# Patient Record
Sex: MALE | Race: WHITE | NOT HISPANIC OR LATINO | Employment: OTHER | URBAN - METROPOLITAN AREA
[De-identification: names, ages, dates, MRNs, and addresses within clinical notes are randomized per-mention and may not be internally consistent; named-entity substitution may affect disease eponyms.]

---

## 2023-03-09 LAB
THYROTROPIN (MIU/L) IN SER/PLAS BY DETECTION LIMIT <= 0.05 MIU/L: 2 MIU/L (ref 0.44–3.98)
THYROXINE (T4) FREE (NG/DL) IN SER/PLAS: 1.21 NG/DL (ref 0.78–1.48)

## 2023-06-26 LAB
ALANINE AMINOTRANSFERASE (SGPT) (U/L) IN SER/PLAS: 17 U/L (ref 10–52)
ALBUMIN (G/DL) IN SER/PLAS: 4.3 G/DL (ref 3.4–5)
ALKALINE PHOSPHATASE (U/L) IN SER/PLAS: 27 U/L (ref 33–136)
ANION GAP IN SER/PLAS: 15 MMOL/L (ref 10–20)
ASPARTATE AMINOTRANSFERASE (SGOT) (U/L) IN SER/PLAS: 13 U/L (ref 9–39)
BILIRUBIN TOTAL (MG/DL) IN SER/PLAS: 0.7 MG/DL (ref 0–1.2)
CALCIUM (MG/DL) IN SER/PLAS: 9.8 MG/DL (ref 8.6–10.6)
CARBON DIOXIDE, TOTAL (MMOL/L) IN SER/PLAS: 27 MMOL/L (ref 21–32)
CHLORIDE (MMOL/L) IN SER/PLAS: 103 MMOL/L (ref 98–107)
CHOLESTEROL (MG/DL) IN SER/PLAS: 133 MG/DL (ref 0–199)
CHOLESTEROL IN HDL (MG/DL) IN SER/PLAS: 39.5 MG/DL
CHOLESTEROL/HDL RATIO: 3.4
CREATININE (MG/DL) IN SER/PLAS: 1.11 MG/DL (ref 0.5–1.3)
ESTIMATED AVERAGE GLUCOSE FOR HBA1C: 140 MG/DL
GFR MALE: 71 ML/MIN/1.73M2
GLUCOSE (MG/DL) IN SER/PLAS: 166 MG/DL (ref 74–99)
HEMOGLOBIN A1C/HEMOGLOBIN TOTAL IN BLOOD: 6.5 %
LDL: 63 MG/DL (ref 0–99)
POTASSIUM (MMOL/L) IN SER/PLAS: 4.6 MMOL/L (ref 3.5–5.3)
PROTEIN TOTAL: 6.9 G/DL (ref 6.4–8.2)
SODIUM (MMOL/L) IN SER/PLAS: 140 MMOL/L (ref 136–145)
TRIGLYCERIDE (MG/DL) IN SER/PLAS: 153 MG/DL (ref 0–149)
UREA NITROGEN (MG/DL) IN SER/PLAS: 21 MG/DL (ref 6–23)
VLDL: 31 MG/DL (ref 0–40)

## 2023-09-14 PROBLEM — S30.0XXA CONTUSION OF LOWER BACK: Status: ACTIVE | Noted: 2023-09-14

## 2023-09-14 PROBLEM — E55.9 VITAMIN D DEFICIENCY: Status: ACTIVE | Noted: 2023-09-14

## 2023-09-14 PROBLEM — I10 BENIGN ESSENTIAL HYPERTENSION: Status: ACTIVE | Noted: 2023-09-14

## 2023-09-14 PROBLEM — H35.89: Status: ACTIVE | Noted: 2023-09-14

## 2023-09-14 PROBLEM — R07.9 CHEST PAIN OF UNCERTAIN ETIOLOGY: Status: ACTIVE | Noted: 2023-09-14

## 2023-09-14 PROBLEM — N40.0 ENLARGED PROSTATE WITHOUT LOWER URINARY TRACT SYMPTOMS (LUTS): Status: ACTIVE | Noted: 2023-09-14

## 2023-09-14 PROBLEM — K52.9: Status: ACTIVE | Noted: 2023-09-14

## 2023-09-14 PROBLEM — K27.9 PUD (PEPTIC ULCER DISEASE): Status: ACTIVE | Noted: 2023-09-14

## 2023-09-14 PROBLEM — F22 PARANOIA (PSYCHOSIS) (MULTI): Status: ACTIVE | Noted: 2023-09-14

## 2023-09-14 PROBLEM — F33.1 MODERATE EPISODE OF RECURRENT MAJOR DEPRESSIVE DISORDER (MULTI): Status: ACTIVE | Noted: 2023-09-14

## 2023-09-14 PROBLEM — R35.0 URINARY FREQUENCY: Status: ACTIVE | Noted: 2023-09-14

## 2023-09-14 PROBLEM — I10 HTN (HYPERTENSION): Status: ACTIVE | Noted: 2023-09-14

## 2023-09-14 PROBLEM — F44.89 REACTIVE CONFUSION: Status: ACTIVE | Noted: 2023-09-14

## 2023-09-14 PROBLEM — R63.0 ANOREXIA: Status: ACTIVE | Noted: 2023-09-14

## 2023-09-14 PROBLEM — T50.905A ADVERSE EFFECT OF DRUG: Status: ACTIVE | Noted: 2023-09-14

## 2023-09-14 PROBLEM — E78.00 ELEVATED LDL CHOLESTEROL LEVEL: Status: ACTIVE | Noted: 2023-09-14

## 2023-09-14 PROBLEM — E03.9 PRIMARY HYPOTHYROIDISM: Status: ACTIVE | Noted: 2023-09-14

## 2023-09-14 PROBLEM — M79.609 TRIGGER POINT OF EXTREMITY: Status: ACTIVE | Noted: 2023-09-14

## 2023-09-14 PROBLEM — F25.9 SCHIZOAFFECTIVE DISORDER (MULTI): Status: ACTIVE | Noted: 2023-09-14

## 2023-09-14 PROBLEM — E11.9 DIABETES MELLITUS (MULTI): Status: ACTIVE | Noted: 2023-09-14

## 2023-09-14 PROBLEM — J44.9 COPD (CHRONIC OBSTRUCTIVE PULMONARY DISEASE) (MULTI): Status: ACTIVE | Noted: 2023-09-14

## 2023-09-14 PROBLEM — M17.12 PRIMARY OSTEOARTHRITIS OF LEFT KNEE: Status: ACTIVE | Noted: 2023-09-14

## 2023-09-14 PROBLEM — F41.8 MIXED ANXIETY DEPRESSIVE DISORDER: Status: ACTIVE | Noted: 2023-09-14

## 2023-09-14 PROBLEM — G62.9 PERIPHERAL NEUROPATHY: Status: ACTIVE | Noted: 2023-09-14

## 2023-09-14 PROBLEM — G21.11 NEUROLEPTIC-INDUCED PARKINSONISM (MULTI): Status: ACTIVE | Noted: 2023-09-14

## 2023-09-14 PROBLEM — R45.81 CHRONIC LOW SELF ESTEEM: Status: ACTIVE | Noted: 2023-09-14

## 2023-09-14 PROBLEM — G45.9 TIA (TRANSIENT ISCHEMIC ATTACK): Status: ACTIVE | Noted: 2023-09-14

## 2023-09-14 PROBLEM — F22 PARANOID IDEATION (MULTI): Status: ACTIVE | Noted: 2023-09-14

## 2023-09-14 PROBLEM — R42 VERTIGO: Status: ACTIVE | Noted: 2023-09-14

## 2023-09-14 PROBLEM — L70.0 ACNE VULGARIS: Status: ACTIVE | Noted: 2020-02-18

## 2023-09-14 PROBLEM — N50.819 PERSISTENT TESTICULAR PAIN: Status: ACTIVE | Noted: 2023-09-14

## 2023-09-14 PROBLEM — G89.29 PENILE PAIN, CHRONIC: Status: ACTIVE | Noted: 2023-09-14

## 2023-09-14 PROBLEM — H60.62 CHRONIC OTITIS EXTERNA OF LEFT EAR: Status: ACTIVE | Noted: 2023-09-14

## 2023-09-14 PROBLEM — Z04.9 CONDITION NOT FOUND: Status: ACTIVE | Noted: 2023-09-14

## 2023-09-14 PROBLEM — R53.83 FATIGUE: Status: ACTIVE | Noted: 2023-09-14

## 2023-09-14 PROBLEM — K21.00 REFLUX ESOPHAGITIS: Status: ACTIVE | Noted: 2023-09-14

## 2023-09-14 PROBLEM — N52.9 ORGANIC IMPOTENCE: Status: ACTIVE | Noted: 2023-09-14

## 2023-09-14 PROBLEM — F43.10 POST TRAUMATIC STRESS DISORDER (PTSD): Status: ACTIVE | Noted: 2023-09-14

## 2023-09-14 PROBLEM — R29.898 LEG WEAKNESS: Status: ACTIVE | Noted: 2023-09-14

## 2023-09-14 PROBLEM — H40.9 GLAUCOMA: Status: ACTIVE | Noted: 2023-09-14

## 2023-09-14 PROBLEM — R53.1 GENERALIZED WEAKNESS: Status: ACTIVE | Noted: 2023-09-14

## 2023-09-14 PROBLEM — E11.42 DIABETIC PERIPHERAL NEUROPATHY (MULTI): Status: ACTIVE | Noted: 2023-09-14

## 2023-09-14 PROBLEM — H54.7 IMPAIRED VISION: Status: ACTIVE | Noted: 2023-09-14

## 2023-09-14 PROBLEM — I95.2 HYPOTENSION DUE TO DRUGS: Status: ACTIVE | Noted: 2023-09-14

## 2023-09-14 PROBLEM — R06.02 SOB (SHORTNESS OF BREATH): Status: ACTIVE | Noted: 2023-09-14

## 2023-09-14 PROBLEM — M25.562 ACUTE PAIN OF BOTH KNEES: Status: ACTIVE | Noted: 2023-09-14

## 2023-09-14 PROBLEM — M25.569 ARTHRALGIA OF KNEE: Status: ACTIVE | Noted: 2023-09-14

## 2023-09-14 PROBLEM — E78.1 HIGH TRIGLYCERIDES: Status: ACTIVE | Noted: 2023-09-14

## 2023-09-14 PROBLEM — E29.1 HYPOGONADISM MALE: Status: ACTIVE | Noted: 2023-09-14

## 2023-09-14 PROBLEM — M50.30 DEGENERATION OF INTERVERTEBRAL DISC OF CERVICAL REGION: Status: ACTIVE | Noted: 2023-09-14

## 2023-09-14 PROBLEM — G47.00 INSOMNIA: Status: ACTIVE | Noted: 2023-09-14

## 2023-09-14 PROBLEM — E07.9 THYROID DISORDER: Status: ACTIVE | Noted: 2023-09-14

## 2023-09-14 PROBLEM — F68.8: Status: ACTIVE | Noted: 2023-09-14

## 2023-09-14 PROBLEM — T43.505A NEUROLEPTIC-INDUCED PARKINSONISM (MULTI): Status: ACTIVE | Noted: 2023-09-14

## 2023-09-14 PROBLEM — E29.1 TESTICULAR HYPOFUNCTION: Status: ACTIVE | Noted: 2023-09-14

## 2023-09-14 PROBLEM — J45.909 ASTHMATIC BRONCHITIS (HHS-HCC): Status: ACTIVE | Noted: 2023-09-14

## 2023-09-14 PROBLEM — M51.26 LUMBAR DISC HERNIATION: Status: ACTIVE | Noted: 2023-09-14

## 2023-09-14 PROBLEM — M25.561 ACUTE PAIN OF BOTH KNEES: Status: ACTIVE | Noted: 2023-09-14

## 2023-09-14 PROBLEM — K40.90 RIGHT INGUINAL HERNIA: Status: ACTIVE | Noted: 2023-09-14

## 2023-09-14 PROBLEM — H93.13 SUBJECTIVE TINNITUS OF BOTH EARS: Status: ACTIVE | Noted: 2023-09-14

## 2023-09-14 PROBLEM — G50.0 TRIGEMINAL NEURALGIA: Status: ACTIVE | Noted: 2023-09-14

## 2023-09-14 PROBLEM — R55 VASOVAGAL REACTION: Status: ACTIVE | Noted: 2023-09-14

## 2023-09-14 PROBLEM — H90.8 MIXED HEARING LOSS: Status: ACTIVE | Noted: 2023-09-14

## 2023-09-14 PROBLEM — E11.69: Status: ACTIVE | Noted: 2023-09-14

## 2023-09-14 PROBLEM — M77.11 LATERAL EPICONDYLITIS OF RIGHT ELBOW: Status: ACTIVE | Noted: 2023-09-14

## 2023-09-14 PROBLEM — M17.11 PRIMARY OSTEOARTHRITIS OF RIGHT KNEE: Status: ACTIVE | Noted: 2023-09-14

## 2023-09-14 PROBLEM — H66.90 CHRONIC OTITIS MEDIA: Status: ACTIVE | Noted: 2023-09-14

## 2023-09-14 PROBLEM — R73.9 HYPERGLYCEMIA: Status: ACTIVE | Noted: 2023-09-14

## 2023-09-14 PROBLEM — E11.610: Status: ACTIVE | Noted: 2023-09-14

## 2023-09-14 PROBLEM — N48.89 PENILE PAIN, CHRONIC: Status: ACTIVE | Noted: 2023-09-14

## 2023-09-14 PROBLEM — J98.11 PULMONARY ATELECTASIS: Status: ACTIVE | Noted: 2023-09-14

## 2023-09-14 PROBLEM — N45.1 EPIDIDYMITIS: Status: ACTIVE | Noted: 2023-09-14

## 2023-09-14 PROBLEM — G40.909 SEIZURE DISORDER (MULTI): Status: ACTIVE | Noted: 2023-09-14

## 2023-09-14 RX ORDER — DAPAGLIFLOZIN 5 MG/1
1 TABLET, FILM COATED ORAL DAILY
COMMUNITY
Start: 2019-05-14 | End: 2023-10-27 | Stop reason: SDUPTHER

## 2023-09-14 RX ORDER — TIMOLOL MALEATE 5 MG/ML
SOLUTION/ DROPS OPHTHALMIC
COMMUNITY
Start: 2012-07-12 | End: 2023-11-29 | Stop reason: SDUPTHER

## 2023-09-14 RX ORDER — POLYETHYLENE GLYCOL 3350, SODIUM CHLORIDE, SODIUM BICARBONATE, POTASSIUM CHLORIDE 420; 11.2; 5.72; 1.48 G/4L; G/4L; G/4L; G/4L
POWDER, FOR SOLUTION ORAL
COMMUNITY
Start: 2022-02-18

## 2023-09-14 RX ORDER — TESTOSTERONE 20.25 MG/1.25G
2 GEL TOPICAL DAILY
COMMUNITY
Start: 2021-02-26 | End: 2023-12-04 | Stop reason: SDUPTHER

## 2023-09-14 RX ORDER — RIBOFLAVIN (VITAMIN B2) 400 MG
400 TABLET ORAL
COMMUNITY
End: 2024-04-19 | Stop reason: ALTCHOICE

## 2023-09-14 RX ORDER — FLUOXETINE HYDROCHLORIDE 20 MG/1
1 CAPSULE ORAL DAILY
COMMUNITY
Start: 2020-03-17 | End: 2023-11-28 | Stop reason: WASHOUT

## 2023-09-14 RX ORDER — CLINDAMYCIN PHOSPHATE 11.9 MG/ML
1 SOLUTION TOPICAL
COMMUNITY
Start: 2016-10-26 | End: 2024-04-19 | Stop reason: ALTCHOICE

## 2023-09-14 RX ORDER — LACTULOSE 10 G/15ML
30 SOLUTION ORAL; RECTAL DAILY
COMMUNITY
Start: 2020-01-22 | End: 2024-04-19 | Stop reason: ALTCHOICE

## 2023-09-14 RX ORDER — DIAZEPAM 5 MG/1
1 TABLET ORAL NIGHTLY PRN
COMMUNITY
Start: 2013-03-12 | End: 2024-04-01

## 2023-09-14 RX ORDER — METFORMIN HYDROCHLORIDE 500 MG/1
2 TABLET ORAL 2 TIMES DAILY
COMMUNITY
Start: 2012-11-20 | End: 2024-02-08

## 2023-09-14 RX ORDER — LANOLIN ALCOHOL/MO/W.PET/CERES
1 CREAM (GRAM) TOPICAL DAILY
COMMUNITY
Start: 2016-09-29 | End: 2024-04-19 | Stop reason: ALTCHOICE

## 2023-09-14 RX ORDER — CHOLECALCIFEROL (VITAMIN D3) 50 MCG
2 TABLET ORAL
COMMUNITY
Start: 2016-12-13

## 2023-09-14 RX ORDER — ATORVASTATIN CALCIUM 20 MG/1
1 TABLET, FILM COATED ORAL NIGHTLY
COMMUNITY
Start: 2016-09-29 | End: 2023-11-07 | Stop reason: SDUPTHER

## 2023-09-14 RX ORDER — LISINOPRIL 10 MG/1
1 TABLET ORAL DAILY
COMMUNITY
Start: 2016-08-22 | End: 2023-10-03

## 2023-09-14 RX ORDER — RISPERIDONE 1 MG/1
1 TABLET ORAL 2 TIMES DAILY
COMMUNITY
Start: 2022-09-06 | End: 2023-11-15

## 2023-09-14 RX ORDER — BUPROPION HYDROCHLORIDE 150 MG/1
150 TABLET ORAL 2 TIMES DAILY
COMMUNITY
Start: 2019-02-11

## 2023-09-14 RX ORDER — LEVOTHYROXINE SODIUM 88 UG/1
1 TABLET ORAL DAILY
COMMUNITY
Start: 2014-08-28 | End: 2023-10-30

## 2023-09-14 RX ORDER — PANTOPRAZOLE SODIUM 40 MG/1
1 TABLET, DELAYED RELEASE ORAL DAILY
COMMUNITY
Start: 2018-12-12 | End: 2023-10-19

## 2023-09-14 RX ORDER — LATANOPROST 50 UG/ML
SOLUTION/ DROPS OPHTHALMIC
COMMUNITY
Start: 2016-09-14

## 2023-09-14 RX ORDER — GLIPIZIDE 5 MG/1
2 TABLET ORAL
COMMUNITY
Start: 2015-03-03 | End: 2023-10-26 | Stop reason: SDUPTHER

## 2023-09-14 RX ORDER — OMEGA-3-ACID ETHYL ESTERS 1 G/1
4 CAPSULE, LIQUID FILLED ORAL
COMMUNITY
Start: 2015-05-11 | End: 2023-11-27 | Stop reason: SDUPTHER

## 2023-09-14 RX ORDER — PHENYLPROPANOLAMINE/CLEMASTINE 75-1.34MG
200 TABLET, EXTENDED RELEASE ORAL
COMMUNITY

## 2023-09-14 RX ORDER — TRIAMCINOLONE ACETONIDE 1 MG/G
CREAM TOPICAL
COMMUNITY
Start: 2016-10-26

## 2023-09-14 RX ORDER — LEVETIRACETAM 250 MG/1
1 TABLET ORAL DAILY
COMMUNITY
Start: 2022-06-06

## 2023-09-14 RX ORDER — BLOOD-GLUCOSE METER
KIT MISCELLANEOUS 2 TIMES DAILY
COMMUNITY
Start: 2014-09-03

## 2023-10-02 ENCOUNTER — TELEPHONE (OUTPATIENT)
Dept: PRIMARY CARE | Facility: CLINIC | Age: 73
End: 2023-10-02
Payer: COMMERCIAL

## 2023-10-03 ENCOUNTER — TELEMEDICINE (OUTPATIENT)
Dept: BEHAVIORAL HEALTH | Facility: CLINIC | Age: 73
End: 2023-10-03
Payer: COMMERCIAL

## 2023-10-03 DIAGNOSIS — F41.8 MIXED ANXIETY DEPRESSIVE DISORDER: ICD-10-CM

## 2023-10-03 DIAGNOSIS — F25.1 SCHIZOAFFECTIVE DISORDER, DEPRESSIVE TYPE (MULTI): ICD-10-CM

## 2023-10-03 DIAGNOSIS — I10 ESSENTIAL (PRIMARY) HYPERTENSION: ICD-10-CM

## 2023-10-03 PROCEDURE — 99215 OFFICE O/P EST HI 40 MIN: CPT | Performed by: PSYCHIATRY & NEUROLOGY

## 2023-10-03 RX ORDER — LISINOPRIL 10 MG/1
10 TABLET ORAL DAILY
Qty: 90 TABLET | Refills: 2 | Status: SHIPPED | OUTPATIENT
Start: 2023-10-03

## 2023-10-03 ASSESSMENT — ACTIVITIES OF DAILY LIVING (ADL)
DRESSING: INDEPENDENT
BATHING: INDEPENDENT
JUDGMENT_ADEQUATE_SAFELY_COMPLETE_DAILY_ACTIVITIES: YES
FEEDING: INDEPENDENT
MANAGING FINANCES: INDEPENDENT
GROCERY SHOPPING: INDEPENDENT
USING TRANSPORTATION: INDEPENDENT
TOILETING: INDEPENDENT
EATING: INDEPENDENT
STIL DRIVING: YES
PREPARING MEALS: INDEPENDENT
TAKING MEDICATION: INDEPENDENT
ADEQUATE_TO_COMPLETE_ADL: YES
USING TELEPHONE: INDEPENDENT
DOING HOUSEWORK: INDEPENDENT
NEEDS ASSISTANCE WITH FOOD: INDEPENDENT

## 2023-10-03 ASSESSMENT — ENCOUNTER SYMPTOMS
LOSS OF SENSATION IN FEET: 0
DEPRESSION: 0
DYSPHORIC MOOD: 1
NEUROLOGICAL NEGATIVE: 1
OCCASIONAL FEELINGS OF UNSTEADINESS: 0

## 2023-10-03 NOTE — PROGRESS NOTES
Subjective   Patient ID: Truong Armijo is a 73 y.o. male who presents for No chief complaint on file. I have COVID and I have am ill.     The patient reported that he now has COVID and is not feeling well. His symptoms started 4 days ago. Kirby is still concerned about his blood sugars.     Review of Systems   Neurological: Negative.    Psychiatric/Behavioral:  Positive for dysphoric mood.    All other systems reviewed and are negative.    Objective   Physical Exam  Constitutional:       Appearance: Normal appearance.   Neurological:      General: No focal deficit present.      Mental Status: He is alert and oriented to person, place, and time. Mental status is at baseline.   Psychiatric:      Comments: Dysphoric mood and now has COVID with symptoms starting four days ago. He has been concerned about his blood sugars.      Lab Review:       Assessment/Plan   The plan is to continue your current regimen and follow up next month with continuing monthly follow up.

## 2023-10-03 NOTE — TELEPHONE ENCOUNTER
LM for pt that we need a negative covid test within 24 hours due to his symptoms to come in the office

## 2023-10-04 ENCOUNTER — APPOINTMENT (OUTPATIENT)
Dept: PRIMARY CARE | Facility: CLINIC | Age: 73
End: 2023-10-04
Payer: COMMERCIAL

## 2023-10-17 ENCOUNTER — APPOINTMENT (OUTPATIENT)
Dept: BEHAVIORAL HEALTH | Facility: CLINIC | Age: 73
End: 2023-10-17
Payer: COMMERCIAL

## 2023-10-18 ENCOUNTER — OFFICE VISIT (OUTPATIENT)
Dept: PRIMARY CARE | Facility: CLINIC | Age: 73
End: 2023-10-18
Payer: COMMERCIAL

## 2023-10-18 VITALS
HEART RATE: 72 BPM | RESPIRATION RATE: 16 BRPM | SYSTOLIC BLOOD PRESSURE: 110 MMHG | DIASTOLIC BLOOD PRESSURE: 70 MMHG | BODY MASS INDEX: 27.44 KG/M2 | WEIGHT: 213.85 LBS | HEIGHT: 74 IN

## 2023-10-18 DIAGNOSIS — Z23 FLU VACCINE NEED: ICD-10-CM

## 2023-10-18 DIAGNOSIS — G93.39 OTHER POST INFECTION AND RELATED FATIGUE SYNDROMES: ICD-10-CM

## 2023-10-18 DIAGNOSIS — U09.9 POST-COVID-19 SYNDROME MANIFESTING AS CHRONIC FATIGUE: ICD-10-CM

## 2023-10-18 DIAGNOSIS — J43.1 PANLOBULAR EMPHYSEMA (MULTI): Primary | ICD-10-CM

## 2023-10-18 DIAGNOSIS — G93.32 POST-COVID-19 SYNDROME MANIFESTING AS CHRONIC FATIGUE: ICD-10-CM

## 2023-10-18 DIAGNOSIS — M51.26 LUMBAR DISC HERNIATION: ICD-10-CM

## 2023-10-18 DIAGNOSIS — I10 BENIGN ESSENTIAL HYPERTENSION: ICD-10-CM

## 2023-10-18 DIAGNOSIS — E03.9 PRIMARY HYPOTHYROIDISM: ICD-10-CM

## 2023-10-18 DIAGNOSIS — E78.00 ELEVATED LDL CHOLESTEROL LEVEL: ICD-10-CM

## 2023-10-18 DIAGNOSIS — K21.00 GASTROESOPHAGEAL REFLUX DISEASE WITH ESOPHAGITIS WITHOUT HEMORRHAGE: ICD-10-CM

## 2023-10-18 DIAGNOSIS — E11.9 TYPE 2 DIABETES MELLITUS WITHOUT COMPLICATION, WITHOUT LONG-TERM CURRENT USE OF INSULIN (MULTI): ICD-10-CM

## 2023-10-18 PROBLEM — H25.13 NUCLEAR SCLEROTIC CATARACT OF BOTH EYES: Status: RESOLVED | Noted: 2019-09-26 | Resolved: 2023-10-18

## 2023-10-18 PROBLEM — D23.10 BENIGN NEOPLASM OF EYELID INCLUDING CANTHUS: Status: RESOLVED | Noted: 2017-12-28 | Resolved: 2023-10-18

## 2023-10-18 PROBLEM — J45.909 ASTHMA (HHS-HCC): Status: RESOLVED | Noted: 2019-09-26 | Resolved: 2023-10-18

## 2023-10-18 PROCEDURE — 3044F HG A1C LEVEL LT 7.0%: CPT | Performed by: INTERNAL MEDICINE

## 2023-10-18 PROCEDURE — 4010F ACE/ARB THERAPY RXD/TAKEN: CPT | Performed by: INTERNAL MEDICINE

## 2023-10-18 PROCEDURE — 1126F AMNT PAIN NOTED NONE PRSNT: CPT | Performed by: INTERNAL MEDICINE

## 2023-10-18 PROCEDURE — 90471 IMMUNIZATION ADMIN: CPT | Performed by: INTERNAL MEDICINE

## 2023-10-18 PROCEDURE — 3078F DIAST BP <80 MM HG: CPT | Performed by: INTERNAL MEDICINE

## 2023-10-18 PROCEDURE — 1036F TOBACCO NON-USER: CPT | Performed by: INTERNAL MEDICINE

## 2023-10-18 PROCEDURE — 3074F SYST BP LT 130 MM HG: CPT | Performed by: INTERNAL MEDICINE

## 2023-10-18 PROCEDURE — 99214 OFFICE O/P EST MOD 30 MIN: CPT | Performed by: INTERNAL MEDICINE

## 2023-10-18 PROCEDURE — 90662 IIV NO PRSV INCREASED AG IM: CPT | Performed by: INTERNAL MEDICINE

## 2023-10-18 PROCEDURE — 1159F MED LIST DOCD IN RCRD: CPT | Performed by: INTERNAL MEDICINE

## 2023-10-18 ASSESSMENT — ENCOUNTER SYMPTOMS
EYES NEGATIVE: 1
NEUROLOGICAL NEGATIVE: 1
HEMATOLOGIC/LYMPHATIC NEGATIVE: 1
ALLERGIC/IMMUNOLOGIC NEGATIVE: 1
ENDOCRINE NEGATIVE: 1
GASTROINTESTINAL NEGATIVE: 1
RESPIRATORY NEGATIVE: 1
CARDIOVASCULAR NEGATIVE: 1
CONSTITUTIONAL NEGATIVE: 1
MUSCULOSKELETAL NEGATIVE: 1
PSYCHIATRIC NEGATIVE: 1

## 2023-10-18 NOTE — ASSESSMENT & PLAN NOTE
DM - NIDDM  . Reviewed with patient / Will check  HbA1c and fasting blood sugars. Educate home self monitoring and diary keeping(reviewed with patient home blood sugar levels /diary). Educate extensively low calorie diet and weight loss with exercise. Reviewed BS- diary and Rx. Regimen. [place] renal protection with ARB/ACEI.               Educate compliance with diet and Rx. And educate risks and autcomes.

## 2023-10-18 NOTE — ASSESSMENT & PLAN NOTE
POST COVID Fatigue - Fatigue - check CMP(metabolic panel and elctrolytes) , CBC(complete blood cell count), TSH(thyroid function). Insomnia may play a role and sleep studies(rule out sleep apnea) are recommended . Educate sleep hygiene. Consider anxiety disorder vs. depression. Consider Stress test, and 2DECHO.

## 2023-10-18 NOTE — ASSESSMENT & PLAN NOTE
HTN - hypertension well/controlled .Target BP < 130/80  achieved. Educate low salt diet and exercise with weight loss. Educate home self monitoring and diary keeping. Educate risks of elevate blood pressure and benefits of prompt treatment.  Refill Lisinopril

## 2023-10-18 NOTE — ASSESSMENT & PLAN NOTE
DDD - Degenerative disc disease of the Lumbo-Sacral (LS)/Cervical (C)  spine. Educate exercises and referred patient to Physical Therapy (PT). Ordered X-Ray's of the LS/C spine. Consider MRI. Radiculopathy in the distribution of L4-L5-S1/C3-C4-C5 nerve roots, needs NSAIDS (Naprosin 500mg BID vs. Arthrotec 75mg BID or Prednisone taper (Medrol dose pack), Flexeril 10 mg qHS, heat application, and pain control with Tylenol 325 mg TID.

## 2023-10-18 NOTE — ASSESSMENT & PLAN NOTE
Hypothyroidism - Symptoms well/controlled (weight gain, fatigue, constipation, cold intolerance). Check TSH continues dose of Synthroid/Levothyroxine  of  88 bmcg/qD.

## 2023-10-18 NOTE — PROGRESS NOTES
"Subjective   Patient ID: Truong Armijo is a 73 y.o. male who presents for Follow-up (Follow up/Pt tested positive for covid two weeks ago-still feeling fatigue ).    HPI Covid infection 15 days ago was treated with Paxlovid and had bad symptoms and still feels very fatigued       Review of Systems   Constitutional: Negative.    HENT: Negative.     Eyes: Negative.    Respiratory: Negative.     Cardiovascular: Negative.    Gastrointestinal: Negative.    Endocrine: Negative.    Musculoskeletal: Negative.    Skin: Negative.    Allergic/Immunologic: Negative.    Neurological: Negative.    Hematological: Negative.    Psychiatric/Behavioral: Negative.     All other systems reviewed and are negative.      Objective   Ht 1.88 m (6' 2\")   Wt 97 kg (213 lb 13.5 oz)   BMI 27.46 kg/m²   Blood pressure 110/70, pulse 72, resp. rate 16, height 1.88 m (6' 2\"), weight 97 kg (213 lb 13.5 oz).   Physical Exam  Vitals and nursing note reviewed.   Constitutional:       Appearance: Normal appearance.   HENT:      Head: Normocephalic and atraumatic.      Right Ear: Tympanic membrane, ear canal and external ear normal.      Left Ear: Tympanic membrane, ear canal and external ear normal. There is no impacted cerumen.      Nose: Nose normal.      Mouth/Throat:      Mouth: Mucous membranes are moist.      Pharynx: Oropharynx is clear.   Eyes:      Extraocular Movements: Extraocular movements intact.      Conjunctiva/sclera: Conjunctivae normal.      Pupils: Pupils are equal, round, and reactive to light.   Cardiovascular:      Rate and Rhythm: Normal rate and regular rhythm.      Pulses: Normal pulses.      Heart sounds: Normal heart sounds. No murmur heard.  Pulmonary:      Effort: Pulmonary effort is normal. No respiratory distress.      Breath sounds: Normal breath sounds. No stridor. No wheezing, rhonchi or rales.   Chest:      Chest wall: No tenderness.   Abdominal:      General: Abdomen is flat. Bowel sounds are normal. There is no " distension.      Palpations: Abdomen is soft. There is no mass.      Tenderness: There is no abdominal tenderness. There is no right CVA tenderness, left CVA tenderness, guarding or rebound.      Hernia: No hernia is present.   Musculoskeletal:         General: Normal range of motion.      Cervical back: Normal range of motion and neck supple.   Skin:     General: Skin is warm.      Capillary Refill: Capillary refill takes less than 2 seconds.   Neurological:      General: No focal deficit present.      Mental Status: He is alert.      Cranial Nerves: No cranial nerve deficit.      Sensory: No sensory deficit.      Motor: No weakness.      Coordination: Coordination normal.      Gait: Gait normal.      Deep Tendon Reflexes: Reflexes normal.   Psychiatric:         Mood and Affect: Mood normal.         Behavior: Behavior normal. Behavior is cooperative.         Thought Content: Thought content normal.         Judgment: Judgment normal.         Assessment/Plan   Problem List Items Addressed This Visit             ICD-10-CM    Benign essential hypertension I10     HTN - hypertension well/controlled .Target BP < 130/80  achieved. Educate low salt diet and exercise with weight loss. Educate home self monitoring and diary keeping. Educate risks of elevate blood pressure and benefits of prompt treatment.  Refill Lisinopril            COPD (chronic obstructive pulmonary disease) (CMS/[facility]) - Primary J44.9    Elevated LDL cholesterol level E78.00     Hypercholesterolemia - Monitor lipid profile and educate patient upon risks of high cholesterol and targets. Educate diet and change in lifestyle and increase in exercises - Refill: Atorvastatin  and educate compliance with medication and diet.           Relevant Orders    Lipid Panel    Fatigue R53.83     POST COVID Fatigue - Fatigue - check CMP(metabolic panel and elctrolytes) , CBC(complete blood cell count), TSH(thyroid function). Insomnia may play a role and sleep  studies(rule out sleep apnea) are recommended . Educate sleep hygiene. Consider anxiety disorder vs. depression. Consider Stress test, and 2DECHO.           Relevant Orders    CBC and Auto Differential    Diabetes mellitus (CMS/[facility]) E11.9     DM - NIDDM  . Reviewed with patient / Will check  HbA1c and fasting blood sugars. Educate home self monitoring and diary keeping(reviewed with patient home blood sugar levels /diary). Educate extensively low calorie diet and weight loss with exercise. Reviewed BS- diary and Rx. Regimen. [place] renal protection with ARB/ACEI.               Educate compliance with diet and Rx. And educate risks and autcomes.                                                  Relevant Orders    Comprehensive Metabolic Panel    Hemoglobin A1C    Lumbar disc herniation M51.26     DDD - Degenerative disc disease of the Lumbo-Sacral (LS)/Cervical (C)  spine. Educate exercises and referred patient to Physical Therapy (PT). Ordered X-Ray's of the LS/C spine. Consider MRI. Radiculopathy in the distribution of L4-L5-S1/C3-C4-C5 nerve roots, needs NSAIDS (Naprosin 500mg BID vs. Arthrotec 75mg BID or Prednisone taper (Medrol dose pack), Flexeril 10 mg qHS, heat application, and pain control with Tylenol 325 mg TID.           Primary hypothyroidism E03.9     Hypothyroidism - Symptoms well/controlled (weight gain, fatigue, constipation, cold intolerance). Check TSH continues dose of Synthroid/Levothyroxine  of  88 bmcg/qD.           Relevant Orders    TSH with reflex to Free T4 if abnormal    Reflux esophagitis K21.00     GERD - Acid reflux disease. Rx. PPI (Prilosec/Prevacid/Protonix/Nexium) and educate diet and life style changes. Referred patient to an endoscopy (EGD) and check H. Pylori titers.           Post-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 chronic fatigue G93.32, U09.9     Other Visit Diagnoses         Codes    Flu vaccine need     Z23    Relevant Orders    Flu vaccine, quadrivalent, high-dose,  preservative free, age 65y+ (FLUZONE)

## 2023-10-18 NOTE — ASSESSMENT & PLAN NOTE
Hypercholesterolemia - Monitor lipid profile and educate patient upon risks of high cholesterol and targets. Educate diet and change in lifestyle and increase in exercises - Refill: Atorvastatin  and educate compliance with medication and diet.

## 2023-10-19 DIAGNOSIS — K27.9 PEPTIC ULCER, SITE UNSPECIFIED, UNSPECIFIED AS ACUTE OR CHRONIC, WITHOUT HEMORRHAGE OR PERFORATION: ICD-10-CM

## 2023-10-19 RX ORDER — PANTOPRAZOLE SODIUM 40 MG/1
40 TABLET, DELAYED RELEASE ORAL DAILY
Qty: 90 TABLET | Refills: 2 | Status: SHIPPED | OUTPATIENT
Start: 2023-10-19 | End: 2024-03-08 | Stop reason: SDUPTHER

## 2023-10-24 ENCOUNTER — TELEPHONE (OUTPATIENT)
Dept: NEUROLOGY | Facility: CLINIC | Age: 73
End: 2023-10-24
Payer: COMMERCIAL

## 2023-10-24 ENCOUNTER — APPOINTMENT (OUTPATIENT)
Dept: BEHAVIORAL HEALTH | Facility: CLINIC | Age: 73
End: 2023-10-24
Payer: COMMERCIAL

## 2023-10-24 DIAGNOSIS — G43.109 MIGRAINE EQUIVALENT: Primary | ICD-10-CM

## 2023-10-24 RX ORDER — GLUCOSAMINE HCL 500 MG
200 TABLET ORAL DAILY
Qty: 60 TABLET | Refills: 5 | Status: SHIPPED | OUTPATIENT
Start: 2023-10-24 | End: 2023-12-11

## 2023-10-24 NOTE — TELEPHONE ENCOUNTER
Toparimate was not helping... stated it made him feel worse...patient stopped taking it (not sure when he stopped, possibly a month ago) ... Would like to know what else he can do.

## 2023-10-26 DIAGNOSIS — E11.9 TYPE 2 DIABETES MELLITUS WITHOUT COMPLICATION, WITHOUT LONG-TERM CURRENT USE OF INSULIN (MULTI): ICD-10-CM

## 2023-10-26 RX ORDER — GLIPIZIDE 5 MG/1
10 TABLET ORAL
Qty: 120 TABLET | Refills: 2 | Status: SHIPPED | OUTPATIENT
Start: 2023-10-26 | End: 2024-01-26 | Stop reason: SDUPTHER

## 2023-10-27 DIAGNOSIS — E11.9 TYPE 2 DIABETES MELLITUS WITHOUT COMPLICATION, WITHOUT LONG-TERM CURRENT USE OF INSULIN (MULTI): Primary | ICD-10-CM

## 2023-10-27 RX ORDER — DAPAGLIFLOZIN 5 MG/1
5 TABLET, FILM COATED ORAL DAILY
Qty: 90 TABLET | Refills: 0 | Status: SHIPPED | OUTPATIENT
Start: 2023-10-27 | End: 2024-02-08

## 2023-10-29 DIAGNOSIS — E03.9 HYPOTHYROIDISM, UNSPECIFIED: ICD-10-CM

## 2023-10-30 RX ORDER — LEVOTHYROXINE SODIUM 88 UG/1
88 TABLET ORAL DAILY
Qty: 90 TABLET | Refills: 0 | Status: SHIPPED | OUTPATIENT
Start: 2023-10-30 | End: 2023-12-27 | Stop reason: SDUPTHER

## 2023-11-07 ENCOUNTER — OFFICE VISIT (OUTPATIENT)
Dept: OPHTHALMOLOGY | Facility: CLINIC | Age: 73
End: 2023-11-07
Payer: COMMERCIAL

## 2023-11-07 DIAGNOSIS — H52.4 PRESBYOPIA OF BOTH EYES: ICD-10-CM

## 2023-11-07 DIAGNOSIS — H43.813 PVD (POSTERIOR VITREOUS DETACHMENT), BOTH EYES: ICD-10-CM

## 2023-11-07 DIAGNOSIS — E78.1 HIGH TRIGLYCERIDES: ICD-10-CM

## 2023-11-07 DIAGNOSIS — H53.19 PHOTOPSIA: ICD-10-CM

## 2023-11-07 DIAGNOSIS — Z96.1 PSEUDOPHAKIA OF BOTH EYES: ICD-10-CM

## 2023-11-07 DIAGNOSIS — H52.13 MYOPIA, BILATERAL: ICD-10-CM

## 2023-11-07 DIAGNOSIS — H26.493 POSTERIOR CAPSULAR OPACIFICATION OF BOTH EYES, NOT OBSCURING VISION: ICD-10-CM

## 2023-11-07 DIAGNOSIS — G43.109 ACEPHALGIC MIGRAINE: Primary | ICD-10-CM

## 2023-11-07 PROCEDURE — 92015 DETERMINE REFRACTIVE STATE: CPT | Performed by: OPTOMETRIST

## 2023-11-07 PROCEDURE — 92014 COMPRE OPH EXAM EST PT 1/>: CPT | Performed by: OPTOMETRIST

## 2023-11-07 RX ORDER — ATORVASTATIN CALCIUM 20 MG/1
20 TABLET, FILM COATED ORAL NIGHTLY
Qty: 90 TABLET | Refills: 0 | Status: SHIPPED | OUTPATIENT
Start: 2023-11-07 | End: 2024-02-06

## 2023-11-07 ASSESSMENT — REFRACTION_MANIFEST
OS_AXIS: 015
OD_ADD: +3.00
OS_ADD: +3.00
OS_CYLINDER: -0.75
OD_SPHERE: -2.75
OS_SPHERE: -2.50

## 2023-11-07 ASSESSMENT — REFRACTION_WEARINGRX
OD_SPHERE: -3.00
OD_ADD: +3.00
OS_CYLINDER: -0.75
OS_ADD: +3.00
OS_AXIS: 015
OS_SPHERE: -2.75

## 2023-11-07 ASSESSMENT — TONOMETRY
OD_IOP_MMHG: 13
OS_IOP_MMHG: 14
IOP_METHOD: GOLDMANN APPLANATION

## 2023-11-07 ASSESSMENT — CUP TO DISC RATIO
OD_RATIO: 0.7
OS_RATIO: 0.7

## 2023-11-07 ASSESSMENT — VISUAL ACUITY
OS_CC: 20/30
CORRECTION_TYPE: GLASSES
OD_CC: 20/25
METHOD: SNELLEN - LINEAR

## 2023-11-07 ASSESSMENT — SLIT LAMP EXAM - LIDS
COMMENTS: NORMAL
COMMENTS: NORMAL

## 2023-11-07 ASSESSMENT — EXTERNAL EXAM - LEFT EYE: OS_EXAM: NORMAL

## 2023-11-07 ASSESSMENT — EXTERNAL EXAM - RIGHT EYE: OD_EXAM: NORMAL

## 2023-11-07 NOTE — PROGRESS NOTES
Hx. 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  A1C 6.5 (April 2023). Being seen by Dr. Garnica. Taking latanoprost, brimonidine and timolol for glaucoma and managed with Dr. Adamson. Recent history of acephalgic migraine. Ruled out retinal tear hole retinal detachment (RD) today as well. 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  The patient was asked to return to our clinic or seek out eye care ASAP if new flashes of light or floaters are noted.    PCO right eye (OD)>left eye (OS). No glare complaint and will monitor.   A spectacle prescription was dispensed to be used as needed.   RTC PRN with me.

## 2023-11-13 ENCOUNTER — LAB (OUTPATIENT)
Dept: LAB | Facility: LAB | Age: 73
End: 2023-11-13
Payer: COMMERCIAL

## 2023-11-13 DIAGNOSIS — E78.00 ELEVATED LDL CHOLESTEROL LEVEL: ICD-10-CM

## 2023-11-13 DIAGNOSIS — G93.39 OTHER POST INFECTION AND RELATED FATIGUE SYNDROMES: ICD-10-CM

## 2023-11-13 DIAGNOSIS — E03.9 PRIMARY HYPOTHYROIDISM: ICD-10-CM

## 2023-11-13 DIAGNOSIS — E11.9 TYPE 2 DIABETES MELLITUS WITHOUT COMPLICATION, WITHOUT LONG-TERM CURRENT USE OF INSULIN (MULTI): ICD-10-CM

## 2023-11-13 LAB
ALBUMIN SERPL BCP-MCNC: 4.7 G/DL (ref 3.4–5)
ALP SERPL-CCNC: 29 U/L (ref 33–136)
ALT SERPL W P-5'-P-CCNC: 16 U/L (ref 10–52)
ANION GAP SERPL CALC-SCNC: 15 MMOL/L (ref 10–20)
AST SERPL W P-5'-P-CCNC: 12 U/L (ref 9–39)
BASOPHILS # BLD AUTO: 0.07 X10*3/UL (ref 0–0.1)
BASOPHILS NFR BLD AUTO: 0.9 %
BILIRUB SERPL-MCNC: 0.8 MG/DL (ref 0–1.2)
BUN SERPL-MCNC: 21 MG/DL (ref 6–23)
CALCIUM SERPL-MCNC: 9.7 MG/DL (ref 8.6–10.6)
CHLORIDE SERPL-SCNC: 101 MMOL/L (ref 98–107)
CHOLEST SERPL-MCNC: 182 MG/DL (ref 0–199)
CHOLESTEROL/HDL RATIO: 4.2
CO2 SERPL-SCNC: 27 MMOL/L (ref 21–32)
CREAT SERPL-MCNC: 1.03 MG/DL (ref 0.5–1.3)
EOSINOPHIL # BLD AUTO: 0.25 X10*3/UL (ref 0–0.4)
EOSINOPHIL NFR BLD AUTO: 3.3 %
ERYTHROCYTE [DISTWIDTH] IN BLOOD BY AUTOMATED COUNT: 14 % (ref 11.5–14.5)
EST. AVERAGE GLUCOSE BLD GHB EST-MCNC: 128 MG/DL
GFR SERPL CREATININE-BSD FRML MDRD: 78 ML/MIN/1.73M*2
GLUCOSE SERPL-MCNC: 186 MG/DL (ref 74–99)
HBA1C MFR BLD: 6.1 %
HCT VFR BLD AUTO: 44.5 % (ref 41–52)
HDLC SERPL-MCNC: 43.2 MG/DL
HGB BLD-MCNC: 14.6 G/DL (ref 13.5–17.5)
IMM GRANULOCYTES # BLD AUTO: 0.04 X10*3/UL (ref 0–0.5)
IMM GRANULOCYTES NFR BLD AUTO: 0.5 % (ref 0–0.9)
LDLC SERPL CALC-MCNC: 109 MG/DL
LYMPHOCYTES # BLD AUTO: 1.97 X10*3/UL (ref 0.8–3)
LYMPHOCYTES NFR BLD AUTO: 25.8 %
MCH RBC QN AUTO: 30.7 PG (ref 26–34)
MCHC RBC AUTO-ENTMCNC: 32.8 G/DL (ref 32–36)
MCV RBC AUTO: 94 FL (ref 80–100)
MONOCYTES # BLD AUTO: 0.77 X10*3/UL (ref 0.05–0.8)
MONOCYTES NFR BLD AUTO: 10.1 %
NEUTROPHILS # BLD AUTO: 4.53 X10*3/UL (ref 1.6–5.5)
NEUTROPHILS NFR BLD AUTO: 59.4 %
NON HDL CHOLESTEROL: 139 MG/DL (ref 0–149)
NRBC BLD-RTO: 0 /100 WBCS (ref 0–0)
PLATELET # BLD AUTO: 248 X10*3/UL (ref 150–450)
POTASSIUM SERPL-SCNC: 4.1 MMOL/L (ref 3.5–5.3)
PROT SERPL-MCNC: 7.2 G/DL (ref 6.4–8.2)
RBC # BLD AUTO: 4.76 X10*6/UL (ref 4.5–5.9)
SODIUM SERPL-SCNC: 139 MMOL/L (ref 136–145)
TRIGL SERPL-MCNC: 147 MG/DL (ref 0–149)
TSH SERPL-ACNC: 2.68 MIU/L (ref 0.44–3.98)
VLDL: 29 MG/DL (ref 0–40)
WBC # BLD AUTO: 7.6 X10*3/UL (ref 4.4–11.3)

## 2023-11-13 PROCEDURE — 80061 LIPID PANEL: CPT

## 2023-11-13 PROCEDURE — 84443 ASSAY THYROID STIM HORMONE: CPT

## 2023-11-13 PROCEDURE — 83036 HEMOGLOBIN GLYCOSYLATED A1C: CPT

## 2023-11-13 PROCEDURE — 80053 COMPREHEN METABOLIC PANEL: CPT

## 2023-11-13 PROCEDURE — 85025 COMPLETE CBC W/AUTO DIFF WBC: CPT

## 2023-11-13 PROCEDURE — 36415 COLL VENOUS BLD VENIPUNCTURE: CPT

## 2023-11-15 DIAGNOSIS — F22 PARANOIA (PSYCHOSIS) (MULTI): ICD-10-CM

## 2023-11-15 RX ORDER — RISPERIDONE 1 MG/1
1 TABLET ORAL NIGHTLY
Qty: 30 TABLET | Refills: 2 | Status: SHIPPED | OUTPATIENT
Start: 2023-11-15 | End: 2024-03-19 | Stop reason: SDUPTHER

## 2023-11-18 DIAGNOSIS — E11.9 TYPE 2 DIABETES MELLITUS WITHOUT COMPLICATIONS (MULTI): ICD-10-CM

## 2023-11-20 RX ORDER — SITAGLIPTIN 100 MG/1
100 TABLET, FILM COATED ORAL DAILY
Qty: 90 TABLET | Refills: 0 | Status: SHIPPED | OUTPATIENT
Start: 2023-11-20 | End: 2024-02-16

## 2023-11-21 ENCOUNTER — TELEPHONE (OUTPATIENT)
Dept: PRIMARY CARE | Facility: CLINIC | Age: 73
End: 2023-11-21
Payer: COMMERCIAL

## 2023-11-21 ENCOUNTER — TELEPHONE (OUTPATIENT)
Dept: OTHER | Age: 73
End: 2023-11-21
Payer: COMMERCIAL

## 2023-11-21 NOTE — TELEPHONE ENCOUNTER
from [facility] Gissell Blackburn is calling to coordinate care for your mutual patient. She can be reached at [phone].

## 2023-11-22 ENCOUNTER — APPOINTMENT (OUTPATIENT)
Dept: PRIMARY CARE | Facility: CLINIC | Age: 73
End: 2023-11-22
Payer: COMMERCIAL

## 2023-11-26 PROBLEM — I10 ESSENTIAL HYPERTENSION: Status: ACTIVE | Noted: 2019-09-26

## 2023-11-26 RX ORDER — ARIPIPRAZOLE 15 MG/1
15 TABLET ORAL
COMMUNITY
Start: 2019-09-26 | End: 2023-11-28 | Stop reason: WASHOUT

## 2023-11-26 RX ORDER — NYSTATIN 100000 U/G
CREAM TOPICAL
COMMUNITY
Start: 2023-10-20

## 2023-11-26 RX ORDER — TOPIRAMATE 25 MG/1
TABLET ORAL
COMMUNITY
Start: 2023-09-15

## 2023-11-26 RX ORDER — SILDENAFIL CITRATE 20 MG/1
20 TABLET ORAL
COMMUNITY
Start: 2019-01-26 | End: 2024-04-19 | Stop reason: ALTCHOICE

## 2023-11-26 RX ORDER — CARBAMAZEPINE 100 MG/1
100 TABLET, EXTENDED RELEASE ORAL DAILY
COMMUNITY
Start: 2022-12-27 | End: 2023-11-28 | Stop reason: WASHOUT

## 2023-11-26 RX ORDER — BRIMONIDINE TARTRATE 1.5 MG/ML
SOLUTION/ DROPS OPHTHALMIC
COMMUNITY
Start: 2023-10-01 | End: 2024-01-12 | Stop reason: SDUPTHER

## 2023-11-26 RX ORDER — CHLORHEXIDINE GLUCONATE ORAL RINSE 1.2 MG/ML
SOLUTION DENTAL
COMMUNITY
Start: 2023-07-25 | End: 2024-04-19 | Stop reason: ALTCHOICE

## 2023-11-26 RX ORDER — BRIMONIDINE TARTRATE 1 MG/ML
SOLUTION/ DROPS OPHTHALMIC
COMMUNITY
Start: 2023-03-02 | End: 2024-01-15

## 2023-11-26 RX ORDER — NIACIN (INOSITOL NIACINATE) 400(500MG)
CAPSULE ORAL
COMMUNITY
Start: 2023-10-24 | End: 2024-01-26 | Stop reason: SDUPTHER

## 2023-11-26 RX ORDER — FLUOXETINE HYDROCHLORIDE 40 MG/1
40 CAPSULE ORAL DAILY
COMMUNITY
Start: 2023-10-27 | End: 2024-04-04

## 2023-11-26 RX ORDER — NIRMATRELVIR AND RITONAVIR 300-100 MG
KIT ORAL
COMMUNITY
Start: 2023-10-03 | End: 2023-11-28 | Stop reason: WASHOUT

## 2023-11-27 DIAGNOSIS — I10 BENIGN ESSENTIAL HYPERTENSION: ICD-10-CM

## 2023-11-27 RX ORDER — OMEGA-3-ACID ETHYL ESTERS 1 G/1
4 CAPSULE, LIQUID FILLED ORAL
Qty: 360 CAPSULE | Refills: 0 | Status: SHIPPED | OUTPATIENT
Start: 2023-11-27 | End: 2024-02-26

## 2023-11-28 ENCOUNTER — TELEMEDICINE (OUTPATIENT)
Dept: BEHAVIORAL HEALTH | Facility: CLINIC | Age: 73
End: 2023-11-28
Payer: COMMERCIAL

## 2023-11-28 DIAGNOSIS — F25.1 SCHIZOAFFECTIVE DISORDER, DEPRESSIVE TYPE (MULTI): ICD-10-CM

## 2023-11-28 PROCEDURE — 99215 OFFICE O/P EST HI 40 MIN: CPT | Performed by: PSYCHIATRY & NEUROLOGY

## 2023-11-28 SDOH — HEALTH STABILITY: PHYSICAL HEALTH: ON AVERAGE, HOW MANY DAYS PER WEEK DO YOU ENGAGE IN MODERATE TO STRENUOUS EXERCISE (LIKE A BRISK WALK)?: 0 DAYS

## 2023-11-28 SDOH — ECONOMIC STABILITY: INCOME INSECURITY: IN THE LAST 12 MONTHS, WAS THERE A TIME WHEN YOU WERE NOT ABLE TO PAY THE MORTGAGE OR RENT ON TIME?: NO

## 2023-11-28 SDOH — ECONOMIC STABILITY: FOOD INSECURITY: WITHIN THE PAST 12 MONTHS, THE FOOD YOU BOUGHT JUST DIDN'T LAST AND YOU DIDN'T HAVE MONEY TO GET MORE.: NEVER TRUE

## 2023-11-28 SDOH — ECONOMIC STABILITY: GENERAL
WHICH OF THE FOLLOWING DO YOU KNOW HOW TO USE AND HAVE ACCESS TO EVERY DAY? (CHOOSE ALL THAT APPLY): DESKTOP COMPUTER, LAPTOP COMPUTER, OR TABLET WITH BROADBAND INTERNET CONNECTION

## 2023-11-28 SDOH — ECONOMIC STABILITY: FOOD INSECURITY: WITHIN THE PAST 12 MONTHS, YOU WORRIED THAT YOUR FOOD WOULD RUN OUT BEFORE YOU GOT MONEY TO BUY MORE.: NEVER TRUE

## 2023-11-28 SDOH — HEALTH STABILITY: PHYSICAL HEALTH: ON AVERAGE, HOW MANY MINUTES DO YOU ENGAGE IN EXERCISE AT THIS LEVEL?: 0 MIN

## 2023-11-28 SDOH — ECONOMIC STABILITY: TRANSPORTATION INSECURITY
IN THE PAST 12 MONTHS, HAS THE LACK OF TRANSPORTATION KEPT YOU FROM MEDICAL APPOINTMENTS OR FROM GETTING MEDICATIONS?: NO

## 2023-11-28 SDOH — ECONOMIC STABILITY: HOUSING INSECURITY: IN THE LAST 12 MONTHS, HOW MANY PLACES HAVE YOU LIVED?: 1

## 2023-11-28 SDOH — ECONOMIC STABILITY: HOUSING INSECURITY
IN THE LAST 12 MONTHS, WAS THERE A TIME WHEN YOU DID NOT HAVE A STEADY PLACE TO SLEEP OR SLEPT IN A SHELTER (INCLUDING NOW)?: NO

## 2023-11-28 SDOH — ECONOMIC STABILITY: TRANSPORTATION INSECURITY
IN THE PAST 12 MONTHS, HAS LACK OF TRANSPORTATION KEPT YOU FROM MEETINGS, WORK, OR FROM GETTING THINGS NEEDED FOR DAILY LIVING?: NO

## 2023-11-28 SDOH — ECONOMIC STABILITY: GENERAL
WHICH OF THE FOLLOWING WOULD YOU LIKE TO GET CONNECTED TO IN ORDER TO RECEIVE A DISCOUNT OR FOR FREE? (CHOOSE ALL THAT APPLY): COMPUTER

## 2023-11-28 ASSESSMENT — PATIENT HEALTH QUESTIONNAIRE - PHQ9
8. MOVING OR SPEAKING SO SLOWLY THAT OTHER PEOPLE COULD HAVE NOTICED. OR THE OPPOSITE, BEING SO FIGETY OR RESTLESS THAT YOU HAVE BEEN MOVING AROUND A LOT MORE THAN USUAL: SEVERAL DAYS
SUM OF ALL RESPONSES TO PHQ9 QUESTIONS 1 & 2: 6
5. POOR APPETITE OR OVEREATING: SEVERAL DAYS
2. FEELING DOWN, DEPRESSED OR HOPELESS: MORE THAN HALF THE DAYS
1. LITTLE INTEREST OR PLEASURE IN DOING THINGS: NEARLY EVERY DAY
9. THOUGHTS THAT YOU WOULD BE BETTER OFF DEAD, OR OF HURTING YOURSELF: NOT AT ALL
10. IF YOU CHECKED OFF ANY PROBLEMS, HOW DIFFICULT HAVE THESE PROBLEMS MADE IT FOR YOU TO DO YOUR WORK, TAKE CARE OF THINGS AT HOME, OR GET ALONG WITH OTHER PEOPLE: SOMEWHAT DIFFICULT
7. TROUBLE CONCENTRATING ON THINGS, SUCH AS READING THE NEWSPAPER OR WATCHING TELEVISION: SEVERAL DAYS
1. LITTLE INTEREST OR PLEASURE IN DOING THINGS: MORE THAN HALF THE DAYS
3. TROUBLE FALLING OR STAYING ASLEEP: SEVERAL DAYS
2. FEELING DOWN, DEPRESSED OR HOPELESS: NEARLY EVERY DAY
4. FEELING TIRED OR HAVING LITTLE ENERGY: SEVERAL DAYS
SUM OF ALL RESPONSES TO PHQ9 QUESTIONS 1 & 2: 4
7. TROUBLE CONCENTRATING ON THINGS, SUCH AS READING THE NEWSPAPER OR WATCHING TELEVISION: SEVERAL DAYS
10. IF YOU CHECKED OFF ANY PROBLEMS, HOW DIFFICULT HAVE THESE PROBLEMS MADE IT FOR YOU TO DO YOUR WORK, TAKE CARE OF THINGS AT HOME, OR GET ALONG WITH OTHER PEOPLE: SOMEWHAT DIFFICULT
5. POOR APPETITE OR OVEREATING: SEVERAL DAYS
6. FEELING BAD ABOUT YOURSELF - OR THAT YOU ARE A FAILURE OR HAVE LET YOURSELF OR YOUR FAMILY DOWN: MORE THAN HALF THE DAYS
6. FEELING BAD ABOUT YOURSELF - OR THAT YOU ARE A FAILURE OR HAVE LET YOURSELF OR YOUR FAMILY DOWN: SEVERAL DAYS
8. MOVING OR SPEAKING SO SLOWLY THAT OTHER PEOPLE COULD HAVE NOTICED. OR THE OPPOSITE, BEING SO FIGETY OR RESTLESS THAT YOU HAVE BEEN MOVING AROUND A LOT MORE THAN USUAL: NOT AT ALL
SUM OF ALL RESPONSES TO PHQ QUESTIONS 1-9: 12
4. FEELING TIRED OR HAVING LITTLE ENERGY: MORE THAN HALF THE DAYS
3. TROUBLE FALLING OR STAYING ASLEEP: SEVERAL DAYS
SUM OF ALL RESPONSES TO PHQ QUESTIONS 1-9: 11
9. THOUGHTS THAT YOU WOULD BE BETTER OFF DEAD, OR OF HURTING YOURSELF: NOT AT ALL

## 2023-11-28 ASSESSMENT — ENCOUNTER SYMPTOMS
NEUROLOGICAL NEGATIVE: 1
PSYCHIATRIC NEGATIVE: 1
CONSTITUTIONAL NEGATIVE: 1

## 2023-11-28 ASSESSMENT — LIFESTYLE VARIABLES
HOW MANY STANDARD DRINKS CONTAINING ALCOHOL DO YOU HAVE ON A TYPICAL DAY: PATIENT DOES NOT DRINK
SKIP TO QUESTIONS 9-10: 1
HOW OFTEN DO YOU HAVE A DRINK CONTAINING ALCOHOL: NEVER
HOW OFTEN DO YOU HAVE SIX OR MORE DRINKS ON ONE OCCASION: NEVER
AUDIT-C TOTAL SCORE: 0

## 2023-11-28 ASSESSMENT — SOCIAL DETERMINANTS OF HEALTH (SDOH)
ARE YOU MARRIED, WIDOWED, DIVORCED, SEPARATED, NEVER MARRIED, OR LIVING WITH A PARTNER?: NEVER MARRIED
WITHIN THE LAST YEAR, HAVE YOU BEEN AFRAID OF YOUR PARTNER OR EX-PARTNER?: NO
HOW OFTEN DO YOU GET TOGETHER WITH FRIENDS OR RELATIVES?: MORE THAN THREE TIMES A WEEK
IN THE PAST 12 MONTHS, HAS THE ELECTRIC, GAS, OIL, OR WATER COMPANY THREATENED TO SHUT OFF SERVICE IN YOUR HOME?: NO
WITHIN THE LAST YEAR, HAVE YOU BEEN KICKED, HIT, SLAPPED, OR OTHERWISE PHYSICALLY HURT BY YOUR PARTNER OR EX-PARTNER?: NO
HOW OFTEN DO YOU ATTENT MEETINGS OF THE CLUB OR ORGANIZATION YOU BELONG TO?: MORE THAN 4 TIMES PER YEAR
HOW OFTEN DO YOU ATTEND CHURCH OR RELIGIOUS SERVICES?: NEVER
WITHIN THE LAST YEAR, HAVE TO BEEN RAPED OR FORCED TO HAVE ANY KIND OF SEXUAL ACTIVITY BY YOUR PARTNER OR EX-PARTNER?: NO
WITHIN THE LAST YEAR, HAVE YOU BEEN HUMILIATED OR EMOTIONALLY ABUSED IN OTHER WAYS BY YOUR PARTNER OR EX-PARTNER?: NO
DO YOU BELONG TO ANY CLUBS OR ORGANIZATIONS SUCH AS CHURCH GROUPS UNIONS, FRATERNAL OR ATHLETIC GROUPS, OR SCHOOL GROUPS?: YES
IN A TYPICAL WEEK, HOW MANY TIMES DO YOU TALK ON THE PHONE WITH FAMILY, FRIENDS, OR NEIGHBORS?: MORE THAN THREE TIMES A WEEK
HOW HARD IS IT FOR YOU TO PAY FOR THE VERY BASICS LIKE FOOD, HOUSING, MEDICAL CARE, AND HEATING?: SOMEWHAT HARD

## 2023-11-28 NOTE — PROGRESS NOTES
Subjective   Patient ID: Truong Armijo is a 73 y.o. male who presents for No chief complaint on file. My cousins sent more than I thought they would.    The patient is alert, fully oriented, language is intact, and recent and remote memory intact. The patient denies any suicidal or homicidal ideation or plans. The patient presents with chronic depressive features without manic or psychotic symptoms. Thought is clear. Judgment and  insight are limited.   The patient reports that he did have some testing for cognition, including a MoCA score of 30/30. He states that he did 22 words on the MoCA exceeding the 11 threshold by his report.   The patient is glad that he received a third more in funds from his cousins beyond what he sought but he still feels financially strained.       Review of Systems   Constitutional: Negative.    Neurological: Negative.         The patient is alert, fully oriented, language is intact, and recent and remote memory intact. The patient denies any suicidal or homicidal ideation or plans. The patient presents with chronic depressive features without manic or psychotic symptoms. Thought is clear. Judgment and  insight are limited.   The patient reports that he did have some testing for cognition, including a MoCA score of 30/30. He states that he did 22 words on the MoCA exceeding the 11 threshold by his report.   The patient is glad that he received a third more in funds from his cousins beyond what he sought but he still feels financially strained.    Psychiatric/Behavioral: Negative.          The patient is alert, fully oriented, language is intact, and recent and remote memory intact. The patient denies any suicidal or homicidal ideation or plans. The patient presents with chronic depressive features without manic or psychotic symptoms. Thought is clear. Judgment and  insight are limited.   The patient reports that he did have some testing for cognition, including a MoCA score of 30/30.  He states that he did 22 words on the MoCA exceeding the 11 threshold by his report.   The patient is glad that he received a third more in funds from his cousins beyond what he sought but he still feels financially strained.    All other systems reviewed and are negative.      Objective   Physical Exam  Neurological:      General: No focal deficit present.      Mental Status: He is alert and oriented to person, place, and time. Mental status is at baseline.      Comments: The patient is alert, fully oriented, language is intact, and recent and remote memory intact. The patient denies any suicidal or homicidal ideation or plans. The patient presents with chronic depressive features without manic or psychotic symptoms. Thought is clear. Judgment and  insight are limited.   The patient reports that he did have some testing for cognition, including a MoCA score of 30/30. He states that he did 22 words on the MoCA exceeding the 11 threshold by his report.   The patient is glad that he received a third more in funds from his cousins beyond what he sought but he still feels financially strained.    Psychiatric:      Comments: The patient is alert, fully oriented, language is intact, and recent and remote memory intact. The patient denies any suicidal or homicidal ideation or plans. The patient presents with chronic depressive features without manic or psychotic symptoms. Thought is clear. Judgment and  insight are limited.   The patient reports that he did have some testing for cognition, including a MoCA score of 30/30. He states that he did 22 words on the MoCA exceeding the 11 threshold by his report.   The patient is glad that he received a third more in funds from his cousins beyond what he sought but he still feels financially strained.           Lab Review:       Assessment/Plan   The risks, benefits & alternatives to the medications prescribed were explained to you today. You were able to understand & repeat these  risks, benefits & alternatives to this prescribed medication. You have agreed to proceed with treatment with the medications discussed based on the conclusion that the benefit outweighs the risks of this treatment regimen: continue fluoxetine 40 mg daily, bupropion  mg twice daily, risperidone 1 mg daily only as needed and diazepam 5 mg daily at bedtime.   Continue monthly appointments or sooner if needed.

## 2023-11-29 DIAGNOSIS — Z96.1 PSEUDOPHAKIA: Primary | ICD-10-CM

## 2023-11-30 DIAGNOSIS — Z96.1 PSEUDOPHAKIA: ICD-10-CM

## 2023-11-30 RX ORDER — TIMOLOL MALEATE 5 MG/ML
1 SOLUTION/ DROPS OPHTHALMIC 2 TIMES DAILY
Qty: 10 ML | Refills: 3 | Status: SHIPPED | OUTPATIENT
Start: 2023-11-30 | End: 2024-01-12 | Stop reason: SDUPTHER

## 2023-11-30 NOTE — PROGRESS NOTES
"Reason for Nutrition Visit: T2DM      Anthropometrics    Height 6' 2\"  Weight 213# 13.5oz  (10/18/2023);   226# (7/5/2023)  Wt loss 12.2# ~3+ mos; wt loss 5.4%)  BMI  27.46  Waist Circumference    Weight change:    Significant Weight Change: No    Past Medical History  Hyperlipidemia  Hypertension  T2DM  Hypothyroid  Vitamin D deficiency  Peptic Ulcer disease  Reflux esophagitis  Schizophrenia    Nutritional Labs    Lab Results   Component Value Date    HGBA1C 6.1 (H) 11/13/2023    CHOL 182 11/13/2023    LDLF 63 06/26/2023    TRIG 147 11/13/2023    HDL 43.2 11/13/2023         Food and Nutrition Hx:  Very elevated BG started about 3 months ago.    Followed Ketogenic Diet.  Was anorexic at age 30.  After that developed impaired cognition.   Now after meals gets that feeling again.  Sensitive to tobacco especially after meals.  Spells include aphasia and numb feeling in head.  Doctors have not been able to diagnose or treat with medication.  Has neuropathy, frequent urination, advanced glaucoma.  Last A1c was 6.1% and morning glucose usually close to 200 mg/dL.  Has also had very low glucose levels in evening.  Has food aversions to sour foods--vinegar, yogurts, condiments, tomatoes.  If he does not eat in time, gets a dazed feeling in his head. Has checked BG in the past and found no correlation to Would like a diet that will not harm health by being too high in meats.  His budget is very limited. He is committed to continuing with the supplements he uses--has researched them--despite their cost. First diagnosed with T2DM at age of 48 and started treating in his 50's.  Has gradually lost weight over the years and recently due to austere ketogenic diet.  Neurologists recommended kim doses of riboflavin,  Spending a large percentage of budget on supplements.  Has small, cramped kitchen.  Currently does not have appetite until 3 hours or so after rising.  Has 1/2 mug of half n half heated with artificial sweetener and " coffee.  He likes blueberries and crisp apples.  Avoids sugar and fructose do eliminated dried fruits and apricots.  24 Hour Diet Recall:  Meal 1: 1/2 mug of half n half heated (now using whole milk) with coffee (1-2 cups per day)                3 small egg omelet + small amount corned beef hash                May have bran cereal with milk and berries 3 x week                May also have cheese                4 strips of schwab  Meal 2: Sub sandwich at [employer] [place]               [employer] and [employer] roast beef  1 is too small, 2 is too much   Goes to [employer] [place]   [employer] menu                  If staying home may have chicken and chicken wings (from [employer])  Roasts chicken and gets 3-4 meals                                 Ground beef, or pork chops  may have rice with spaghetti sauce, or vegetables such as celery or [place] sprouts                                  Likes dry mix soups and Ramen but does not seem to taste as good any more                                   Drinks water, and drinks orange juice sparingly         Gets cans of salmon or sardines sometimes  Meal 3:  Similar to above.  Last night had shrimp and broccoli with white rice  Snacks:  does not snack   Beverages:  coffee, water     Allergies: None   Sensitivities:  sensitive to wheat, and large amount of potatoes or corn    Intolerance: None See above       constipation    Types of Activities: Mostly Sedentary          Supplements: Vitamin D ; Fish oil (states that normalized lipids) : Takes multiple supplements including Vitamin C, Vitamin B complex, Multivitamin 2/d, alpha-lipoic acid,, tumeric, choline Helps with sleep)       Energy Levels: Low      Estimated Energy Needs:    Weight Maintanence: 2161 kcal/day  Weight Loss Needs: 1661  kcal/day  Weight Gain Needs: n/a kcal/day  Protein Needs:    Nutrition Diagnosis:    Diagnosis Statement 1:  Diagnosis Status: New  Diagnosis : Food and  nutrition related knowledge deficit related to lack of or limited prior nutrition-related education as evidenced by verbalizes inaccurate or incomplete knowledge    Nutrition Interventions:  Low saturated fat diet    Nutrition Goals:  Nutrition Goals : Blood glucose control; maintain stable weight    Nutrition Recommendations:   1.  Focus on lean proteins to prevent increase in serum lipids--white skinless poultry, fish, 90-94% lean beef, pork tenderloin, eggs, reduced fat cheeses, lower fat cottage cheese, reduced fat milk    2.  Add small amounts of healthy carbohydrates to protein rich meals such as slice of whole wheat bread, whole grain crackers, brown rice, quinoa,, fruit, corn, peas, potatoes, etc    3.  If using low calorie sweeteners to avoid blood sugar spikes, best choices are stevia of monk fruit for natural sweeteners or the artificial sweetener sucralose (Splenda-yellow packet) Splenda      4.  Set timer on your phone for every 4 hours to remind you to eat something that is high in protein, a small amount of carbohydrate and healthy fat for example low fat cheese + Triscuit,  Peanuts + apple, slice of meat + brown rice cracker    5.  Add vegetables to breakfast omelet to increase fiber and vitamin/mineral intake          Educational Handouts: Snack lists

## 2023-12-01 ENCOUNTER — APPOINTMENT (OUTPATIENT)
Dept: NUTRITION | Facility: CLINIC | Age: 73
End: 2023-12-01
Payer: COMMERCIAL

## 2023-12-01 ENCOUNTER — APPOINTMENT (OUTPATIENT)
Dept: PRIMARY CARE | Facility: CLINIC | Age: 73
End: 2023-12-01
Payer: COMMERCIAL

## 2023-12-01 ENCOUNTER — TELEMEDICINE CLINICAL SUPPORT (OUTPATIENT)
Dept: NUTRITION | Facility: CLINIC | Age: 73
End: 2023-12-01
Payer: COMMERCIAL

## 2023-12-01 DIAGNOSIS — E11.9 DIABETES MELLITUS WITHOUT COMPLICATION (MULTI): Primary | ICD-10-CM

## 2023-12-01 PROCEDURE — 97802 MEDICAL NUTRITION INDIV IN: CPT | Performed by: DIETITIAN, REGISTERED

## 2023-12-01 PROCEDURE — 97802 MEDICAL NUTRITION INDIV IN: CPT | Mod: 95 | Performed by: DIETITIAN, REGISTERED

## 2023-12-04 ENCOUNTER — OFFICE VISIT (OUTPATIENT)
Dept: PRIMARY CARE | Facility: CLINIC | Age: 73
End: 2023-12-04
Payer: COMMERCIAL

## 2023-12-04 VITALS
RESPIRATION RATE: 16 BRPM | HEIGHT: 74 IN | BODY MASS INDEX: 27.73 KG/M2 | DIASTOLIC BLOOD PRESSURE: 78 MMHG | WEIGHT: 216.05 LBS | HEART RATE: 78 BPM | SYSTOLIC BLOOD PRESSURE: 128 MMHG

## 2023-12-04 DIAGNOSIS — E03.9 PRIMARY HYPOTHYROIDISM: ICD-10-CM

## 2023-12-04 DIAGNOSIS — M51.26 LUMBAR DISC HERNIATION: ICD-10-CM

## 2023-12-04 DIAGNOSIS — K21.00 GASTROESOPHAGEAL REFLUX DISEASE WITH ESOPHAGITIS WITHOUT HEMORRHAGE: ICD-10-CM

## 2023-12-04 DIAGNOSIS — E29.1 HYPOGONADISM MALE: ICD-10-CM

## 2023-12-04 DIAGNOSIS — I10 BENIGN ESSENTIAL HYPERTENSION: ICD-10-CM

## 2023-12-04 DIAGNOSIS — E78.00 ELEVATED LDL CHOLESTEROL LEVEL: ICD-10-CM

## 2023-12-04 DIAGNOSIS — M25.562 ACUTE PAIN OF LEFT KNEE: ICD-10-CM

## 2023-12-04 DIAGNOSIS — G93.39 OTHER POST INFECTION AND RELATED FATIGUE SYNDROMES: ICD-10-CM

## 2023-12-04 DIAGNOSIS — E11.9 TYPE 2 DIABETES MELLITUS WITHOUT COMPLICATION, WITHOUT LONG-TERM CURRENT USE OF INSULIN (MULTI): Primary | ICD-10-CM

## 2023-12-04 PROCEDURE — 4010F ACE/ARB THERAPY RXD/TAKEN: CPT | Performed by: INTERNAL MEDICINE

## 2023-12-04 PROCEDURE — 3044F HG A1C LEVEL LT 7.0%: CPT | Performed by: INTERNAL MEDICINE

## 2023-12-04 PROCEDURE — 1036F TOBACCO NON-USER: CPT | Performed by: INTERNAL MEDICINE

## 2023-12-04 PROCEDURE — 1160F RVW MEDS BY RX/DR IN RCRD: CPT | Performed by: INTERNAL MEDICINE

## 2023-12-04 PROCEDURE — 3078F DIAST BP <80 MM HG: CPT | Performed by: INTERNAL MEDICINE

## 2023-12-04 PROCEDURE — 3074F SYST BP LT 130 MM HG: CPT | Performed by: INTERNAL MEDICINE

## 2023-12-04 PROCEDURE — 1126F AMNT PAIN NOTED NONE PRSNT: CPT | Performed by: INTERNAL MEDICINE

## 2023-12-04 PROCEDURE — 3049F LDL-C 100-129 MG/DL: CPT | Performed by: INTERNAL MEDICINE

## 2023-12-04 PROCEDURE — 1159F MED LIST DOCD IN RCRD: CPT | Performed by: INTERNAL MEDICINE

## 2023-12-04 PROCEDURE — 99214 OFFICE O/P EST MOD 30 MIN: CPT | Performed by: INTERNAL MEDICINE

## 2023-12-04 RX ORDER — TESTOSTERONE 20.25 MG/1.25G
2 GEL TOPICAL DAILY
Qty: 75 G | Refills: 0 | Status: SHIPPED | OUTPATIENT
Start: 2023-12-04 | End: 2024-03-08 | Stop reason: SDUPTHER

## 2023-12-04 RX ORDER — TESTOSTERONE 20.25 MG/1.25G
2 GEL TOPICAL DAILY
Qty: 75 G | Refills: 0 | Status: SHIPPED | OUTPATIENT
Start: 2023-12-04 | End: 2023-12-04 | Stop reason: SDUPTHER

## 2023-12-04 ASSESSMENT — ENCOUNTER SYMPTOMS
EYES NEGATIVE: 1
CARDIOVASCULAR NEGATIVE: 1
ENDOCRINE NEGATIVE: 1
CONSTITUTIONAL NEGATIVE: 1
NEUROLOGICAL NEGATIVE: 1
ALLERGIC/IMMUNOLOGIC NEGATIVE: 1
MUSCULOSKELETAL NEGATIVE: 1
HEMATOLOGIC/LYMPHATIC NEGATIVE: 1
PSYCHIATRIC NEGATIVE: 1
GASTROINTESTINAL NEGATIVE: 1
RESPIRATORY NEGATIVE: 1

## 2023-12-04 NOTE — ASSESSMENT & PLAN NOTE
DDD - Degenerative disc disease of the Lumbo-Sacral (LS)/ spine. Educate exercises and referred patient to Physical Therapy (PT). Ordered X-Ray's of the LS spine. Consider MRI. Radiculopathy in the distribution of L4-L5-S1/nerve roots, needs NSAIDS (Naprosin 500mg BID vs. Arthrotec 75mg BID or Prednisone taper (Medrol dose pack), Flexeril 10 mg qHS, heat application, and pain control with Tylenol 325 mg TID.

## 2023-12-04 NOTE — PROGRESS NOTES
"Subjective   Patient ID: Truong Armijo is a 73 y.o. male who presents for Follow-up (Follow up).    HPI     Review of Systems   Constitutional: Negative.    HENT: Negative.     Eyes: Negative.    Respiratory: Negative.     Cardiovascular: Negative.    Gastrointestinal: Negative.    Endocrine: Negative.    Musculoskeletal: Negative.    Skin: Negative.    Allergic/Immunologic: Negative.    Neurological: Negative.    Hematological: Negative.    Psychiatric/Behavioral: Negative.     All other systems reviewed and are negative.      Objective   Ht 1.88 m (6' 2\")   Wt 98 kg (216 lb 0.8 oz)   BMI 27.74 kg/m²   Blood pressure 128/78, pulse 78, resp. rate 16, height 1.88 m (6' 2\"), weight 98 kg (216 lb 0.8 oz).   Physical Exam  Vitals and nursing note reviewed.   Constitutional:       Appearance: Normal appearance.   HENT:      Head: Normocephalic and atraumatic.      Right Ear: Tympanic membrane, ear canal and external ear normal.      Left Ear: Tympanic membrane, ear canal and external ear normal. There is no impacted cerumen.      Nose: Nose normal.      Mouth/Throat:      Mouth: Mucous membranes are moist.      Pharynx: Oropharynx is clear.   Eyes:      Extraocular Movements: Extraocular movements intact.      Conjunctiva/sclera: Conjunctivae normal.      Pupils: Pupils are equal, round, and reactive to light.   Cardiovascular:      Rate and Rhythm: Normal rate and regular rhythm.      Pulses: Normal pulses.      Heart sounds: Normal heart sounds. No murmur heard.  Pulmonary:      Effort: Pulmonary effort is normal. No respiratory distress.      Breath sounds: Normal breath sounds. No stridor. No wheezing, rhonchi or rales.   Chest:      Chest wall: No tenderness.   Abdominal:      General: Abdomen is flat. Bowel sounds are normal. There is no distension.      Palpations: Abdomen is soft. There is no mass.      Tenderness: There is no abdominal tenderness. There is no right CVA tenderness, left CVA tenderness, " guarding or rebound.      Hernia: No hernia is present.   Musculoskeletal:         General: Normal range of motion.      Cervical back: Normal range of motion and neck supple.   Skin:     General: Skin is warm.      Capillary Refill: Capillary refill takes less than 2 seconds.   Neurological:      General: No focal deficit present.      Mental Status: He is alert.      Cranial Nerves: No cranial nerve deficit.      Sensory: No sensory deficit.      Motor: No weakness.      Coordination: Coordination normal.      Gait: Gait normal.      Deep Tendon Reflexes: Reflexes normal.   Psychiatric:         Mood and Affect: Mood normal.         Behavior: Behavior normal. Behavior is cooperative.         Thought Content: Thought content normal.         Judgment: Judgment normal.         Assessment/Plan   Problem List Items Addressed This Visit             ICD-10-CM    Acute pain of left knee M25.562    Relevant Orders    XR knee left 1-2 views    Referral to Physical Therapy    Benign essential hypertension - Primary I10     HTN - hypertension well/controlled .Target BP < 130/80  achieved. Educate low salt diet and exercise with weight loss. Educate home self monitoring and diary keeping. Educate risks of elevate blood pressure and benefits of prompt treatment.  Refill Lisinopril           Elevated LDL cholesterol level E78.00     Hypercholesterolemia - Monitor lipid profile and educate patient upon risks of high cholesterol and targets. Educate diet and change in lifestyle and increase in exercises - Refill: Atorvastatin  and educate compliance with medication and diet.            Fatigue R53.83     POST COVID Fatigue - Fatigue - check CMP(metabolic panel and elctrolytes) , CBC(complete blood cell count), TSH(thyroid function). Insomnia may play a role and sleep studies(rule out sleep apnea) are recommended . Educate sleep hygiene. Consider anxiety disorder vs. depression. Consider Stress test, and 2DECHO.            Diabetes  mellitus (CMS/[facility]) E11.9     DM - NIDDM  . Reviewed with patient / Will check  HbA1c and fasting blood sugars. Educate home self monitoring and diary keeping(reviewed with patient home blood sugar levels /diary). Educate extensively low calorie diet and weight loss with exercise. Reviewed BS- diary and Rx. Regimen. [place] renal protection with ARB/ACEI.               Educate compliance with diet and Rx. And educate risks and autcomes.                                                 Lumbar disc herniation M51.26     DDD - Degenerative disc disease of the Lumbo-Sacral (LS)/ spine. Educate exercises and referred patient to Physical Therapy (PT). Ordered X-Ray's of the LS spine. Consider MRI. Radiculopathy in the distribution of L4-L5-S1/nerve roots, needs NSAIDS (Naprosin 500mg BID vs. Arthrotec 75mg BID or Prednisone taper (Medrol dose pack), Flexeril 10 mg qHS, heat application, and pain control with Tylenol 325 mg TID.           Primary hypothyroidism E03.9     Hypothyroidism - Symptoms well/controlled (weight gain, fatigue, constipation, cold intolerance). Check TSH continues dose of Synthroid/Levothyroxine  of  88 bmcg/qD.          Reflux esophagitis K21.00     GERD - Acid reflux disease. Rx. PPI (Prilosec/Prevacid/Protonix/Nexium) and educate diet and life style changes. Referred patient to an endoscopy (EGD) and check H. Pylori titers.          Hypogonadism male E29.1     Check levels and refill supplement of Testosterone          Relevant Medications    testosterone 20.25 mg/1.25 gram (1.62 %) gel in metered-dose pump          Benign essential hypertension I10        HTN - hypertension well/controlled .Target BP < 130/80  achieved. Educate low salt diet and exercise with weight loss. Educate home self monitoring and diary keeping. Educate risks of elevate blood pressure and benefits of prompt treatment.  Refill Lisinopril               COPD (chronic obstructive pulmonary disease) (CMS/[facility]) - Primary J44.9      Elevated LDL cholesterol level E78.00       Hypercholesterolemia - Monitor lipid profile and educate patient upon risks of high cholesterol and targets. Educate diet and change in lifestyle and increase in exercises - Refill: Atorvastatin  and educate compliance with medication and diet.             Relevant Orders     Lipid Panel     Fatigue R53.83       POST COVID Fatigue - Fatigue - check CMP(metabolic panel and elctrolytes) , CBC(complete blood cell count), TSH(thyroid function). Insomnia may play a role and sleep studies(rule out sleep apnea) are recommended . Educate sleep hygiene. Consider anxiety disorder vs. depression. Consider Stress test, and 2DECHO.             Relevant Orders     CBC and Auto Differential     Diabetes mellitus (CMS/HCC) E11.9       DM - NIDDM  . Reviewed with patient / Will check  HbA1c and fasting blood sugars. Educate home self monitoring and diary keeping(reviewed with patient home blood sugar levels /diary). Educate extensively low calorie diet and weight loss with exercise. Reviewed BS- diary and Rx. Regimen. [place] renal protection with ARB/ACEI.               Educate compliance with diet and Rx. And educate risks and autcomes.                                                     Relevant Orders     Comprehensive Metabolic Panel     Hemoglobin A1C     Lumbar disc herniation M51.26       DDD - Degenerative disc disease of the Lumbo-Sacral (LS)/Cervical (C)  spine. Educate exercises and referred patient to Physical Therapy (PT). Ordered X-Ray's of the LS/C spine. Consider MRI. Radiculopathy in the distribution of L4-L5-S1/C3-C4-C5 nerve roots, needs NSAIDS (Naprosin 500mg BID vs. Arthrotec 75mg BID or Prednisone taper (Medrol dose pack), Flexeril 10 mg qHS, heat application, and pain control with Tylenol 325 mg TID.             Primary hypothyroidism E03.9       Hypothyroidism - Symptoms well/controlled (weight gain, fatigue, constipation, cold intolerance). Check TSH continues  dose of Synthroid/Levothyroxine  of  88 bmcg/qD.              Relevant Orders     TSH with reflex to Free T4 if abnormal     Reflux esophagitis K21.00       GERD - Acid reflux disease. Rx. PPI (Prilosec/Prevacid/Protonix/Nexium) and educate diet and life style changes. Referred patient to an endoscopy (EGD) and check H. Pylori titers.              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

## 2023-12-04 NOTE — ASSESSMENT & PLAN NOTE
DM - NIDDM  . Reviewed with patient / Will check  HbA1c and fasting blood sugars. Educate home self monitoring and diary keeping(reviewed with patient home blood sugar levels /diary). Educate extensively low calorie diet and weight loss with exercise. Reviewed BS- diary and Rx. Regimen. [place] renal protection with ARB/ACEI.               Educate compliance with diet and Rx. And educate risks and autcomes.

## 2023-12-06 ENCOUNTER — OFFICE VISIT (OUTPATIENT)
Dept: BEHAVIORAL HEALTH | Facility: CLINIC | Age: 73
End: 2023-12-06
Payer: COMMERCIAL

## 2023-12-06 VITALS
TEMPERATURE: 96.5 F | BODY MASS INDEX: 27.91 KG/M2 | DIASTOLIC BLOOD PRESSURE: 77 MMHG | HEART RATE: 75 BPM | HEIGHT: 74 IN | SYSTOLIC BLOOD PRESSURE: 114 MMHG | WEIGHT: 217.5 LBS

## 2023-12-06 DIAGNOSIS — F25.1 SCHIZOAFFECTIVE DISORDER, DEPRESSIVE TYPE (MULTI): ICD-10-CM

## 2023-12-06 PROCEDURE — 1160F RVW MEDS BY RX/DR IN RCRD: CPT | Performed by: PSYCHIATRY & NEUROLOGY

## 2023-12-06 PROCEDURE — 99215 OFFICE O/P EST HI 40 MIN: CPT | Performed by: PSYCHIATRY & NEUROLOGY

## 2023-12-06 PROCEDURE — 1159F MED LIST DOCD IN RCRD: CPT | Performed by: PSYCHIATRY & NEUROLOGY

## 2023-12-06 PROCEDURE — 4010F ACE/ARB THERAPY RXD/TAKEN: CPT | Performed by: PSYCHIATRY & NEUROLOGY

## 2023-12-06 PROCEDURE — 3049F LDL-C 100-129 MG/DL: CPT | Performed by: PSYCHIATRY & NEUROLOGY

## 2023-12-06 PROCEDURE — 1036F TOBACCO NON-USER: CPT | Performed by: PSYCHIATRY & NEUROLOGY

## 2023-12-06 PROCEDURE — 3078F DIAST BP <80 MM HG: CPT | Performed by: PSYCHIATRY & NEUROLOGY

## 2023-12-06 PROCEDURE — 1126F AMNT PAIN NOTED NONE PRSNT: CPT | Performed by: PSYCHIATRY & NEUROLOGY

## 2023-12-06 PROCEDURE — 3044F HG A1C LEVEL LT 7.0%: CPT | Performed by: PSYCHIATRY & NEUROLOGY

## 2023-12-06 PROCEDURE — 3074F SYST BP LT 130 MM HG: CPT | Performed by: PSYCHIATRY & NEUROLOGY

## 2023-12-06 ASSESSMENT — ENCOUNTER SYMPTOMS
DYSPHORIC MOOD: 1
NEUROLOGICAL NEGATIVE: 1

## 2023-12-06 NOTE — PROGRESS NOTES
Subjective   Patient ID: Truong Armijo is a 73 y.o. male who presents for No chief complaint on file. I have been having mood changes.    HPI: The patient is alert, fully oriented, language is intact, and recent and remote memory intact. The patient denies any suicidal or homicidal ideation or plans. The patient presents with no depressive, manic or psychotic symptoms. Thought is logical and clear. Judgment and insight are limited. No behavioral disturbances are present on examination.  The patient reported that his father had a deleterious effect upon him. The patient wants to try to maintain weight and not fall into anorexia, which he is doing successfully. He is working to find appropriate exercises that he can do given he can't walk very much because of his malformed feet according to his report. The patient report that she was close to his mother until the patient turned 42 years of age at which point he thinks she turned against him. He did have a reconciliation with his father.     FH: The patient's mother had dementia and his father was reclusive.         Review of Systems   Neurological: Negative.         The patient is alert, fully oriented, language is intact, and recent and remote memory intact. The patient denies any suicidal or homicidal ideation or plans. The patient presents with no depressive, manic or psychotic symptoms. Thought is logical and clear. Judgment and insight are limited. No behavioral disturbances are present on examination.   Psychiatric/Behavioral:  Positive for dysphoric mood.         The patient is alert, fully oriented, language is intact, and recent and remote memory intact. The patient denies any suicidal or homicidal ideation or plans. The patient presents with no depressive, manic or psychotic symptoms. Thought is logical and clear. Judgment and insight are limited. No behavioral disturbances are present on examination.   All other systems reviewed and are  negative.      Objective   Physical Exam  Vitals reviewed.   Neurological:      General: No focal deficit present.      Mental Status: He is alert and oriented to person, place, and time. Mental status is at baseline.      Comments: The patient is alert, fully oriented, language is intact, and recent and remote memory intact. The patient denies any suicidal or homicidal ideation or plans. The patient presents with no depressive, manic or psychotic symptoms. Thought is logical and clear. Judgment and insight are limited. No behavioral disturbances are present on examination.   Psychiatric:      Comments: The patient is alert, fully oriented, language is intact, and recent and remote memory intact. The patient denies any suicidal or homicidal ideation or plans. The patient presents with no depressive, manic or psychotic symptoms. Thought is logical and clear. Judgment and insight are limited. No behavioral disturbances are present on examination.         Lab Review:       Assessment/Plan   The risks, benefits & alternatives to the medications prescribed were explained to you today. You were able to understand & repeat these risks, benefits & alternatives to this prescribed medication. You have agreed to proceed with treatment with the medications discussed based on the conclusion that the benefit outweighs the risks of this treatment regimen: continue to see your therapist, Sissy Alfred, continue bupropion  mg daily, diazepam 5 mg nightly prin, and fluoxetine 40 mg daily. Follow up monthly as you have been doing.

## 2023-12-09 DIAGNOSIS — G43.109 MIGRAINE EQUIVALENT: ICD-10-CM

## 2023-12-11 ENCOUNTER — APPOINTMENT (OUTPATIENT)
Dept: OPHTHALMOLOGY | Facility: CLINIC | Age: 73
End: 2023-12-11
Payer: COMMERCIAL

## 2023-12-11 RX ORDER — NIACIN (INOSITOL NIACINATE) 400(500MG)
CAPSULE ORAL
Qty: 180 CAPSULE | Refills: 2 | Status: SHIPPED | OUTPATIENT
Start: 2023-12-11

## 2023-12-12 ENCOUNTER — ANCILLARY PROCEDURE (OUTPATIENT)
Dept: RADIOLOGY | Facility: CLINIC | Age: 73
End: 2023-12-12
Payer: COMMERCIAL

## 2023-12-12 DIAGNOSIS — M25.562 ACUTE PAIN OF LEFT KNEE: ICD-10-CM

## 2023-12-12 PROCEDURE — 73560 X-RAY EXAM OF KNEE 1 OR 2: CPT | Mod: LT

## 2023-12-12 PROCEDURE — 73560 X-RAY EXAM OF KNEE 1 OR 2: CPT | Mod: LEFT SIDE | Performed by: RADIOLOGY

## 2023-12-22 DIAGNOSIS — E03.9 HYPOTHYROIDISM, UNSPECIFIED: ICD-10-CM

## 2023-12-27 RX ORDER — LEVOTHYROXINE SODIUM 88 UG/1
88 TABLET ORAL DAILY
Qty: 90 TABLET | Refills: 0 | Status: SHIPPED | OUTPATIENT
Start: 2023-12-27 | End: 2024-04-19 | Stop reason: SDUPTHER

## 2024-01-12 DIAGNOSIS — Z96.1 PSEUDOPHAKIA: ICD-10-CM

## 2024-01-15 RX ORDER — BRIMONIDINE TARTRATE 1.5 MG/ML
1 SOLUTION/ DROPS OPHTHALMIC DAILY
Qty: 5 ML | Refills: 6 | Status: SHIPPED | OUTPATIENT
Start: 2024-01-15

## 2024-01-15 RX ORDER — TIMOLOL MALEATE 5 MG/ML
1 SOLUTION/ DROPS OPHTHALMIC 2 TIMES DAILY
Qty: 10 ML | Refills: 6 | Status: SHIPPED | OUTPATIENT
Start: 2024-01-15 | End: 2024-05-20

## 2024-01-18 ENCOUNTER — EVALUATION (OUTPATIENT)
Dept: PHYSICAL THERAPY | Facility: CLINIC | Age: 74
End: 2024-01-18
Payer: COMMERCIAL

## 2024-01-18 DIAGNOSIS — M25.562 ACUTE PAIN OF LEFT KNEE: ICD-10-CM

## 2024-01-18 DIAGNOSIS — M54.16 RADICULOPATHY, LUMBAR REGION: Primary | ICD-10-CM

## 2024-01-18 PROCEDURE — 97161 PT EVAL LOW COMPLEX 20 MIN: CPT | Mod: GP | Performed by: PHYSICAL THERAPIST

## 2024-01-18 PROCEDURE — 97110 THERAPEUTIC EXERCISES: CPT | Mod: GP | Performed by: PHYSICAL THERAPIST

## 2024-01-18 ASSESSMENT — ENCOUNTER SYMPTOMS
LOSS OF SENSATION IN FEET: 1
DEPRESSION: 1
OCCASIONAL FEELINGS OF UNSTEADINESS: 1

## 2024-01-18 NOTE — PROGRESS NOTES
Physical Therapy  Physical Therapy Orthopedic Evaluation    Patient Name: Truong Armijo  MRN: [id number]  Today's Date: 1/18/2024  Time Calculation  Start Time: 1145  Stop Time: 1230  Time Calculation (min): 45 min  Reason for visit: acute pain left knee   Referring MD: Ki Ahuja   Insurance: [facility] , auth needed  DX: acute left knee pain , lumbar radic   POC: 1/week 6-8 weeks         Current Problem  1. Radiculopathy, lumbar region  Follow Up In Physical Therapy      2. Acute pain of left knee  Referral to Physical Therapy    Follow Up In Physical Therapy          General:  General  Reason for Referral: Left knee, lumbar radicullopathy  Referred By: Ki Ahuja  Past Medical History Relevant to Rehab: HTN, anxiety/depression, neurogenic bladder, daibetes, diabetic neuropathy, Charcot foot, hypogonadism, seizure disorder, GERD, HLD, dizziness/vertigo, renal insufficiency, insomnia,. Back and neck pain,  Precautions:  Precautions  STEADI Fall Risk Score (The score of 4 or more indicates an increased risk of falling): 6  Pain:       Subjective:   Subjective   Chief Complaint: after our last therapy sessions [facility].  I would get residual pain in my lower body especially my left knee.  I had some soreness in my left thigh as well that has started to go away as I have not been going to the gym.  It took about 3 months to settle down.    I want to be able to get back into working out my lower body without hurting.    Still seeing podiatrist and has changed inserts which has made walking easier.    Onset: 1/18/23  MIKE: none     Current Condition: improving    PAIN  Intensity (0-10): 1/10, up to 4/10  Location: front of left knee and thigh   Description: sharp pain     Aggravating Factors:  working out, walking even from garage to condo.    Relieving Factors:  rest     Relevant Information (PMH & Previous Tests/Imaging):     PMH: HTN, anxiety/depression, neurogenic  bladder, daibetes, diabetic neuropathy, Charcot foot, hypogonadism, seizure disorder, GERD, HLD, dizziness/vertigo, renal insufficiency, insomnia,. Back and neck pain,     Xray knee 12/12/23- unremarkable, well maintained joint spaces    Previous Interventions/Treatments: prior therapy and was [facility] but not since pain worsened.  Has been working on weight loss     Prior Level of Function (PLOF)  Exercise/Physical Activity: not currently   Work/School:Retired     Living situation/Environment: lives alone , no problems reported currently getting around except balance    Treatment Goals: Be able to get back to working my lower body     Red Flags: Do you have any of the following? No   Fever/chills, unexplained weight changes, dizziness/fainting, unexplained change in bowel or bladder functions, unexplained malaise or muscle weakness, night pain/sweats, numbness or tingling    Objective:  Objective       Gait: w/o device, symmetric, inc toe out TIARRA      Transfers:  sit <> stand: independent    Balance  SL Balance: 2-3 sec tiarra   Tandem: poor   5xsts 17.28 sec     Knee ROM WNL TIARRA            Knee Strength        Extension: L= 4+/5 [] R= 4+/5  Flexion: L= 5-/5 [] R= 5-/5    Hip Strength MMT  Flex: L= 4-/5 [] R= 4/5   Abd L= 4/5 [] R= 4/5  Ext L= 4-/5 [] R= 4/5    Abdominals = fair   Bridge = partial range     Flexibility:  Hamstrings: MOD  Quads:MALCOLM left mod right   Hip Flexor:limited      Repeated movement testing   RFIS - NE   SIVA - prod thigh pain, worse     LS ROM   FLEX = mod dec       Outcome Measures:  Other Measures  Lower Extremity Funtional Score (LEFS): 31/80     EDUCATION: home exercise program, plan of care, activity modifications, pain management, and injury pathology       Goals:  Active       PT Problem       PT Goal 1       Start:  01/18/24    Expected End:  02/15/24       Patient will demonstrate good understanding and compliance with HEP          PT Goal 2       Start:  01/18/24     Expected End:  03/14/24       Knee strength 5/5 to allow return to normal workouts w/o pain   Hip strength 4+/5 to improve gait and mobility   Abdominal strength good to improve stability in core   Increase LEFS score by 9 points   Tolerate weight machine workouts w/o increased pain to allow return to gym workouts   Decrease pain to  1/10                Treatments:   Access Code: [email or web address]  URL: [email or web address]  Date: 01/18/2024  Prepared by: Artemio Roy    Exercises  - Supine Double Knee to Chest  - 2-6 x daily - 1-2 sets - 10 reps - 5 hold  - Small Range Straight Leg Raise  - 1-2 x daily - 2-3 sets - 10 reps  - Supine Bridge  - 1-2 x daily - 2-3 sets - 10 reps - 3-5 hold  - Standing Hip Abduction with Counter Support  - 1-2 x daily - 2-3 sets - 10 reps  - Seated Hamstring Stretch  - 2-3 x daily - 3-4 reps - 30 hold  - Quadricep Stretch with Chair and Counter Support  - 2-3 x daily - 3-4 reps - 30 hold    Assessment: Patient presents therapy with left > right knee pain, and limitations to exercise and normal work outs, also has possible radicular symptoms with flexion bias likely contributing complaints.  Problems resulting in limited participation in pain-free ADLs and inability to perform at their prior level of function. Pt would benefit from physical therapy to address the impairments found & listed previously in the objective section in order to return to safe and pain-free ADLs and prior level of function.     Pain, Impaired balance, Impaired gait, Decreased flexibility, Decreased strength, Limitations to normal ADL's, Fall risk , and Decreased knowledge of HEP     Plan:   Certification Period Start Date: 01/18/24  Certification Period End Date: 04/17/24  Rehab Potential: Good  Planned Interventions include: therapeutic exercise, self-care home management, manual therapy, therapeutic activities, gait training, neuromuscular coordination, aquatic therapy, modalities  PRN  Frequency: 1 /week   Duration: 6-8 weeks    Artemio Roy, PT

## 2024-01-22 ENCOUNTER — APPOINTMENT (OUTPATIENT)
Dept: PHYSICAL THERAPY | Facility: CLINIC | Age: 74
End: 2024-01-22
Payer: COMMERCIAL

## 2024-01-22 ENCOUNTER — DOCUMENTATION (OUTPATIENT)
Dept: PHYSICAL THERAPY | Facility: CLINIC | Age: 74
End: 2024-01-22
Payer: COMMERCIAL

## 2024-01-22 NOTE — PROGRESS NOTES
Therapy Communication Note    Patient Name: Truong Armijo  MRN: [id number]  Today's Date: 1/22/2024     Discipline: Physical Therapy        Comment: Had to cancel today's appointment as insurance approval is still pending.

## 2024-01-23 ENCOUNTER — TELEMEDICINE (OUTPATIENT)
Dept: BEHAVIORAL HEALTH | Facility: CLINIC | Age: 74
End: 2024-01-23
Payer: COMMERCIAL

## 2024-01-23 DIAGNOSIS — F41.8 MIXED ANXIETY DEPRESSIVE DISORDER: ICD-10-CM

## 2024-01-23 DIAGNOSIS — F68.8 CHANGE IN PERSONALITY: ICD-10-CM

## 2024-01-23 DIAGNOSIS — Z79.899 MEDICATION MANAGEMENT: ICD-10-CM

## 2024-01-23 DIAGNOSIS — R45.81 CHRONIC LOW SELF ESTEEM: ICD-10-CM

## 2024-01-23 DIAGNOSIS — F25.1 SCHIZOAFFECTIVE DISORDER, DEPRESSIVE TYPE (MULTI): ICD-10-CM

## 2024-01-23 PROCEDURE — 80346 BENZODIAZEPINES1-12: CPT | Performed by: PSYCHIATRY & NEUROLOGY

## 2024-01-23 PROCEDURE — 99215 OFFICE O/P EST HI 40 MIN: CPT | Performed by: PSYCHIATRY & NEUROLOGY

## 2024-01-23 SDOH — ECONOMIC STABILITY: GENERAL
WHICH OF THE FOLLOWING WOULD YOU LIKE TO GET CONNECTED TO IN ORDER TO RECEIVE A DISCOUNT OR FOR FREE? (CHOOSE ALL THAT APPLY): NONE OF THESE

## 2024-01-23 SDOH — ECONOMIC STABILITY: GENERAL
WHICH OF THE FOLLOWING DO YOU KNOW HOW TO USE AND HAVE ACCESS TO EVERY DAY? (CHOOSE ALL THAT APPLY): DESKTOP COMPUTER, LAPTOP COMPUTER, OR TABLET WITH BROADBAND INTERNET CONNECTION;SMARTPHONE WITH CELLULAR DATA PLAN

## 2024-01-23 ASSESSMENT — ENCOUNTER SYMPTOMS: DYSPHORIC MOOD: 1

## 2024-01-23 NOTE — PROGRESS NOTES
Subjective   Patient ID: Truong Armijo is a 73 y.o. male who presents for No chief complaint on file. Things are not very well.     The patient is alert, fully oriented, language is intact, and recent and remote memory intact. The patient denies any suicidal or homicidal ideation or plans. The patient presents with chronic dysphoria without manic or psychotic symptoms. Thought is clear. Judgment and insight are limited. The patient continues to have disappointments with his social interaction.      Review of Systems   Neurological:         The patient is alert, fully oriented, language is intact, and recent and remote memory intact. The patient denies any suicidal or homicidal ideation or plans. The patient presents with chronic dysphoria without manic or psychotic symptoms. Thought is clear. Judgment and insight are limited. The patient continues to have disappointments with his social interaction.     Psychiatric/Behavioral:  Positive for dysphoric mood.         The patient is alert, fully oriented, language is intact, and recent and remote memory intact. The patient denies any suicidal or homicidal ideation or plans. The patient presents with chronic dysphoria without manic or psychotic symptoms. Thought is clear. Judgment and insight are limited. The patient continues to have disappointments with his social interaction.     All other systems reviewed and are negative.      Objective   Physical Exam  Neurological:      General: No focal deficit present.      Mental Status: He is alert and oriented to person, place, and time. Mental status is at baseline.      Comments: The patient is alert, fully oriented, language is intact, and recent and remote memory intact. The patient denies any suicidal or homicidal ideation or plans. The patient presents with chronic dysphoria without manic or psychotic symptoms. Thought is clear. Judgment and insight are limited. The patient continues to have disappointments with his  social interaction.     Psychiatric:      Comments: The patient is alert, fully oriented, language is intact, and recent and remote memory intact. The patient denies any suicidal or homicidal ideation or plans. The patient presents with chronic dysphoria without manic or psychotic symptoms. Thought is clear. Judgment and insight are limited. The patient continues to have disappointments with his social interaction.           Lab Review:       Assessment/Plan   The FDA risks, benefits & alternatives to the medications prescribed were explained to you today. You were able to understand & repeat these risks, benefits & alternatives to these prescribed medications. You have agreed to proceed with treatment with the medications discussed based on the conclusion that the benefit outweighs the risks of this treatment regimen: bupropion  mg every 12 hours, diazepam 5 mg nightly as needed, fluoxetine 40 mg daily and risperidone 1 mg daily. Follow up monthly. You are due for a urine toxicology screen (last done in November of 2022) for which I have put in an order. You are also due to sign your annual controlled substance agreement(last signed in December of 2023). Follow up monthly as you have been doing and please come in to have your substance agreement signed at [facility].

## 2024-01-26 ENCOUNTER — OFFICE VISIT (OUTPATIENT)
Dept: PRIMARY CARE | Facility: CLINIC | Age: 74
End: 2024-01-26
Payer: COMMERCIAL

## 2024-01-26 VITALS
HEIGHT: 74 IN | WEIGHT: 215 LBS | BODY MASS INDEX: 27.59 KG/M2 | SYSTOLIC BLOOD PRESSURE: 95 MMHG | DIASTOLIC BLOOD PRESSURE: 65 MMHG | RESPIRATION RATE: 16 BRPM | HEART RATE: 78 BPM

## 2024-01-26 DIAGNOSIS — N40.0 ENLARGED PROSTATE WITHOUT LOWER URINARY TRACT SYMPTOMS (LUTS): ICD-10-CM

## 2024-01-26 DIAGNOSIS — I95.2 HYPOTENSION DUE TO DRUGS: ICD-10-CM

## 2024-01-26 DIAGNOSIS — E11.42 DIABETIC PERIPHERAL NEUROPATHY (MULTI): ICD-10-CM

## 2024-01-26 DIAGNOSIS — G21.11 NEUROLEPTIC-INDUCED PARKINSONISM (MULTI): ICD-10-CM

## 2024-01-26 DIAGNOSIS — G93.39 OTHER POST INFECTION AND RELATED FATIGUE SYNDROMES: ICD-10-CM

## 2024-01-26 DIAGNOSIS — E78.00 ELEVATED LDL CHOLESTEROL LEVEL: ICD-10-CM

## 2024-01-26 DIAGNOSIS — J43.1 PANLOBULAR EMPHYSEMA (MULTI): ICD-10-CM

## 2024-01-26 DIAGNOSIS — K21.00 GASTROESOPHAGEAL REFLUX DISEASE WITH ESOPHAGITIS WITHOUT HEMORRHAGE: ICD-10-CM

## 2024-01-26 DIAGNOSIS — I10 BENIGN ESSENTIAL HYPERTENSION: ICD-10-CM

## 2024-01-26 DIAGNOSIS — E11.9 TYPE 2 DIABETES MELLITUS WITHOUT COMPLICATION, WITHOUT LONG-TERM CURRENT USE OF INSULIN (MULTI): Primary | ICD-10-CM

## 2024-01-26 DIAGNOSIS — E29.1 HYPOGONADISM MALE: ICD-10-CM

## 2024-01-26 DIAGNOSIS — F25.1 SCHIZOAFFECTIVE DISORDER, DEPRESSIVE TYPE (MULTI): ICD-10-CM

## 2024-01-26 DIAGNOSIS — E03.9 PRIMARY HYPOTHYROIDISM: ICD-10-CM

## 2024-01-26 DIAGNOSIS — T43.505A NEUROLEPTIC-INDUCED PARKINSONISM (MULTI): ICD-10-CM

## 2024-01-26 PROBLEM — R26.89 IMPAIRMENT OF BALANCE: Status: RESOLVED | Noted: 2024-01-26 | Resolved: 2024-01-26

## 2024-01-26 PROBLEM — H40.1190 PRIMARY OPEN ANGLE GLAUCOMA (POAG): Status: RESOLVED | Noted: 2023-09-14 | Resolved: 2024-01-26

## 2024-01-26 PROBLEM — M25.529 ELBOW PAIN: Status: RESOLVED | Noted: 2024-01-26 | Resolved: 2024-01-26

## 2024-01-26 PROBLEM — S53.409A ELBOW SPRAIN: Status: RESOLVED | Noted: 2024-01-26 | Resolved: 2024-01-26

## 2024-01-26 PROBLEM — N39.0 URINARY TRACT INFECTION: Status: RESOLVED | Noted: 2024-01-26 | Resolved: 2024-01-26

## 2024-01-26 PROBLEM — K59.00 ACUTE CONSTIPATION: Status: RESOLVED | Noted: 2024-01-26 | Resolved: 2024-01-26

## 2024-01-26 PROBLEM — N31.9 NEUROGENIC BLADDER: Status: RESOLVED | Noted: 2024-01-26 | Resolved: 2024-01-26

## 2024-01-26 PROBLEM — R13.10 DYSPHAGIA: Status: RESOLVED | Noted: 2024-01-26 | Resolved: 2024-01-26

## 2024-01-26 PROBLEM — H47.029: Status: RESOLVED | Noted: 2024-01-26 | Resolved: 2024-01-26

## 2024-01-26 PROBLEM — R11.0 NAUSEA: Status: RESOLVED | Noted: 2024-01-26 | Resolved: 2024-01-26

## 2024-01-26 PROBLEM — H93.19 SUBJECTIVE TINNITUS: Status: RESOLVED | Noted: 2024-01-26 | Resolved: 2024-01-26

## 2024-01-26 PROBLEM — G40.909 SEIZURE DISORDER (MULTI): Status: RESOLVED | Noted: 2023-09-14 | Resolved: 2024-01-26

## 2024-01-26 PROCEDURE — 3078F DIAST BP <80 MM HG: CPT | Performed by: INTERNAL MEDICINE

## 2024-01-26 PROCEDURE — 4010F ACE/ARB THERAPY RXD/TAKEN: CPT | Performed by: INTERNAL MEDICINE

## 2024-01-26 PROCEDURE — 99215 OFFICE O/P EST HI 40 MIN: CPT | Performed by: INTERNAL MEDICINE

## 2024-01-26 PROCEDURE — 1126F AMNT PAIN NOTED NONE PRSNT: CPT | Performed by: INTERNAL MEDICINE

## 2024-01-26 PROCEDURE — 1036F TOBACCO NON-USER: CPT | Performed by: INTERNAL MEDICINE

## 2024-01-26 PROCEDURE — 1159F MED LIST DOCD IN RCRD: CPT | Performed by: INTERNAL MEDICINE

## 2024-01-26 PROCEDURE — 3074F SYST BP LT 130 MM HG: CPT | Performed by: INTERNAL MEDICINE

## 2024-01-26 RX ORDER — GLIPIZIDE 5 MG/1
10 TABLET ORAL
Qty: 120 TABLET | Refills: 2 | Status: SHIPPED | OUTPATIENT
Start: 2024-01-26 | End: 2024-04-19 | Stop reason: SDUPTHER

## 2024-01-26 ASSESSMENT — ENCOUNTER SYMPTOMS
RESPIRATORY NEGATIVE: 1
HEMATOLOGIC/LYMPHATIC NEGATIVE: 1
ALLERGIC/IMMUNOLOGIC NEGATIVE: 1
GASTROINTESTINAL NEGATIVE: 1
NEUROLOGICAL NEGATIVE: 1
CONSTITUTIONAL NEGATIVE: 1
CARDIOVASCULAR NEGATIVE: 1
ENDOCRINE NEGATIVE: 1
PSYCHIATRIC NEGATIVE: 1
MUSCULOSKELETAL NEGATIVE: 1
EYES NEGATIVE: 1

## 2024-01-26 NOTE — ASSESSMENT & PLAN NOTE
DM - NIDDM  . Reviewed with patient / Will check  HbA1c and fasting blood sugars. Educate home self monitoring and diary keeping(reviewed with patient home blood sugar levels /diary). Educate extensively low calorie diet and weight loss with exercise. Reviewed BS- diary and Rx. Regimen. [place] renal protection with ARB/ACEI.               Educate compliance with diet and Rx. And educate risks and autcomes.

## 2024-01-26 NOTE — PROGRESS NOTES
"Subjective   Patient ID: Truong Armijo is a 73 y.o. male who presents for Follow-up (Follow up).    HPI     Review of Systems   Constitutional: Negative.    HENT: Negative.     Eyes: Negative.    Respiratory: Negative.     Cardiovascular: Negative.    Gastrointestinal: Negative.    Endocrine: Negative.    Musculoskeletal: Negative.    Skin: Negative.    Allergic/Immunologic: Negative.    Neurological: Negative.    Hematological: Negative.    Psychiatric/Behavioral: Negative.     All other systems reviewed and are negative.      Objective   Ht 1.88 m (6' 2\")   Wt 97.5 kg (215 lb)   BMI 27.60 kg/m²   Blood pressure 95/65, pulse 78, resp. rate 16, height 1.88 m (6' 2\"), weight 97.5 kg (215 lb).   Physical Exam  Vitals and nursing note reviewed.   Constitutional:       Appearance: Normal appearance.   HENT:      Head: Normocephalic and atraumatic.      Right Ear: Tympanic membrane, ear canal and external ear normal.      Left Ear: Tympanic membrane, ear canal and external ear normal. There is no impacted cerumen.      Nose: Nose normal.      Mouth/Throat:      Mouth: Mucous membranes are moist.      Pharynx: Oropharynx is clear.   Eyes:      Extraocular Movements: Extraocular movements intact.      Conjunctiva/sclera: Conjunctivae normal.      Pupils: Pupils are equal, round, and reactive to light.   Cardiovascular:      Rate and Rhythm: Normal rate and regular rhythm.      Pulses: Normal pulses.      Heart sounds: Normal heart sounds. No murmur heard.  Pulmonary:      Effort: Pulmonary effort is normal. No respiratory distress.      Breath sounds: Normal breath sounds. No stridor. No wheezing, rhonchi or rales.   Chest:      Chest wall: No tenderness.   Abdominal:      General: Abdomen is flat. Bowel sounds are normal. There is no distension.      Palpations: Abdomen is soft. There is no mass.      Tenderness: There is no abdominal tenderness. There is no right CVA tenderness, left CVA tenderness, guarding or " rebound.      Hernia: No hernia is present.   Musculoskeletal:         General: Normal range of motion.      Cervical back: Normal range of motion and neck supple.   Skin:     General: Skin is warm.      Capillary Refill: Capillary refill takes less than 2 seconds.   Neurological:      General: No focal deficit present.      Mental Status: He is alert.      Cranial Nerves: No cranial nerve deficit.      Sensory: No sensory deficit.      Motor: No weakness.      Coordination: Coordination normal.      Gait: Gait normal.      Deep Tendon Reflexes: Reflexes normal.   Psychiatric:         Mood and Affect: Mood normal.         Behavior: Behavior normal. Behavior is cooperative.         Thought Content: Thought content normal.         Judgment: Judgment normal.       Assessment/Plan   Problem List Items Addressed This Visit             ICD-10-CM    Benign essential hypertension I10     HTN - hypertension well/controlled .Target BP < 130/80  achieved. Educate low salt diet and exercise with weight loss. Educate home self monitoring and diary keeping. Educate risks of elevate blood pressure and benefits of prompt treatment.  Refill Lisinopril            Diabetic peripheral neuropathy (CMS/HCC) E11.42     Neuropathy/ - positive numbness, tingling, discomfort (needles pricking, cold feeling) in distal lower extremities(toes, feet, legs), secondary to DM/Radiculopathy/idiopathic. Recommend: Gabapentin(Neurontin) 300 daily(at bed time) upward taper up to  2 gm/day or Lyrica 75 mg daily if failure of treatment. Also recommend: treatment of Diabetes(control levels of blood sugars), lumbar/ cervical  disc disease (Physical Therapy), and check electrolytes and Thyroid function. Also address regenrative measures: B12 intrmusculary (Monthly) and Folic acid supplementation. Recommend  EMG. Start Amitriptyline 25 mg daily / . Start - Tonic water - and Tramadol 50 mg TID.           Elevated LDL cholesterol level E78.00      Hypercholesterolemia - Monitor lipid profile and educate patient upon risks of high cholesterol and targets. Educate diet and change in lifestyle and increase in exercises - Refill: Atorvastatin  and educate compliance with medication and diet.             Enlarged prostate without lower urinary tract symptoms (luts) N40.0     BPH - Benign PROSTATIC Hypertrophy, + Dysuria/Nocturia, check PSA, prescribe Flomax 0.4mg qD and Avodart 0.5 mg qD vs. Finasteride 5 mg qD.  Educate exercises and Change in life style, prescribe vitamin E 400 IU qD. Consider referral to urology.          Fatigue R53.83     Fatigue - check CMP(metabolic panel and elctrolytes) , CBC(complete blood cell count), TSH(thyroid function). Insomnia may play a role and sleep studies(rule out sleep apnea) are recommended . Educate sleep hygiene. Consider anxiety disorder vs. depression. Consider Stress test, and 2DECHO.           Diabetes mellitus (CMS/[facility]) - Primary E11.9     DM - NIDDM  . Reviewed with patient / Will check  HbA1c and fasting blood sugars. Educate home self monitoring and diary keeping(reviewed with patient home blood sugar levels /diary). Educate extensively low calorie diet and weight loss with exercise. Reviewed BS- diary and Rx. Regimen. [place] renal protection with ARB/ACEI.               Educate compliance with diet and Rx. And educate risks and autcomes.                                                 Relevant Medications    glipiZIDE (Glucotrol) 5 mg tablet    Primary hypothyroidism E03.9     Hypothyroidism - Symptoms well/controlled (weight gain, fatigue, constipation, cold intolerance). Check TSH continues dose of Synthroid/Levothyroxine  of  88 bmcg/qD.           Reflux esophagitis K21.00     GERD - Acid reflux disease. Rx. PPI (Prilosec/Prevacid/Protonix/Nexium) and educate diet and life style changes. Referred patient to an endoscopy (EGD) and check H. Pylori titers.          Schizoaffective disorder (CMS/[facility]) F25.9      Refer to and follows with psych          Hypogonadism male E29.1          Benign essential hypertension - Primary I10        HTN - hypertension well/controlled .Target BP < 130/80  achieved. Educate low salt diet and exercise with weight loss. Educate home self monitoring and diary keeping. Educate risks of elevate blood pressure and benefits of prompt treatment.  Refill Lisinopril             Elevated LDL cholesterol level E78.00       Hypercholesterolemia - Monitor lipid profile and educate patient upon risks of high cholesterol and targets. Educate diet and change in lifestyle and increase in exercises - Refill: Atorvastatin  and educate compliance with medication and diet.              Fatigue R53.83       POST COVID Fatigue - Fatigue - check CMP(metabolic panel and elctrolytes) , CBC(complete blood cell count), TSH(thyroid function). Insomnia may play a role and sleep studies(rule out sleep apnea) are recommended . Educate sleep hygiene. Consider anxiety disorder vs. depression. Consider Stress test, and 2DECHO.              Diabetes mellitus (CMS/[facility]) E11.9       DM - NIDDM  . Reviewed with patient / Will check  HbA1c and fasting blood sugars. Educate home self monitoring and diary keeping(reviewed with patient home blood sugar levels /diary). Educate extensively low calorie diet and weight loss with exercise. Reviewed BS- diary and Rx. Regimen. [place] renal protection with ARB/ACEI.               Educate compliance with diet and Rx. And educate risks and autcomes.                                                   Lumbar disc herniation M51.26       DDD - Degenerative disc disease of the Lumbo-Sacral (LS)/ spine. Educate exercises and referred patient to Physical Therapy (PT). Ordered X-Ray's of the LS spine. Consider MRI. Radiculopathy in the distribution of L4-L5-S1/nerve roots, needs NSAIDS (Naprosin 500mg BID vs. Arthrotec 75mg BID or Prednisone taper (Medrol dose pack), Flexeril 10 mg qHS, heat  application, and pain control with Tylenol 325 mg TID.             Primary hypothyroidism E03.9       Hypothyroidism - Symptoms well/controlled (weight gain, fatigue, constipation, cold intolerance). Check TSH continues dose of Synthroid/Levothyroxine  of  88 bmcg/qD.            Reflux esophagitis K21.00       GERD - Acid reflux disease. Rx. PPI (Prilosec/Prevacid/Protonix/Nexium) and educate diet and life style changes. Referred patient to an endoscopy (EGD) and check H. Pylori titers.            Hypogonadism male E29.1       Check levels and refill supplement of Testosterone            Relevant Medications     testosterone 20.25 mg/1.25 gram (1.62 %) gel in metered-dose pump                Benign essential hypertension I10          HTN - hypertension well/controlled .Target BP < 130/80  achieved. Educate low salt diet and exercise with weight loss. Educate home self monitoring and diary keeping. Educate risks of elevate blood pressure and benefits of prompt treatment.  Refill Lisinopril               COPD (chronic obstructive pulmonary disease) (CMS/[facility]) - Primary J44.9      Elevated LDL cholesterol level E78.00        Hypercholesterolemia - Monitor lipid profile and educate patient upon risks of high cholesterol and targets. Educate diet and change in lifestyle and increase in exercises - Refill: Atorvastatin  and educate compliance with medication and diet.             Relevant Orders      Lipid Panel      Fatigue R53.83        POST COVID Fatigue - Fatigue - check CMP(metabolic panel and elctrolytes) , CBC(complete blood cell count), TSH(thyroid function). Insomnia may play a role and sleep studies(rule out sleep apnea) are recommended . Educate sleep hygiene. Consider anxiety disorder vs. depression. Consider Stress test, and 2DECHO.             Relevant Orders      CBC and Auto Differential      Diabetes mellitus (CMS/[facility]) E11.9        DM - NIDDM  . Reviewed with patient / Will check  HbA1c and fasting blood  sugars. Educate home self monitoring and diary keeping(reviewed with patient home blood sugar levels /diary). Educate extensively low calorie diet and weight loss with exercise. Reviewed BS- diary and Rx. Regimen. [place] renal protection with ARB/ACEI.               Educate compliance with diet and Rx. And educate risks and autcomes.                                                     Relevant Orders      Comprehensive Metabolic Panel      Hemoglobin A1C      Lumbar disc herniation M51.26        DDD - Degenerative disc disease of the Lumbo-Sacral (LS)/Cervical (C)  spine. Educate exercises and referred patient to Physical Therapy (PT). Ordered X-Ray's of the LS/C spine. Consider MRI. Radiculopathy in the distribution of L4-L5-S1/C3-C4-C5 nerve roots, needs NSAIDS (Naprosin 500mg BID vs. Arthrotec 75mg BID or Prednisone taper (Medrol dose pack), Flexeril 10 mg qHS, heat application, and pain control with Tylenol 325 mg TID.             Primary hypothyroidism E03.9        Hypothyroidism - Symptoms well/controlled (weight gain, fatigue, constipation, cold intolerance). Check TSH continues dose of Synthroid/Levothyroxine  of  88 bmcg/qD.              Relevant Orders      TSH with reflex to Free T4 if abnormal      Reflux esophagitis K21.00        GERD - Acid reflux disease. Rx. PPI (Prilosec/Prevacid/Protonix/Nexium) and educate diet and life style changes. Referred patient to an endoscopy (EGD) and check H. Pylori titers.         Total face to face time was 45 minutes , with more than 50% spent counseling the patient regarding diagnosis of Hypertension with aggressive treatment of hypercholesterolemia. Educate extensively diet and lifestyle changes and increase in physical activity and weight loss. Also educate the patient the risks of  untreated hypertension or untreated hypercholesterolemia  - educate risks of developing coronary artery disease , stroke or renal failure -

## 2024-01-26 NOTE — ASSESSMENT & PLAN NOTE
Neuropathy/ - positive numbness, tingling, discomfort (needles pricking, cold feeling) in distal lower extremities(toes, feet, legs), secondary to DM/Radiculopathy/idiopathic. Recommend: Gabapentin(Neurontin) 300 daily(at bed time) upward taper up to  2 gm/day or Lyrica 75 mg daily if failure of treatment. Also recommend: treatment of Diabetes(control levels of blood sugars), lumbar/ cervical  disc disease (Physical Therapy), and check electrolytes and Thyroid function. Also address regenrative measures: B12 intrmusculary (Monthly) and Folic acid supplementation. Recommend  EMG. Start Amitriptyline 25 mg daily / . Start - Tonic water - and Tramadol 50 mg TID.

## 2024-01-27 LAB
1OH-MIDAZOLAM UR CFM-MCNC: <25 NG/ML
7AMINOCLONAZEPAM UR CFM-MCNC: <25 NG/ML
A-OH ALPRAZ UR CFM-MCNC: <25 NG/ML
ALPRAZ UR CFM-MCNC: <25 NG/ML
CHLORDIAZEP UR CFM-MCNC: <25 NG/ML
CLONAZEPAM UR CFM-MCNC: <25 NG/ML
DIAZEPAM UR CFM-MCNC: <25 NG/ML
LORAZEPAM UR CFM-MCNC: <25 NG/ML
MIDAZOLAM UR CFM-MCNC: <25 NG/ML
NORDIAZEPAM UR CFM-MCNC: 283 NG/ML
OXAZEPAM UR CFM-MCNC: 598 NG/ML
TEMAZEPAM UR CFM-MCNC: 671 NG/ML

## 2024-01-29 ENCOUNTER — TREATMENT (OUTPATIENT)
Dept: PHYSICAL THERAPY | Facility: CLINIC | Age: 74
End: 2024-01-29
Payer: COMMERCIAL

## 2024-01-29 DIAGNOSIS — M25.562 ACUTE PAIN OF LEFT KNEE: Primary | ICD-10-CM

## 2024-01-29 DIAGNOSIS — M54.16 RADICULOPATHY, LUMBAR REGION: ICD-10-CM

## 2024-01-29 PROCEDURE — 97110 THERAPEUTIC EXERCISES: CPT | Mod: GP | Performed by: PHYSICAL THERAPIST

## 2024-01-29 NOTE — PROGRESS NOTES
"Physical Therapy  Physical Therapy Treatment    Patient Name: Truong Armijo  MRN: [id number]  Today's Date: 1/29/2024     Visit 2   Reason for visit: acute pain left knee   Referring MD: Ki Ahuja   Insurance: [facility] 8 visits from 1/18/24 through 4/17/23  DX: acute left knee pain , lumbar radic   POC: 1/week 6-8 weeks       Current Problem  No diagnosis found.    General     Precautions     Pain       Subjective:   Subjective   Patient reports 0.5/10 pain current.  There is a little bit of pain in my left knee.  I have not felt the pain in my thigh/tendon really anymore.    HEP compliance= yes     Objective:   Objective    Patellar mobility - WNL TIARRA   + lateral tracking TIARRA L>R     Treatments:  Nustep L3 x5'   DKTC 5\" x10    SLR 2x12 tiarra   Bridge 2x12   Standing hip abd 2x12 tiarra   TKE blue TB x20 tiarra (Added to HEP)    Sit to stand hands on thighs x10 (Added to HEP)    Leg press 70# 2x12   Cybex walk out 3 step 5# x5 ea   Ham stretch seated /chair 1' tiarra   Quad stretch chair 1' tiarra     Access Code: [email or web address]   Assessment:     Able to complete bridge but in partial range.  Verbal cues for hold times.  Did report some calf \"sensation\" after bridging exercise but not pain.  Calf sensation mostly abolished after DKTC.      Plan:      Continue per POC to increase ROM, flexibility, core and LE strength to improve function, decrease pain and allow return to prior level of function and exercise routine.        Artemio Roy, PT  "

## 2024-02-06 DIAGNOSIS — E78.1 HIGH TRIGLYCERIDES: ICD-10-CM

## 2024-02-06 RX ORDER — ATORVASTATIN CALCIUM 20 MG/1
20 TABLET, FILM COATED ORAL NIGHTLY
Qty: 90 TABLET | Refills: 0 | Status: SHIPPED | OUTPATIENT
Start: 2024-02-06 | End: 2024-02-15 | Stop reason: SDUPTHER

## 2024-02-08 DIAGNOSIS — E11.9 TYPE 2 DIABETES MELLITUS WITHOUT COMPLICATION, WITHOUT LONG-TERM CURRENT USE OF INSULIN (MULTI): ICD-10-CM

## 2024-02-08 DIAGNOSIS — E11.9 TYPE 2 DIABETES MELLITUS WITHOUT COMPLICATIONS (MULTI): ICD-10-CM

## 2024-02-08 RX ORDER — DAPAGLIFLOZIN 5 MG/1
5 TABLET, FILM COATED ORAL DAILY
Qty: 90 TABLET | Refills: 0 | Status: SHIPPED | OUTPATIENT
Start: 2024-02-08

## 2024-02-08 RX ORDER — METFORMIN HYDROCHLORIDE 500 MG/1
1000 TABLET ORAL 2 TIMES DAILY
Qty: 360 TABLET | Refills: 0 | Status: SHIPPED | OUTPATIENT
Start: 2024-02-08 | End: 2024-05-10

## 2024-02-15 DIAGNOSIS — E78.1 HIGH TRIGLYCERIDES: ICD-10-CM

## 2024-02-15 RX ORDER — ATORVASTATIN CALCIUM 20 MG/1
20 TABLET, FILM COATED ORAL NIGHTLY
Qty: 90 TABLET | Refills: 0 | Status: SHIPPED | OUTPATIENT
Start: 2024-02-15 | End: 2024-04-19 | Stop reason: SDUPTHER

## 2024-02-16 DIAGNOSIS — E11.9 TYPE 2 DIABETES MELLITUS WITHOUT COMPLICATIONS (MULTI): ICD-10-CM

## 2024-02-16 RX ORDER — SITAGLIPTIN 100 MG/1
100 TABLET, FILM COATED ORAL DAILY
Qty: 90 TABLET | Refills: 0 | Status: SHIPPED | OUTPATIENT
Start: 2024-02-16 | End: 2024-05-10

## 2024-02-19 ENCOUNTER — OFFICE VISIT (OUTPATIENT)
Dept: OPHTHALMOLOGY | Facility: CLINIC | Age: 74
End: 2024-02-19
Payer: COMMERCIAL

## 2024-02-19 DIAGNOSIS — H35.372 EPIRETINAL MEMBRANE (ERM) OF LEFT EYE: Primary | ICD-10-CM

## 2024-02-19 DIAGNOSIS — D31.32 NEVUS OF CHOROID OF LEFT EYE: ICD-10-CM

## 2024-02-19 PROCEDURE — 92134 CPTRZ OPH DX IMG PST SGM RTA: CPT

## 2024-02-19 PROCEDURE — 99213 OFFICE O/P EST LOW 20 MIN: CPT

## 2024-02-19 PROCEDURE — 92250 FUNDUS PHOTOGRAPHY W/I&R: CPT

## 2024-02-19 ASSESSMENT — SLIT LAMP EXAM - LIDS
COMMENTS: NORMAL
COMMENTS: NORMAL

## 2024-02-19 ASSESSMENT — CUP TO DISC RATIO
OD_RATIO: 0.7
OS_RATIO: 0.7

## 2024-02-19 ASSESSMENT — VISUAL ACUITY
OS_CC: 20/30
OD_CC: 20/25
OS_PH_CC: 20/25
METHOD: SNELLEN - LINEAR

## 2024-02-19 ASSESSMENT — PACHYMETRY
OD_CT(UM): 623
OS_CT(UM): 590

## 2024-02-19 ASSESSMENT — TONOMETRY
IOP_METHOD: GOLDMANN APPLANATION
OD_IOP_MMHG: 15
OS_IOP_MMHG: 16

## 2024-02-19 ASSESSMENT — EXTERNAL EXAM - LEFT EYE: OS_EXAM: NORMAL

## 2024-02-19 ASSESSMENT — EXTERNAL EXAM - RIGHT EYE: OD_EXAM: NORMAL

## 2024-02-19 ASSESSMENT — ENCOUNTER SYMPTOMS: EYES NEGATIVE: 1

## 2024-02-19 NOTE — PROGRESS NOTES
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 No IRF or SRF    - Left eye (OS): ERM stage 3. 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 Adamson

## 2024-02-20 ENCOUNTER — TELEMEDICINE (OUTPATIENT)
Dept: BEHAVIORAL HEALTH | Facility: CLINIC | Age: 74
End: 2024-02-20
Payer: COMMERCIAL

## 2024-02-20 DIAGNOSIS — F25.1 SCHIZOAFFECTIVE DISORDER, DEPRESSIVE TYPE (MULTI): ICD-10-CM

## 2024-02-20 PROCEDURE — 1126F AMNT PAIN NOTED NONE PRSNT: CPT | Performed by: PSYCHIATRY & NEUROLOGY

## 2024-02-20 PROCEDURE — 1160F RVW MEDS BY RX/DR IN RCRD: CPT | Performed by: PSYCHIATRY & NEUROLOGY

## 2024-02-20 PROCEDURE — 4010F ACE/ARB THERAPY RXD/TAKEN: CPT | Performed by: PSYCHIATRY & NEUROLOGY

## 2024-02-20 PROCEDURE — 1036F TOBACCO NON-USER: CPT | Performed by: PSYCHIATRY & NEUROLOGY

## 2024-02-20 PROCEDURE — 99214 OFFICE O/P EST MOD 30 MIN: CPT | Performed by: PSYCHIATRY & NEUROLOGY

## 2024-02-20 PROCEDURE — 1159F MED LIST DOCD IN RCRD: CPT | Performed by: PSYCHIATRY & NEUROLOGY

## 2024-02-20 ASSESSMENT — ENCOUNTER SYMPTOMS: DYSPHORIC MOOD: 1

## 2024-02-20 NOTE — PROGRESS NOTES
Subjective   Patient ID: Truong Armijo is a 73 y.o. male who presents for No chief complaint on file. Things are not very well.     The patient engaged in a telehealth session via Epic audio visual or phone with this provider practicing within the [facility]. The identity of the patient was verified by their date of birth and last four digits of their social security number. The provider demonstrated that confidentially was preserved at their location. The patient was informed that they were responsible for ensuring confidentially was secured at their location. The patient's location was documented for emergency purposes. The patient was informed of the necessary steps that would occur if an emergency was to occur or technology failed during session.     The patient is alert, fully oriented, language is intact, and recent and remote memory intact. The patient denies any suicidal or homicidal ideation or plans. The patient presents with chronic dysphoria without manic or psychotic symptoms. Thought is clear. Judgment and insight are limited. The patient continues to have disappointments with his social interactions.      Review of Systems   Neurological:         The patient is alert, fully oriented, language is intact, and recent and remote memory intact. The patient denies any suicidal or homicidal ideation or plans. The patient presents with chronic dysphoria without manic or psychotic symptoms. Thought is clear. Judgment and insight are limited. The patient continues to have disappointments with his social interaction.     Psychiatric/Behavioral:  Positive for dysphoric mood.         The patient is alert, fully oriented, language is intact, and recent and remote memory intact. The patient denies any suicidal or homicidal ideation or plans. The patient presents with chronic dysphoria without manic or psychotic symptoms. Thought is clear. Judgment and insight are limited. The patient continues to have  disappointments with his social interaction.     All other systems reviewed and are negative.      Objective   Physical Exam  Neurological:      General: No focal deficit present.      Mental Status: He is alert and oriented to person, place, and time. Mental status is at baseline.      Comments: The patient is alert, fully oriented, language is intact, and recent and remote memory intact. The patient denies any suicidal or homicidal ideation or plans. The patient presents with chronic dysphoria without manic or psychotic symptoms. Thought is clear. Judgment and insight are limited. The patient continues to have disappointments with his social interaction.     Psychiatric:      Comments: The patient is alert, fully oriented, language is intact, and recent and remote memory intact. The patient denies any suicidal or homicidal ideation or plans. The patient presents with chronic dysphoria without manic or psychotic symptoms. Thought is clear. Judgment and insight are limited. The patient continues to have disappointments with his social interaction.         Lab Review:     Assessment/Plan   The FDA risks, benefits & alternatives to the medications prescribed were explained to you today. You were able to understand & repeat these risks, benefits & alternatives to these prescribed medications. You have agreed to proceed with treatment with the medications discussed based on the conclusion that the benefit outweighs the risks of this treatment regimen: bupropion  mg every 12 hours, diazepam 5 mg nightly as needed, fluoxetine 40 mg daily and risperidone 1 mg daily.    Follow up monthly. You are due for a urine toxicology screen (last done on January 23 of 2024) and your annual controlled substance agreement(last signed in January 23 of 2024) in January of 2025.     Follow up monthly as you have been doing.

## 2024-02-24 DIAGNOSIS — I10 BENIGN ESSENTIAL HYPERTENSION: ICD-10-CM

## 2024-02-26 ENCOUNTER — APPOINTMENT (OUTPATIENT)
Dept: OPHTHALMOLOGY | Facility: CLINIC | Age: 74
End: 2024-02-26
Payer: COMMERCIAL

## 2024-02-26 RX ORDER — OMEGA-3-ACID ETHYL ESTERS 1 G/1
4 CAPSULE, LIQUID FILLED ORAL
Qty: 360 CAPSULE | Refills: 0 | Status: SHIPPED | OUTPATIENT
Start: 2024-02-26 | End: 2024-05-20

## 2024-02-27 ENCOUNTER — APPOINTMENT (OUTPATIENT)
Dept: OPHTHALMOLOGY | Facility: CLINIC | Age: 74
End: 2024-02-27
Payer: COMMERCIAL

## 2024-03-04 ENCOUNTER — APPOINTMENT (OUTPATIENT)
Dept: PHYSICAL THERAPY | Facility: CLINIC | Age: 74
End: 2024-03-04
Payer: COMMERCIAL

## 2024-03-08 ENCOUNTER — OFFICE VISIT (OUTPATIENT)
Dept: PRIMARY CARE | Facility: CLINIC | Age: 74
End: 2024-03-08
Payer: COMMERCIAL

## 2024-03-08 ENCOUNTER — APPOINTMENT (OUTPATIENT)
Dept: PRIMARY CARE | Facility: CLINIC | Age: 74
End: 2024-03-08
Payer: COMMERCIAL

## 2024-03-08 VITALS
BODY MASS INDEX: 27.34 KG/M2 | HEART RATE: 72 BPM | DIASTOLIC BLOOD PRESSURE: 70 MMHG | SYSTOLIC BLOOD PRESSURE: 110 MMHG | RESPIRATION RATE: 16 BRPM | WEIGHT: 213 LBS | HEIGHT: 74 IN

## 2024-03-08 DIAGNOSIS — E29.1 HYPOGONADISM MALE: ICD-10-CM

## 2024-03-08 DIAGNOSIS — M50.30 DEGENERATION OF INTERVERTEBRAL DISC OF CERVICAL REGION: ICD-10-CM

## 2024-03-08 DIAGNOSIS — E11.9 TYPE 2 DIABETES MELLITUS WITHOUT COMPLICATION, WITHOUT LONG-TERM CURRENT USE OF INSULIN (MULTI): ICD-10-CM

## 2024-03-08 DIAGNOSIS — E11.610 DIABETIC CHARCOT'S FOOT (MULTI): ICD-10-CM

## 2024-03-08 DIAGNOSIS — J43.1 PANLOBULAR EMPHYSEMA (MULTI): ICD-10-CM

## 2024-03-08 DIAGNOSIS — R53.82 CHRONIC FATIGUE: ICD-10-CM

## 2024-03-08 DIAGNOSIS — E78.00 ELEVATED LDL CHOLESTEROL LEVEL: ICD-10-CM

## 2024-03-08 DIAGNOSIS — E11.42 DIABETIC PERIPHERAL NEUROPATHY (MULTI): ICD-10-CM

## 2024-03-08 DIAGNOSIS — R29.898 WEAKNESS OF BOTH LOWER EXTREMITIES: ICD-10-CM

## 2024-03-08 DIAGNOSIS — K27.9 PEPTIC ULCER, SITE UNSPECIFIED, UNSPECIFIED AS ACUTE OR CHRONIC, WITHOUT HEMORRHAGE OR PERFORATION: ICD-10-CM

## 2024-03-08 DIAGNOSIS — I10 PRIMARY HYPERTENSION: ICD-10-CM

## 2024-03-08 DIAGNOSIS — Z00.00 ANNUAL PHYSICAL EXAM: ICD-10-CM

## 2024-03-08 DIAGNOSIS — I10 BENIGN ESSENTIAL HYPERTENSION: Primary | ICD-10-CM

## 2024-03-08 DIAGNOSIS — F33.1 MODERATE EPISODE OF RECURRENT MAJOR DEPRESSIVE DISORDER (MULTI): ICD-10-CM

## 2024-03-08 DIAGNOSIS — R73.9 HYPERGLYCEMIA: ICD-10-CM

## 2024-03-08 DIAGNOSIS — F68.8 CHANGE IN PERSONALITY: ICD-10-CM

## 2024-03-08 DIAGNOSIS — F25.1 SCHIZOAFFECTIVE DISORDER, DEPRESSIVE TYPE (MULTI): ICD-10-CM

## 2024-03-08 DIAGNOSIS — K21.00 GASTROESOPHAGEAL REFLUX DISEASE WITH ESOPHAGITIS WITHOUT HEMORRHAGE: ICD-10-CM

## 2024-03-08 PROCEDURE — 99214 OFFICE O/P EST MOD 30 MIN: CPT | Performed by: INTERNAL MEDICINE

## 2024-03-08 PROCEDURE — 1126F AMNT PAIN NOTED NONE PRSNT: CPT | Performed by: INTERNAL MEDICINE

## 2024-03-08 PROCEDURE — 4010F ACE/ARB THERAPY RXD/TAKEN: CPT | Performed by: INTERNAL MEDICINE

## 2024-03-08 PROCEDURE — 1160F RVW MEDS BY RX/DR IN RCRD: CPT | Performed by: INTERNAL MEDICINE

## 2024-03-08 PROCEDURE — 1159F MED LIST DOCD IN RCRD: CPT | Performed by: INTERNAL MEDICINE

## 2024-03-08 PROCEDURE — 3078F DIAST BP <80 MM HG: CPT | Performed by: INTERNAL MEDICINE

## 2024-03-08 PROCEDURE — 1036F TOBACCO NON-USER: CPT | Performed by: INTERNAL MEDICINE

## 2024-03-08 PROCEDURE — 99397 PER PM REEVAL EST PAT 65+ YR: CPT | Performed by: INTERNAL MEDICINE

## 2024-03-08 PROCEDURE — 3074F SYST BP LT 130 MM HG: CPT | Performed by: INTERNAL MEDICINE

## 2024-03-08 RX ORDER — TESTOSTERONE 20.25 MG/1.25G
2 GEL TOPICAL DAILY
Qty: 75 G | Refills: 0 | Status: SHIPPED | OUTPATIENT
Start: 2024-03-08

## 2024-03-08 RX ORDER — PANTOPRAZOLE SODIUM 40 MG/1
40 TABLET, DELAYED RELEASE ORAL DAILY
Qty: 90 TABLET | Refills: 2 | Status: SHIPPED | OUTPATIENT
Start: 2024-03-08

## 2024-03-08 RX ORDER — DAPAGLIFLOZIN 10 MG/1
10 TABLET, FILM COATED ORAL DAILY
Qty: 100 TABLET | Refills: 0 | Status: SHIPPED | OUTPATIENT
Start: 2024-03-08 | End: 2024-06-03

## 2024-03-08 ASSESSMENT — ENCOUNTER SYMPTOMS
CONSTITUTIONAL NEGATIVE: 1
PSYCHIATRIC NEGATIVE: 1
CARDIOVASCULAR NEGATIVE: 1
RESPIRATORY NEGATIVE: 1
ALLERGIC/IMMUNOLOGIC NEGATIVE: 1
MUSCULOSKELETAL NEGATIVE: 1
EYES NEGATIVE: 1
GASTROINTESTINAL NEGATIVE: 1
NEUROLOGICAL NEGATIVE: 1
HEMATOLOGIC/LYMPHATIC NEGATIVE: 1
ENDOCRINE NEGATIVE: 1

## 2024-03-08 NOTE — PROGRESS NOTES
"Subjective   Patient ID: Truong Armijo is a 73 y.o. male who presents for Annual Exam (cpe).    HPI     Review of Systems   Constitutional: Negative.    HENT: Negative.     Eyes: Negative.    Respiratory: Negative.     Cardiovascular: Negative.    Gastrointestinal: Negative.    Endocrine: Negative.    Musculoskeletal: Negative.    Skin: Negative.    Allergic/Immunologic: Negative.    Neurological: Negative.    Hematological: Negative.    Psychiatric/Behavioral: Negative.     All other systems reviewed and are negative.      Objective   Ht 1.88 m (6' 2\")   Wt 96.6 kg (213 lb)   BMI 27.35 kg/m²   Blood pressure 110/70, pulse 72, resp. rate 16, height 1.88 m (6' 2\"), weight 96.6 kg (213 lb).   Physical Exam  Vitals and nursing note reviewed.   Constitutional:       Appearance: Normal appearance.   HENT:      Head: Normocephalic and atraumatic.      Right Ear: Tympanic membrane, ear canal and external ear normal.      Left Ear: Tympanic membrane, ear canal and external ear normal. There is no impacted cerumen.      Nose: Nose normal.      Mouth/Throat:      Mouth: Mucous membranes are moist.      Pharynx: Oropharynx is clear.   Eyes:      Extraocular Movements: Extraocular movements intact.      Conjunctiva/sclera: Conjunctivae normal.      Pupils: Pupils are equal, round, and reactive to light.   Cardiovascular:      Rate and Rhythm: Normal rate and regular rhythm.      Pulses: Normal pulses.      Heart sounds: Normal heart sounds. No murmur heard.  Pulmonary:      Effort: Pulmonary effort is normal. No respiratory distress.      Breath sounds: Normal breath sounds. No stridor. No wheezing, rhonchi or rales.   Chest:      Chest wall: No tenderness.   Abdominal:      General: Abdomen is flat. Bowel sounds are normal. There is no distension.      Palpations: Abdomen is soft. There is no mass.      Tenderness: There is no abdominal tenderness. There is no right CVA tenderness, left CVA tenderness, guarding or rebound. "      Hernia: No hernia is present.   Musculoskeletal:         General: Normal range of motion.      Cervical back: Normal range of motion and neck supple.   Skin:     General: Skin is warm.      Capillary Refill: Capillary refill takes less than 2 seconds.   Neurological:      General: No focal deficit present.      Mental Status: He is alert.      Cranial Nerves: No cranial nerve deficit.      Sensory: No sensory deficit.      Motor: No weakness.      Coordination: Coordination normal.      Gait: Gait normal.      Deep Tendon Reflexes: Reflexes normal.   Psychiatric:         Mood and Affect: Mood normal.         Behavior: Behavior normal. Behavior is cooperative.         Thought Content: Thought content normal.         Judgment: Judgment normal.         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    COPD (chronic obstructive pulmonary disease) (CMS/[facility]) J44.9    Degeneration of intervertebral disc of cervical region M50.30    Diabetic Charcot's foot (CMS/[facility]) E11.610    Diabetic peripheral neuropathy (CMS/[facility]) E11.42    Elevated LDL cholesterol level E78.00    Fatigue R53.83    Relevant Orders    CBC and Auto Differential    HTN (hypertension) I10    Diabetes mellitus (CMS/[facility]) E11.9    Relevant Orders    Hemoglobin A1C    Hyperglycemia R73.9    Leg weakness R29.898    Moderate episode of recurrent major depressive disorder (CMS/[facility]) F33.1    Reflux esophagitis K21.00    Schizoaffective disorder (CMS/[facility]) F25.9    Hypogonadism male E29.1    Relevant Medications    testosterone 20.25 mg/1.25 gram (1.62 %) gel in metered-dose pump    Annual physical exam Z00.00     No recent hospitalizations.    All medications reviewed and reconciled by me the provider..  No use of controlled substances or opiates.    Family history, social history reviewed, no  changes.    Patient does not smoke.    Patient does not drink.    Patient hydrates adequately daily.  Eats a well-balanced healthy diet.     Exercises adequately including walking and doing weightbearing exercises.    Patient denies any difficulty with memory or cognition.     Denies any difficulty with hearing.  Patient does not wear hearing aids.    No fall risk.  No recent falls.  Denies any difficulty walking.    Patient with no history of depression anxiety, denies any loss of interest, no feeling of sadness, no lack of motivation.    Patient is independent in all ADLs and IADLs.  Independent bathing, dressing, walking.  Takes care of own finances, shopping and cooking.     End-of-life decision-making power of  reviewed with patient.     Risk Factors Identified During Visit: None.   Influenza: influenza vaccine was previously given.   Pneumovax 23: Pneumovax 23 vaccine was previously given.   Prevnar 13: Prevnar 13 vaccine was previously given.   Shingles Vaccine: Shingles vaccine was previously given.   Prostate cancer screening: Screening is current.   Colorectal Cancer Screening: screening is current.   Abdominal Aortic Aneurysm screening: screening is current.   HIV screening: screening not indicated.       Preventive measures - Recommend ASAP :  Specialist. Colonoscopy (educate patient risks of colon cancer) refer patient to GI specialist. Ophthalmology and retina exam recommend yearly exams refer patient to an Ophthalmologist. BPH - (Benign Prostatic Hypertrophy) refer patient to an Urologist for rectal exam and PSA check.          Relevant Orders    CBC and Auto Differential    Comprehensive Metabolic Panel    Lipid Panel    TSH with reflex to Free T4 if abnormal    Prostate Specific Antigen    Hemoglobin A1C          Benign essential hypertension I10        HTN - hypertension well/controlled .Target BP < 130/80  achieved. Educate low salt diet and exercise with weight loss. Educate home self  monitoring and diary keeping. Educate risks of elevate blood pressure and benefits of prompt treatment.  Refill Lisinopril              Diabetic peripheral neuropathy (CMS/[facility]) E11.42       Neuropathy/ - positive numbness, tingling, discomfort (needles pricking, cold feeling) in distal lower extremities(toes, feet, legs), secondary to DM/Radiculopathy/idiopathic. Recommend: Gabapentin(Neurontin) 300 daily(at bed time) upward taper up to  2 gm/day or Lyrica 75 mg daily if failure of treatment. Also recommend: treatment of Diabetes(control levels of blood sugars), lumbar/ cervical  disc disease (Physical Therapy), and check electrolytes and Thyroid function. Also address regenrative measures: B12 intrmusculary (Monthly) and Folic acid supplementation. Recommend  EMG. Start Amitriptyline 25 mg daily / . Start - Tonic water - and Tramadol 50 mg TID.             Elevated LDL cholesterol level E78.00       Hypercholesterolemia - Monitor lipid profile and educate patient upon risks of high cholesterol and targets. Educate diet and change in lifestyle and increase in exercises - Refill: Atorvastatin  and educate compliance with medication and diet.               Enlarged prostate without lower urinary tract symptoms (luts) N40.0       BPH - Benign PROSTATIC Hypertrophy, + Dysuria/Nocturia, check PSA, prescribe Flomax 0.4mg qD and Avodart 0.5 mg qD vs. Finasteride 5 mg qD.  Educate exercises and Change in life style, prescribe vitamin E 400 IU qD. Consider referral to urology.            Fatigue R53.83       Fatigue - check CMP(metabolic panel and elctrolytes) , CBC(complete blood cell count), TSH(thyroid function). Insomnia may play a role and sleep studies(rule out sleep apnea) are recommended . Educate sleep hygiene. Consider anxiety disorder vs. depression. Consider Stress test, and 2DECHO.             Diabetes mellitus (CMS/[facility]) - Primary E11.9       DM - NIDDM  . Reviewed with patient / Will check  HbA1c and fasting  blood sugars. Educate home self monitoring and diary keeping(reviewed with patient home blood sugar levels /diary). Educate extensively low calorie diet and weight loss with exercise. Reviewed BS- diary and Rx. Regimen. [place] renal protection with ARB/ACEI.               Educate compliance with diet and Rx. And educate risks and autcomes.                                                   Relevant Medications     glipiZIDE (Glucotrol) 5 mg tablet     Primary hypothyroidism E03.9       Hypothyroidism - Symptoms well/controlled (weight gain, fatigue, constipation, cold intolerance). Check TSH continues dose of Synthroid/Levothyroxine  of  88 bmcg/qD.             Reflux esophagitis K21.00       GERD - Acid reflux disease. Rx. PPI (Prilosec/Prevacid/Protonix/Nexium) and educate diet and life style changes. Referred patient to an endoscopy (EGD) and check H. Pylori titers.            Schizoaffective disorder (CMS/HCC) F25.9       Refer to and follows with psych            Hypogonadism male E29.1                Benign essential hypertension - Primary I10          HTN - hypertension well/controlled .Target BP < 130/80  achieved. Educate low salt diet and exercise with weight loss. Educate home self monitoring and diary keeping. Educate risks of elevate blood pressure and benefits of prompt treatment.  Refill Lisinopril             Elevated LDL cholesterol level E78.00        Hypercholesterolemia - Monitor lipid profile and educate patient upon risks of high cholesterol and targets. Educate diet and change in lifestyle and increase in exercises - Refill: Atorvastatin  and educate compliance with medication and diet.              Fatigue R53.83        POST COVID Fatigue - Fatigue - check CMP(metabolic panel and elctrolytes) , CBC(complete blood cell count), TSH(thyroid function). Insomnia may play a role and sleep studies(rule out sleep apnea) are recommended . Educate sleep hygiene. Consider anxiety disorder vs.  depression. Consider Stress test, and 2DECHO.              Diabetes mellitus (CMS/[facility]) E11.9        DM - NIDDM  . Reviewed with patient / Will check  HbA1c and fasting blood sugars. Educate home self monitoring and diary keeping(reviewed with patient home blood sugar levels /diary). Educate extensively low calorie diet and weight loss with exercise. Reviewed BS- diary and Rx. Regimen. [place] renal protection with ARB/ACEI.               Educate compliance with diet and Rx. And educate risks and autcomes.                                                   Lumbar disc herniation M51.26        DDD - Degenerative disc disease of the Lumbo-Sacral (LS)/ spine. Educate exercises and referred patient to Physical Therapy (PT). Ordered X-Ray's of the LS spine. Consider MRI. Radiculopathy in the distribution of L4-L5-S1/nerve roots, needs NSAIDS (Naprosin 500mg BID vs. Arthrotec 75mg BID or Prednisone taper (Medrol dose pack), Flexeril 10 mg qHS, heat application, and pain control with Tylenol 325 mg TID.             Primary hypothyroidism E03.9        Hypothyroidism - Symptoms well/controlled (weight gain, fatigue, constipation, cold intolerance). Check TSH continues dose of Synthroid/Levothyroxine  of  88 bmcg/qD.            Reflux esophagitis K21.00        GERD - Acid reflux disease. Rx. PPI (Prilosec/Prevacid/Protonix/Nexium) and educate diet and life style changes. Referred patient to an endoscopy (EGD) and check H. Pylori titers.            Hypogonadism male E29.1        Check levels and refill supplement of Testosterone            Relevant Medications      testosterone 20.25 mg/1.25 gram (1.62 %) gel in metered-dose pump                      Benign essential hypertension I10           HTN - hypertension well/controlled .Target BP < 130/80  achieved. Educate low salt diet and exercise with weight loss. Educate home self monitoring and diary keeping. Educate risks of elevate blood pressure and benefits of prompt  treatment.  Refill Lisinopril               COPD (chronic obstructive pulmonary disease) (CMS/[facility]) - Primary J44.9       Elevated LDL cholesterol level E78.00         Hypercholesterolemia - Monitor lipid profile and educate patient upon risks of high cholesterol and targets. Educate diet and change in lifestyle and increase in exercises - Refill: Atorvastatin  and educate compliance with medication and diet.             Relevant Orders       Lipid Panel       Fatigue R53.83         POST COVID Fatigue - Fatigue - check CMP(metabolic panel and elctrolytes) , CBC(complete blood cell count), TSH(thyroid function). Insomnia may play a role and sleep studies(rule out sleep apnea) are recommended . Educate sleep hygiene. Consider anxiety disorder vs. depression. Consider Stress test, and 2DECHO.             Relevant Orders       CBC and Auto Differential       Diabetes mellitus (CMS/[facility]) E11.9         DM - NIDDM  . Reviewed with patient / Will check  HbA1c and fasting blood sugars. Educate home self monitoring and diary keeping(reviewed with patient home blood sugar levels /diary). Educate extensively low calorie diet and weight loss with exercise. Reviewed BS- diary and Rx. Regimen. [place] renal protection with ARB/ACEI.               Educate compliance with diet and Rx. And educate risks and autcomes.                                                     Relevant Orders       Comprehensive Metabolic Panel       Hemoglobin A1C       Lumbar disc herniation M51.26         DDD - Degenerative disc disease of the Lumbo-Sacral (LS)/Cervical (C)  spine. Educate exercises and referred patient to Physical Therapy (PT). Ordered X-Ray's of the LS/C spine. Consider MRI. Radiculopathy in the distribution of L4-L5-S1/C3-C4-C5 nerve roots, needs NSAIDS (Naprosin 500mg BID vs. Arthrotec 75mg BID or Prednisone taper (Medrol dose pack), Flexeril 10 mg qHS, heat application, and pain control with Tylenol 325 mg TID.             Primary  hypothyroidism E03.9         Hypothyroidism - Symptoms well/controlled (weight gain, fatigue, constipation, cold intolerance). Check TSH continues dose of Synthroid/Levothyroxine  of  88 bmcg/qD.              Relevant Orders       TSH with reflex to Free T4 if abnormal       Reflux esophagitis K21.00         GERD - Acid reflux disease. Rx. PPI (Prilosec/Prevacid/Protonix/Nexium) and educate diet and life style changes. Referred patient to an endoscopy (EGD) and check H. Pylori titers.         Total face to face time was 45 minutes , with more than 50% spent counseling the patient regarding diagnosis of Hypertension with aggressive treatment of hypercholesterolemia. Educate extensively diet and lifestyle changes and increase in physical activity and weight loss. Also educate the patient the risks of  untreated hypertension or untreated hypercholesterolemia  - educate risks of developing coronary artery disease , stroke or renal failure -               Immunizations/Injections      very important  Immunizations from outside sources need reconciliation.      Flu vaccine, quadrivalent, high-dose, preservative free, age 65y+ (FLUZONE)10/18/2023, 10/17/2022, 10/8/2020  Influenza, High Dose Seasonal, Preservative Free10/14/2019, 10/17/2018, 10/4/2017  Influenza, Seasonal, Quadrivalent, Pqdjzphisl82/27/2021  Influenza, seasonal, vwfinbokxm44/1/2021, 9/23/2011, 10/8/2009,  ... (1 more)  Influenza, seasonal, injectable, preservative free10/5/2015, 10/1/2015  Moderna SARS-CoV-2 Vaccination3/27/2021, 2/28/2021  Pfizer COVID-19 vaccine, Fall 2023, 12 years and older, (30mcg/0.3mL)1/23/2024  Pfizer COVID-19 vaccine, bivalent, age 12 years and older (30 mcg/0.3 mL)9/15/2022  Pfizer Purple Cap SARS-CoV-211/3/2021  Pneumococcal conjugate vaccine, 13-valent (PREVNAR 13)9/15/2014  Pneumococcal conjugate vaccine, 20-valent (PREVNAR 20)5/5/2023  Pneumococcal polysaccharide vaccine, 23-valent, age 2 years and older (PNEUMOVAX  23)1/1/2014  Tdap vaccine, age 7 year and older (BOOSTRIX, ADACEL)6/5/2023, 11/26/2017  Zoster vaccine, recombinant, adult (SHINGRIX)8/8/2023, 6/5/2023, 12/13/2020,  ... (1 more)  Zoster, live10/25/2017, 5/22/2014

## 2024-03-08 NOTE — ASSESSMENT & PLAN NOTE
No recent hospitalizations.    All medications reviewed and reconciled by me the provider..  No use of controlled substances or opiates.    Family history, social history reviewed, no changes.    Patient does not smoke.    Patient does not drink.    Patient hydrates adequately daily.  Eats a well-balanced healthy diet.     Exercises adequately including walking and doing weightbearing exercises.    Patient denies any difficulty with memory or cognition.     Denies any difficulty with hearing.  Patient does not wear hearing aids.    No fall risk.  No recent falls.  Denies any difficulty walking.    Patient with no history of depression anxiety, denies any loss of interest, no feeling of sadness, no lack of motivation.    Patient is independent in all ADLs and IADLs.  Independent bathing, dressing, walking.  Takes care of own finances, shopping and cooking.     End-of-life decision-making power of  reviewed with patient.     Risk Factors Identified During Visit: None.   Influenza: influenza vaccine was previously given.   Pneumovax 23: Pneumovax 23 vaccine was previously given.   Prevnar 13: Prevnar 13 vaccine was previously given.   Shingles Vaccine: Shingles vaccine was previously given.   Prostate cancer screening: Screening is current.   Colorectal Cancer Screening: screening is current.   Abdominal Aortic Aneurysm screening: screening is current.   HIV screening: screening not indicated.       Preventive measures - Recommend ASAP :  Specialist. Colonoscopy (educate patient risks of colon cancer) refer patient to GI specialist. Ophthalmology and retina exam recommend yearly exams refer patient to an Ophthalmologist. BPH - (Benign Prostatic Hypertrophy) refer patient to an Urologist for rectal exam and PSA check.

## 2024-03-11 ENCOUNTER — APPOINTMENT (OUTPATIENT)
Dept: PHYSICAL THERAPY | Facility: CLINIC | Age: 74
End: 2024-03-11
Payer: COMMERCIAL

## 2024-03-11 ENCOUNTER — DOCUMENTATION (OUTPATIENT)
Dept: PHYSICAL THERAPY | Facility: CLINIC | Age: 74
End: 2024-03-11
Payer: COMMERCIAL

## 2024-03-11 NOTE — PROGRESS NOTES
Discharge Summary    Name: Truong Armijo  MRN: [id number]  : 1950  Date: 24    Discharge Summary: PT    Discharge Information: Date of discharge 3/11/24, Date of last visit 24, Date of evaluation 24, Number of attended visits 2, Referred by Ki Ahuja, and Referred for Left knee pain     Therapy Summary: Cancelled remaining visits via my chart and stated that he was having a difficult time and unable to follow up at this time.      Discharge Status: unable to reassess.  Attended 2 sessions but then had to cancel remaining.      Rehab Discharge Reason: Failed to schedule and/or keep follow-up appointment(s)

## 2024-03-18 ENCOUNTER — APPOINTMENT (OUTPATIENT)
Dept: PHYSICAL THERAPY | Facility: CLINIC | Age: 74
End: 2024-03-18
Payer: COMMERCIAL

## 2024-03-19 ENCOUNTER — TELEMEDICINE (OUTPATIENT)
Dept: BEHAVIORAL HEALTH | Facility: CLINIC | Age: 74
End: 2024-03-19
Payer: COMMERCIAL

## 2024-03-19 DIAGNOSIS — F22 PARANOIA (PSYCHOSIS) (MULTI): ICD-10-CM

## 2024-03-19 PROCEDURE — 4010F ACE/ARB THERAPY RXD/TAKEN: CPT | Performed by: PSYCHIATRY & NEUROLOGY

## 2024-03-19 PROCEDURE — 1160F RVW MEDS BY RX/DR IN RCRD: CPT | Performed by: PSYCHIATRY & NEUROLOGY

## 2024-03-19 PROCEDURE — 1036F TOBACCO NON-USER: CPT | Performed by: PSYCHIATRY & NEUROLOGY

## 2024-03-19 PROCEDURE — 99214 OFFICE O/P EST MOD 30 MIN: CPT | Performed by: PSYCHIATRY & NEUROLOGY

## 2024-03-19 PROCEDURE — 1159F MED LIST DOCD IN RCRD: CPT | Performed by: PSYCHIATRY & NEUROLOGY

## 2024-03-19 RX ORDER — RISPERIDONE 1 MG/1
1 TABLET ORAL 2 TIMES DAILY
Qty: 180 TABLET | Refills: 0 | Status: SHIPPED | OUTPATIENT
Start: 2024-03-19 | End: 2024-06-17

## 2024-03-19 ASSESSMENT — ENCOUNTER SYMPTOMS: DYSPHORIC MOOD: 1

## 2024-03-19 NOTE — PROGRESS NOTES
Subjective   Patient ID: Truong Armijo is a 73 y.o. male who presents for No chief complaint on file. I have had catatonic episodes and not doing well.    The patient engaged in a telehealth session via Epic audio visual or phone with this provider practicing within the [facility]. The identity of the patient was verified by their date of birth and last four digits of their social security number. The provider demonstrated that confidentially was preserved at their location. The patient was informed that they were responsible for ensuring confidentially was secured at their location. The patient's location was documented for emergency purposes. The patient was informed of the necessary steps that would occur if an emergency was to occur or technology failed during session.     MSE: The patient is alert, fully oriented, language is intact, and recent and remote memory intact. The patient denies any suicidal or homicidal ideation or plans. The patient presents with chronic dysphoria with increased depressive spells, catatonic episodes without manic symptoms. Thought is impaired with thought blocking. Judgment and insight are limited. The patient continues to have disappointments with his social interactions. The patient reports inability function.       Review of Systems   Neurological:         MSE: The patient is alert, fully oriented, language is intact, and recent and remote memory intact. The patient denies any suicidal or homicidal ideation or plans. The patient presents with chronic dysphoria with increased depressive spells, catatonic episodes without manic symptoms. Thought is impaired with thought blocking. Judgment and insight are limited. The patient continues to have disappointments with his social interactions.       Psychiatric/Behavioral:  Positive for dysphoric mood.         MSE: The patient is alert, fully oriented, language is intact, and recent and remote memory intact. The patient denies any  suicidal or homicidal ideation or plans. The patient presents with chronic dysphoria with increased depressive spells, catatonic episodes without manic symptoms. Thought is impaired with thought blocking. Judgment and insight are limited. The patient continues to have disappointments with his social interactions. The patient reports inability function.        All other systems reviewed and are negative.      Objective   Physical Exam  Neurological:      General: No focal deficit present.      Mental Status: He is alert and oriented to person, place, and time. Mental status is at baseline.      Comments: MSE: The patient is alert, fully oriented, language is intact, and recent and remote memory intact. The patient denies any suicidal or homicidal ideation or plans. The patient presents with chronic dysphoria with increased depressive spells, catatonic episodes without manic symptoms. Thought is impaired with thought blocking. Judgment and insight are limited. The patient continues to have disappointments with his social interactions. The patient reports inability function.        Psychiatric:      Comments: MSE: The patient is alert, fully oriented, language is intact, and recent and remote memory intact. The patient denies any suicidal or homicidal ideation or plans. The patient presents with chronic dysphoria with increased depressive spells, catatonic episodes without manic symptoms. Thought is impaired with thought blocking. Judgment and insight are limited. The patient continues to have disappointments with his social interactions. The patient reports inability function.            Lab Review:     Assessment/Plan   The FDA risks, benefits & alternatives to the medications prescribed were explained to you today. You were able to understand & repeat these risks, benefits & alternatives to these prescribed medications. You have agreed to proceed with treatment with the medications discussed based on the conclusion  that the benefit outweighs the risks of this treatment regimen: bupropion  mg every 12 hours, diazepam 5 mg nightly as needed, fluoxetine 40 mg daily and risperidone increase 2 mg daily.    Follow up monthly. You are due for a urine toxicology screen (last done on January 23 of 2024) and your annual controlled substance agreement(last signed in January 23 of 2024) in January of 2025.     Follow up monthly as you have been doing.

## 2024-03-25 ENCOUNTER — APPOINTMENT (OUTPATIENT)
Dept: PHYSICAL THERAPY | Facility: CLINIC | Age: 74
End: 2024-03-25
Payer: COMMERCIAL

## 2024-03-25 ENCOUNTER — OFFICE VISIT (OUTPATIENT)
Dept: OPHTHALMOLOGY | Facility: CLINIC | Age: 74
End: 2024-03-25
Payer: COMMERCIAL

## 2024-03-25 DIAGNOSIS — H40.1132 PRIMARY OPEN ANGLE GLAUCOMA (POAG) OF BOTH EYES, MODERATE STAGE: Primary | ICD-10-CM

## 2024-03-25 PROCEDURE — 1160F RVW MEDS BY RX/DR IN RCRD: CPT | Performed by: OPHTHALMOLOGY

## 2024-03-25 PROCEDURE — 1159F MED LIST DOCD IN RCRD: CPT | Performed by: OPHTHALMOLOGY

## 2024-03-25 PROCEDURE — 1036F TOBACCO NON-USER: CPT | Performed by: OPHTHALMOLOGY

## 2024-03-25 PROCEDURE — 4010F ACE/ARB THERAPY RXD/TAKEN: CPT | Performed by: OPHTHALMOLOGY

## 2024-03-25 PROCEDURE — 99212 OFFICE O/P EST SF 10 MIN: CPT | Performed by: OPHTHALMOLOGY

## 2024-03-25 ASSESSMENT — ENCOUNTER SYMPTOMS: EYES NEGATIVE: 1

## 2024-03-25 ASSESSMENT — VISUAL ACUITY
OD_CC: 20/25
OS_CC: 20/30
METHOD: SNELLEN - LINEAR

## 2024-03-25 ASSESSMENT — SLIT LAMP EXAM - LIDS
COMMENTS: NORMAL
COMMENTS: NORMAL

## 2024-03-25 ASSESSMENT — GONIOSCOPY
OD_SUPERIOR: 3/360
OS_SUPERIOR: 3/360

## 2024-03-25 ASSESSMENT — PACHYMETRY
OD_CT(UM): 623
OS_CT(UM): 590

## 2024-03-25 ASSESSMENT — CUP TO DISC RATIO
OD_RATIO: 0.7
OS_RATIO: 0.7

## 2024-03-25 ASSESSMENT — TONOMETRY
OD_IOP_MMHG: 18
OS_IOP_MMHG: 18
IOP_METHOD: GOLDMANN APPLANATION

## 2024-03-25 ASSESSMENT — EXTERNAL EXAM - LEFT EYE: OS_EXAM: NORMAL

## 2024-03-25 ASSESSMENT — EXTERNAL EXAM - RIGHT EYE: OD_EXAM: NORMAL

## 2024-03-30 DIAGNOSIS — F41.9 ANXIETY DISORDER, UNSPECIFIED: ICD-10-CM

## 2024-04-01 ENCOUNTER — APPOINTMENT (OUTPATIENT)
Dept: PHYSICAL THERAPY | Facility: CLINIC | Age: 74
End: 2024-04-01
Payer: COMMERCIAL

## 2024-04-01 RX ORDER — DIAZEPAM 5 MG/1
TABLET ORAL
Qty: 90 TABLET | Refills: 0 | Status: SHIPPED | OUTPATIENT
Start: 2024-04-01

## 2024-04-04 DIAGNOSIS — F43.10 POST-TRAUMATIC STRESS DISORDER, UNSPECIFIED: ICD-10-CM

## 2024-04-04 RX ORDER — FLUOXETINE HYDROCHLORIDE 40 MG/1
40 CAPSULE ORAL DAILY
Qty: 90 CAPSULE | Refills: 1 | Status: SHIPPED | OUTPATIENT
Start: 2024-04-04

## 2024-04-08 ENCOUNTER — APPOINTMENT (OUTPATIENT)
Dept: PHYSICAL THERAPY | Facility: CLINIC | Age: 74
End: 2024-04-08
Payer: COMMERCIAL

## 2024-04-09 ENCOUNTER — LAB (OUTPATIENT)
Dept: LAB | Facility: LAB | Age: 74
End: 2024-04-09
Payer: COMMERCIAL

## 2024-04-09 ENCOUNTER — APPOINTMENT (OUTPATIENT)
Dept: PHYSICAL THERAPY | Facility: CLINIC | Age: 74
End: 2024-04-09
Payer: COMMERCIAL

## 2024-04-09 DIAGNOSIS — E11.9 TYPE 2 DIABETES MELLITUS WITHOUT COMPLICATION, WITHOUT LONG-TERM CURRENT USE OF INSULIN (MULTI): ICD-10-CM

## 2024-04-09 DIAGNOSIS — R53.82 CHRONIC FATIGUE: ICD-10-CM

## 2024-04-09 DIAGNOSIS — I10 BENIGN ESSENTIAL HYPERTENSION: ICD-10-CM

## 2024-04-09 DIAGNOSIS — Z00.00 ANNUAL PHYSICAL EXAM: ICD-10-CM

## 2024-04-09 LAB
ALBUMIN SERPL BCP-MCNC: 4.6 G/DL (ref 3.4–5)
ALP SERPL-CCNC: 31 U/L (ref 33–136)
ALT SERPL W P-5'-P-CCNC: 15 U/L (ref 10–52)
ANION GAP SERPL CALC-SCNC: 16 MMOL/L (ref 10–20)
AST SERPL W P-5'-P-CCNC: 11 U/L (ref 9–39)
BASOPHILS # BLD AUTO: 0.06 X10*3/UL (ref 0–0.1)
BASOPHILS NFR BLD AUTO: 0.7 %
BILIRUB SERPL-MCNC: 0.7 MG/DL (ref 0–1.2)
BUN SERPL-MCNC: 24 MG/DL (ref 6–23)
CALCIUM SERPL-MCNC: 10.1 MG/DL (ref 8.6–10.6)
CHLORIDE SERPL-SCNC: 99 MMOL/L (ref 98–107)
CHOLEST SERPL-MCNC: 240 MG/DL (ref 0–199)
CHOLESTEROL/HDL RATIO: 5.2
CO2 SERPL-SCNC: 27 MMOL/L (ref 21–32)
CREAT SERPL-MCNC: 1.07 MG/DL (ref 0.5–1.3)
EGFRCR SERPLBLD CKD-EPI 2021: 74 ML/MIN/1.73M*2
EOSINOPHIL # BLD AUTO: 0.29 X10*3/UL (ref 0–0.4)
EOSINOPHIL NFR BLD AUTO: 3.2 %
ERYTHROCYTE [DISTWIDTH] IN BLOOD BY AUTOMATED COUNT: 13.3 % (ref 11.5–14.5)
EST. AVERAGE GLUCOSE BLD GHB EST-MCNC: 123 MG/DL
GLUCOSE SERPL-MCNC: 107 MG/DL (ref 74–99)
HBA1C MFR BLD: 5.9 %
HCT VFR BLD AUTO: 46.6 % (ref 41–52)
HDLC SERPL-MCNC: 46 MG/DL
HGB BLD-MCNC: 15.5 G/DL (ref 13.5–17.5)
IMM GRANULOCYTES # BLD AUTO: 0.05 X10*3/UL (ref 0–0.5)
IMM GRANULOCYTES NFR BLD AUTO: 0.6 % (ref 0–0.9)
LDLC SERPL CALC-MCNC: 156 MG/DL
LYMPHOCYTES # BLD AUTO: 2.22 X10*3/UL (ref 0.8–3)
LYMPHOCYTES NFR BLD AUTO: 24.5 %
MCH RBC QN AUTO: 31.2 PG (ref 26–34)
MCHC RBC AUTO-ENTMCNC: 33.3 G/DL (ref 32–36)
MCV RBC AUTO: 94 FL (ref 80–100)
MONOCYTES # BLD AUTO: 0.99 X10*3/UL (ref 0.05–0.8)
MONOCYTES NFR BLD AUTO: 10.9 %
NEUTROPHILS # BLD AUTO: 5.46 X10*3/UL (ref 1.6–5.5)
NEUTROPHILS NFR BLD AUTO: 60.1 %
NON HDL CHOLESTEROL: 194 MG/DL (ref 0–149)
NRBC BLD-RTO: 0 /100 WBCS (ref 0–0)
PLATELET # BLD AUTO: 274 X10*3/UL (ref 150–450)
POTASSIUM SERPL-SCNC: 4.3 MMOL/L (ref 3.5–5.3)
PROT SERPL-MCNC: 7.4 G/DL (ref 6.4–8.2)
PSA SERPL-MCNC: 3.21 NG/ML
RBC # BLD AUTO: 4.97 X10*6/UL (ref 4.5–5.9)
SODIUM SERPL-SCNC: 138 MMOL/L (ref 136–145)
T4 FREE SERPL-MCNC: 1.51 NG/DL (ref 0.78–1.48)
TRIGL SERPL-MCNC: 190 MG/DL (ref 0–149)
TSH SERPL-ACNC: 4.3 MIU/L (ref 0.44–3.98)
VLDL: 38 MG/DL (ref 0–40)
WBC # BLD AUTO: 9.1 X10*3/UL (ref 4.4–11.3)

## 2024-04-09 PROCEDURE — 83036 HEMOGLOBIN GLYCOSYLATED A1C: CPT

## 2024-04-09 PROCEDURE — 80053 COMPREHEN METABOLIC PANEL: CPT

## 2024-04-09 PROCEDURE — 85025 COMPLETE CBC W/AUTO DIFF WBC: CPT

## 2024-04-09 PROCEDURE — 80061 LIPID PANEL: CPT

## 2024-04-09 PROCEDURE — 36415 COLL VENOUS BLD VENIPUNCTURE: CPT

## 2024-04-09 PROCEDURE — 84439 ASSAY OF FREE THYROXINE: CPT

## 2024-04-09 PROCEDURE — 84153 ASSAY OF PSA TOTAL: CPT

## 2024-04-09 PROCEDURE — 84443 ASSAY THYROID STIM HORMONE: CPT

## 2024-04-19 ENCOUNTER — OFFICE VISIT (OUTPATIENT)
Dept: PRIMARY CARE | Facility: CLINIC | Age: 74
End: 2024-04-19
Payer: COMMERCIAL

## 2024-04-19 VITALS
BODY MASS INDEX: 26.88 KG/M2 | SYSTOLIC BLOOD PRESSURE: 110 MMHG | HEART RATE: 83 BPM | OXYGEN SATURATION: 96 % | RESPIRATION RATE: 16 BRPM | HEIGHT: 74 IN | WEIGHT: 209.44 LBS | DIASTOLIC BLOOD PRESSURE: 70 MMHG

## 2024-04-19 DIAGNOSIS — E03.9 HYPOTHYROIDISM, UNSPECIFIED: Primary | ICD-10-CM

## 2024-04-19 DIAGNOSIS — E78.1 HIGH TRIGLYCERIDES: ICD-10-CM

## 2024-04-19 DIAGNOSIS — E78.00 ELEVATED LDL CHOLESTEROL LEVEL: ICD-10-CM

## 2024-04-19 DIAGNOSIS — I10 BENIGN ESSENTIAL HYPERTENSION: ICD-10-CM

## 2024-04-19 DIAGNOSIS — E11.9 TYPE 2 DIABETES MELLITUS WITHOUT COMPLICATION, WITHOUT LONG-TERM CURRENT USE OF INSULIN (MULTI): ICD-10-CM

## 2024-04-19 DIAGNOSIS — E29.1 HYPOGONADISM MALE: ICD-10-CM

## 2024-04-19 DIAGNOSIS — G93.39 OTHER POST INFECTION AND RELATED FATIGUE SYNDROMES: ICD-10-CM

## 2024-04-19 PROBLEM — G82.20 PARAPARESIS (MULTI): Status: ACTIVE | Noted: 2024-04-19

## 2024-04-19 PROCEDURE — 99215 OFFICE O/P EST HI 40 MIN: CPT | Performed by: INTERNAL MEDICINE

## 2024-04-19 PROCEDURE — 1036F TOBACCO NON-USER: CPT | Performed by: INTERNAL MEDICINE

## 2024-04-19 PROCEDURE — 3074F SYST BP LT 130 MM HG: CPT | Performed by: INTERNAL MEDICINE

## 2024-04-19 PROCEDURE — 3078F DIAST BP <80 MM HG: CPT | Performed by: INTERNAL MEDICINE

## 2024-04-19 PROCEDURE — 3044F HG A1C LEVEL LT 7.0%: CPT | Performed by: INTERNAL MEDICINE

## 2024-04-19 PROCEDURE — 4010F ACE/ARB THERAPY RXD/TAKEN: CPT | Performed by: INTERNAL MEDICINE

## 2024-04-19 PROCEDURE — 1159F MED LIST DOCD IN RCRD: CPT | Performed by: INTERNAL MEDICINE

## 2024-04-19 PROCEDURE — 3050F LDL-C >= 130 MG/DL: CPT | Performed by: INTERNAL MEDICINE

## 2024-04-19 PROCEDURE — 1160F RVW MEDS BY RX/DR IN RCRD: CPT | Performed by: INTERNAL MEDICINE

## 2024-04-19 RX ORDER — ATORVASTATIN CALCIUM 40 MG/1
40 TABLET, FILM COATED ORAL NIGHTLY
Qty: 90 TABLET | Refills: 1 | Status: SHIPPED | OUTPATIENT
Start: 2024-04-19

## 2024-04-19 RX ORDER — LEVOTHYROXINE SODIUM 100 UG/1
100 TABLET ORAL DAILY
Qty: 90 TABLET | Refills: 1 | Status: SHIPPED | OUTPATIENT
Start: 2024-04-19

## 2024-04-19 RX ORDER — GLIPIZIDE 5 MG/1
10 TABLET ORAL
Qty: 120 TABLET | Refills: 2 | Status: SHIPPED | OUTPATIENT
Start: 2024-04-19

## 2024-04-19 ASSESSMENT — ENCOUNTER SYMPTOMS
NEUROLOGICAL NEGATIVE: 1
PSYCHIATRIC NEGATIVE: 1
LOSS OF SENSATION IN FEET: 0
ENDOCRINE NEGATIVE: 1
ALLERGIC/IMMUNOLOGIC NEGATIVE: 1
GASTROINTESTINAL NEGATIVE: 1
MUSCULOSKELETAL NEGATIVE: 1
OCCASIONAL FEELINGS OF UNSTEADINESS: 0
EYES NEGATIVE: 1
DEPRESSION: 0
CONSTITUTIONAL NEGATIVE: 1
RESPIRATORY NEGATIVE: 1
HEMATOLOGIC/LYMPHATIC NEGATIVE: 1
CARDIOVASCULAR NEGATIVE: 1

## 2024-04-19 ASSESSMENT — PATIENT HEALTH QUESTIONNAIRE - PHQ9
1. LITTLE INTEREST OR PLEASURE IN DOING THINGS: NOT AT ALL
2. FEELING DOWN, DEPRESSED OR HOPELESS: NOT AT ALL
SUM OF ALL RESPONSES TO PHQ9 QUESTIONS 1 AND 2: 0

## 2024-04-19 NOTE — PROGRESS NOTES
"Subjective   Patient ID: Truong Armijo is a 73 y.o. male who presents for Follow-up (6 week follow up).    HPI     Review of Systems   Constitutional: Negative.    HENT: Negative.     Eyes: Negative.    Respiratory: Negative.     Cardiovascular: Negative.    Gastrointestinal: Negative.    Endocrine: Negative.    Musculoskeletal: Negative.    Skin: Negative.    Allergic/Immunologic: Negative.    Neurological: Negative.    Hematological: Negative.    Psychiatric/Behavioral: Negative.     All other systems reviewed and are negative.      Objective   Pulse 83   Resp 16   Ht 1.88 m (6' 2\")   Wt 95 kg (209 lb 7 oz)   SpO2 96%   BMI 26.89 kg/m²   Blood pressure 110/70, pulse 83, resp. rate 16, height 1.88 m (6' 2\"), weight 95 kg (209 lb 7 oz), SpO2 96%.   Physical Exam  Vitals and nursing note reviewed.   Constitutional:       Appearance: Normal appearance.   HENT:      Head: Normocephalic and atraumatic.      Right Ear: Tympanic membrane, ear canal and external ear normal.      Left Ear: Tympanic membrane, ear canal and external ear normal. There is no impacted cerumen.      Nose: Nose normal.      Mouth/Throat:      Mouth: Mucous membranes are moist.      Pharynx: Oropharynx is clear.   Eyes:      Extraocular Movements: Extraocular movements intact.      Conjunctiva/sclera: Conjunctivae normal.      Pupils: Pupils are equal, round, and reactive to light.   Cardiovascular:      Rate and Rhythm: Normal rate and regular rhythm.      Pulses: Normal pulses.      Heart sounds: Normal heart sounds. No murmur heard.  Pulmonary:      Effort: Pulmonary effort is normal. No respiratory distress.      Breath sounds: Normal breath sounds. No stridor. No wheezing, rhonchi or rales.   Chest:      Chest wall: No tenderness.   Abdominal:      General: Abdomen is flat. Bowel sounds are normal. There is no distension.      Palpations: Abdomen is soft. There is no mass.      Tenderness: There is no abdominal tenderness. There is no " right CVA tenderness, left CVA tenderness, guarding or rebound.      Hernia: No hernia is present.   Musculoskeletal:         General: Normal range of motion.      Cervical back: Normal range of motion and neck supple.   Skin:     General: Skin is warm.      Capillary Refill: Capillary refill takes less than 2 seconds.   Neurological:      General: No focal deficit present.      Mental Status: He is alert.      Cranial Nerves: No cranial nerve deficit.      Sensory: No sensory deficit.      Motor: No weakness.      Coordination: Coordination normal.      Gait: Gait normal.      Deep Tendon Reflexes: Reflexes normal.   Psychiatric:         Mood and Affect: Mood normal.         Behavior: Behavior normal. Behavior is cooperative.         Thought Content: Thought content normal.         Judgment: Judgment normal.         Assessment/Plan   Problem List Items Addressed This Visit             ICD-10-CM    Benign essential hypertension I10     HTN - hypertension well/controlled .Target BP < 130/80  achieved. Educate low salt diet and exercise with weight loss. Educate home self monitoring and diary keeping. Educate risks of elevate blood pressure and benefits of prompt treatment.  Refill Lisinopril            Elevated LDL cholesterol level E78.00     Hypercholesterolemia - Monitor lipid profile and educate patient upon risks of high cholesterol and targets. Educate diet and change in lifestyle and increase in exercises - Refill: Atorvastatin  40 mg daily and educate compliance with medication and diet.          Fatigue R53.83     Fatigue - check CMP(metabolic panel and elctrolytes) , CBC(complete blood cell count), TSH(thyroid function). Insomnia may play a role and sleep studies(rule out sleep apnea) are recommended . Educate sleep hygiene. Consider anxiety disorder vs. depression. Consider Stress test, and 2DECHO.           High triglycerides E78.1    Relevant Medications    atorvastatin (Lipitor) 40 mg tablet    Diabetes  mellitus (Multi) E11.9     DM - NIDDM  . Reviewed with patient / Will check  HbA1c and fasting blood sugars. Educate home self monitoring and diary keeping(reviewed with patient home blood sugar levels /diary). Educate extensively low calorie diet and weight loss with exercise. Reviewed BS- diary and Rx. Regimen. [place] renal protection with ARB/ACEI.               Educate compliance with diet and Rx. And educate risks and autcomes.                                                 Relevant Medications    glipiZIDE (Glucotrol) 5 mg tablet    Hypothyroidism, unspecified - Primary E03.9    Relevant Medications    levothyroxine (Synthroid, Levoxyl) 100 mcg tablet    Hypogonadism male E29.1     Check levels and refill supplement of Testosterone             Benign essential hypertension I10         HTN - hypertension well/controlled .Target BP < 130/80  achieved. Educate low salt diet and exercise with weight loss. Educate home self monitoring and diary keeping. Educate risks of elevate blood pressure and benefits of prompt treatment.  Refill Lisinopril              Diabetic peripheral neuropathy (CMS/HCC) E11.42       Neuropathy/ - positive numbness, tingling, discomfort (needles pricking, cold feeling) in distal lower extremities(toes, feet, legs), secondary to DM/Radiculopathy/idiopathic. Recommend: Gabapentin(Neurontin) 300 daily(at bed time) upward taper up to  2 gm/day or Lyrica 75 mg daily if failure of treatment. Also recommend: treatment of Diabetes(control levels of blood sugars), lumbar/ cervical  disc disease (Physical Therapy), and check electrolytes and Thyroid function. Also address regenrative measures: B12 intrmusculary (Monthly) and Folic acid supplementation. Recommend  EMG. Start Amitriptyline 25 mg daily / . Start - Tonic water - and Tramadol 50 mg TID.             Elevated LDL cholesterol level E78.00       Hypercholesterolemia - Monitor lipid profile and educate patient upon risks of high  cholesterol and targets. Educate diet and change in lifestyle and increase in exercises - Refill: Atorvastatin  and educate compliance with medication and diet.               Enlarged prostate without lower urinary tract symptoms (luts) N40.0       BPH - Benign PROSTATIC Hypertrophy, + Dysuria/Nocturia, check PSA, prescribe Flomax 0.4mg qD and Avodart 0.5 mg qD vs. Finasteride 5 mg qD.  Educate exercises and Change in life style, prescribe vitamin E 400 IU qD. Consider referral to urology.            Fatigue R53.83       Fatigue - check CMP(metabolic panel and elctrolytes) , CBC(complete blood cell count), TSH(thyroid function). Insomnia may play a role and sleep studies(rule out sleep apnea) are recommended . Educate sleep hygiene. Consider anxiety disorder vs. depression. Consider Stress test, and 2DECHO.             Diabetes mellitus (CMS/[facility]) - Primary E11.9       DM - NIDDM  . Reviewed with patient / Will check  HbA1c and fasting blood sugars. Educate home self monitoring and diary keeping(reviewed with patient home blood sugar levels /diary). Educate extensively low calorie diet and weight loss with exercise. Reviewed BS- diary and Rx. Regimen. [place] renal protection with ARB/ACEI.               Educate compliance with diet and Rx. And educate risks and autcomes.                                                   Relevant Medications     glipiZIDE (Glucotrol) 5 mg tablet     Primary hypothyroidism E03.9       Hypothyroidism - Symptoms well/controlled (weight gain, fatigue, constipation, cold intolerance). Check TSH continues dose of Synthroid/Levothyroxine  of  88 bmcg/qD.             Reflux esophagitis K21.00       GERD - Acid reflux disease. Rx. PPI (Prilosec/Prevacid/Protonix/Nexium) and educate diet and life style changes. Referred patient to an endoscopy (EGD) and check H. Pylori titers.            Schizoaffective disorder (CMS/[facility]) F25.9       Refer to and follows with psych            Hypogonadism  male E29.1                     Benign essential hypertension - Primary I10           HTN - hypertension well/controlled .Target BP < 130/80  achieved. Educate low salt diet and exercise with weight loss. Educate home self monitoring and diary keeping. Educate risks of elevate blood pressure and benefits of prompt treatment.  Refill Lisinopril              Elevated LDL cholesterol level E78.00         Hypercholesterolemia - Monitor lipid profile and educate patient upon risks of high cholesterol and targets. Educate diet and change in lifestyle and increase in exercises - Refill: Atorvastatin  and educate compliance with medication and diet.               Fatigue R53.83         POST COVID Fatigue - Fatigue - check CMP(metabolic panel and elctrolytes) , CBC(complete blood cell count), TSH(thyroid function). Insomnia may play a role and sleep studies(rule out sleep apnea) are recommended . Educate sleep hygiene. Consider anxiety disorder vs. depression. Consider Stress test, and 2DECHO.               Diabetes mellitus (CMS/[facility]) E11.9         DM - NIDDM  . Reviewed with patient / Will check  HbA1c and fasting blood sugars. Educate home self monitoring and diary keeping(reviewed with patient home blood sugar levels /diary). Educate extensively low calorie diet and weight loss with exercise. Reviewed BS- diary and Rx. Regimen. [place] renal protection with ARB/ACEI.               Educate compliance with diet and Rx. And educate risks and autcomes.                                                    Lumbar disc herniation M51.26         DDD - Degenerative disc disease of the Lumbo-Sacral (LS)/ spine. Educate exercises and referred patient to Physical Therapy (PT). Ordered X-Ray's of the LS spine. Consider MRI. Radiculopathy in the distribution of L4-L5-S1/nerve roots, needs NSAIDS (Naprosin 500mg BID vs. Arthrotec 75mg BID or Prednisone taper (Medrol dose pack), Flexeril 10 mg qHS, heat application, and pain control with  Tylenol 325 mg TID.              Primary hypothyroidism E03.9         Hypothyroidism - Symptoms well/controlled (weight gain, fatigue, constipation, cold intolerance). Check TSH continues dose of Synthroid/Levothyroxine  of  88 bmcg/qD.             Reflux esophagitis K21.00         GERD - Acid reflux disease. Rx. PPI (Prilosec/Prevacid/Protonix/Nexium) and educate diet and life style changes. Referred patient to an endoscopy (EGD) and check H. Pylori titers.             Hypogonadism male E29.1         Check levels and refill supplement of Testosterone             Relevant Medications       testosterone 20.25 mg/1.25 gram (1.62 %) gel in metered-dose pump                       Benign essential hypertension I10           HTN - hypertension well/controlled .Target BP < 130/80  achieved. Educate low salt diet and exercise with weight loss. Educate home self monitoring and diary keeping. Educate risks of elevate blood pressure and benefits of prompt treatment.  Refill Lisinopril               COPD (chronic obstructive pulmonary disease) (CMS/[facility]) - Primary J44.9       Elevated LDL cholesterol level E78.00         Hypercholesterolemia - Monitor lipid profile and educate patient upon risks of high cholesterol and targets. Educate diet and change in lifestyle and increase in exercises - Refill: Atorvastatin  and educate compliance with medication and diet.             Relevant Orders       Lipid Panel       Fatigue R53.83         POST COVID Fatigue - Fatigue - check CMP(metabolic panel and elctrolytes) , CBC(complete blood cell count), TSH(thyroid function). Insomnia may play a role and sleep studies(rule out sleep apnea) are recommended . Educate sleep hygiene. Consider anxiety disorder vs. depression. Consider Stress test, and 2DECHO.             Relevant Orders       CBC and Auto Differential       Diabetes mellitus (CMS/[facility]) E11.9         DM - NIDDM  . Reviewed with patient / Will check  HbA1c and fasting blood sugars.  Educate home self monitoring and diary keeping(reviewed with patient home blood sugar levels /diary). Educate extensively low calorie diet and weight loss with exercise. Reviewed BS- diary and Rx. Regimen. [place] renal protection with ARB/ACEI.               Educate compliance with diet and Rx. And educate risks and autcomes.                                                     Relevant Orders       Comprehensive Metabolic Panel       Hemoglobin A1C       Lumbar disc herniation M51.26         DDD - Degenerative disc disease of the Lumbo-Sacral (LS)/Cervical (C)  spine. Educate exercises and referred patient to Physical Therapy (PT). Ordered X-Ray's of the LS/C spine. Consider MRI. Radiculopathy in the distribution of L4-L5-S1/C3-C4-C5 nerve roots, needs NSAIDS (Naprosin 500mg BID vs. Arthrotec 75mg BID or Prednisone taper (Medrol dose pack), Flexeril 10 mg qHS, heat application, and pain control with Tylenol 325 mg TID.             Primary hypothyroidism E03.9         Hypothyroidism - Symptoms well/controlled (weight gain, fatigue, constipation, cold intolerance). Check TSH continues dose of Synthroid/Levothyroxine  of  88 bmcg/qD.              Relevant Orders       TSH with reflex to Free T4 if abnormal       Reflux esophagitis K21.00         GERD - Acid reflux disease. Rx. PPI (Prilosec/Prevacid/Protonix/Nexium) and educate diet and life style changes. Referred patient to an endoscopy (EGD) and check H. Pylori titers.         Total face to face time was 45 minutes , with more than 50% spent counseling the patient regarding diagnosis of Hypertension with aggressive treatment of hypercholesterolemia. Educate extensively diet and lifestyle changes and increase in physical activity and weight loss. Also educate the patient the risks of  untreated hypertension or untreated hypercholesterolemia  - educate risks of developing coronary artery disease , stroke or renal failure

## 2024-04-19 NOTE — ASSESSMENT & PLAN NOTE
Hypercholesterolemia - Monitor lipid profile and educate patient upon risks of high cholesterol and targets. Educate diet and change in lifestyle and increase in exercises - Refill: Atorvastatin  40 mg daily and educate compliance with medication and diet.

## 2024-04-19 NOTE — ASSESSMENT & PLAN NOTE
DM - NIDDM  . Reviewed with patient / Will check  HbA1c and fasting blood sugars. Educate home self monitoring and diary keeping(reviewed with patient home blood sugar levels /diary). Educate extensively low calorie diet and weight loss with exercise. Reviewed BS- diary and Rx. Regimen. [place] renal protection with ARB/ACEI.               Educate compliance with diet and Rx. And educate risks and autcomes.

## 2024-04-23 ENCOUNTER — TELEMEDICINE (OUTPATIENT)
Dept: BEHAVIORAL HEALTH | Facility: CLINIC | Age: 74
End: 2024-04-23
Payer: COMMERCIAL

## 2024-04-23 DIAGNOSIS — F44.89 REACTIVE CONFUSION: ICD-10-CM

## 2024-04-23 DIAGNOSIS — R45.81 CHRONIC LOW SELF ESTEEM: ICD-10-CM

## 2024-04-23 DIAGNOSIS — F25.1 SCHIZOAFFECTIVE DISORDER, DEPRESSIVE TYPE (MULTI): ICD-10-CM

## 2024-04-23 DIAGNOSIS — F41.8 MIXED ANXIETY DEPRESSIVE DISORDER: ICD-10-CM

## 2024-04-23 PROCEDURE — 3050F LDL-C >= 130 MG/DL: CPT | Performed by: PSYCHIATRY & NEUROLOGY

## 2024-04-23 PROCEDURE — 99214 OFFICE O/P EST MOD 30 MIN: CPT | Performed by: PSYCHIATRY & NEUROLOGY

## 2024-04-23 PROCEDURE — 1160F RVW MEDS BY RX/DR IN RCRD: CPT | Performed by: PSYCHIATRY & NEUROLOGY

## 2024-04-23 PROCEDURE — 4010F ACE/ARB THERAPY RXD/TAKEN: CPT | Performed by: PSYCHIATRY & NEUROLOGY

## 2024-04-23 PROCEDURE — 3044F HG A1C LEVEL LT 7.0%: CPT | Performed by: PSYCHIATRY & NEUROLOGY

## 2024-04-23 PROCEDURE — 1159F MED LIST DOCD IN RCRD: CPT | Performed by: PSYCHIATRY & NEUROLOGY

## 2024-04-23 ASSESSMENT — ENCOUNTER SYMPTOMS
DEPRESSED MOOD: 1
DYSPHORIC MOOD: 1
NERVOUS/ANXIOUS: 1

## 2024-04-23 NOTE — PROGRESS NOTES
Subjective   Patient ID: Truong Armijo is a 73 y.o. male who presents for No chief complaint on file. I have had catatonic episodes and not doing well.    The patient engaged in a telehealth session via Epic audio visual or phone with this provider practicing within the [facility], [place]. The identity of the patient was verified by their date of birth and last four digits of their social security number. The provider demonstrated that confidentially was preserved at their location. The patient was informed that they were responsible for ensuring confidentially was secured at their location. The patient's location was documented for emergency purposes. The patient was informed of the necessary steps that would occur if an emergency was to occur or technology failed during session.     HPI: The patient reports that he is withdrawing more because of his negative interactions with others and associated stress. The patient wants to minimize exposure to people who have a negative perception of him. The patient reports having elevated lipids with his weight at 202 pounds concerns him. He reports has reduction thyroid activity as well. He has some improvement in energy with the increase of Synthroid to 100 micrograms. The patient reports intermittent episodes of having anxiety and possible catatonic or acute mental cognitive dysfunction which he describes as mental stupor.     MSE: The patient is alert, fully oriented, language is intact, and recent and remote memory intact. The patient denies any suicidal or homicidal ideation or plans. The patient presents with chronic dysphoria with increased depressive spells, catatonic episodes without manic symptoms. Thought is impaired with thought blocking. Judgment and insight are limited. The patient continues to have disappointments with his social interactions. The patient reports intermittent of episodes of inability to function.       Review of Systems   Neurological:          MSE: The patient is alert, fully oriented, language is intact, and recent and remote memory intact. The patient denies any suicidal or homicidal ideation or plans. The patient presents with chronic dysphoria with increased depressive spells, catatonic episodes without manic symptoms. Thought is impaired with thought blocking. Judgment and insight are limited. The patient continues to have disappointments with his social interactions. The patient reports intermittent of episodes of inability to function.        Psychiatric/Behavioral:  Positive for behavioral problems and dysphoric mood. The patient is nervous/anxious.         MSE: The patient is alert, fully oriented, language is intact, and recent and remote memory intact. The patient denies any suicidal or homicidal ideation or plans. The patient presents with chronic dysphoria with increased depressive spells, catatonic episodes without manic symptoms. Thought is impaired with thought blocking. Judgment and insight are limited. The patient continues to have disappointments with his social interactions. The patient reports intermittent of episodes of inability to function.          All other systems reviewed and are negative.      Objective   Physical Exam  Neurological:      General: No focal deficit present.      Mental Status: He is alert and oriented to person, place, and time. Mental status is at baseline.      Comments: MSE: The patient is alert, fully oriented, language is intact, and recent and remote memory intact. The patient denies any suicidal or homicidal ideation or plans. The patient presents with chronic dysphoria with increased depressive spells, catatonic episodes without manic symptoms. Thought is impaired with thought blocking. Judgment and insight are limited. The patient continues to have disappointments with his social interactions. The patient reports intermittent of episodes of inability to function.        Psychiatric:      Comments: MSE:  The patient is alert, fully oriented, language is intact, and recent and remote memory intact. The patient denies any suicidal or homicidal ideation or plans. The patient presents with chronic dysphoria with increased depressive spells, catatonic episodes without manic symptoms. Thought is impaired with thought blocking. Judgment and insight are limited. The patient continues to have disappointments with his social interactions. The patient reports intermittent of episodes of inability to function.              Lab Review:     Assessment/Plan   The FDA risks, benefits & alternatives to the medications prescribed were explained to you today. You were able to understand & repeat these risks, benefits & alternatives to these prescribed medications. You have agreed to proceed with treatment with the medications discussed based on the conclusion that the benefit outweighs the risks of this treatment regimen: bupropion  mg every 12 hours, diazepam 5 mg nightly as needed, fluoxetine 40 mg daily and risperidone increase 2 mg daily.    Follow up monthly. You are due for a urine toxicology screen (last done on January 23 of 2024) and your annual controlled substance agreement(last signed in January 23 of 2024) in January of 2025.     Follow up monthly as you have been doing.       Psych Review of Symptoms:    ADHD: Patient denied any symptoms.         Anxiety:   Generalized Anxiety Symptoms: Excessive worry.       Developmental and Sensory Concerns: Patient denied any symptoms.         Depressive Symptoms:   Depressed mood, withdrawal/isolation and irritable.       Disruptive and Conduct Symptoms: Patient denied any symptoms.         Eating / Feeding Concerns: Patient denied any symptoms.         Elimination Symptoms: Patient denied any symptoms.         Manic Symptoms: Patient denied any symptoms.         Obsessive-Compulsive Symptoms: Patient denied any symptoms.         Psychotic Symptoms:   Catatonia.         Trauma  Related Symptoms: Patient denied any symptoms.           Sleep Concerns: Patient denied any symptoms.

## 2024-05-10 DIAGNOSIS — E11.9 TYPE 2 DIABETES MELLITUS WITHOUT COMPLICATIONS (MULTI): ICD-10-CM

## 2024-05-10 RX ORDER — SITAGLIPTIN 100 MG/1
100 TABLET, FILM COATED ORAL DAILY
Qty: 90 TABLET | Refills: 0 | Status: SHIPPED | OUTPATIENT
Start: 2024-05-10

## 2024-05-10 RX ORDER — METFORMIN HYDROCHLORIDE 500 MG/1
1000 TABLET ORAL 2 TIMES DAILY
Qty: 360 TABLET | Refills: 0 | Status: SHIPPED | OUTPATIENT
Start: 2024-05-10

## 2024-05-19 DIAGNOSIS — I10 BENIGN ESSENTIAL HYPERTENSION: ICD-10-CM

## 2024-05-20 ENCOUNTER — LAB (OUTPATIENT)
Dept: LAB | Facility: LAB | Age: 74
End: 2024-05-20
Payer: COMMERCIAL

## 2024-05-20 ENCOUNTER — OFFICE VISIT (OUTPATIENT)
Dept: PRIMARY CARE | Facility: CLINIC | Age: 74
End: 2024-05-20
Payer: COMMERCIAL

## 2024-05-20 VITALS
SYSTOLIC BLOOD PRESSURE: 120 MMHG | HEART RATE: 72 BPM | RESPIRATION RATE: 16 BRPM | BODY MASS INDEX: 26.56 KG/M2 | HEIGHT: 74 IN | DIASTOLIC BLOOD PRESSURE: 70 MMHG | WEIGHT: 207 LBS

## 2024-05-20 DIAGNOSIS — E03.9 PRIMARY HYPOTHYROIDISM: ICD-10-CM

## 2024-05-20 DIAGNOSIS — E11.9 TYPE 2 DIABETES MELLITUS WITHOUT COMPLICATION, WITHOUT LONG-TERM CURRENT USE OF INSULIN (MULTI): Primary | ICD-10-CM

## 2024-05-20 DIAGNOSIS — G93.39 OTHER POST INFECTION AND RELATED FATIGUE SYNDROMES: ICD-10-CM

## 2024-05-20 DIAGNOSIS — M54.16 RADICULOPATHY, LUMBAR REGION: ICD-10-CM

## 2024-05-20 DIAGNOSIS — N40.0 ENLARGED PROSTATE WITHOUT LOWER URINARY TRACT SYMPTOMS (LUTS): ICD-10-CM

## 2024-05-20 DIAGNOSIS — E03.9 ACQUIRED HYPOTHYROIDISM: ICD-10-CM

## 2024-05-20 DIAGNOSIS — E78.00 HYPERCHOLESTEROLEMIA: ICD-10-CM

## 2024-05-20 DIAGNOSIS — M51.26 LUMBAR DISC HERNIATION: ICD-10-CM

## 2024-05-20 DIAGNOSIS — I10 BENIGN ESSENTIAL HYPERTENSION: ICD-10-CM

## 2024-05-20 DIAGNOSIS — F51.01 PRIMARY INSOMNIA: ICD-10-CM

## 2024-05-20 DIAGNOSIS — R53.1 GENERALIZED WEAKNESS: ICD-10-CM

## 2024-05-20 PROBLEM — G82.20 PARAPARESIS (MULTI): Status: RESOLVED | Noted: 2024-04-19 | Resolved: 2024-05-20

## 2024-05-20 PROCEDURE — 1159F MED LIST DOCD IN RCRD: CPT | Performed by: INTERNAL MEDICINE

## 2024-05-20 PROCEDURE — 3050F LDL-C >= 130 MG/DL: CPT | Performed by: INTERNAL MEDICINE

## 2024-05-20 PROCEDURE — 3078F DIAST BP <80 MM HG: CPT | Performed by: INTERNAL MEDICINE

## 2024-05-20 PROCEDURE — 3074F SYST BP LT 130 MM HG: CPT | Performed by: INTERNAL MEDICINE

## 2024-05-20 PROCEDURE — 1036F TOBACCO NON-USER: CPT | Performed by: INTERNAL MEDICINE

## 2024-05-20 PROCEDURE — 4010F ACE/ARB THERAPY RXD/TAKEN: CPT | Performed by: INTERNAL MEDICINE

## 2024-05-20 PROCEDURE — 3044F HG A1C LEVEL LT 7.0%: CPT | Performed by: INTERNAL MEDICINE

## 2024-05-20 PROCEDURE — 99215 OFFICE O/P EST HI 40 MIN: CPT | Performed by: INTERNAL MEDICINE

## 2024-05-20 PROCEDURE — 1160F RVW MEDS BY RX/DR IN RCRD: CPT | Performed by: INTERNAL MEDICINE

## 2024-05-20 RX ORDER — OMEGA-3-ACID ETHYL ESTERS 1 G/1
4 CAPSULE, LIQUID FILLED ORAL
Qty: 360 CAPSULE | Refills: 0 | Status: SHIPPED | OUTPATIENT
Start: 2024-05-20

## 2024-05-20 RX ORDER — TRAZODONE HYDROCHLORIDE 50 MG/1
50 TABLET ORAL NIGHTLY PRN
Qty: 30 TABLET | Refills: 0 | Status: SHIPPED | OUTPATIENT
Start: 2024-05-20 | End: 2025-05-20

## 2024-05-20 ASSESSMENT — ENCOUNTER SYMPTOMS
NEUROLOGICAL NEGATIVE: 1
PSYCHIATRIC NEGATIVE: 1
ENDOCRINE NEGATIVE: 1
MUSCULOSKELETAL NEGATIVE: 1
CARDIOVASCULAR NEGATIVE: 1
GASTROINTESTINAL NEGATIVE: 1
CONSTITUTIONAL NEGATIVE: 1
HEMATOLOGIC/LYMPHATIC NEGATIVE: 1
EYES NEGATIVE: 1
RESPIRATORY NEGATIVE: 1
ALLERGIC/IMMUNOLOGIC NEGATIVE: 1

## 2024-05-20 NOTE — ASSESSMENT & PLAN NOTE
Hypothyroidism - Symptoms well/not controlled (weight gain, fatigue, constipation, cold intolerance). Check TSH continue/change dose of Synthroid/Levothyroxine  of 100 mcg/qD.

## 2024-05-20 NOTE — ASSESSMENT & PLAN NOTE
DM - NIDDM  . Reviewed with patient / Will check  HbA1c and fasting blood sugars. Educate home self monitoring and diary keeping(reviewed with patient home blood sugar levels /diary). Educate extensively low calorie diet and weight loss with exercise. Reviewed BS- diary and Rx. Regimen. [place] renal protection with ARB/ACEI.               Educate compliance with diet and Rx. And educate risks and autcomes.    Continuesandrefill the Metformin 500 mg BID with meals  Januvia 100 mg daily and Farxiga 5 mg daily

## 2024-05-20 NOTE — PROGRESS NOTES
"Subjective   Patient ID: Truong Armijo is a 73 y.o. male who presents for Follow-up (Follow up/Wants to discuss getting PT for leg weakness, TSH levels, medication for sleep with his age ). Urgency of urination weak and stream and also Complains of  right lower back ands right leg hurt a lot a few days ago which got better by itself though did not follow in PT though last time  -     HPI     Review of Systems   Constitutional: Negative.    HENT: Negative.     Eyes: Negative.    Respiratory: Negative.     Cardiovascular: Negative.    Gastrointestinal: Negative.    Endocrine: Negative.    Musculoskeletal: Negative.    Skin: Negative.    Allergic/Immunologic: Negative.    Neurological: Negative.    Hematological: Negative.    Psychiatric/Behavioral: Negative.     All other systems reviewed and are negative.      Objective   Ht 1.88 m (6' 2\")   Wt 93.9 kg (207 lb)   BMI 26.58 kg/m²   Blood pressure 120/70, pulse 72, resp. rate 16, height 1.88 m (6' 2\"), weight 93.9 kg (207 lb).   Physical Exam  Vitals and nursing note reviewed.   Constitutional:       Appearance: Normal appearance.   HENT:      Head: Normocephalic and atraumatic.      Right Ear: Tympanic membrane, ear canal and external ear normal.      Left Ear: Tympanic membrane, ear canal and external ear normal. There is no impacted cerumen.      Nose: Nose normal.      Mouth/Throat:      Mouth: Mucous membranes are moist.      Pharynx: Oropharynx is clear.   Eyes:      Extraocular Movements: Extraocular movements intact.      Conjunctiva/sclera: Conjunctivae normal.      Pupils: Pupils are equal, round, and reactive to light.   Cardiovascular:      Rate and Rhythm: Normal rate and regular rhythm.      Pulses: Normal pulses.      Heart sounds: Normal heart sounds. No murmur heard.  Pulmonary:      Effort: Pulmonary effort is normal. No respiratory distress.      Breath sounds: Normal breath sounds. No stridor. No wheezing, rhonchi or rales.   Chest:      Chest " wall: No tenderness.   Abdominal:      General: Abdomen is flat. Bowel sounds are normal. There is no distension.      Palpations: Abdomen is soft. There is no mass.      Tenderness: There is no abdominal tenderness. There is no right CVA tenderness, left CVA tenderness, guarding or rebound.      Hernia: No hernia is present.   Musculoskeletal:         General: Normal range of motion.      Cervical back: Normal range of motion and neck supple.   Skin:     General: Skin is warm.      Capillary Refill: Capillary refill takes less than 2 seconds.   Neurological:      General: No focal deficit present.      Mental Status: He is alert.      Cranial Nerves: No cranial nerve deficit.      Sensory: No sensory deficit.      Motor: No weakness.      Coordination: Coordination normal.      Gait: Gait normal.      Deep Tendon Reflexes: Reflexes normal.   Psychiatric:         Mood and Affect: Mood normal.         Behavior: Behavior normal. Behavior is cooperative.         Thought Content: Thought content normal.         Judgment: Judgment normal.         Assessment/Plan   Problem List Items Addressed This Visit             ICD-10-CM    Benign essential hypertension I10     HTN - hypertension well/controlled .Target BP < 130/80  achieved. Educate low salt diet and exercise with weight loss. Educate home self monitoring and diary keeping. Educate risks of elevate blood pressure and benefits of prompt treatment.  Refill Lisinopril            Hypercholesterolemia E78.00     Hypercholesterolemia - Monitor lipid profile and educate patient upon risks of high cholesterol and targets. Educate diet and change in lifestyle and increase in exercises - Refill: Atorvastatin  40 mg daily and educate compliance with medication and diet.          Relevant Orders    Lipid Panel    Enlarged prostate without lower urinary tract symptoms (luts) N40.0     BPH - Benign PROSTATIC Hypertrophy, + Dysuria/Nocturia, check PSA, prescribe Flomax 0.4mg qD  and Avodart 0.5 mg qD vs. Finasteride 5 mg qD.  Educate exercises and Change in life style, prescribe vitamin E 400 IU qD. Consider referral to urology.          Fatigue R53.83     Fatigue - check CMP(metabolic panel and elctrolytes) , CBC(complete blood cell count), TSH(thyroid function). Insomnia may play a role and sleep studies(rule out sleep apnea) are recommended . Educate sleep hygiene. Consider anxiety disorder vs. depression. Consider Stress test, and 2DECHO.           Generalized weakness R53.1     Weaknesz  - refer to PT ans strengthening exERCIESES         Diabetes mellitus (Multi) - Primary E11.9     DM - NIDDM  . Reviewed with patient / Will check  HbA1c and fasting blood sugars. Educate home self monitoring and diary keeping(reviewed with patient home blood sugar levels /diary). Educate extensively low calorie diet and weight loss with exercise. Reviewed BS- diary and Rx. Regimen. [place] renal protection with ARB/ACEI.               Educate compliance with diet and Rx. And educate risks and autcomes.    Continuesandrefill the Metformin 500 mg BID with meals  Januvia 100 mg daily and Farxiga 5 mg daily                                            Hypothyroidism, unspecified E03.9     Hypothyroidism - Symptoms well/not controlled (weight gain, fatigue, constipation, cold intolerance). Check TSH continue/change dose of Synthroid/Levothyroxine  of 100 mcg/qD.          Relevant Orders    TSH with reflex to Free T4 if abnormal    Thyroxine, Free    Insomnia G47.00    Relevant Medications    traZODone (Desyrel) 50 mg tablet    Lumbar disc herniation M51.26     DDD - Degenerative disc disease of the Lumbo-Sacral (LS)/ spine. Educate exercises and referred patient to Physical Therapy (PT). Ordered X-Ray's of the LS spine. Consider MRI. Radiculopathy in the distribution of L4-L5-S1/nerve roots, needs NSAIDS (Naprosin 500mg BID vs. Arthrotec 75mg BID or Prednisone taper (Medrol dose pack), Flexeril 10 mg qHS,  heat application, and pain control with Tylenol 325 mg TI          Primary hypothyroidism E03.9    Radiculopathy, lumbar region M54.16     DDD - Degenerative disc disease of the Lumbo-Sacral (LS) spine. Educate exercises and referred patient to Physical Therapy (PT). Ordered X-Ray's of the LS spine. Consider MRI. Radiculopathy in the distribution of L4-L5-S1/C nerve roots, needs NSAIDS (Naprosin 500mg BID vs. Arthrotec 75mg BID or Prednisone taper (Medrol dose pack), Flexeril 10 mg qHS, heat application, and pain control with Tylenol          Total face to face time was 45 minutes , with more than 50% spent counseling the patient regarding diagnosis of Hypertension with aggressive treatment of hypercholesterolemia. Educate extensively diet and lifestyle changes and increase in physical activity and weight loss. Also educate the patient the risks of  untreated hypertension or untreated hypercholesterolemia  - educate risks of developing coronary artery disease , stroke or renal failure -

## 2024-05-20 NOTE — ASSESSMENT & PLAN NOTE
DDD - Degenerative disc disease of the Lumbo-Sacral (LS) spine. Educate exercises and referred patient to Physical Therapy (PT). Ordered X-Ray's of the LS spine. Consider MRI. Radiculopathy in the distribution of L4-L5-S1/C nerve roots, needs NSAIDS (Naprosin 500mg BID vs. Arthrotec 75mg BID or Prednisone taper (Medrol dose pack), Flexeril 10 mg qHS, heat application, and pain control with Tylenol

## 2024-05-20 NOTE — ASSESSMENT & PLAN NOTE
DDD - Degenerative disc disease of the Lumbo-Sacral (LS)/ spine. Educate exercises and referred patient to Physical Therapy (PT). Ordered X-Ray's of the LS spine. Consider MRI. Radiculopathy in the distribution of L4-L5-S1/nerve roots, needs NSAIDS (Naprosin 500mg BID vs. Arthrotec 75mg BID or Prednisone taper (Medrol dose pack), Flexeril 10 mg qHS, heat application, and pain control with Tylenol 325 mg TI

## 2024-05-21 ENCOUNTER — TELEMEDICINE (OUTPATIENT)
Dept: BEHAVIORAL HEALTH | Facility: CLINIC | Age: 74
End: 2024-05-21
Payer: COMMERCIAL

## 2024-05-21 DIAGNOSIS — G47.00 INSOMNIA, UNSPECIFIED TYPE: ICD-10-CM

## 2024-05-21 PROCEDURE — 4010F ACE/ARB THERAPY RXD/TAKEN: CPT | Performed by: PSYCHIATRY & NEUROLOGY

## 2024-05-21 PROCEDURE — 1160F RVW MEDS BY RX/DR IN RCRD: CPT | Performed by: PSYCHIATRY & NEUROLOGY

## 2024-05-21 PROCEDURE — 3050F LDL-C >= 130 MG/DL: CPT | Performed by: PSYCHIATRY & NEUROLOGY

## 2024-05-21 PROCEDURE — 99214 OFFICE O/P EST MOD 30 MIN: CPT | Performed by: PSYCHIATRY & NEUROLOGY

## 2024-05-21 PROCEDURE — 1159F MED LIST DOCD IN RCRD: CPT | Performed by: PSYCHIATRY & NEUROLOGY

## 2024-05-21 PROCEDURE — 3044F HG A1C LEVEL LT 7.0%: CPT | Performed by: PSYCHIATRY & NEUROLOGY

## 2024-05-21 ASSESSMENT — ENCOUNTER SYMPTOMS
NERVOUS/ANXIOUS: 1
DYSPHORIC MOOD: 1
DEPRESSED MOOD: 1

## 2024-05-21 NOTE — PROGRESS NOTES
Subjective   Patient ID: Truong Armijo is a 73 y.o. male who presents for No chief complaint on file. I have had anxiety and brain fog today.    The patient engaged in a telehealth session via Epic audio visual or phone with this provider practicing within the [facility]. The identity of the patient was verified by their date of birth and last four digits of their social security number. The provider demonstrated that confidentially was preserved at their location. The patient was informed that they were responsible for ensuring confidentially was secured at their location. The patient's location was documented for emergency purposes. The patient was informed of the necessary steps that would occur if an emergency was to occur or technology failed during session.     HPI: The patient reports that he has been having brain fog and anxiety. He has been dieting and did some exercise so that he has been able to lose weight. The patient is going to try physical therapy at another institution. The patient had an episode of significant back pain which has improved. The patient has been concerned about his lipid levels which has been elevated. The patient has been going through a difficult emotional time. He has withdrawn because he was emotionally hurt by others. The patient described disappointing interactions with people that were emotional hurtful for him. The patient feels that he has been misunderstood by others creating unpleasant interactions.     MSE: The patient is alert, fully oriented, language is intact, and recent and remote memory intact. The patient denies any suicidal or homicidal ideation or plans. The patient presents with chronic dysphoria with increased anxious and depressive spells, intermittent possible catatonic episodes without manic symptoms. Thought is impaired with thought blocking. Judgment and insight are limited. The patient continues to have disappointments with his social interactions.  The patient reports intermittent of episodes of inability to function or brain fog.       Review of Systems   Neurological:         MSE: The patient is alert, fully oriented, language is intact, and recent and remote memory intact. The patient denies any suicidal or homicidal ideation or plans. The patient presents with chronic dysphoria with increased anxious and depressive spells, intermittent possible catatonic episodes without manic symptoms. Thought is impaired with thought blocking. Judgment and insight are limited. The patient continues to have disappointments with his social interactions. The patient reports intermittent of episodes of inability to function or brain fog.          Psychiatric/Behavioral:  Positive for behavioral problems and dysphoric mood. The patient is nervous/anxious.         MSE: The patient is alert, fully oriented, language is intact, and recent and remote memory intact. The patient denies any suicidal or homicidal ideation or plans. The patient presents with chronic dysphoria with increased anxious and depressive spells, intermittent possible catatonic episodes without manic symptoms. Thought is impaired with thought blocking. Judgment and insight are limited. The patient continues to have disappointments with his social interactions. The patient reports intermittent of episodes of inability to function or brain fog.          All other systems reviewed and are negative.      Objective   Physical Exam  Neurological:      General: No focal deficit present.      Mental Status: He is alert and oriented to person, place, and time. Mental status is at baseline.      Comments: MSE: The patient is alert, fully oriented, language is intact, and recent and remote memory intact. The patient denies any suicidal or homicidal ideation or plans. The patient presents with chronic dysphoria with increased anxious and depressive spells, intermittent possible catatonic episodes without manic symptoms.  Thought is impaired with thought blocking. Judgment and insight are limited. The patient continues to have disappointments with his social interactions. The patient reports intermittent of episodes of inability to function or brain fog.      Psychiatric:      Comments: MSE: The patient is alert, fully oriented, language is intact, and recent and remote memory intact. The patient denies any suicidal or homicidal ideation or plans. The patient presents with chronic dysphoria with increased depressive spells, catatonic episodes without manic symptoms. Thought is impaired with thought blocking. Judgment and insight are limited. The patient continues to have disappointments with his social interactions. The patient reports intermittent of episodes of inability to function.              Lab Review:     Assessment/Plan   The FDA risks, benefits & alternatives to the medications prescribed were explained to you today. You were able to understand & repeat these risks, benefits & alternatives to these prescribed medications. You have agreed to proceed with treatment with the medications discussed based on the conclusion that the benefit outweighs the risks of this treatment regimen: bupropion  mg every 12 hours, diazepam 5 mg nightly as needed, fluoxetine 40 mg daily and risperidone 2 mg daily.    Follow up monthly. You are due for a urine toxicology screen (last done on January 23 of 2024) and your annual controlled substance agreement(last signed in January 23 of 2024) in January of 2025.     Follow up monthly as you have been doing.    We have referred you to sleep medicine as discussed for your chronic insomnia.       Psych Review of Symptoms:    ADHD: Patient denied any symptoms.         Anxiety:   Generalized Anxiety Symptoms: Excessive worry.       Developmental and Sensory Concerns: Patient denied any symptoms.         Depressive Symptoms:   Depressed mood, withdrawal/isolation and irritable.       Disruptive  and Conduct Symptoms: Patient denied any symptoms.         Eating / Feeding Concerns: Patient denied any symptoms.         Elimination Symptoms: Patient denied any symptoms.         Manic Symptoms: Patient denied any symptoms.         Obsessive-Compulsive Symptoms: Patient denied any symptoms.         Psychotic Symptoms:   Catatonia.         Trauma Related Symptoms: Patient denied any symptoms.           Sleep Concerns: Patient denied any symptoms.

## 2024-06-02 DIAGNOSIS — E11.42 DIABETIC PERIPHERAL NEUROPATHY (MULTI): ICD-10-CM

## 2024-06-02 DIAGNOSIS — E11.9 TYPE 2 DIABETES MELLITUS WITHOUT COMPLICATION, WITHOUT LONG-TERM CURRENT USE OF INSULIN (MULTI): ICD-10-CM

## 2024-06-03 ENCOUNTER — LAB (OUTPATIENT)
Dept: LAB | Facility: LAB | Age: 74
End: 2024-06-03
Payer: COMMERCIAL

## 2024-06-03 DIAGNOSIS — E78.00 HYPERCHOLESTEROLEMIA: ICD-10-CM

## 2024-06-03 LAB
CHOLEST SERPL-MCNC: 119 MG/DL (ref 0–199)
CHOLESTEROL/HDL RATIO: 2.8
HDLC SERPL-MCNC: 41.8 MG/DL
LDLC SERPL CALC-MCNC: 55 MG/DL
NON HDL CHOLESTEROL: 77 MG/DL (ref 0–149)
T4 FREE SERPL-MCNC: 1.57 NG/DL (ref 0.78–1.48)
TRIGL SERPL-MCNC: 109 MG/DL (ref 0–149)
TSH SERPL-ACNC: 2.95 MIU/L (ref 0.44–3.98)
VLDL: 22 MG/DL (ref 0–40)

## 2024-06-03 PROCEDURE — 84443 ASSAY THYROID STIM HORMONE: CPT

## 2024-06-03 PROCEDURE — 84439 ASSAY OF FREE THYROXINE: CPT

## 2024-06-03 PROCEDURE — 36415 COLL VENOUS BLD VENIPUNCTURE: CPT

## 2024-06-03 PROCEDURE — 80061 LIPID PANEL: CPT

## 2024-06-03 RX ORDER — DAPAGLIFLOZIN 10 MG/1
10 TABLET, FILM COATED ORAL DAILY
Qty: 90 TABLET | Refills: 0 | Status: SHIPPED | OUTPATIENT
Start: 2024-06-03

## 2024-06-04 ENCOUNTER — TELEPHONE (OUTPATIENT)
Dept: PRIMARY CARE | Facility: CLINIC | Age: 74
End: 2024-06-04

## 2024-06-04 ENCOUNTER — TELEMEDICINE (OUTPATIENT)
Dept: BEHAVIORAL HEALTH | Facility: CLINIC | Age: 74
End: 2024-06-04
Payer: COMMERCIAL

## 2024-06-04 DIAGNOSIS — F25.1 SCHIZOAFFECTIVE DISORDER, DEPRESSIVE TYPE (MULTI): ICD-10-CM

## 2024-06-04 PROCEDURE — 3044F HG A1C LEVEL LT 7.0%: CPT | Performed by: PSYCHIATRY & NEUROLOGY

## 2024-06-04 PROCEDURE — 99214 OFFICE O/P EST MOD 30 MIN: CPT | Performed by: PSYCHIATRY & NEUROLOGY

## 2024-06-04 PROCEDURE — 3048F LDL-C <100 MG/DL: CPT | Performed by: PSYCHIATRY & NEUROLOGY

## 2024-06-04 PROCEDURE — 4010F ACE/ARB THERAPY RXD/TAKEN: CPT | Performed by: PSYCHIATRY & NEUROLOGY

## 2024-06-04 ASSESSMENT — ENCOUNTER SYMPTOMS
DEPRESSED MOOD: 1
DYSPHORIC MOOD: 1
NERVOUS/ANXIOUS: 1

## 2024-06-04 NOTE — PROGRESS NOTES
Subjective   Patient ID: Truong Armijo is a 73 y.o. male who presents for No chief complaint on file. I have had anxiety and brain fog today.    The patient engaged in a telehealth session via Epic audio visual or phone with this provider practicing within the [facility]. The identity of the patient was verified by their date of birth and last four digits of their social security number. The provider demonstrated that confidentially was preserved at their location. The patient was informed that they were responsible for ensuring confidentially was secured at their location. The patient's location was documented for emergency purposes. The patient was informed of the necessary steps that would occur if an emergency was to occur or technology failed during session.     HPI: The patient reports that he has been having brain fog and anxiety. He has been dieting and did some exercise so that he has been able to lose weight. The patient is going to try physical therapy at another institution. The patient had an episode of significant back pain which has improved. The patient has been concerned about his lipid levels which has been elevated. The patient has been going through a difficult emotional time. He has withdrawn because he was emotionally hurt by others. The patient described disappointing interactions with people that were emotional hurtful for him. The patient feels that he has been misunderstood by others creating unpleasant interactions.   The patient reports that recent laboratories have been off which have been concerning. As a result he reports that he has become more tense. He is concerned about his elevated free thyroxine level. The patient has been very stressed. There have been social stresses including feeling targeted by a person of authority.     MSE: The patient is alert, fully oriented, language is intact, and recent and remote memory intact. The patient denies any suicidal or homicidal  ideation or plans. The patient presents with chronic dysphoria with increased anxious and depressive spells, intermittent possible catatonic episodes without manic symptoms. Thought is impaired with thought blocking. Judgment and insight are limited. The patient continues to have disappointments with his social interactions. The patient reports intermittent of episodes of inability to function or brain fog continuing.       Review of Systems   Neurological:         MSE: The patient is alert, fully oriented, language is intact, and recent and remote memory intact. The patient denies any suicidal or homicidal ideation or plans. The patient presents with chronic dysphoria with increased anxious and depressive spells, intermittent possible catatonic episodes without manic symptoms. Thought is impaired with thought blocking. Judgment and insight are limited. The patient continues to have disappointments with his social interactions. The patient reports intermittent of episodes of inability to function or brain fog.          Psychiatric/Behavioral:  Positive for behavioral problems and dysphoric mood. The patient is nervous/anxious.         MSE: The patient is alert, fully oriented, language is intact, and recent and remote memory intact. The patient denies any suicidal or homicidal ideation or plans. The patient presents with chronic dysphoria with increased anxious and depressive spells, intermittent possible catatonic episodes without manic symptoms. Thought is impaired with thought blocking. Judgment and insight are limited. The patient continues to have disappointments with his social interactions. The patient reports intermittent of episodes of inability to function or brain fog.          All other systems reviewed and are negative.      Objective   Physical Exam  Neurological:      General: No focal deficit present.      Mental Status: He is alert and oriented to person, place, and time. Mental status is at  baseline.      Comments: MSE: The patient is alert, fully oriented, language is intact, and recent and remote memory intact. The patient denies any suicidal or homicidal ideation or plans. The patient presents with chronic dysphoria with increased anxious and depressive spells, intermittent possible catatonic episodes without manic symptoms. Thought is impaired with thought blocking. Judgment and insight are limited. The patient continues to have disappointments with his social interactions. The patient reports intermittent of episodes of inability to function or brain fog.      Psychiatric:      Comments: MSE: The patient is alert, fully oriented, language is intact, and recent and remote memory intact. The patient denies any suicidal or homicidal ideation or plans. The patient presents with chronic dysphoria with increased depressive spells, catatonic episodes without manic symptoms. Thought is impaired with thought blocking. Judgment and insight are limited. The patient continues to have disappointments with his social interactions. The patient reports intermittent of episodes of inability to function.              Lab Review:     Assessment/Plan   The FDA risks, benefits & alternatives to the medications prescribed were explained to you today. You were able to understand & repeat these risks, benefits & alternatives to these prescribed medications. You have agreed to proceed with treatment with the medications discussed based on the conclusion that the benefit outweighs the risks of this treatment regimen: bupropion  mg every 12 hours, diazepam 5 mg nightly as needed, fluoxetine 40 mg daily and risperidone 2 mg daily.    Follow up monthly. You are due for a urine toxicology screen (last done on January 23 of 2024) and your annual controlled substance agreement(last signed in January 23 of 2024) in January of 2025.     Follow up monthly as you have been doing.    We have referred you to sleep medicine as  discussed for your chronic insomnia.       Psych Review of Symptoms:    ADHD: Patient denied any symptoms.         Anxiety:   Generalized Anxiety Symptoms: Excessive worry.       Developmental and Sensory Concerns: Patient denied any symptoms.         Depressive Symptoms:   Depressed mood, withdrawal/isolation and irritable.       Disruptive and Conduct Symptoms: Patient denied any symptoms.         Eating / Feeding Concerns: Patient denied any symptoms.         Elimination Symptoms: Patient denied any symptoms.         Manic Symptoms: Patient denied any symptoms.         Obsessive-Compulsive Symptoms: Patient denied any symptoms.         Psychotic Symptoms: Patient denied any symptoms.           Trauma Related Symptoms: Patient denied any symptoms.           Sleep Concerns: Patient denied any symptoms.

## 2024-06-18 DIAGNOSIS — F51.01 PRIMARY INSOMNIA: ICD-10-CM

## 2024-06-18 RX ORDER — TRAZODONE HYDROCHLORIDE 50 MG/1
50 TABLET ORAL NIGHTLY PRN
Qty: 90 TABLET | Refills: 1 | Status: SHIPPED | OUTPATIENT
Start: 2024-06-18 | End: 2025-06-18

## 2024-06-20 ENCOUNTER — TELEPHONE (OUTPATIENT)
Dept: PRIMARY CARE | Facility: CLINIC | Age: 74
End: 2024-06-20
Payer: COMMERCIAL

## 2024-06-21 ENCOUNTER — OFFICE VISIT (OUTPATIENT)
Dept: PRIMARY CARE | Facility: CLINIC | Age: 74
End: 2024-06-21
Payer: COMMERCIAL

## 2024-06-21 VITALS
HEIGHT: 74 IN | WEIGHT: 208 LBS | BODY MASS INDEX: 26.69 KG/M2 | SYSTOLIC BLOOD PRESSURE: 120 MMHG | OXYGEN SATURATION: 98 % | RESPIRATION RATE: 18 BRPM | DIASTOLIC BLOOD PRESSURE: 85 MMHG | HEART RATE: 76 BPM

## 2024-06-21 DIAGNOSIS — E03.9 PRIMARY HYPOTHYROIDISM: ICD-10-CM

## 2024-06-21 DIAGNOSIS — I10 ESSENTIAL (PRIMARY) HYPERTENSION: ICD-10-CM

## 2024-06-21 DIAGNOSIS — F25.1 SCHIZOAFFECTIVE DISORDER, DEPRESSIVE TYPE (MULTI): ICD-10-CM

## 2024-06-21 DIAGNOSIS — E29.1 HYPOGONADISM MALE: ICD-10-CM

## 2024-06-21 DIAGNOSIS — E03.8 SUBCLINICAL HYPOTHYROIDISM: ICD-10-CM

## 2024-06-21 DIAGNOSIS — H10.33 ACUTE BACTERIAL CONJUNCTIVITIS OF BOTH EYES: Primary | ICD-10-CM

## 2024-06-21 DIAGNOSIS — E11.9 TYPE 2 DIABETES MELLITUS WITHOUT COMPLICATION, WITHOUT LONG-TERM CURRENT USE OF INSULIN (MULTI): ICD-10-CM

## 2024-06-21 DIAGNOSIS — J43.1 PANLOBULAR EMPHYSEMA (MULTI): ICD-10-CM

## 2024-06-21 DIAGNOSIS — I10 BENIGN ESSENTIAL HYPERTENSION: ICD-10-CM

## 2024-06-21 DIAGNOSIS — E78.00 HYPERCHOLESTEROLEMIA: ICD-10-CM

## 2024-06-21 PROCEDURE — 3044F HG A1C LEVEL LT 7.0%: CPT | Performed by: INTERNAL MEDICINE

## 2024-06-21 PROCEDURE — 3048F LDL-C <100 MG/DL: CPT | Performed by: INTERNAL MEDICINE

## 2024-06-21 PROCEDURE — 3079F DIAST BP 80-89 MM HG: CPT | Performed by: INTERNAL MEDICINE

## 2024-06-21 PROCEDURE — 1159F MED LIST DOCD IN RCRD: CPT | Performed by: INTERNAL MEDICINE

## 2024-06-21 PROCEDURE — 4010F ACE/ARB THERAPY RXD/TAKEN: CPT | Performed by: INTERNAL MEDICINE

## 2024-06-21 PROCEDURE — 99215 OFFICE O/P EST HI 40 MIN: CPT | Performed by: INTERNAL MEDICINE

## 2024-06-21 PROCEDURE — 3074F SYST BP LT 130 MM HG: CPT | Performed by: INTERNAL MEDICINE

## 2024-06-21 PROCEDURE — 1036F TOBACCO NON-USER: CPT | Performed by: INTERNAL MEDICINE

## 2024-06-21 RX ORDER — LISINOPRIL 10 MG/1
10 TABLET ORAL DAILY
Qty: 90 TABLET | Refills: 2 | Status: SHIPPED | OUTPATIENT
Start: 2024-06-21

## 2024-06-21 RX ORDER — TOBRAMYCIN AND DEXAMETHASONE 3; 1 MG/ML; MG/ML
1 SUSPENSION/ DROPS OPHTHALMIC
Qty: 10 ML | Refills: 2 | Status: SHIPPED | OUTPATIENT
Start: 2024-06-21

## 2024-06-21 ASSESSMENT — PATIENT HEALTH QUESTIONNAIRE - PHQ9
5. POOR APPETITE OR OVEREATING: NOT AT ALL
SUM OF ALL RESPONSES TO PHQ9 QUESTIONS 1 AND 2: 3
10. IF YOU CHECKED OFF ANY PROBLEMS, HOW DIFFICULT HAVE THESE PROBLEMS MADE IT FOR YOU TO DO YOUR WORK, TAKE CARE OF THINGS AT HOME, OR GET ALONG WITH OTHER PEOPLE: SOMEWHAT DIFFICULT
7. TROUBLE CONCENTRATING ON THINGS, SUCH AS READING THE NEWSPAPER OR WATCHING TELEVISION: NOT AT ALL
SUM OF ALL RESPONSES TO PHQ QUESTIONS 1-9: 10
9. THOUGHTS THAT YOU WOULD BE BETTER OFF DEAD, OR OF HURTING YOURSELF: NOT AT ALL
4. FEELING TIRED OR HAVING LITTLE ENERGY: NOT AT ALL
2. FEELING DOWN, DEPRESSED OR HOPELESS: NEARLY EVERY DAY
3. TROUBLE FALLING OR STAYING ASLEEP OR SLEEPING TOO MUCH: NEARLY EVERY DAY
1. LITTLE INTEREST OR PLEASURE IN DOING THINGS: NOT AT ALL
8. MOVING OR SPEAKING SO SLOWLY THAT OTHER PEOPLE COULD HAVE NOTICED. OR THE OPPOSITE, BEING SO FIGETY OR RESTLESS THAT YOU HAVE BEEN MOVING AROUND A LOT MORE THAN USUAL: SEVERAL DAYS
6. FEELING BAD ABOUT YOURSELF - OR THAT YOU ARE A FAILURE OR HAVE LET YOURSELF OR YOUR FAMILY DOWN: NEARLY EVERY DAY

## 2024-06-21 ASSESSMENT — ENCOUNTER SYMPTOMS
GASTROINTESTINAL NEGATIVE: 1
LOSS OF SENSATION IN FEET: 0
MUSCULOSKELETAL NEGATIVE: 1
OCCASIONAL FEELINGS OF UNSTEADINESS: 0
CONSTITUTIONAL NEGATIVE: 1
PSYCHIATRIC NEGATIVE: 1
CARDIOVASCULAR NEGATIVE: 1
EYE REDNESS: 1
ENDOCRINE NEGATIVE: 1
HEMATOLOGIC/LYMPHATIC NEGATIVE: 1
RESPIRATORY NEGATIVE: 1
EYE ITCHING: 1
DEPRESSION: 1
NEUROLOGICAL NEGATIVE: 1
ALLERGIC/IMMUNOLOGIC NEGATIVE: 1

## 2024-06-21 NOTE — ASSESSMENT & PLAN NOTE
COPD - no wheezing, no SOB, no Crackles heard/ Exacerbation.         Get CXR.  Continues mild exercises and inhalers.  [place] preventive care and vaccinations.                                       Add advair inhalers and spiriva inhaler.

## 2024-06-21 NOTE — PROGRESS NOTES
"Subjective   Patient ID: Truong Armijo is a 73 y.o. male who presents for Follow-up (Eye problem last 5 weeks).    HPI     Review of Systems   Constitutional: Negative.    HENT: Negative.     Eyes:  Positive for redness and itching.   Respiratory: Negative.     Cardiovascular: Negative.    Gastrointestinal: Negative.    Endocrine: Negative.    Musculoskeletal: Negative.    Skin: Negative.    Allergic/Immunologic: Negative.    Neurological: Negative.    Hematological: Negative.    Psychiatric/Behavioral: Negative.     All other systems reviewed and are negative.      Objective   Pulse 76   Resp 18   Ht 1.88 m (6' 2\")   Wt 94.3 kg (208 lb)   SpO2 98%   BMI 26.71 kg/m²   Blood pressure 120/85, pulse 76, resp. rate 18, height 1.88 m (6' 2\"), weight 94.3 kg (208 lb), SpO2 98%.   Physical Exam  Vitals and nursing note reviewed.   Constitutional:       Appearance: Normal appearance.   HENT:      Head: Normocephalic and atraumatic.      Right Ear: Tympanic membrane, ear canal and external ear normal.      Left Ear: Tympanic membrane, ear canal and external ear normal. There is no impacted cerumen.      Nose: Nose normal.      Mouth/Throat:      Mouth: Mucous membranes are moist.      Pharynx: Oropharynx is clear.   Eyes:      General:         Right eye: Discharge present.         Left eye: Discharge present.     Extraocular Movements: Extraocular movements intact.      Conjunctiva/sclera: Conjunctivae normal.      Pupils: Pupils are equal, round, and reactive to light.   Cardiovascular:      Rate and Rhythm: Normal rate and regular rhythm.      Pulses: Normal pulses.      Heart sounds: Normal heart sounds. No murmur heard.  Pulmonary:      Effort: Pulmonary effort is normal. No respiratory distress.      Breath sounds: Normal breath sounds. No stridor. No wheezing, rhonchi or rales.   Chest:      Chest wall: No tenderness.   Abdominal:      General: Abdomen is flat. Bowel sounds are normal. There is no distension.    "   Palpations: Abdomen is soft. There is no mass.      Tenderness: There is no abdominal tenderness. There is no right CVA tenderness, left CVA tenderness, guarding or rebound.      Hernia: No hernia is present.   Musculoskeletal:         General: Normal range of motion.      Cervical back: Normal range of motion and neck supple.   Skin:     General: Skin is warm.      Capillary Refill: Capillary refill takes less than 2 seconds.   Neurological:      General: No focal deficit present.      Mental Status: He is alert.      Cranial Nerves: No cranial nerve deficit.      Sensory: No sensory deficit.      Motor: No weakness.      Coordination: Coordination normal.      Gait: Gait normal.      Deep Tendon Reflexes: Reflexes normal.   Psychiatric:         Mood and Affect: Mood normal.         Behavior: Behavior normal. Behavior is cooperative.         Thought Content: Thought content normal.         Judgment: Judgment normal.         Assessment/Plan   Problem List Items Addressed This Visit             ICD-10-CM    Benign essential hypertension I10     HTN - hypertension well/controlled .Target BP < 130/80  achieved. Educate low salt diet and exercise with weight loss. Educate home self monitoring and diary keeping. Educate risks of elevate blood pressure and benefits of prompt treatment.  Refill Lisinopril            COPD (chronic obstructive pulmonary disease) (Multi) J44.9     COPD - no wheezing, no SOB, no Crackles heard/ Exacerbation.         Get CXR.  Continues mild exercises and inhalers.  [place] preventive care and vaccinations.                                       Add advair inhalers and spiriva inhaler.           Hypercholesterolemia E78.00     Hypercholesterolemia - Monitor lipid profile and educate patient upon risks of high cholesterol and targets. Educate diet and change in lifestyle and increase in exercises - Refill: Atorvastatin  40 mg daily and educate compliance with medication and diet.           Diabetes mellitus (Multi) E11.9     DM - NIDDM  . Reviewed with patient / Will check  HbA1c and fasting blood sugars. Educate home self monitoring and diary keeping(reviewed with patient home blood sugar levels /diary). Educate extensively low calorie diet and weight loss with exercise. Reviewed BS- diary and Rx. Regimen. [place] renal protection with ARB/ACEI.               Educate compliance with diet and Rx. And educate risks and autcomes.    Continuesandrefill the Metformin 500 mg BID with meals  Januvia 100 mg daily and Farxiga 5 mg daily                         Primary hypothyroidism E03.9     Hypothyroidism - Symptoms well/controlled (weight gain, fatigue, constipation, cold intolerance). Check TSH continues dose of Synthroid/Levothyroxine  of  88 bmcg/qD.          Relevant Orders    Referral to Endocrinology    Schizoaffective disorder (Multi) F25.9     Refer to and follows with psych          Hypogonadism male E29.1     Check levels and refill supplement of Testosterone          Essential (primary) hypertension I10     HTN - hypertension well/controlled .Target BP < 130/80  achieved. Educate low salt diet and exercise with weight loss. Educate home self monitoring and diary keeping. Educate risks of elevate blood pressure and benefits of prompt treatment.  Refill lisinopril          Relevant Medications    lisinopril 10 mg tablet    Acute bacterial conjunctivitis of both eyes - Primary H10.33     Start ophthalmic solution Tobradex and follows with ophthalmology          Relevant Medications    tobramycin-dexamethasone (Tobradex) ophthalmic suspension     Other Visit Diagnoses         Codes    Subclinical hypothyroidism     E03.8    Relevant Orders    Referral to Endocrinology          Total face to face time was 45 minutes , with more than 50% spent counseling the patient regarding diagnosis of Hypertension with aggressive treatment of hypercholesterolemia. Educate extensively diet and lifestyle changes  and increase in physical activity and weight loss. Also educate the patient the risks of  untreated hypertension or untreated hypercholesterolemia  - educate risks of developing coronary artery disease , stroke or renal failure -   Benign essential hypertension I10        HTN - hypertension well/controlled .Target BP < 130/80  achieved. Educate low salt diet and exercise with weight loss. Educate home self monitoring and diary keeping. Educate risks of elevate blood pressure and benefits of prompt treatment.  Refill Lisinopril              Hypercholesterolemia E78.00       Hypercholesterolemia - Monitor lipid profile and educate patient upon risks of high cholesterol and targets. Educate diet and change in lifestyle and increase in exercises - Refill: Atorvastatin  40 mg daily and educate compliance with medication and diet.            Relevant Orders     Lipid Panel     Enlarged prostate without lower urinary tract symptoms (luts) N40.0       BPH - Benign PROSTATIC Hypertrophy, + Dysuria/Nocturia, check PSA, prescribe Flomax 0.4mg qD and Avodart 0.5 mg qD vs. Finasteride 5 mg qD.  Educate exercises and Change in life style, prescribe vitamin E 400 IU qD. Consider referral to urology.            Fatigue R53.83       Fatigue - check CMP(metabolic panel and elctrolytes) , CBC(complete blood cell count), TSH(thyroid function). Insomnia may play a role and sleep studies(rule out sleep apnea) are recommended . Educate sleep hygiene. Consider anxiety disorder vs. depression. Consider Stress test, and 2DECHO.             Generalized weakness R53.1       Weaknesz  - refer to PT ans strengthening exERCIESES           Diabetes mellitus (Multi) - Primary E11.9       DM - NIDDM  . Reviewed with patient / Will check  HbA1c and fasting blood sugars. Educate home self monitoring and diary keeping(reviewed with patient home blood sugar levels /diary). Educate extensively low calorie diet and weight loss with exercise. Reviewed BS-  diary and Rx. Regimen. [place] renal protection with ARB/ACEI.               Educate compliance with diet and Rx. And educate risks and autcomes.    Continuesandrefill the Metformin 500 mg BID with meals  Januvia 100 mg daily and Farxiga 5 mg daily                                              Hypothyroidism, unspecified E03.9       Hypothyroidism - Symptoms well/not controlled (weight gain, fatigue, constipation, cold intolerance). Check TSH continue/change dose of Synthroid/Levothyroxine  of 100 mcg/qD.            Relevant Orders     TSH with reflex to Free T4 if abnormal     Thyroxine, Free     Insomnia G47.00     Relevant Medications     traZODone (Desyrel) 50 mg tablet     Lumbar disc herniation M51.26       DDD - Degenerative disc disease of the Lumbo-Sacral (LS)/ spine. Educate exercises and referred patient to Physical Therapy (PT). Ordered X-Ray's of the LS spine. Consider MRI. Radiculopathy in the distribution of L4-L5-S1/nerve roots, needs NSAIDS (Naprosin 500mg BID vs. Arthrotec 75mg BID or Prednisone taper (Medrol dose pack), Flexeril 10 mg qHS, heat application, and pain control with Tylenol 325 mg TI            Primary hypothyroidism E03.9     Radiculopathy, lumbar region M54.16       DDD - Degenerative disc disease of the Lumbo-Sacral (LS) spine. Educate exercises and referred patient to Physical Therapy (PT). Ordered X-Ray's of the LS spine. Consider MRI. Radiculopathy in the distribution of L4-L5-S1/C nerve roots, needs NSAIDS (Naprosin 500mg BID vs. Arthrotec 75mg BID or Prednisone taper (Medrol dose pack), Flexeril 10 mg qHS, heat application, and pain control with Tylenol            Total face to face time was 45 minutes , with more than 50% spent counseling the patient regarding diagnosis of Hypertension with aggressive treatment of hypercholesterolemia. Educate extensively diet and lifestyle changes and increase in physical activity and weight loss. Also educate the patient the risks of   untreated hypertension or untreated hypercholesterolemia  - educate risks of developing coronary artery disease , stroke or renal failure -               Immunizations/Injections      very important  Immunizations from outside sources need reconciliation.      Flu vaccine, quadrivalent, high-dose, preservative free, age 65y+ (FLUZONE)10/18/2023, 10/17/2022, 10/8/2020  Influenza, High Dose Seasonal, Preservative Free10/14/2019, 10/17/2018, 10/4/2017  Influenza, Seasonal, Quadrivalent, Fundgamopf82/27/2021  Influenza, seasonal, czwxtzsvvs11/1/2021, 9/23/2011, 10/8/2009,  ... (1 more)  Influenza, seasonal, injectable, preservative free10/5/2015, 10/1/2015  Moderna SARS-CoV-2 Vaccination3/27/2021, 2/28/2021  Pfizer COVID-19 vaccine, Fall 2023, 12 years and older, (30mcg/0.3mL)1/23/2024  Pfizer COVID-19 vaccine, bivalent, age 12 years and older (30 mcg/0.3 mL)9/15/2022  Pfizer Purple Cap SARS-CoV-211/3/2021  Pneumococcal conjugate vaccine, 13-valent (PREVNAR 13)9/15/2014  Pneumococcal conjugate vaccine, 20-valent (PREVNAR 20)5/5/2023  Pneumococcal polysaccharide vaccine, 23-valent, age 2 years and older (PNEUMOVAX 23)1/1/2014  Tdap vaccine, age 7 year and older (BOOSTRIX, ADACEL)6/5/2023, 11/26/2017  Zoster vaccine, recombinant, adult (SHINGRIX)8/8/2023, 6/5/2023, 12/13/2020,  ... (1 more)  Zoster, live10/25/2017, 5/22/2014

## 2024-06-21 NOTE — ASSESSMENT & PLAN NOTE
DM - NIDDM  . Reviewed with patient / Will check  HbA1c and fasting blood sugars. Educate home self monitoring and diary keeping(reviewed with patient home blood sugar levels /diary). Educate extensively low calorie diet and weight loss with exercise. Reviewed BS- diary and Rx. Regimen. [place] renal protection with ARB/ACEI.               Educate compliance with diet and Rx. And educate risks and autcomes.    Continuesandrefill the Metformin 500 mg BID with meals  Januvia 100 mg daily and Farxiga 5 mg daily

## 2024-06-24 ENCOUNTER — APPOINTMENT (OUTPATIENT)
Dept: PRIMARY CARE | Facility: CLINIC | Age: 74
End: 2024-06-24
Payer: COMMERCIAL

## 2024-06-25 ENCOUNTER — APPOINTMENT (OUTPATIENT)
Dept: OPHTHALMOLOGY | Facility: CLINIC | Age: 74
End: 2024-06-25
Payer: COMMERCIAL

## 2024-06-25 DIAGNOSIS — Z96.1 PSEUDOPHAKIA: ICD-10-CM

## 2024-06-25 DIAGNOSIS — H04.123 DRY EYES, BILATERAL: Primary | ICD-10-CM

## 2024-06-25 PROCEDURE — 99214 OFFICE O/P EST MOD 30 MIN: CPT | Performed by: OPHTHALMOLOGY

## 2024-06-25 RX ORDER — CYCLOSPORINE 0.5 MG/ML
1 EMULSION OPHTHALMIC 2 TIMES DAILY
Qty: 60 ML | Refills: 11 | Status: SHIPPED | OUTPATIENT
Start: 2024-06-25

## 2024-06-25 RX ORDER — BRIMONIDINE TARTRATE 1.5 MG/ML
1 SOLUTION/ DROPS OPHTHALMIC DAILY
Qty: 5 ML | Refills: 6 | Status: SHIPPED | OUTPATIENT
Start: 2024-06-25

## 2024-06-25 RX ORDER — LATANOPROST 50 UG/ML
1 SOLUTION/ DROPS OPHTHALMIC NIGHTLY
Qty: 2.5 ML | Refills: 11 | Status: SHIPPED | OUTPATIENT
Start: 2024-06-25 | End: 2025-06-25

## 2024-06-25 RX ORDER — TIMOLOL MALEATE 5 MG/ML
1 SOLUTION/ DROPS OPHTHALMIC 2 TIMES DAILY
Qty: 10 ML | Refills: 6 | Status: SHIPPED | OUTPATIENT
Start: 2024-06-25 | End: 2024-09-23

## 2024-06-25 ASSESSMENT — VISUAL ACUITY
OD_CC: 20/25
OS_CC: 20/30-2
METHOD: SNELLEN - LINEAR

## 2024-06-25 ASSESSMENT — PACHYMETRY
OD_CT(UM): 623
OS_CT(UM): 590

## 2024-06-25 NOTE — PROGRESS NOTES
1-KITTY has recent flux in thyroid tsh and t3. Stable iop  Meds : restasis bid ou + np tearsou  Refilled oag meds  *if still red may need to reduce perservatives in meds      Rto 2-3 weeks prn

## 2024-07-01 ENCOUNTER — APPOINTMENT (OUTPATIENT)
Dept: OPHTHALMOLOGY | Facility: CLINIC | Age: 74
End: 2024-07-01
Payer: COMMERCIAL

## 2024-07-01 DIAGNOSIS — H52.4 MYOPIA OF BOTH EYES WITH ASTIGMATISM AND PRESBYOPIA: ICD-10-CM

## 2024-07-01 DIAGNOSIS — H52.4 PRESBYOPIA OF BOTH EYES: ICD-10-CM

## 2024-07-01 DIAGNOSIS — H04.123 DRY EYES, BILATERAL: Primary | ICD-10-CM

## 2024-07-01 DIAGNOSIS — H40.1132 PRIMARY OPEN ANGLE GLAUCOMA (POAG) OF BOTH EYES, MODERATE STAGE: ICD-10-CM

## 2024-07-01 DIAGNOSIS — H35.372 EPIRETINAL MEMBRANE (ERM) OF LEFT EYE: ICD-10-CM

## 2024-07-01 DIAGNOSIS — H52.13 MYOPIA OF BOTH EYES WITH ASTIGMATISM AND PRESBYOPIA: ICD-10-CM

## 2024-07-01 DIAGNOSIS — H52.203 MYOPIA OF BOTH EYES WITH ASTIGMATISM AND PRESBYOPIA: ICD-10-CM

## 2024-07-01 PROCEDURE — 99070(U24) BRUDER EYE MASK: Performed by: OPTOMETRIST

## 2024-07-01 PROCEDURE — 92015 DETERMINE REFRACTIVE STATE: CPT | Performed by: OPTOMETRIST

## 2024-07-01 PROCEDURE — 92012 INTRM OPH EXAM EST PATIENT: CPT | Performed by: OPTOMETRIST

## 2024-07-01 ASSESSMENT — VISUAL ACUITY
METHOD: SNELLEN - LINEAR
OS_CC+: -2
OD_CC+: -2
OD_CC: 20/25
METHOD: SNELLEN - LINEAR
CORRECTION_TYPE: GLASSES
OS_CC: 20/40
OS_CC: 20/30
CORRECTION_TYPE: GLASSES
OD_CC: 20/25
OS_CC+: -2

## 2024-07-01 ASSESSMENT — CONF VISUAL FIELD
OD_INFERIOR_TEMPORAL_RESTRICTION: 0
OS_SUPERIOR_NASAL_RESTRICTION: 0
OS_INFERIOR_NASAL_RESTRICTION: 0
OD_SUPERIOR_NASAL_RESTRICTION: 3
OS_INFERIOR_TEMPORAL_RESTRICTION: 0
OD_SUPERIOR_TEMPORAL_RESTRICTION: 0
OD_INFERIOR_NASAL_RESTRICTION: 0
METHOD: COUNTING FINGERS
OS_NORMAL: 1
OS_SUPERIOR_TEMPORAL_RESTRICTION: 0

## 2024-07-01 ASSESSMENT — REFRACTION_MANIFEST
OD_CYLINDER: -1.00
OD_SPHERE: -2.00
OS_ADD: +2.75
OD_ADD: +2.75
OS_CYLINDER: -0.75
OS_SPHERE: -2.50
OS_AXIS: 005
OD_AXIS: 090

## 2024-07-01 ASSESSMENT — ENCOUNTER SYMPTOMS
RESPIRATORY NEGATIVE: 0
ALLERGIC/IMMUNOLOGIC NEGATIVE: 0
CONSTITUTIONAL NEGATIVE: 0
GASTROINTESTINAL NEGATIVE: 0
EYES NEGATIVE: 0
CARDIOVASCULAR NEGATIVE: 0
ENDOCRINE NEGATIVE: 1
MUSCULOSKELETAL NEGATIVE: 0
NEUROLOGICAL NEGATIVE: 0
PSYCHIATRIC NEGATIVE: 0
HEMATOLOGIC/LYMPHATIC NEGATIVE: 0

## 2024-07-01 ASSESSMENT — REFRACTION_WEARINGRX
OS_ADD: +3.00
OS_CYLINDER: -0.75
OS_CYLINDER: -0.75
OD_SPHERE: -2.75
OD_CYLINDER: SPHERE
OS_AXIS: 115
OD_SPHERE: -2.75
OD_CYLINDER: -0.50
OS_SPHERE: -2.50
OS_AXIS: 015
OD_AXIS: 080
OS_SPHERE: -2.75
OD_ADD: +3.00

## 2024-07-01 ASSESSMENT — TONOMETRY
OD_IOP_MMHG: 17
OS_IOP_MMHG: 17
IOP_METHOD: GOLDMANN APPLANATION

## 2024-07-01 ASSESSMENT — CUP TO DISC RATIO
OD_RATIO: 0.7
OS_RATIO: 0.7

## 2024-07-01 ASSESSMENT — EXTERNAL EXAM - LEFT EYE: OS_EXAM: NORMAL

## 2024-07-01 ASSESSMENT — PACHYMETRY
OD_CT(UM): 623
OS_CT(UM): 590

## 2024-07-01 ASSESSMENT — EXTERNAL EXAM - RIGHT EYE: OD_EXAM: NORMAL

## 2024-07-01 NOTE — PROGRESS NOTES
Dry eyes and using Restasis and PFATs iVizia.     PCP Dr Ahuja and had discharge a few weeks ago, concerned for acute conjunctivitis Rx'd tobra-dex QID, Dr Adamson reduced to every day.  VA is blurry. VA corrects to 20/25 OD and 20/30 OS. PCIOL OU.   Brimonidine use for glaucoma. IOP is good.     Testing to evaluate the presence of dry eye disease (DED) was performed today and provided the following results:  Tear break up time (TBUT), a measure of tear stability (0-5 = severe, 6-10 = moderate, 11-15 = mild)  OD:  7 seconds  OS:  3 seconds    Corneal punctate epithelial erosions (PEE) staining with sodium fluorescein score (5 zones, each PEE level 0-3, maximum score = 15)  OD: NIH PEE score: 0  Central PEE absent  OS: NIH PEE score: 0  Central PEE absent    +2 Saponification of tears OU    Blepharitis: [place] gland disease (Effron scale 0-4, 0:no abnormality, 4: thick creamy yellow expression at all gland orifices, expression continuous, conjunctival redness). Collarettes (0-4, 0: 0-2 lashes with collarettes, 1: 3-10, 2: >10 but <1/3rd of lashes, 3: >1/3rd to <2/3rd of lashes, 4: >2/3rd of lashes.  Right eye (OD): stage 3 collarettes: stage 2  Left eye (OS): stage 3 collarettes: stage 2    Start Brudermask BID 5-10 minutes. Sterilid BID let soak into eyelid. PFATs PRN. Restasis BID. Brimonidine BID. Tobra-dex every day.

## 2024-07-02 ENCOUNTER — APPOINTMENT (OUTPATIENT)
Dept: PHYSICAL THERAPY | Facility: CLINIC | Age: 74
End: 2024-07-02
Payer: COMMERCIAL

## 2024-07-02 DIAGNOSIS — F22 PARANOIA (PSYCHOSIS) (MULTI): ICD-10-CM

## 2024-07-02 DIAGNOSIS — I10 BENIGN ESSENTIAL HYPERTENSION: ICD-10-CM

## 2024-07-02 RX ORDER — OMEGA-3-ACID ETHYL ESTERS 1 G/1
4 CAPSULE, LIQUID FILLED ORAL
Qty: 360 CAPSULE | Refills: 0 | Status: SHIPPED | OUTPATIENT
Start: 2024-07-02

## 2024-07-02 RX ORDER — RISPERIDONE 1 MG/1
1 TABLET ORAL 2 TIMES DAILY
Qty: 180 TABLET | Refills: 1 | Status: SHIPPED | OUTPATIENT
Start: 2024-07-02 | End: 2024-12-29

## 2024-07-13 ENCOUNTER — APPOINTMENT (OUTPATIENT)
Dept: OPHTHALMOLOGY | Facility: CLINIC | Age: 74
End: 2024-07-13
Payer: COMMERCIAL

## 2024-07-13 DIAGNOSIS — H40.1132 PRIMARY OPEN ANGLE GLAUCOMA (POAG) OF BOTH EYES, MODERATE STAGE: Primary | ICD-10-CM

## 2024-07-13 PROCEDURE — 92083 EXTENDED VISUAL FIELD XM: CPT | Performed by: OPHTHALMOLOGY

## 2024-07-13 PROCEDURE — 99212 OFFICE O/P EST SF 10 MIN: CPT | Performed by: OPHTHALMOLOGY

## 2024-07-13 ASSESSMENT — CONF VISUAL FIELD
OS_INFERIOR_NASAL_RESTRICTION: 0
OD_SUPERIOR_NASAL_RESTRICTION: 3
OD_INFERIOR_TEMPORAL_RESTRICTION: 0
OD_INFERIOR_NASAL_RESTRICTION: 0
OS_INFERIOR_TEMPORAL_RESTRICTION: 0
OD_SUPERIOR_TEMPORAL_RESTRICTION: 0
OS_NORMAL: 1
OS_SUPERIOR_TEMPORAL_RESTRICTION: 0
METHOD: COUNTING FINGERS
OS_SUPERIOR_NASAL_RESTRICTION: 0

## 2024-07-13 ASSESSMENT — ENCOUNTER SYMPTOMS
CONSTITUTIONAL NEGATIVE: 0
EYES NEGATIVE: 0
CARDIOVASCULAR NEGATIVE: 0
ALLERGIC/IMMUNOLOGIC NEGATIVE: 0
HEMATOLOGIC/LYMPHATIC NEGATIVE: 0
MUSCULOSKELETAL NEGATIVE: 0
GASTROINTESTINAL NEGATIVE: 0
RESPIRATORY NEGATIVE: 0
NEUROLOGICAL NEGATIVE: 0
PSYCHIATRIC NEGATIVE: 0
ENDOCRINE NEGATIVE: 0

## 2024-07-13 ASSESSMENT — REFRACTION_WEARINGRX
OS_ADD: +1.50
OD_SPHERE: -0.75
OD_CYLINDER: -1.00
OS_ADD: +2.75
OS_SPHERE: -1.25
OS_AXIS: 005
OD_AXIS: 090
OD_AXIS: 090
OS_CYLINDER: -0.75
OD_CYLINDER: -1.00
OD_ADD: +2.75
OD_SPHERE: -2.00
OS_CYLINDER: -0.75
OS_SPHERE: -2.50
OD_ADD: +1.50
OS_AXIS: 005

## 2024-07-13 ASSESSMENT — CUP TO DISC RATIO
OS_RATIO: 0.7
OD_RATIO: 0.7

## 2024-07-13 ASSESSMENT — VISUAL ACUITY
METHOD: SNELLEN - LINEAR
OD_CC: 20/25
OS_CC+: -2
CORRECTION_TYPE: GLASSES
OS_CC: 20/40
OD_CC+: -2

## 2024-07-13 ASSESSMENT — TONOMETRY
OS_IOP_MMHG: 17
OD_IOP_MMHG: 17
IOP_METHOD: GOLDMANN APPLANATION

## 2024-07-13 ASSESSMENT — EXTERNAL EXAM - RIGHT EYE: OD_EXAM: NORMAL

## 2024-07-13 ASSESSMENT — PACHYMETRY
OD_CT(UM): 623
OS_CT(UM): 590

## 2024-07-13 ASSESSMENT — EXTERNAL EXAM - LEFT EYE: OS_EXAM: NORMAL

## 2024-07-14 DIAGNOSIS — E11.9 TYPE 2 DIABETES MELLITUS WITHOUT COMPLICATION, WITHOUT LONG-TERM CURRENT USE OF INSULIN (MULTI): ICD-10-CM

## 2024-07-15 RX ORDER — GLIPIZIDE 5 MG/1
10 TABLET ORAL
Qty: 360 TABLET | Refills: 0 | Status: SHIPPED | OUTPATIENT
Start: 2024-07-15

## 2024-07-16 ENCOUNTER — TELEMEDICINE (OUTPATIENT)
Dept: BEHAVIORAL HEALTH | Facility: CLINIC | Age: 74
End: 2024-07-16
Payer: COMMERCIAL

## 2024-07-16 DIAGNOSIS — F25.1 SCHIZOAFFECTIVE DISORDER, DEPRESSIVE TYPE (MULTI): ICD-10-CM

## 2024-07-16 PROCEDURE — 1159F MED LIST DOCD IN RCRD: CPT | Performed by: PSYCHIATRY & NEUROLOGY

## 2024-07-16 PROCEDURE — 4010F ACE/ARB THERAPY RXD/TAKEN: CPT | Performed by: PSYCHIATRY & NEUROLOGY

## 2024-07-16 PROCEDURE — 3048F LDL-C <100 MG/DL: CPT | Performed by: PSYCHIATRY & NEUROLOGY

## 2024-07-16 PROCEDURE — 3044F HG A1C LEVEL LT 7.0%: CPT | Performed by: PSYCHIATRY & NEUROLOGY

## 2024-07-16 PROCEDURE — 99214 OFFICE O/P EST MOD 30 MIN: CPT | Performed by: PSYCHIATRY & NEUROLOGY

## 2024-07-16 PROCEDURE — 1160F RVW MEDS BY RX/DR IN RCRD: CPT | Performed by: PSYCHIATRY & NEUROLOGY

## 2024-07-16 ASSESSMENT — ENCOUNTER SYMPTOMS
DEPRESSED MOOD: 1
DYSPHORIC MOOD: 1
NERVOUS/ANXIOUS: 1

## 2024-07-16 NOTE — PROGRESS NOTES
Subjective   Patient ID: Truong Armijo is a 73 y.o. male who presents for No chief complaint on file. I have had anxiety and brain fog today.    The patient engaged in a telehealth session via Epic audio visual or phone with this provider practicing within the [facility]. The identity of the patient was verified by their date of birth and last four digits of their social security number. The provider demonstrated that confidentially was preserved at their location. The patient was informed that they were responsible for ensuring confidentially was secured at their location. The patient's location was documented for emergency purposes. The patient was informed of the necessary steps that would occur if an emergency was to occur or technology failed during session.     HPI: The patient reports that he had a disturbing incident at the [facility] where a malfunction on the eye test was very disturbing and concerning. Fortunately, all turned out well as there was a malfunction on the device at the [facility]. The patient is very happy with his  that he had but he will be starting with a new instructor which he thinks will work out. The guitar lessons are very positive for him. He continues to take one Thai lesson daily. His counselor has suggested that he might be able to take Thai for free at [facility]. He would like to read the Thai philosophers. He reported his A1C was down to 5.9. The patient has been having pain in his right leg which he thinks may be related to some of his exercise. The patient continues to experience financing issues but he is responsible about his spending.     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, disorganized, in remission  01/26/2024: Dysphagia  12/09/2021: Dysphagia, oropharyngeal phase      Comment:  Dysphagia, oropharyngeal phase  01/26/2024: Elbow pain  01/26/2024: Elbow sprain  01/26/2024: Hemorrhage in optic nerve sheath  01/26/2024: Impairment of balance  01/26/2024: Nausea  01/26/2024: Neurogenic bladder  12/09/2021: Noninfective gastroenteritis and colitis, unspecified      Comment:  Chronic diarrhea of unknown origin  09/26/2019: Nuclear sclerotic cataract of both eyes  12/09/2021: Other conditions influencing health status      Comment:  History of chronic diarrhea  10/17/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      Comment:  Reactive confusion  05/13/2020: Other specified anxiety disorders      Comment:  Depression with anxiety  07/02/2021: Other specified anxiety disorders      Comment:  Depression with anxiety  11/03/2017: Other specified anxiety disorders      Comment:  Depression with anxiety  12/12/2014: Otitis media, unspecified, bilateral      Comment:  Acute bilateral otitis media  10/01/2012: Peripheral vertigo  10/17/2022: Personal history of other diseases of the nervous system   and sense organs      Comment:  History of seizure disorder  12/12/2014: Personal history of other diseases of the nervous system   and sense organs      Comment:  History of acute otitis media  08/17/2015: Personal history of other diseases of the respiratory   system      Comment:  History of acute pharyngitis  12/12/2014: Personal history of other diseases of the respiratory   system      Comment:  History of acute sinusitis  10/12/2016:  Personal history of other endocrine, nutritional and   metabolic disease      Comment:  History of hypercholesterolemia  No date: Personal history of other mental and behavioral disorders      Comment:  History of psychiatric hospitalization  12/09/2021: Personal history of other specified conditions      Comment:  History of nausea  09/03/2021: Personal history of other specified conditions      Comment:  History of shortness of breath  12/09/2021: Personal history of other specified conditions      Comment:  History of dysphagia  12/09/2021: Personal history of other specified conditions      Comment:  History of dysphagia  12/09/2021: Personal history of other specified conditions      Comment:  History of nausea  07/25/2018: Personal history of other specified conditions      Comment:  History of shortness of breath  12/09/2021: Personal history of other specified conditions      Comment:  History of nausea and vomiting  03/07/2014: Personal history of other specified conditions      Comment:  History of fatigue  No date: Postconcussional syndrome      Comment:  Post-concussion syndrome  09/14/2023: Primary open angle glaucoma (POAG)  07/30/2019: Problem related to primary support group, unspecified      Comment:  Relationship problems  01/26/2024: Subjective tinnitus  01/26/2024: Urinary tract infection    MSE: The patient is alert, fully oriented, language is intact, and recent and remote memory intact. The patient denies any suicidal or homicidal ideation or plans. The patient presents with chronic dysphoria with increased anxious and depressive spells, intermittent possible catatonic episodes without manic symptoms. Thought is impaired with thought blocking. Judgment and insight are limited. The patient continues to have disappointments with his social interactions. The patient reports intermittent of episodes of inability to function or brain fog continuing.       Review of Systems   Neurological:          MSE: The patient is alert, fully oriented, language is intact, and recent and remote memory intact. The patient denies any suicidal or homicidal ideation or plans. The patient presents with chronic dysphoria with increased anxious and depressive spells, intermittent possible catatonic episodes without manic symptoms. Thought is impaired with thought blocking. Judgment and insight are limited. The patient continues to have disappointments with his social interactions. The patient reports intermittent of episodes of inability to function or brain fog.          Psychiatric/Behavioral:  Positive for behavioral problems and dysphoric mood. The patient is nervous/anxious.         MSE: The patient is alert, fully oriented, language is intact, and recent and remote memory intact. The patient denies any suicidal or homicidal ideation or plans. The patient presents with chronic dysphoria with increased anxious and depressive spells, intermittent possible catatonic episodes without manic symptoms. Thought is impaired with thought blocking. Judgment and insight are limited. The patient continues to have disappointments with his social interactions. The patient reports intermittent of episodes of inability to function or brain fog.          All other systems reviewed and are negative.      Objective   Physical Exam  Neurological:      General: No focal deficit present.      Mental Status: He is alert and oriented to person, place, and time. Mental status is at baseline.      Comments: MSE: The patient is alert, fully oriented, language is intact, and recent and remote memory intact. The patient denies any suicidal or homicidal ideation or plans. The patient presents with chronic dysphoria with increased anxious and depressive spells, intermittent possible catatonic episodes without manic symptoms. Thought is impaired with thought blocking. Judgment and insight are limited. The patient continues to have disappointments with his  social interactions. The patient reports intermittent of episodes of inability to function or brain fog.      Psychiatric:      Comments: MSE: The patient is alert, fully oriented, language is intact, and recent and remote memory intact. The patient denies any suicidal or homicidal ideation or plans. The patient presents with chronic dysphoria with increased depressive spells, catatonic episodes without manic symptoms. Thought is impaired with thought blocking. Judgment and insight are limited. The patient continues to have disappointments with his social interactions. The patient reports intermittent of episodes of inability to function.              Lab Review:     Assessment/Plan   Psychiatric Risk Assessment  Violence Risk Assessment: none  Acute Risk of Harm to Others is Considered: low   Suicide Risk Assessment: age > 65 yrs old and   Protective Factors against Suicide: adherence to  treatment, fear of suicide, moral objections to suicide, positive family relationships, and sense of responsibility toward family  Acute Risk of Harm to Self is Considered: low    Diagnosis: schizoaffective disorder depressive type.    Treatment plan:   The FDA risks, benefits & alternatives to the medications prescribed were explained to you today. You were able to understand & repeat these risks, benefits & alternatives to these prescribed medications. You have agreed to proceed with treatment with the medications discussed based on the conclusion that the benefit outweighs the risks of this treatment regimen: bupropion  mg every 12 hours, diazepam 5 mg nightly as needed, fluoxetine 40 mg daily and risperidone 2 mg daily.    Follow up monthly. You are due for a urine toxicology screen (last done on January 23 of 2024) and your annual controlled substance agreement(last signed in January 23 of 2024) in January of 2025.     Follow up monthly as you have been doing.    We have referred you to sleep medicine as  discussed for your chronic insomnia.       Psych Review of Symptoms:    ADHD: Patient denied any symptoms.         Anxiety:   Generalized Anxiety Symptoms: Difficulty controlling worry and excessive worry.       Developmental and Sensory Concerns: Patient denied any symptoms.         Depressive Symptoms:   Depressed mood, withdrawal/isolation, irritable and low self esteem.       Disruptive and Conduct Symptoms: Patient denied any symptoms.         Eating / Feeding Concerns: Patient denied any symptoms.         Elimination Symptoms: Patient denied any symptoms.         Manic Symptoms: Patient denied any symptoms.         Obsessive-Compulsive Symptoms: Patient denied any symptoms.         Psychotic Symptoms: Patient denied any symptoms.           Trauma Related Symptoms: Patient denied any symptoms.           Sleep Concerns: Patient denied any symptoms.

## 2024-07-22 ENCOUNTER — APPOINTMENT (OUTPATIENT)
Dept: PRIMARY CARE | Facility: CLINIC | Age: 74
End: 2024-07-22
Payer: COMMERCIAL

## 2024-07-22 VITALS
BODY MASS INDEX: 26.18 KG/M2 | DIASTOLIC BLOOD PRESSURE: 65 MMHG | OXYGEN SATURATION: 99 % | RESPIRATION RATE: 17 BRPM | HEART RATE: 80 BPM | SYSTOLIC BLOOD PRESSURE: 110 MMHG | HEIGHT: 74 IN | WEIGHT: 204 LBS

## 2024-07-22 DIAGNOSIS — E03.9 PRIMARY HYPOTHYROIDISM: ICD-10-CM

## 2024-07-22 DIAGNOSIS — R35.0 BENIGN PROSTATIC HYPERPLASIA WITH URINARY FREQUENCY: ICD-10-CM

## 2024-07-22 DIAGNOSIS — N40.1 BENIGN PROSTATIC HYPERPLASIA WITH URINARY FREQUENCY: ICD-10-CM

## 2024-07-22 DIAGNOSIS — G93.39 OTHER POST INFECTION AND RELATED FATIGUE SYNDROMES: ICD-10-CM

## 2024-07-22 DIAGNOSIS — M25.562 ACUTE PAIN OF BOTH KNEES: ICD-10-CM

## 2024-07-22 DIAGNOSIS — E78.00 HYPERCHOLESTEROLEMIA: ICD-10-CM

## 2024-07-22 DIAGNOSIS — E04.9 GOITER: ICD-10-CM

## 2024-07-22 DIAGNOSIS — I10 ESSENTIAL (PRIMARY) HYPERTENSION: ICD-10-CM

## 2024-07-22 DIAGNOSIS — M25.561 ACUTE PAIN OF BOTH KNEES: ICD-10-CM

## 2024-07-22 DIAGNOSIS — I10 BENIGN ESSENTIAL HYPERTENSION: ICD-10-CM

## 2024-07-22 DIAGNOSIS — M25.562 ACUTE PAIN OF LEFT KNEE: ICD-10-CM

## 2024-07-22 DIAGNOSIS — E11.9 TYPE 2 DIABETES MELLITUS WITHOUT COMPLICATION, WITHOUT LONG-TERM CURRENT USE OF INSULIN (MULTI): Primary | ICD-10-CM

## 2024-07-22 DIAGNOSIS — E29.1 HYPOGONADISM MALE: ICD-10-CM

## 2024-07-22 DIAGNOSIS — K21.00 GASTROESOPHAGEAL REFLUX DISEASE WITH ESOPHAGITIS WITHOUT HEMORRHAGE: ICD-10-CM

## 2024-07-22 PROCEDURE — 3074F SYST BP LT 130 MM HG: CPT | Performed by: INTERNAL MEDICINE

## 2024-07-22 PROCEDURE — 1036F TOBACCO NON-USER: CPT | Performed by: INTERNAL MEDICINE

## 2024-07-22 PROCEDURE — 3078F DIAST BP <80 MM HG: CPT | Performed by: INTERNAL MEDICINE

## 2024-07-22 PROCEDURE — 4010F ACE/ARB THERAPY RXD/TAKEN: CPT | Performed by: INTERNAL MEDICINE

## 2024-07-22 PROCEDURE — 99214 OFFICE O/P EST MOD 30 MIN: CPT | Performed by: INTERNAL MEDICINE

## 2024-07-22 PROCEDURE — 3008F BODY MASS INDEX DOCD: CPT | Performed by: INTERNAL MEDICINE

## 2024-07-22 PROCEDURE — 3044F HG A1C LEVEL LT 7.0%: CPT | Performed by: INTERNAL MEDICINE

## 2024-07-22 PROCEDURE — 3048F LDL-C <100 MG/DL: CPT | Performed by: INTERNAL MEDICINE

## 2024-07-22 PROCEDURE — 1159F MED LIST DOCD IN RCRD: CPT | Performed by: INTERNAL MEDICINE

## 2024-07-22 ASSESSMENT — ENCOUNTER SYMPTOMS
RESPIRATORY NEGATIVE: 1
GASTROINTESTINAL NEGATIVE: 1
CONSTITUTIONAL NEGATIVE: 1
CARDIOVASCULAR NEGATIVE: 1
EYES NEGATIVE: 1
PSYCHIATRIC NEGATIVE: 1
NEUROLOGICAL NEGATIVE: 1
ENDOCRINE NEGATIVE: 1
MUSCULOSKELETAL NEGATIVE: 1
ALLERGIC/IMMUNOLOGIC NEGATIVE: 1
HEMATOLOGIC/LYMPHATIC NEGATIVE: 1

## 2024-07-22 NOTE — PROGRESS NOTES
"Subjective   Patient ID: Truong Armijo is a 74 y.o. male who presents for Follow-up (1 month follow up). Left leg pain upon waking up and left knee remained tender and painful and low back pain also Complains of  food intolerance and intolerant to statins and reduced the Atorvastatin   from 40 mg daily to 20 mg daily     HPI Left leg pain upon waking up and left knee remained tender and painful and low back pain also Complains of  food intolerance and intolerant to statins and reduced the Atorvastatin   from 40 mg daily to 20 mg daily       Review of Systems   Constitutional: Negative.    HENT: Negative.     Eyes: Negative.    Respiratory: Negative.     Cardiovascular: Negative.    Gastrointestinal: Negative.    Endocrine: Negative.    Musculoskeletal: Negative.    Skin: Negative.    Allergic/Immunologic: Negative.    Neurological: Negative.    Hematological: Negative.    Psychiatric/Behavioral: Negative.     All other systems reviewed and are negative.      Objective   /65   Pulse 80   Resp 17   Ht 1.88 m (6' 2\")   Wt 92.5 kg (204 lb)   SpO2 99%   BMI 26.19 kg/m²   Blood pressure 110/65, pulse 80, resp. rate 17, height 1.88 m (6' 2\"), weight 92.5 kg (204 lb), SpO2 99%.   Physical Exam  Vitals and nursing note reviewed.   Constitutional:       Appearance: Normal appearance.   HENT:      Head: Normocephalic and atraumatic.      Right Ear: Tympanic membrane, ear canal and external ear normal.      Left Ear: Tympanic membrane, ear canal and external ear normal. There is no impacted cerumen.      Nose: Nose normal.      Mouth/Throat:      Mouth: Mucous membranes are moist.      Pharynx: Oropharynx is clear.   Eyes:      Extraocular Movements: Extraocular movements intact.      Conjunctiva/sclera: Conjunctivae normal.      Pupils: Pupils are equal, round, and reactive to light.   Cardiovascular:      Rate and Rhythm: Normal rate and regular rhythm.      Pulses: Normal pulses.      Heart sounds: Normal heart " sounds. No murmur heard.  Pulmonary:      Effort: Pulmonary effort is normal. No respiratory distress.      Breath sounds: Normal breath sounds. No stridor. No wheezing, rhonchi or rales.   Chest:      Chest wall: No tenderness.   Abdominal:      General: Abdomen is flat. Bowel sounds are normal. There is no distension.      Palpations: Abdomen is soft. There is no mass.      Tenderness: There is no abdominal tenderness. There is no right CVA tenderness, left CVA tenderness, guarding or rebound.      Hernia: No hernia is present.   Musculoskeletal:         General: Normal range of motion.      Cervical back: Normal range of motion and neck supple.   Skin:     General: Skin is warm.      Capillary Refill: Capillary refill takes less than 2 seconds.   Neurological:      General: No focal deficit present.      Mental Status: He is alert.      Cranial Nerves: No cranial nerve deficit.      Sensory: No sensory deficit.      Motor: No weakness.      Coordination: Coordination normal.      Gait: Gait normal.      Deep Tendon Reflexes: Reflexes normal.   Psychiatric:         Mood and Affect: Mood normal.         Behavior: Behavior normal. Behavior is cooperative.         Thought Content: Thought content normal.         Judgment: Judgment normal.         Assessment/Plan   Problem List Items Addressed This Visit             ICD-10-CM    Acute pain of left knee M25.562     DJD - Degenerative Joint Disease, Chronic Arthritis, of the Left more than the right  Knee. . Pain control, and anti-inflammatory meds : consider Kenalog injection and start Voltaren gel 1% topically BID,  and  Tylenol 325 mg TID PRN. Educate exercises and referred the patient to PT (Physical Therapy). Get X-Ray's.             Benign essential hypertension I10     HTN - hypertension well/controlled .Target BP < 130/80  achieved. Educate low salt diet and exercise with weight loss. Educate home self monitoring and diary keeping. Educate risks of elevate blood  pressure and benefits of prompt treatment.  Refill Lisinopril           Relevant Orders    Comprehensive Metabolic Panel    Hypercholesterolemia E78.00     Hypercholesterolemia - Monitor lipid profile and educate patient upon risks of high cholesterol and targets. Educate diet and change in lifestyle and increase in exercises - Refill: Atorvastatin  20 mg daily and educate compliance with medication and diet.          Relevant Orders    Lipid Panel    Fatigue R53.83     Fatigue - check CMP(metabolic panel and elctrolytes) , CBC(complete blood cell count), TSH(thyroid function). Insomnia may play a role and sleep studies(rule out sleep apnea) are recommended . Educate sleep hygiene. Consider anxiety disorder vs. depression. Consider Stress test, and 2DECHO.           Diabetes mellitus (Multi) - Primary E11.9     DM - NIDDM  . Reviewed with patient / Will check  HbA1c and fasting blood sugars. Educate home self monitoring and diary keeping(reviewed with patient home blood sugar levels /diary). Educate extensively low calorie diet and weight loss with exercise. Reviewed BS- diary and Rx. Regimen. [place] renal protection with ARB/ACEI.               Educate compliance with diet and Rx. And educate risks and autcomes.    Continuesandrefill the Metformin 500 mg BID with meals  Januvia 100 mg daily and Farxiga 5 mg daily                      Relevant Orders    Hemoglobin A1C    Primary hypothyroidism E03.9     Hypothyroidism - Symptoms well/controlled (weight gain, fatigue, constipation, cold intolerance). Check TSH continues dose of Synthroid/Levothyroxine  of  88 bmcg/qD.          Relevant Orders    CBC and Auto Differential    TSH with reflex to Free T4 if abnormal    Reflux esophagitis K21.00     GERD - Acid reflux disease. Rx. PPI (Prilosec/Prevacid/Protonix/Nexium) and educate diet and life style changes. Referred patient to an endoscopy (EGD) and check H. Pylori titers.          Hypogonadism male E29.1     Check  levels and refill supplement of Testosterone          Relevant Orders    Prostate Specific Antigen    Essential (primary) hypertension I10     HTN - hypertension well/controlled .Target BP < 130/80  achieved. Educate low salt diet and exercise with weight loss. Educate home self monitoring and diary keeping. Educate risks of elevate blood pressure and benefits of prompt treatment.  Refill lisinopril           Other Visit Diagnoses         Codes    Goiter     E04.9    Relevant Orders    US thyroid    Acute pain of both knees     M25.561, M25.562    Relevant Orders    XR knee 1-2 views bilateral    Benign prostatic hyperplasia with urinary frequency     N40.1, R35.0    Relevant Orders    Prostate Specific Antigen

## 2024-07-22 NOTE — ASSESSMENT & PLAN NOTE
DJD - Degenerative Joint Disease, Chronic Arthritis, of the Left more than the right  Knee. . Pain control, and anti-inflammatory meds : consider Kenalog injection and start Voltaren gel 1% topically BID,  and  Tylenol 325 mg TID PRN. Educate exercises and referred the patient to PT (Physical Therapy). Get X-Ray's.       0 -5.12

## 2024-07-22 NOTE — ASSESSMENT & PLAN NOTE
Hypercholesterolemia - Monitor lipid profile and educate patient upon risks of high cholesterol and targets. Educate diet and change in lifestyle and increase in exercises - Refill: Atorvastatin  20 mg daily and educate compliance with medication and diet.

## 2024-07-22 NOTE — ASSESSMENT & PLAN NOTE
DM - NIDDM  . Reviewed with patient / Will check  HbA1c and fasting blood sugars. Educate home self monitoring and diary keeping(reviewed with patient home blood sugar levels /diary). Educate extensively low calorie diet and weight loss with exercise. Reviewed BS- diary and Rx. Regimen. [place] renal protection with ARB/ACEI.               Educate compliance with diet and Rx. And educate risks and autcomes.    Continuesandrefill the Metformin 500 mg BID with meals  Januvia 100 mg daily and Farxiga 5 mg daily

## 2024-07-23 ENCOUNTER — EVALUATION (OUTPATIENT)
Dept: PHYSICAL THERAPY | Facility: CLINIC | Age: 74
End: 2024-07-23
Payer: COMMERCIAL

## 2024-07-23 DIAGNOSIS — M51.26 LUMBAR DISC HERNIATION: ICD-10-CM

## 2024-07-23 DIAGNOSIS — M25.562 ACUTE PAIN OF LEFT KNEE: Primary | ICD-10-CM

## 2024-07-23 PROCEDURE — 97110 THERAPEUTIC EXERCISES: CPT | Mod: GP

## 2024-07-23 PROCEDURE — 97161 PT EVAL LOW COMPLEX 20 MIN: CPT | Mod: GP

## 2024-07-23 NOTE — PROGRESS NOTES
Physical Therapy    Physical Therapy Evaluation and Treatment      Patient Name: Truong Armijo  MRN: [id number]  Today's Date: 7/23/2024    Time Entry:   Time Calculation  Start Time: 1035  Stop Time: 1115  Time Calculation (min): 40 min  PT Evaluation Time Entry  PT Evaluation (Low) Time Entry: 25  PT Therapeutic Procedures Time Entry  Therapeutic Exercise Time Entry: 15  Insurance: [facility]  Visit Limit: Needs Authorization  Visit: 1    Assessment:  PT Assessment  Assessment Comment: Patient presents with signs and symptoms consistent with the physician’s medical diagnosis of knee osteoarthritis and lumbar radiculopathy. He will benefit from medically necessary skilled physical therapy interventions in order to improve functional mobility with daily activities.     Plan:  OP PT Plan  Treatment/Interventions: Cryotherapy, Dry needling, Education/ Instruction, Electrical stimulation, Gait training, Hot pack, Manual therapy, Neuromuscular re-education, Therapeutic activities, Therapeutic exercises  PT Plan: Skilled PT  PT Frequency: 1 time per week  Duration: 8 weeks  Certification Period Start Date: 07/23/24  Certification Period End Date: 10/21/24  Number of Treatments Authorized: Needs Auth  Rehab Potential: Good  Plan of Care Agreement: Patient    Current Problem:   Problem List Items Addressed This Visit             ICD-10-CM    Acute pain of left knee - Primary M25.562    Relevant Orders    Follow Up In Physical Therapy    Lumbar disc herniation M51.26         Subjective    General:  General  Reason for Referral: left knee pain  Referred By: Ki Ahuja MD  Preferred Learning Style: kinesthetic, verbal, visual, written  General Comment: Patient is a 73 y/o male who was referred to outpatient physical therapy due to knee osteoarthritis and lumbar radiculopathy. He reports that the neuropathy in his feet has been constant for about 5-6 years. He also notes intermittent knee pain. He reports that he  "started working out and swimming but would get a lot of lower extremity achiness. Patient states that his knee pain flared up about 2 months ago. He also had an episode 6 weeks ago when he was standing for a long period of time playing his guitar and had a lot of pain afterwards. He had a recent onset of right lower extremity shooting pain that started when his stain medication was adjusted. The patient states he recently lost 20 lbs through the keto diet.  Precautions:  Precautions  STEADI Fall Risk Score (The score of 4 or more indicates an increased risk of falling): 0  Pain:   2-3/10  Prior Level of Function:   Independent with all ADLs    Objective   Cognition:   WNL  General Assessments:  Range of Motion Comments: Right knee ROM (in degrees): 0-0-125  Left knee ROM (in degrees): 0-0-125    Strength Comments: LE strength: 4/5 overall  Functional Assessments:  Gait Comment: Patient ambulates with decreased velocity.    Outcome Measures:  LEFS: 35/80    Treatments:  Therapeutic Exercise  Therapeutic Exercise Performed: Yes  Therapeutic Exercise Activity 1: hamstring stretch 3 x 30\"  Therapeutic Exercise Activity 2: calf stretch 3 x 30\"  Therapeutic Exercise Activity 3: quad sets 10 x 5\"  Therapeutic Exercise Activity 4: heel slides 10 x 5\"  Therapeutic Exercise Activity 5: SAQ 2 x 10 x 3\"  Therapeutic Exercise Activity 6: SLR x 10    EDUCATION:   Home exercise program once per day.     Goals:  Active       PT Problem       Patient will report compliance with his home exercise program.        Start:  07/23/24    Expected End:  10/21/24            Patient will report improvement through the LEFS to show improved activity tolerance.         Start:  07/23/24    Expected End:  10/21/24            Patient will demonstrate improvements in lower extremity strength to 5/5 to facilitate weight bearing activity.         Start:  07/23/24    Expected End:  10/21/24                      "

## 2024-07-25 ENCOUNTER — APPOINTMENT (OUTPATIENT)
Dept: RADIOLOGY | Facility: CLINIC | Age: 74
End: 2024-07-25
Payer: COMMERCIAL

## 2024-07-26 ENCOUNTER — HOSPITAL ENCOUNTER (OUTPATIENT)
Dept: RADIOLOGY | Facility: CLINIC | Age: 74
Discharge: HOME | End: 2024-07-26
Payer: COMMERCIAL

## 2024-07-26 DIAGNOSIS — M25.562 ACUTE PAIN OF BOTH KNEES: ICD-10-CM

## 2024-07-26 DIAGNOSIS — E04.9 GOITER: ICD-10-CM

## 2024-07-26 DIAGNOSIS — M25.561 ACUTE PAIN OF BOTH KNEES: ICD-10-CM

## 2024-07-26 PROCEDURE — 73562 X-RAY EXAM OF KNEE 3: CPT | Mod: 50

## 2024-07-26 PROCEDURE — 76536 US EXAM OF HEAD AND NECK: CPT

## 2024-07-29 DIAGNOSIS — Z96.1 PSEUDOPHAKIA: ICD-10-CM

## 2024-07-29 RX ORDER — TIMOLOL MALEATE 5 MG/ML
1 SOLUTION/ DROPS OPHTHALMIC 2 TIMES DAILY
Qty: 30 ML | Refills: 2 | Status: SHIPPED | OUTPATIENT
Start: 2024-07-29 | End: 2024-10-27

## 2024-07-31 NOTE — PROGRESS NOTES
"       [facility]  [facility]  [address]  [facility] [id number] [phone]     [facility]  New Visit Note      Subjective   Patient ID: Truong Armijo is a 74 y.o. male with past medical history significant for Overweight, Hypertension, diabetes, Vit D deficiency, Post-traumatic stress disorder, Chronic Obstructive Pulmonary Disease, Transient ischemic attack, and Sleep disorder breathing.    2024: The patient is here {pxarrival:07150} and was referred by Geriatric Psychiatry Josue Hernandez MD PhD  for comprehensive sleep medicine evaluation due to {SleepCC:40117}. ESS: JENNY:  , and neck circumference is  inches  today.    HPI  {OSAhx:15289}      SLEEP STUDY HISTORY: (personally reviewed raw data such as interpretation report, data sheet, hypnogram, and titration table if available and applicable)  ***    SLEEP-WAKE SCHEDULE  Bedtime: *** on weekdays, *** on weekends  Subjective sleep latency: ***  Problems falling asleep: ***  Number of awakenings: *** times per night spontaneously for unknown reasons  Falls back asleep in ***  Problems staying asleep: ***  Final wake time: *** on weekdays, *** on weekends  Out of bed time: *** on weekdays, *** on weekends  Shift work: ***  Naps: ***  Feels rested after a nap: {Yes/No:11129}  Average sleep duration (excluding naps): ***    SLEEP ENVIRONMENT  Sleep location: ***  Sleep status: {sleep status:65897}  Room is dark:  {Yes/No:38319::\"Yes\"}  Room is quiet: {Yes/No:66966::\"Yes\"}  Room is cool: {Yes/No:20625::\"Yes\"}  Bed comfort: good    SLEEP HABITS:   Activities before bedtime: ***  Activities in bed: ***  Preferred sleep position: {Sleep position:20717}    SLEEP ROS:  Night symptoms: {sleep apnea ROS at night:00169}  Morning symptoms: {sleep apnea ROS in mornin}  Daytime symptoms {sleep apnea ROS at daytime:44273}  Hypersomnia / narcolepsy " symptoms: {narcolepsy ROS:33808}  RLS symptoms: {RLS symptoms:19704}  Movements in sleep: {sleep movements:10356}  Parasomnia symptoms: {parasomnia ROS:12734}    WEIGHT: {weight:40571}    REVIEW OF SYSTEMS: All other systems have been reviewed and are negative.    PERTINENT SOCIAL HISTORY:  Occupation: employment status:44520}  Smoking: {Yes/No:34019}  ETOH: {Yes/No:26298}  Marijuana: {Yes/No:12404}  Caffeine: ***  Sleep aids: {Yes/No:54898}  Claustrophobia: {Yes/No:71180}    PERTINENT PAST SURGICAL HISTORY:  {surgical history pertinent in sleep:35885}    PERTINENT FAMILY HISTORY:  {family history in sleep:97246}    Active Problems, Allergy List, Medication List, and PMH/PSH/FH/Social Hx have been reviewed and reconciled in chart. No significant changes unless documented in the pertinent chart section. Updates made when necessary.       Objective     Physical Exam  Constitutional:alert and oriented to time, place, and person  Sinus: *** tenderness to palpation  Palate: Normal / Narrow / High-arched / *** torus palatini  Mallampati ***, *** Tongue scalloping, *** macroglossia  Lungs: Clear to auscultation bilateral, no rales  Heart: Regular rate and rhythm, no murmurs    Assessment/Plan   Truong Armijo is a 74 y.o. male who is seen to evaluate for ***possible/severe/ moderate/mild obstructive sleep apnea. The pathophysiology of sleep apnea, diagnostic testing (HST vs PSG), treatment options (PAP, oral appliance, surgery, hypoglossal nerve stimulator called Inspire), and supportive management (weight loss, positional therapy, smoking cessation, avoidance of alcohol and sedatives) were discussed with the patient in detail. Risk factors of sleep apnea as well as cardiometabolic and neurocognitive sequelae associated with untreated sleep apnea were also discussed. Lastly, patient was advised to avoid driving vehicle or operating heavy machinery when sleepy.     Truong Armijo with the following problems:     # SLEEP  DISORDERED BREATHING:  -This is likely sleep apnea based on the the history and physical examination.   -Truong EDDI Riyas not yet had a sleep study.  -Instruct patient to complete home/ in lab/ split night/ titration sleep study.  -HSAT is reasonable as patient likely has THONY based on history and exam and does not have any of the following comorbidities: CHF, neuromuscular weakness, hypoventilation, or significant COPD.  -We consider treatment as indicated when testing is complete.     # SLEEP APNEA:  -Per CMS criteria, the patient is not eligible for a pap to treat her Obstructive sleep apnea.   -Start/ Continue [] cmH20 []PAP through [facility].  -Sleep apnea and PAP therapy education were provided at length in the clinic today. Truong Armijo verbalized understanding.  -Emphasized diet, exercise, and weight loss in the clinic, as were non-supine sleep, avoiding alcohol in the late evening, and driving or operating heavy machinery when sleepy.  Truong Armijoverbalizes understanding of the above instructions and risks.    # BARIATRIC SURGERY INSTRUCTION:  -Please continuous using your CPAP to treat your sleep apnea.  -Bring your PAP and all equipment with you to surgery.  -Use your PAP with surgery and during recovery.  -Keep your head of the bed at 30* or higher.  -I advise judicious use of pain medications post operatively as pain medications can make sleep apnea worse.    -I advise close monitoring of the patient post operatively due to increased risk of complications related to sleep apnea.   -Truong Armijo may be at higher risk of postoperative respiratory complications.Truong Armijo understands the risk of post operative complications are higher for Truong Armijo is at increased but not prohibitive risk due to THONY.   -Truong MONTAGUE Zofia is optimized on PAP therapy for sleep apnea as long as Truong Armijo is compliant with PAP use per most recent download.  -Truong MONTAGUE  "Zofia verbalizes understanding of the above instructions and risks.     # CHRONIC SLEEP ONSET/ SLEEP MAINTENANCE INSOMNIA:  -likely due to poor sleep hygiene, irregular sleep schedule, depression, anxiety, untreated sleep apnea, nocturia, RLS, delayed sleep phase syndrome, and chronic pain. [Meds] may be a contributing factor as well.  -JENNY:  -Sleep hygiene discuss in the clinic.    # DEPRESSION and ANXIETY:   -Troung Flores taking [] and participating in psychotherapy.  -Denies HI/SI     # HYPERTENSION/ ATRIAL FIBRILLATION/ CAD/ CHF:  -Blood pressure was controlled today. To control her blood pressure better, instruct the patient to take anti-hypertension medication at bedtime and a water pill in the waking time.  -Denies headache, palpitation, and syncope in the clinic.  -Last Echo:  -Follows with PCP/ Cardiology     # MORBID OBESITY/OBESITY /OVERWEIGHT:  -with a BMI of []. Truong Armijo most recent Bicarbonate was   Bicarbonate   Date Value Ref Range Status   04/09/2024 27 21 - 32 mmol/L Final      -Encourage to have regular exercise to manage weight well.  -Refer to a nutritionist for weight control.  -Bariatric surgery candidate.    # NASAL CONGESTION:  -Instruct Truong Pompa use appropriate Flonase spray to ease congestion.  -Refer to Otolaryngology for underlying investigation.    # XEROSTOMIA:  -Instruct Truong Pompa purchase the Biotene gel to ease the dry mouth symptom,     # TOBACCO ABUSE:Truong EDDI Armijo is a current [] PPD smoker for [] years.  -Smoking Cessation given  -Decline Smoking Cessation     # RESTLESS LEG SYNDROME: This occurs frequently.  No results found for: \"IRON\", \"TRANSFERRIN\", \"IRONSAT\", \"TIBC\", \"FERRITIN\"  We will check a ferritin level today and start OTC iron replacement to ferritin of >75 as indicated.  Caffeine []. Eliminate  Tobacco []. Smoking cessation counseling provided.  Truong Fregoso I discussed Truong Armijocusuellen medications that could be " exacerbating Truong Armijo symptoms. Will continue [] for now.  Associated diseases [] and response [].  Pramipexole  Ropinirole  Rotigitine  Gabapentin  Pregabalin  Opioids  Benzos     # CIRCADIAN RHYTHM SLEEP-WAKE DISORDER:  -We will obtain sleep logs for two weeks to be brought to next clinic to discuss. We will consider actigraphy to compare and/or confirm sleep log findings.  Delayed Sleep Phase:   -Set wake time, light therapy in the morning.   -Sleep hygiene measures at set bedtime.  Advanced Sleep Phase:   -Set bedtime and light therapy in the evening.   -Sleep hygiene measures at set bedtime.  Irregular Sleep Phase:  -Set bedtime and wake time.   -Daytime routine including scheduled physical activity, social activity and meal times.  Non-24 Sleep-Wake Rhythm:  -Set bedtime and wake time.   -Daytime routine including scheduled physical activity, social activity and meal times.  Shift Work:  -Maximize sleep time to 7-8 hours.   -Make sure the room is dark, quiet, cool and interruptions are eliminated.   -Avoid light in the mornings, wear dark sunglasses driving home.   -Expose self to bright light or daylight upon waking.   -Avoid caffeine 8 hours prior to sleeping.   Jet Lag:  -Try to change your sleep/wake schedule two to three days prior to travel.   -Expose yourself to bright light when you want to be awake.   -Avoid bright light and electronic light when you are nearing time to sleep.   -You can take melatonin OTC as needed 1 hour prior to bedtime as needed.     # SLEEPWALKING/ SLEEP EATING/ REM BEHAVIOR DISORDER:  -Instruct Truong Armijo to make sure self and bed partner are safe.  -Instruct Truong Pompa lock bedroom doors and windows.  -Instruct Truong Pompa hide his/her car keys.  -Instruct Truong Pompa pad headboard and move furniture away from the bed.  -Instruct Truong Armijo to put pillows in between him/her with bed partner if kicking or hitting. Consider sleeping  separately.  -Instruct Truong Armijo to remove clutter and furniture from bedroom floor to avoid injury. Consider moving Truong Armijo mattress to the floor.  -Instruct Truong Armijo to advise bed partner to calmly lead you back to bed with a gentle voice. Avoid touching and grabbing.  -Instruct Truong Armijo to  find evidence of sleep eating, especially if he/she is gaining weight, consider locking Truong Armijo fridge and pantry at night.  -We will consider a trial of low dose clonazepam 0.5 mg nightly.     #IDIOPATHIC HYPERSOMNIA VS. NARCOLEPSY:  - Will order an overnight sleep study, followed by MSLT the next day  - Perform urine toxicology prior to sleep study.  - Will call patient within 3 weeks after the test. Will discuss follow up plan at that time.  - May consider checking TSH and iron studies to rule out underlying metabolic etiologies.  -Scheduled naps  -Consolidate night time sleep.     *An OARRS report was reviewed and is consistent with prescribed medications.   *Considered the risks of abuse, dependence, addiction, and diversion and [] medication is felt to be clinically appropriate based on documented diagnosis.  *A controlled substance agreement was reviewed and signed today in clinic.   *Pending scanned copy in to the chart per office staff.    RTC      All of patient's questions were answered. He verbalizes understanding and agreement with my assessment and plan.

## 2024-08-01 DIAGNOSIS — F41.9 ANXIETY DISORDER, UNSPECIFIED: ICD-10-CM

## 2024-08-01 RX ORDER — DIAZEPAM 5 MG/1
TABLET ORAL
Qty: 90 TABLET | Refills: 0 | Status: SHIPPED | OUTPATIENT
Start: 2024-08-01

## 2024-08-02 ENCOUNTER — TREATMENT (OUTPATIENT)
Dept: PHYSICAL THERAPY | Facility: CLINIC | Age: 74
End: 2024-08-02
Payer: COMMERCIAL

## 2024-08-02 DIAGNOSIS — M51.26 LUMBAR DISC HERNIATION: ICD-10-CM

## 2024-08-02 DIAGNOSIS — E11.9 TYPE 2 DIABETES MELLITUS WITHOUT COMPLICATIONS (MULTI): ICD-10-CM

## 2024-08-02 DIAGNOSIS — M25.562 ACUTE PAIN OF LEFT KNEE: Primary | ICD-10-CM

## 2024-08-02 PROCEDURE — 97110 THERAPEUTIC EXERCISES: CPT | Mod: GP,CQ | Performed by: PHYSICAL THERAPY ASSISTANT

## 2024-08-02 RX ORDER — METFORMIN HYDROCHLORIDE 500 MG/1
1000 TABLET ORAL 2 TIMES DAILY
Qty: 360 TABLET | Refills: 0 | Status: SHIPPED | OUTPATIENT
Start: 2024-08-02

## 2024-08-02 RX ORDER — SITAGLIPTIN 100 MG/1
100 TABLET, FILM COATED ORAL DAILY
Qty: 90 TABLET | Refills: 0 | Status: SHIPPED | OUTPATIENT
Start: 2024-08-02

## 2024-08-02 NOTE — PROGRESS NOTES
"Physical Therapy    Physical Therapy Treatment    Patient Name: Truong Armijo  MRN: [id number]  Today's Date: 8/2/2024    Time Entry:   Time Calculation  Start Time: 1210  Stop Time: 1250  Time Calculation (min): 40 min     PT Therapeutic Procedures Time Entry  Therapeutic Exercise Time Entry: 40                   Assessment:  Able to decrease pain levels and centralize to R thigh .   Plan:  R SGIS     Current Problem  1. Acute pain of left knee  Follow Up In Physical Therapy      2. Lumbar disc herniation          Problem List Items Addressed This Visit             ICD-10-CM    Acute pain of left knee - Primary M25.562    Lumbar disc herniation M51.26         Subjective    Difficulty with some HEP exercises d/t \"lumpy\" bed.  Pain  No LBP , 3/10 R LE --> lateral calf     Objective   CONCORDANT SIGNS:  LAQ w/ DF/PF  (+) R LE        Treatments:  Therapeutic Exercise  Therapeutic Exercise Performed: Yes  Therapeutic Exercise Activity 1: hamstring stretch 3 x 30\"  Therapeutic Exercise Activity 2: calf stretch 3 x 30\"  Therapeutic Exercise Activity 3: quad sets 10 x 5\"  Therapeutic Exercise Activity 4: heel slides 10 x 5\"  Therapeutic Exercise Activity 5: SAQ 2 x 10 x 3\"  Therapeutic Exercise Activity 6: SLR x 10  Prone lying over 2 pillow x 3 min ; decreased and centralized R LE symptoms   OP EDUCATION:       Goals:  Active       PT Problem       Patient will report compliance with his home exercise program.        Start:  07/23/24    Expected End:  10/21/24            Patient will report improvement through the LEFS to show improved activity tolerance.         Start:  07/23/24    Expected End:  10/21/24            Patient will demonstrate improvements in lower extremity strength to 5/5 to facilitate weight bearing activity.         Start:  07/23/24    Expected End:  10/21/24              "

## 2024-08-03 PROBLEM — I10 HTN (HYPERTENSION): Status: RESOLVED | Noted: 2023-09-14 | Resolved: 2024-08-03

## 2024-08-03 PROBLEM — I10 ESSENTIAL (PRIMARY) HYPERTENSION: Status: RESOLVED | Noted: 2019-09-26 | Resolved: 2024-08-03

## 2024-08-07 ENCOUNTER — APPOINTMENT (OUTPATIENT)
Dept: SLEEP MEDICINE | Facility: HOSPITAL | Age: 74
End: 2024-08-07
Payer: COMMERCIAL

## 2024-08-09 ENCOUNTER — TREATMENT (OUTPATIENT)
Dept: PHYSICAL THERAPY | Facility: CLINIC | Age: 74
End: 2024-08-09
Payer: COMMERCIAL

## 2024-08-09 ENCOUNTER — LAB (OUTPATIENT)
Dept: LAB | Facility: LAB | Age: 74
End: 2024-08-09
Payer: COMMERCIAL

## 2024-08-09 DIAGNOSIS — I10 BENIGN ESSENTIAL HYPERTENSION: ICD-10-CM

## 2024-08-09 DIAGNOSIS — E11.9 TYPE 2 DIABETES MELLITUS WITHOUT COMPLICATION, WITHOUT LONG-TERM CURRENT USE OF INSULIN (MULTI): ICD-10-CM

## 2024-08-09 DIAGNOSIS — E78.00 HYPERCHOLESTEROLEMIA: ICD-10-CM

## 2024-08-09 DIAGNOSIS — E03.9 PRIMARY HYPOTHYROIDISM: ICD-10-CM

## 2024-08-09 DIAGNOSIS — M25.562 ACUTE PAIN OF LEFT KNEE: Primary | ICD-10-CM

## 2024-08-09 DIAGNOSIS — N40.1 BENIGN PROSTATIC HYPERPLASIA WITH URINARY FREQUENCY: ICD-10-CM

## 2024-08-09 DIAGNOSIS — E29.1 HYPOGONADISM MALE: ICD-10-CM

## 2024-08-09 DIAGNOSIS — M51.26 LUMBAR DISC HERNIATION: ICD-10-CM

## 2024-08-09 DIAGNOSIS — R35.0 BENIGN PROSTATIC HYPERPLASIA WITH URINARY FREQUENCY: ICD-10-CM

## 2024-08-09 LAB
ALBUMIN SERPL BCP-MCNC: 4.6 G/DL (ref 3.4–5)
ALP SERPL-CCNC: 36 U/L (ref 33–136)
ALT SERPL W P-5'-P-CCNC: 16 U/L (ref 10–52)
ANION GAP SERPL CALC-SCNC: 15 MMOL/L (ref 10–20)
AST SERPL W P-5'-P-CCNC: 14 U/L (ref 9–39)
BASOPHILS # BLD AUTO: 0.05 X10*3/UL (ref 0–0.1)
BASOPHILS NFR BLD AUTO: 0.6 %
BILIRUB SERPL-MCNC: 0.8 MG/DL (ref 0–1.2)
BUN SERPL-MCNC: 26 MG/DL (ref 6–23)
CALCIUM SERPL-MCNC: 9.7 MG/DL (ref 8.6–10.6)
CHLORIDE SERPL-SCNC: 100 MMOL/L (ref 98–107)
CHOLEST SERPL-MCNC: 127 MG/DL (ref 0–199)
CHOLESTEROL/HDL RATIO: 3.1
CO2 SERPL-SCNC: 28 MMOL/L (ref 21–32)
CREAT SERPL-MCNC: 0.94 MG/DL (ref 0.5–1.3)
EGFRCR SERPLBLD CKD-EPI 2021: 86 ML/MIN/1.73M*2
EOSINOPHIL # BLD AUTO: 0.14 X10*3/UL (ref 0–0.4)
EOSINOPHIL NFR BLD AUTO: 1.7 %
ERYTHROCYTE [DISTWIDTH] IN BLOOD BY AUTOMATED COUNT: 13.2 % (ref 11.5–14.5)
EST. AVERAGE GLUCOSE BLD GHB EST-MCNC: 111 MG/DL
GLUCOSE SERPL-MCNC: 107 MG/DL (ref 74–99)
HBA1C MFR BLD: 5.5 %
HCT VFR BLD AUTO: 45.2 % (ref 41–52)
HDLC SERPL-MCNC: 41 MG/DL
HGB BLD-MCNC: 14.7 G/DL (ref 13.5–17.5)
IMM GRANULOCYTES # BLD AUTO: 0.02 X10*3/UL (ref 0–0.5)
IMM GRANULOCYTES NFR BLD AUTO: 0.2 % (ref 0–0.9)
LDLC SERPL CALC-MCNC: 65 MG/DL
LYMPHOCYTES # BLD AUTO: 1.9 X10*3/UL (ref 0.8–3)
LYMPHOCYTES NFR BLD AUTO: 23.1 %
MCH RBC QN AUTO: 30.9 PG (ref 26–34)
MCHC RBC AUTO-ENTMCNC: 32.5 G/DL (ref 32–36)
MCV RBC AUTO: 95 FL (ref 80–100)
MONOCYTES # BLD AUTO: 0.94 X10*3/UL (ref 0.05–0.8)
MONOCYTES NFR BLD AUTO: 11.4 %
NEUTROPHILS # BLD AUTO: 5.19 X10*3/UL (ref 1.6–5.5)
NEUTROPHILS NFR BLD AUTO: 63 %
NON HDL CHOLESTEROL: 86 MG/DL (ref 0–149)
NRBC BLD-RTO: 0 /100 WBCS (ref 0–0)
PLATELET # BLD AUTO: 253 X10*3/UL (ref 150–450)
POTASSIUM SERPL-SCNC: 4.4 MMOL/L (ref 3.5–5.3)
PROT SERPL-MCNC: 7.3 G/DL (ref 6.4–8.2)
PSA SERPL-MCNC: 3.68 NG/ML
RBC # BLD AUTO: 4.75 X10*6/UL (ref 4.5–5.9)
SODIUM SERPL-SCNC: 139 MMOL/L (ref 136–145)
TRIGL SERPL-MCNC: 105 MG/DL (ref 0–149)
TSH SERPL-ACNC: 2.43 MIU/L (ref 0.44–3.98)
VLDL: 21 MG/DL (ref 0–40)
WBC # BLD AUTO: 8.2 X10*3/UL (ref 4.4–11.3)

## 2024-08-09 PROCEDURE — 83036 HEMOGLOBIN GLYCOSYLATED A1C: CPT

## 2024-08-09 PROCEDURE — 97110 THERAPEUTIC EXERCISES: CPT | Mod: GP,CQ | Performed by: PHYSICAL THERAPY ASSISTANT

## 2024-08-09 PROCEDURE — 80053 COMPREHEN METABOLIC PANEL: CPT

## 2024-08-09 PROCEDURE — 36415 COLL VENOUS BLD VENIPUNCTURE: CPT

## 2024-08-09 PROCEDURE — 80061 LIPID PANEL: CPT

## 2024-08-09 PROCEDURE — 84153 ASSAY OF PSA TOTAL: CPT

## 2024-08-09 PROCEDURE — 84443 ASSAY THYROID STIM HORMONE: CPT

## 2024-08-09 PROCEDURE — 85025 COMPLETE CBC W/AUTO DIFF WBC: CPT

## 2024-08-09 NOTE — PROGRESS NOTES
"Physical Therapy    Physical Therapy Treatment    Patient Name: Truong Armijo  MRN: [id number]  Today's Date: 8/9/2024    Time Entry:   Time Calculation  Start Time: 1205  Stop Time: 1250  Time Calculation (min): 45 min     PT Therapeutic Procedures Time Entry  Therapeutic Exercise Time Entry: 40      Insurance : [facility]   Authorization /Certification / Visits allowed: 20  Visit 3/20               Assessment:  Prone lying over 2 pillows abolish low back and LE pain . Symptoms quickly returned with standing strength exercises and were unchanged by SIVA. Issued and reviewed HEP for prone lying and SIVA  6-8/day.   Plan:  Prone HSC, GS    Current Problem  1. Acute pain of left knee  Follow Up In Physical Therapy      2. Lumbar disc herniation          Problem List Items Addressed This Visit             ICD-10-CM    Acute pain of left knee - Primary M25.562    Lumbar disc herniation M51.26         Subjective    Difficulty with some HEP exercises d/t \"lumpy\" bed.  Pain  No LBP , 3/10 R LE --> lateral calf     Objective   CONCORDANT SIGNS:  LAQ w/ DF/PF  (+) R LE        Treatments:  Therapeutic Exercise  Therapeutic Exercise Performed: Yes  Therapeutic Exercise Activity 7: Prone lying x 3 min over 2 pilows x 4 min : centralized to upper thigh and low back  Therapeutic Exercise Activity 8: SIAV over table, hands on hps x 10; abolished back and LE P!  Therapeutic Exercise Activity 9: Back extension #70 x 15 reps  Therapeutic Exercise Activity 10: Heels raises x 10  Therapeutic Exercise Activity 11: wall slides x 10  Therapeutic Exercise Activity 12: prone lying x 5 min    OP EDUCATION:       Goals:  Active       PT Problem       Patient will report compliance with his home exercise program.        Start:  07/23/24    Expected End:  10/21/24            Patient will report improvement through the LEFS to show improved activity tolerance.         Start:  07/23/24    Expected End:  10/21/24            Patient will " demonstrate improvements in lower extremity strength to 5/5 to facilitate weight bearing activity.         Start:  07/23/24    Expected End:  10/21/24

## 2024-08-10 DIAGNOSIS — F41.8 MIXED ANXIETY DEPRESSIVE DISORDER: ICD-10-CM

## 2024-08-10 DIAGNOSIS — F25.1 SCHIZOAFFECTIVE DISORDER, DEPRESSIVE TYPE (MULTI): ICD-10-CM

## 2024-08-12 RX ORDER — BUPROPION HYDROCHLORIDE 150 MG/1
TABLET ORAL
Qty: 180 TABLET | Refills: 1 | Status: SHIPPED | OUTPATIENT
Start: 2024-08-12

## 2024-08-16 ENCOUNTER — APPOINTMENT (OUTPATIENT)
Dept: PHYSICAL THERAPY | Facility: CLINIC | Age: 74
End: 2024-08-16
Payer: COMMERCIAL

## 2024-08-19 ENCOUNTER — OFFICE VISIT (OUTPATIENT)
Dept: PRIMARY CARE | Facility: CLINIC | Age: 74
End: 2024-08-19
Payer: COMMERCIAL

## 2024-08-19 ENCOUNTER — APPOINTMENT (OUTPATIENT)
Dept: PHYSICAL THERAPY | Facility: CLINIC | Age: 74
End: 2024-08-19
Payer: COMMERCIAL

## 2024-08-19 ENCOUNTER — APPOINTMENT (OUTPATIENT)
Dept: OPHTHALMOLOGY | Facility: CLINIC | Age: 74
End: 2024-08-19
Payer: COMMERCIAL

## 2024-08-19 VITALS
HEART RATE: 72 BPM | HEIGHT: 74 IN | BODY MASS INDEX: 26.56 KG/M2 | OXYGEN SATURATION: 97 % | WEIGHT: 207 LBS | DIASTOLIC BLOOD PRESSURE: 65 MMHG | RESPIRATION RATE: 17 BRPM | SYSTOLIC BLOOD PRESSURE: 110 MMHG

## 2024-08-19 DIAGNOSIS — E03.9 ACQUIRED HYPOTHYROIDISM: ICD-10-CM

## 2024-08-19 DIAGNOSIS — E11.9 TYPE 2 DIABETES MELLITUS WITHOUT COMPLICATION, WITHOUT LONG-TERM CURRENT USE OF INSULIN (MULTI): ICD-10-CM

## 2024-08-19 DIAGNOSIS — G93.39 OTHER POST INFECTION AND RELATED FATIGUE SYNDROMES: ICD-10-CM

## 2024-08-19 DIAGNOSIS — H43.813 PVD (POSTERIOR VITREOUS DETACHMENT), BOTH EYES: ICD-10-CM

## 2024-08-19 DIAGNOSIS — R42 VERTIGO: ICD-10-CM

## 2024-08-19 DIAGNOSIS — K21.00 GASTROESOPHAGEAL REFLUX DISEASE WITH ESOPHAGITIS WITHOUT HEMORRHAGE: ICD-10-CM

## 2024-08-19 DIAGNOSIS — N40.0 ENLARGED PROSTATE WITHOUT LOWER URINARY TRACT SYMPTOMS (LUTS): ICD-10-CM

## 2024-08-19 DIAGNOSIS — H35.372 EPIRETINAL MEMBRANE (ERM) OF LEFT EYE: Primary | ICD-10-CM

## 2024-08-19 DIAGNOSIS — D31.32 NEVUS OF CHOROID OF LEFT EYE: ICD-10-CM

## 2024-08-19 DIAGNOSIS — E29.1 TESTICULAR HYPOFUNCTION: ICD-10-CM

## 2024-08-19 DIAGNOSIS — Z00.00 ROUTINE GENERAL MEDICAL EXAMINATION AT HEALTH CARE FACILITY: ICD-10-CM

## 2024-08-19 DIAGNOSIS — Z00.00 MEDICARE ANNUAL WELLNESS VISIT, SUBSEQUENT: Primary | ICD-10-CM

## 2024-08-19 DIAGNOSIS — J43.1 PANLOBULAR EMPHYSEMA (MULTI): ICD-10-CM

## 2024-08-19 DIAGNOSIS — E11.42 DIABETIC PERIPHERAL NEUROPATHY (MULTI): ICD-10-CM

## 2024-08-19 DIAGNOSIS — H10.33 ACUTE BACTERIAL CONJUNCTIVITIS OF BOTH EYES: ICD-10-CM

## 2024-08-19 PROCEDURE — 1159F MED LIST DOCD IN RCRD: CPT | Performed by: INTERNAL MEDICINE

## 2024-08-19 PROCEDURE — 87075 CULTR BACTERIA EXCEPT BLOOD: CPT

## 2024-08-19 PROCEDURE — 1160F RVW MEDS BY RX/DR IN RCRD: CPT | Performed by: INTERNAL MEDICINE

## 2024-08-19 PROCEDURE — 4010F ACE/ARB THERAPY RXD/TAKEN: CPT | Performed by: INTERNAL MEDICINE

## 2024-08-19 PROCEDURE — 3074F SYST BP LT 130 MM HG: CPT | Performed by: INTERNAL MEDICINE

## 2024-08-19 PROCEDURE — 1036F TOBACCO NON-USER: CPT | Performed by: INTERNAL MEDICINE

## 2024-08-19 PROCEDURE — 3044F HG A1C LEVEL LT 7.0%: CPT | Performed by: INTERNAL MEDICINE

## 2024-08-19 PROCEDURE — 3078F DIAST BP <80 MM HG: CPT | Performed by: INTERNAL MEDICINE

## 2024-08-19 PROCEDURE — 87070 CULTURE OTHR SPECIMN AEROBIC: CPT

## 2024-08-19 PROCEDURE — 3048F LDL-C <100 MG/DL: CPT | Performed by: INTERNAL MEDICINE

## 2024-08-19 PROCEDURE — 87205 SMEAR GRAM STAIN: CPT

## 2024-08-19 PROCEDURE — 3008F BODY MASS INDEX DOCD: CPT | Performed by: INTERNAL MEDICINE

## 2024-08-19 PROCEDURE — 99214 OFFICE O/P EST MOD 30 MIN: CPT | Performed by: INTERNAL MEDICINE

## 2024-08-19 PROCEDURE — 99213 OFFICE O/P EST LOW 20 MIN: CPT

## 2024-08-19 PROCEDURE — 92134 CPTRZ OPH DX IMG PST SGM RTA: CPT

## 2024-08-19 PROCEDURE — 1170F FXNL STATUS ASSESSED: CPT | Performed by: INTERNAL MEDICINE

## 2024-08-19 RX ORDER — DOXYCYCLINE 100 MG/1
100 CAPSULE ORAL 2 TIMES DAILY
Qty: 28 CAPSULE | Refills: 0 | Status: SHIPPED | OUTPATIENT
Start: 2024-08-19 | End: 2024-09-02

## 2024-08-19 RX ORDER — MECLIZINE HCL 12.5 MG 12.5 MG/1
12.5 TABLET ORAL 3 TIMES DAILY PRN
Qty: 30 TABLET | Refills: 11 | Status: SHIPPED | OUTPATIENT
Start: 2024-08-19 | End: 2025-08-19

## 2024-08-19 ASSESSMENT — ENCOUNTER SYMPTOMS
RESPIRATORY NEGATIVE: 0
LOSS OF SENSATION IN FEET: 0
HEMATOLOGIC/LYMPHATIC NEGATIVE: 0
ENDOCRINE NEGATIVE: 0
GASTROINTESTINAL NEGATIVE: 0
CARDIOVASCULAR NEGATIVE: 0
OCCASIONAL FEELINGS OF UNSTEADINESS: 0
NEUROLOGICAL NEGATIVE: 0
EYES NEGATIVE: 0
PSYCHIATRIC NEGATIVE: 0
ALLERGIC/IMMUNOLOGIC NEGATIVE: 0
MUSCULOSKELETAL NEGATIVE: 0
DEPRESSION: 0
CONSTITUTIONAL NEGATIVE: 0

## 2024-08-19 ASSESSMENT — CONF VISUAL FIELD
OS_SUPERIOR_NASAL_RESTRICTION: 0
OS_INFERIOR_TEMPORAL_RESTRICTION: 0
OS_SUPERIOR_TEMPORAL_RESTRICTION: 0
OD_INFERIOR_TEMPORAL_RESTRICTION: 0
OS_INFERIOR_NASAL_RESTRICTION: 0
OD_NORMAL: 1
OD_SUPERIOR_NASAL_RESTRICTION: 0
OS_NORMAL: 1
OD_INFERIOR_NASAL_RESTRICTION: 0
OD_SUPERIOR_TEMPORAL_RESTRICTION: 0

## 2024-08-19 ASSESSMENT — EXTERNAL EXAM - RIGHT EYE: OD_EXAM: NORMAL

## 2024-08-19 ASSESSMENT — PATIENT HEALTH QUESTIONNAIRE - PHQ9
3. TROUBLE FALLING OR STAYING ASLEEP OR SLEEPING TOO MUCH: SEVERAL DAYS
1. LITTLE INTEREST OR PLEASURE IN DOING THINGS: NEARLY EVERY DAY
4. FEELING TIRED OR HAVING LITTLE ENERGY: SEVERAL DAYS
SUM OF ALL RESPONSES TO PHQ9 QUESTIONS 1 AND 2: 4
10. IF YOU CHECKED OFF ANY PROBLEMS, HOW DIFFICULT HAVE THESE PROBLEMS MADE IT FOR YOU TO DO YOUR WORK, TAKE CARE OF THINGS AT HOME, OR GET ALONG WITH OTHER PEOPLE: SOMEWHAT DIFFICULT
8. MOVING OR SPEAKING SO SLOWLY THAT OTHER PEOPLE COULD HAVE NOTICED. OR THE OPPOSITE, BEING SO FIGETY OR RESTLESS THAT YOU HAVE BEEN MOVING AROUND A LOT MORE THAN USUAL: NOT AT ALL
7. TROUBLE CONCENTRATING ON THINGS, SUCH AS READING THE NEWSPAPER OR WATCHING TELEVISION: NOT AT ALL
9. THOUGHTS THAT YOU WOULD BE BETTER OFF DEAD, OR OF HURTING YOURSELF: NOT AT ALL
6. FEELING BAD ABOUT YOURSELF - OR THAT YOU ARE A FAILURE OR HAVE LET YOURSELF OR YOUR FAMILY DOWN: NEARLY EVERY DAY
2. FEELING DOWN, DEPRESSED OR HOPELESS: SEVERAL DAYS
5. POOR APPETITE OR OVEREATING: SEVERAL DAYS
SUM OF ALL RESPONSES TO PHQ QUESTIONS 1-9: 10

## 2024-08-19 ASSESSMENT — CUP TO DISC RATIO
OS_RATIO: 0.7
OD_RATIO: 0.7

## 2024-08-19 ASSESSMENT — ACTIVITIES OF DAILY LIVING (ADL)
GROCERY_SHOPPING: INDEPENDENT
BATHING: INDEPENDENT
DRESSING: INDEPENDENT
TAKING_MEDICATION: INDEPENDENT
MANAGING_FINANCES: INDEPENDENT
DOING_HOUSEWORK: INDEPENDENT

## 2024-08-19 ASSESSMENT — PACHYMETRY
OS_CT(UM): 590
OD_CT(UM): 623

## 2024-08-19 ASSESSMENT — VISUAL ACUITY
OS_CC: 20/40
OD_CC: 20/25
METHOD: SNELLEN - LINEAR
CORRECTION_TYPE: GLASSES

## 2024-08-19 ASSESSMENT — EXTERNAL EXAM - LEFT EYE: OS_EXAM: NORMAL

## 2024-08-19 NOTE — PROGRESS NOTES
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 No IRF or SRF    - Left eye (OS): ERM stage 3. 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 Adamson    Dry eye, both eyes

## 2024-08-19 NOTE — ASSESSMENT & PLAN NOTE
BPH - Benign PROSTATIC Hypertrophy, + Dysuria/ Increased in Nocturia and frequency of urination check PSA, His father had prostate cancer in his late 70's close to 80 years old- Educate exercises and Change in life style, prescribe vitamin E 400 IU qD. Consider referral to urology.

## 2024-08-19 NOTE — PROGRESS NOTES
"Subjective   Patient ID: Truong Armijo is a 74 y.o. male who presents for Medicare Annual Wellness Visit Subsequent (Mcr/Follow up /Dizzy ). Sudden onset of dizziness last night improved but persistent today still denies upper respiratory infection and denies sinus pains     HPI     Review of Systems    Objective   Pulse 72   Resp 17   Ht 1.88 m (6' 2\")   Wt 93.9 kg (207 lb)   SpO2 97%   BMI 26.58 kg/m²     Physical Exam  Vitals and nursing note reviewed.   Constitutional:       Appearance: Normal appearance.   HENT:      Head: Normocephalic and atraumatic.      Right Ear: Tympanic membrane, ear canal and external ear normal.      Left Ear: Tympanic membrane, ear canal and external ear normal. There is no impacted cerumen.      Nose: Nose normal.      Mouth/Throat:      Mouth: Mucous membranes are moist.      Pharynx: Oropharynx is clear.   Eyes:      Extraocular Movements: Extraocular movements intact.      Conjunctiva/sclera: Conjunctivae normal.      Pupils: Pupils are equal, round, and reactive to light.   Cardiovascular:      Rate and Rhythm: Normal rate and regular rhythm.      Pulses: Normal pulses.      Heart sounds: Normal heart sounds. No murmur heard.  Pulmonary:      Effort: Pulmonary effort is normal. No respiratory distress.      Breath sounds: Normal breath sounds. No stridor. No wheezing, rhonchi or rales.   Chest:      Chest wall: No tenderness.   Abdominal:      General: Abdomen is flat. Bowel sounds are normal. There is no distension.      Palpations: Abdomen is soft. There is no mass.      Tenderness: There is no abdominal tenderness. There is no right CVA tenderness, left CVA tenderness, guarding or rebound.      Hernia: No hernia is present.   Musculoskeletal:         General: Normal range of motion.      Cervical back: Normal range of motion and neck supple.   Skin:     General: Skin is warm.      Capillary Refill: Capillary refill takes less than 2 seconds.   Neurological:      " General: No focal deficit present.      Mental Status: He is alert.      Cranial Nerves: No cranial nerve deficit.      Sensory: No sensory deficit.      Motor: No weakness.      Coordination: Coordination normal.      Gait: Gait normal.      Deep Tendon Reflexes: Reflexes normal.   Psychiatric:         Mood and Affect: Mood normal.         Behavior: Behavior normal. Behavior is cooperative.         Thought Content: Thought content normal.         Judgment: Judgment normal.         Assessment/Plan   Problem List Items Addressed This Visit             ICD-10-CM    COPD (chronic obstructive pulmonary disease) (Multi) J44.9     COPD - no wheezing, no SOB, no Crackles heard/ Exacerbation. Get CXR. Continues mild exercises and inhalers. [place] preventive care and vaccinations. Add advair inhalers and spiriva inhaler.          Diabetic peripheral neuropathy (Multi) E11.42     Neuropathy/ - positive numbness, tingling, discomfort (needles pricking, cold feeling) in distal lower extremities(toes, feet, legs), secondary to DM/Radiculopathy/idiopathic. Recommend: Gabapentin(Neurontin) 300 daily(at bed time) upward taper up to 2 gm/day or Lyrica 75 mg daily if failure of treatment. Also recommend: treatment of Diabetes(control levels of blood sugars), lumbar/ cervical disc disease (Physical Therapy), and check electrolytes and Thyroid function. Also address regenrative measures: B12 intrmusculary (Monthly) and Folic acid supplementation. Recommend EMG. Start Amitriptyline 25 mg daily / . Start - Tonic water - and Tramadol 50 mg TID.          Enlarged prostate without lower urinary tract symptoms (luts) N40.0     BPH - Benign PROSTATIC Hypertrophy, + Dysuria/ Increased in Nocturia and frequency of urination check PSA, His father had prostate cancer in his late 70's close to 80 years old- Educate exercises and Change in life style, prescribe vitamin E 400 IU qD. Consider referral to urology.          Fatigue R53.83     Last  Assessment & Plan Note   Written:Ki Ahuja MD7/22/2024 12:17 PM    Fatigue - check CMP(metabolic panel and elctrolytes) , CBC(complete blood cell count), TSH(thyroid function). Insomnia may play a role and sleep studies(rule out sleep apnea) are recommended . Educate sleep hygiene. Consider anxiety disorder vs. depression. Consider Stress test, and 2DECHO.              Diabetes mellitus (Multi) E11.9     DM - NIDDM  . Reviewed with patient / Will check  HbA1c and fasting blood sugars. Educate home self monitoring and diary keeping(reviewed with patient home blood sugar levels /diary). Educate extensively low calorie diet and weight loss with exercise. Reviewed BS- diary and Rx. Regimen. [place] renal protection with ARB/ACEI.               Educate compliance with diet and Rx. And educate risks and autcomes.    Continuesandrefill the Metformin 500 mg BID with meals  Januvia 100 mg daily and Farxiga 5 mg daily                   Hypothyroidism, unspecified E03.9     Hypothyroidism - Symptoms well/not controlled (weight gain, fatigue, constipation, cold intolerance). Check TSH continue/change dose of Synthroid/Levothyroxine  of 100 mcg/qD.          Reflux esophagitis K21.00     GERD - Acid reflux disease. Rx. PPI (Prilosec/Prevacid/Protonix/Nexium) and educate diet and life style changes. Referred patient to an endoscopy (EGD) and check H. Pylori titers.          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 otitis vs. Eustachian tube dysfunction vs. advanced cervical disc disease causing encroachment of the Vertebral arteries vs. viral infection of the Vestibular nerve sensor - recommend : Antivert /Meclizine 12.5 mg TID , treat infection of the sinuses or ears , increase intake of fluids, educate exercises , avoid operating machinery, monitor closely,  rest.           Relevant Medications    meclizine (Antivert) 12.5 mg tablet    Medicare annual wellness visit, subsequent - Primary Z00.00     No recent hospitalizations.     All medications reviewed and reconciled by me the provider..  No use of controlled substances or opiates.     Family history, social history reviewed, no changes.     Patient does not smoke.     Patient does not drink.     Patient hydrates adequately daily.  Eats a well-balanced healthy diet.      Exercises adequately including walking and doing weightbearing exercises.     Patient denies any difficulty with memory or cognition.      Denies any difficulty with hearing.  Patient does not wear hearing aids.     No fall risk.  No recent falls.  Denies any difficulty walking.     Patient with no history of depression anxiety, denies any loss of interest, no feeling of sadness, no lack of motivation.     Patient is independent in all ADLs and IADLs.  Independent bathing, dressing, walking.  Takes care of own finances, shopping and cooking.      End-of-life decision-making power of  reviewed with patient.      Risk Factors Identified During Visit: None.   Influenza: influenza vaccine was previously given.   Pneumovax 23: Pneumovax 23 vaccine was previously given.   Prevnar 13: Prevnar 13 vaccine was previously given.   Shingles Vaccine: Shingles vaccine was previously given.   Prostate cancer screening: Screening is current.   Colorectal Cancer Screening: screening is current.   Abdominal Aortic Aneurysm screening: screening is current.   HIV screening: screening not indicated.        Preventive measures - Recommend ASAP :  Specialist. Last Colonoscopy in 2022 (educate patient risks of colon cancer) refer patient to GI specialist. Ophthalmology and retina exam recommend yearly exams refer patient to an Ophthalmologist. BPH - (Benign Prostatic Hypertrophy) refer patient to an Urologist for rectal exam and PSA check.                Acute pain  of left knee M25.562        DJD - Degenerative Joint Disease, Chronic Arthritis, of the Left more than the right  Knee. . Pain control, and anti-inflammatory meds : consider Kenalog injection and start Voltaren gel 1% topically BID,  and  Tylenol 325 mg TID PRN. Educate exercises and referred the patient to PT (Physical Therapy). Get X-Ray's.               Benign essential hypertension I10       HTN - hypertension well/controlled .Target BP < 130/80  achieved. Educate low salt diet and exercise with weight loss. Educate home self monitoring and diary keeping. Educate risks of elevate blood pressure and benefits of prompt treatment.  Refill Lisinopril             Relevant Orders     Comprehensive Metabolic Panel     Hypercholesterolemia E78.00       Hypercholesterolemia - Monitor lipid profile and educate patient upon risks of high cholesterol and targets. Educate diet and change in lifestyle and increase in exercises - Refill: Atorvastatin  20 mg daily and educate compliance with medication and diet.            Relevant Orders     Lipid Panel     Fatigue R53.83       Fatigue - check CMP(metabolic panel and elctrolytes) , CBC(complete blood cell count), TSH(thyroid function). Insomnia may play a role and sleep studies(rule out sleep apnea) are recommended . Educate sleep hygiene. Consider anxiety disorder vs. depression. Consider Stress test, and 2DECHO.             Diabetes mellitus (Multi) - Primary E11.9       DM - NIDDM  . Reviewed with patient / Will check  HbA1c and fasting blood sugars. Educate home self monitoring and diary keeping(reviewed with patient home blood sugar levels /diary). Educate extensively low calorie diet and weight loss with exercise. Reviewed BS- diary and Rx. Regimen. [place] renal protection with ARB/ACEI.               Educate compliance with diet and Rx. And educate risks and autcomes.    Continuesandrefill the Metformin 500 mg BID with meals  Januvia 100 mg daily and Farxiga 5 mg  daily                         Relevant Orders     Hemoglobin A1C     Primary hypothyroidism E03.9       Hypothyroidism - Symptoms well/controlled (weight gain, fatigue, constipation, cold intolerance). Check TSH continues dose of Synthroid/Levothyroxine  of  88 bmcg/qD.            Relevant Orders     CBC and Auto Differential     TSH with reflex to Free T4 if abnormal     Reflux esophagitis K21.00       GERD - Acid reflux disease. Rx. PPI (Prilosec/Prevacid/Protonix/Nexium) and educate diet and life style changes. Referred patient to an endoscopy (EGD) and check H. Pylori titers.            Hypogonadism male E29.1       Check levels and refill supplement of Testosterone            Relevant Orders     Prostate Specific Antigen     Essential (primary) hypertension I10       HTN - hypertension well/controlled .Target BP < 130/80  achieved. Educate low salt diet and exercise with weight loss. Educate home self monitoring and diary keeping. Educate risks of elevate blood pressure and benefits of prompt treatment.  Refill lisinopril             Other Visit Diagnoses           Codes     Goiter     E04.9     Relevant Orders     US thyroid     Acute pain of both knees     M25.561, M25.562     Relevant Orders     XR knee 1-2 views bilateral     Benign prostatic hyperplasia with urinary frequency     N40.1, R35.0     Relevant Orders     Prostate Specific Antigen                            Immunizations/Injections      very important  Immunizations from outside sources need reconciliation.      Flu vaccine, quadrivalent, high-dose, preservative free, age 65y+ (FLUZONE)10/18/2023, 10/17/2022, 10/8/2020  Flu vaccine, trivalent, preservative free, HIGH-DOSE, age 65y+ (Fluzone)10/14/2019, 10/17/2018, 10/4/2017  Flu vaccine, trivalent, preservative free, age 6 months and greater (Fluarix/Fluzone/Flulaval)10/5/2015, 10/1/2015  Influenza, Seasonal, Quadrivalent, [place]pbwj44/27/2021  Influenza, seasonal, rkwwfwlwzo81/1/2021,  9/23/2011, 10/8/2009,  ... (1 more)  Moderna SARS-CoV-2 Vaccination3/27/2021, 2/28/2021  Pfizer COVID-19 vaccine, Fall 2023, 12 years and older, (30mcg/0.3mL)1/23/2024  Pfizer COVID-19 vaccine, bivalent, age 12 years and older (30 mcg/0.3 mL)9/15/2022  Pfizer Purple Cap SARS-CoV-211/3/2021  Pneumococcal conjugate vaccine, 13-valent (PREVNAR 13)9/15/2014  Pneumococcal conjugate vaccine, 20-valent (PREVNAR 20)5/5/2023  Pneumococcal polysaccharide vaccine, 23-valent, age 2 years and older (PNEUMOVAX 23)1/1/2014  Tdap vaccine, age 7 year and older (BOOSTRIX, ADACEL)6/5/2023, 11/26/2017  Zoster vaccine, recombinant, adult (SHINGRIX)8/8/2023, 6/5/2023, 12/13/2020,  ... (1 more)  Zoster, live10/25/2017, 5/22/2014

## 2024-08-19 NOTE — ASSESSMENT & PLAN NOTE
COPD - no wheezing, no SOB, no Crackles heard/ Exacerbation. Get CXR. Continues mild exercises and inhalers. [place] preventive care and vaccinations. Add advair inhalers and spiriva inhaler.

## 2024-08-19 NOTE — ASSESSMENT & PLAN NOTE
Last Assessment & Plan Note   Written:Ki Ahuja MD7/22/2024 12:17 PM    Fatigue - check CMP(metabolic panel and elctrolytes) , CBC(complete blood cell count), TSH(thyroid function). Insomnia may play a role and sleep studies(rule out sleep apnea) are recommended . Educate sleep hygiene. Consider anxiety disorder vs. depression. Consider Stress test, and 2DECHO.

## 2024-08-19 NOTE — ASSESSMENT & PLAN NOTE
Vertigo - Dizziness -Castelan [place] sign was positive -  possibly due to sinusitis vs. otitis vs. Eustachian tube dysfunction vs. advanced cervical disc disease causing encroachment of the Vertebral arteries vs. viral infection of the Vestibular nerve sensor - recommend : Antivert /Meclizine 12.5 mg TID , treat infection of the sinuses or ears , increase intake of fluids, educate exercises , avoid operating machinery, monitor closely, rest.

## 2024-08-19 NOTE — ASSESSMENT & PLAN NOTE
No recent hospitalizations.     All medications reviewed and reconciled by me the provider..  No use of controlled substances or opiates.     Family history, social history reviewed, no changes.     Patient does not smoke.     Patient does not drink.     Patient hydrates adequately daily.  Eats a well-balanced healthy diet.      Exercises adequately including walking and doing weightbearing exercises.     Patient denies any difficulty with memory or cognition.      Denies any difficulty with hearing.  Patient does not wear hearing aids.     No fall risk.  No recent falls.  Denies any difficulty walking.     Patient with no history of depression anxiety, denies any loss of interest, no feeling of sadness, no lack of motivation.     Patient is independent in all ADLs and IADLs.  Independent bathing, dressing, walking.  Takes care of own finances, shopping and cooking.      End-of-life decision-making power of  reviewed with patient.      Risk Factors Identified During Visit: None.   Influenza: influenza vaccine was previously given.   Pneumovax 23: Pneumovax 23 vaccine was previously given.   Prevnar 13: Prevnar 13 vaccine was previously given.   Shingles Vaccine: Shingles vaccine was previously given.   Prostate cancer screening: Screening is current.   Colorectal Cancer Screening: screening is current.   Abdominal Aortic Aneurysm screening: screening is current.   HIV screening: screening not indicated.        Preventive measures - Recommend ASAP :  Specialist. Last Colonoscopy in 2022 (educate patient risks of colon cancer) refer patient to GI specialist. Ophthalmology and retina exam recommend yearly exams refer patient to an Ophthalmologist. BPH - (Benign Prostatic Hypertrophy) refer patient to an Urologist for rectal exam and PSA check.

## 2024-08-19 NOTE — ASSESSMENT & PLAN NOTE
DM - NIDDM  . Reviewed with patient / Will check  HbA1c and fasting blood sugars. Educate home self monitoring and diary keeping(reviewed with patient home blood sugar levels /diary). Educate extensively low calorie diet and weight loss with exercise. Reviewed BS- diary and Rx. Regimen. [place] renal protection with ARB/ACEI.               Educate compliance with diet and Rx. And educate risks and autcomes.    Continuesandrefill the Metformin 500 mg BID with meals  Januvia 100 mg daily and Farxiga 5 mg daily

## 2024-08-20 ENCOUNTER — TELEMEDICINE (OUTPATIENT)
Dept: BEHAVIORAL HEALTH | Facility: CLINIC | Age: 74
End: 2024-08-20
Payer: COMMERCIAL

## 2024-08-20 DIAGNOSIS — F22 PARANOIA (PSYCHOSIS) (MULTI): ICD-10-CM

## 2024-08-20 DIAGNOSIS — F51.04 PSYCHOPHYSIOLOGICAL INSOMNIA: ICD-10-CM

## 2024-08-20 PROCEDURE — 3044F HG A1C LEVEL LT 7.0%: CPT | Performed by: PSYCHIATRY & NEUROLOGY

## 2024-08-20 PROCEDURE — 4010F ACE/ARB THERAPY RXD/TAKEN: CPT | Performed by: PSYCHIATRY & NEUROLOGY

## 2024-08-20 PROCEDURE — 1160F RVW MEDS BY RX/DR IN RCRD: CPT | Performed by: PSYCHIATRY & NEUROLOGY

## 2024-08-20 PROCEDURE — 3048F LDL-C <100 MG/DL: CPT | Performed by: PSYCHIATRY & NEUROLOGY

## 2024-08-20 PROCEDURE — 99214 OFFICE O/P EST MOD 30 MIN: CPT | Performed by: PSYCHIATRY & NEUROLOGY

## 2024-08-20 PROCEDURE — 1159F MED LIST DOCD IN RCRD: CPT | Performed by: PSYCHIATRY & NEUROLOGY

## 2024-08-20 ASSESSMENT — ENCOUNTER SYMPTOMS
DEPRESSED MOOD: 1
DYSPHORIC MOOD: 1
NERVOUS/ANXIOUS: 1

## 2024-08-20 NOTE — PROGRESS NOTES
Subjective   Patient ID: Truong Armijo is a 74 y.o. male who presents for No chief complaint on file. I have had anxiety and brain fog today.    The patient engaged in a telehealth session via Epic audio visual or phone with this provider practicing within the [facility]. The identity of the patient was verified by their date of birth and last four digits of their social security number. The provider demonstrated that confidentially was preserved at their location. The patient was informed that they were responsible for ensuring confidentially was secured at their location. The patient's location was documented for emergency purposes. The patient was informed of the necessary steps that would occur if an emergency was to occur or technology failed during session.     HPI: The patient reports that he had a disturbing incident at the [facility] where a malfunction on the eye test was very disturbing and concerning. Fortunately, all turned out well as there was a malfunction on the device at the [facility]. The patient is very happy with his  that he had but he will be starting with a new instructor which he thinks will work out. The guitar lessons are very positive for him. He continues to take one Tristanian lesson daily. His counselor has suggested that he might be able to take Tristanian for free at [facility]. He would like to read the Tristanian philosophers. He reported his A1C was down to 5.9. The patient has been having pain in his right leg which he thinks may be related to some of his exercise. The patient continues to experience financing issues but he is responsible about his spending. The patient reports that he has bee dizzy recently and was started on meclizine.     Past Medical History:  05/16/2012: Abnormality of gait  01/26/2024: Acute constipation  08/28/2019: Altered mental status, unspecified      Comment:  Change in mental status  09/26/2019: Asthma ([facility]  12/28/2017: Benign  neoplasm of eyelid including canthus      Comment:  Formatting of this note might be different from the                original. 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)      Comment:  Schizophrenia, disorganized, in remission  01/26/2024: Dysphagia  12/09/2021: Dysphagia, oropharyngeal phase      Comment:  Dysphagia, oropharyngeal phase  01/26/2024: Elbow pain  01/26/2024: Elbow sprain  01/26/2024: Hemorrhage in optic nerve sheath  01/26/2024: Impairment of balance  01/26/2024: Nausea  01/26/2024: Neurogenic bladder  12/09/2021: Noninfective gastroenteritis and colitis, unspecified      Comment:  Chronic diarrhea of unknown origin  09/26/2019: Nuclear sclerotic cataract of both eyes  12/09/2021: Other conditions influencing health status      Comment:  History of chronic diarrhea  10/17/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      Comment:  Reactive confusion  05/13/2020: Other specified anxiety disorders      Comment:  Depression with anxiety  07/02/2021: Other specified anxiety disorders      Comment:  Depression with anxiety  11/03/2017: Other specified anxiety disorders      Comment:  Depression with anxiety  12/12/2014: Otitis media, unspecified, bilateral      Comment:  Acute bilateral otitis media  10/01/2012: Peripheral vertigo  10/17/2022: Personal history of other diseases of the nervous system   and sense organs      Comment:  History of seizure disorder  12/12/2014: Personal history of other diseases of the nervous system   and sense organs      Comment:  History of acute otitis media  08/17/2015: Personal history of other diseases of the respiratory   system      Comment:  History of acute pharyngitis  12/12/2014: Personal history of other diseases  of the respiratory   system      Comment:  History of acute sinusitis  10/12/2016: Personal history of other endocrine, nutritional and   metabolic disease      Comment:  History of hypercholesterolemia  No date: Personal history of other mental and behavioral disorders      Comment:  History of psychiatric hospitalization  12/09/2021: Personal history of other specified conditions      Comment:  History of nausea  09/03/2021: Personal history of other specified conditions      Comment:  History of shortness of breath  12/09/2021: Personal history of other specified conditions      Comment:  History of dysphagia  12/09/2021: Personal history of other specified conditions      Comment:  History of dysphagia  12/09/2021: Personal history of other specified conditions      Comment:  History of nausea  07/25/2018: Personal history of other specified conditions      Comment:  History of shortness of breath  12/09/2021: Personal history of other specified conditions      Comment:  History of nausea and vomiting  03/07/2014: Personal history of other specified conditions      Comment:  History of fatigue  No date: Postconcussional syndrome      Comment:  Post-concussion syndrome  09/14/2023: Primary open angle glaucoma (POAG)  07/30/2019: Problem related to primary support group, unspecified      Comment:  Relationship problems  01/26/2024: Subjective tinnitus  01/26/2024: Urinary tract infection    MSE: The patient is alert, fully oriented, language is intact, and recent and remote memory intact. The patient denies any suicidal or homicidal ideation or plans. The patient presents with chronic dysphoria with increased anxious and depressive spells, intermittent possible catatonic episodes without manic symptoms. Thought is impaired with thought blocking. Judgment and insight are limited. The patient continues to have disappointments with his social interactions. The patient reports intermittent of episodes of inability to  function or brain fog continuing.       Review of Systems   Neurological:         MSE: The patient is alert, fully oriented, language is intact, and recent and remote memory intact. The patient denies any suicidal or homicidal ideation or plans. The patient presents with chronic dysphoria with increased anxious and depressive spells, intermittent possible catatonic episodes without manic symptoms. Thought is impaired with thought blocking. Judgment and insight are limited. The patient continues to have disappointments with his social interactions. The patient reports intermittent of episodes of inability to function or brain fog.          Psychiatric/Behavioral:  Positive for behavioral problems and dysphoric mood. The patient is nervous/anxious.         MSE: The patient is alert, fully oriented, language is intact, and recent and remote memory intact. The patient denies any suicidal or homicidal ideation or plans. The patient presents with chronic dysphoria with increased anxious and depressive spells, intermittent possible catatonic episodes without manic symptoms. Thought is impaired with thought blocking. Judgment and insight are limited. The patient continues to have disappointments with his social interactions. The patient reports intermittent of episodes of inability to function or brain fog.          All other systems reviewed and are negative.      Objective   Physical Exam  Neurological:      General: No focal deficit present.      Mental Status: He is alert and oriented to person, place, and time. Mental status is at baseline.      Comments: MSE: The patient is alert, fully oriented, language is intact, and recent and remote memory intact. The patient denies any suicidal or homicidal ideation or plans. The patient presents with chronic dysphoria with increased anxious and depressive spells, intermittent possible catatonic episodes without manic symptoms. Thought is impaired with thought blocking. Judgment  and insight are limited. The patient continues to have disappointments with his social interactions. The patient reports intermittent of episodes of inability to function or brain fog.      Psychiatric:      Comments: MSE: The patient is alert, fully oriented, language is intact, and recent and remote memory intact. The patient denies any suicidal or homicidal ideation or plans. The patient presents with chronic dysphoria with increased depressive spells, catatonic episodes without manic symptoms. Thought is impaired with thought blocking. Judgment and insight are limited. The patient continues to have disappointments with his social interactions. The patient reports intermittent of episodes of inability to function.              Lab Review:     Assessment/Plan   Psychiatric Risk Assessment  Violence Risk Assessment: none  Acute Risk of Harm to Others is Considered: low   Suicide Risk Assessment: age > 65 yrs old and   Protective Factors against Suicide: adherence to  treatment, fear of suicide, moral objections to suicide, positive family relationships, and sense of responsibility toward family  Acute Risk of Harm to Self is Considered: low    Diagnosis: schizoaffective disorder depressive type in partial remission.     Treatment plan:   The FDA risks, benefits & alternatives to the medications prescribed were explained to you today. You were able to understand & repeat these risks, benefits & alternatives to these prescribed medications. You have agreed to proceed with treatment with the medications discussed based on the conclusion that the benefit outweighs the risks of this treatment regimen: bupropion  mg every 12 hours and fluoxetine 40 mg daily.    Follow up monthly. You are due for a urine toxicology screen (last done on January 23 of 2024) and your annual controlled substance agreement(last signed in January 23 of 2024) in January of 2025.     Follow up monthly as you have been doing.    We  have referred you to sleep medicine as discussed for your chronic insomnia.       Psych Review of Symptoms:    ADHD: Patient denied any symptoms.         Anxiety:   Generalized Anxiety Symptoms: Difficulty controlling worry and excessive worry.       Developmental and Sensory Concerns: Patient denied any symptoms.         Depressive Symptoms:   Depressed mood, withdrawal/isolation, irritable and low self esteem.       Disruptive and Conduct Symptoms: Patient denied any symptoms.         Eating / Feeding Concerns: Patient denied any symptoms.         Elimination Symptoms: Patient denied any symptoms.         Manic Symptoms: Patient denied any symptoms.         Obsessive-Compulsive Symptoms: Patient denied any symptoms.         Psychotic Symptoms: Patient denied any symptoms.           Trauma Related Symptoms: Patient denied any symptoms.           Sleep Concerns: Patient denied any symptoms.

## 2024-08-21 LAB
BACTERIA SPEC CULT: NORMAL
GRAM STN SPEC: NORMAL
GRAM STN SPEC: NORMAL

## 2024-08-28 ENCOUNTER — TREATMENT (OUTPATIENT)
Dept: PHYSICAL THERAPY | Facility: CLINIC | Age: 74
End: 2024-08-28
Payer: COMMERCIAL

## 2024-08-28 DIAGNOSIS — M54.16 RADICULOPATHY, LUMBAR REGION: ICD-10-CM

## 2024-08-28 DIAGNOSIS — M51.26 LUMBAR DISC HERNIATION: ICD-10-CM

## 2024-08-28 DIAGNOSIS — M25.562 ACUTE PAIN OF LEFT KNEE: Primary | ICD-10-CM

## 2024-08-28 PROCEDURE — 97110 THERAPEUTIC EXERCISES: CPT | Mod: GP,CQ | Performed by: PHYSICAL THERAPY ASSISTANT

## 2024-08-28 NOTE — PROGRESS NOTES
"Physical Therapy    Physical Therapy Treatment    Patient Name: Truong Armijo  MRN: [id number]  Today's Date: 8/28/2024    Time Entry:   Time Calculation  Start Time: 1525  Stop Time: 1615  Time Calculation (min): 50 min     PT Therapeutic Procedures Time Entry  Therapeutic Exercise Time Entry: 40      Insurance : [facility]   Authorization /Certification / Visits allowed: 20  Visit 4/20               Assessment:  Back issues seems to have resolved except for instances of prolonged sitting while playing guitar.  Recommended using a mirror on his music stand to watch his fingering w/ good posture. He finds relief for LBP by \" spending some time in my recliner.\" Did well with standing and seated exercises today for his knee.   Plan:  [facility]one [facility] [place]    Current Problem    Problem List Items Addressed This Visit             ICD-10-CM    Acute pain of left knee - Primary M25.562    Lumbar disc herniation M51.26    Radiculopathy, lumbar region M54.16         Subjective    Still having difficulty w/ lying prone. Has back pain rising chair after practicing guitar . States he watches his  fingers on frets with head bent.   Pain  No LBP , L knee 2/10 VAS    Objective   Very flexed neck when demo'd his guitar playing posture        Treatments:  Therapeutic Exercise  Therapeutic Exercise Performed: Yes  Therapeutic Exercise Activity 1: Back extension #70 2 x 15 reps  Therapeutic Exercise Activity 2: R SGIS 3 x 10; no change  Therapeutic Exercise Activity 3: Heels raises 2 x 10  Therapeutic Exercise Activity 4: Partial squats 2 x 10  Therapeutic Exercise Activity 5: KE #11 B LE x 10 S LE x 10 reps R/L  Therapeutic Exercise Activity 6: HSC #33 3 x 10  Therapeutic Exercise Activity 7: LAQ 5 sec hold 2 x 10 R/L  Therapeutic Exercise Activity 8: sit/stands w/o UE A x 10 reps ; cues for form  Therapeutic Exercise Activity 9: Posture correction seated ; c/o LBP after playing guitar    OP EDUCATION:       Goals:  Active       PT Problem  "      Patient will report compliance with his home exercise program.        Start:  07/23/24    Expected End:  10/21/24            Patient will report improvement through the LEFS to show improved activity tolerance.         Start:  07/23/24    Expected End:  10/21/24            Patient will demonstrate improvements in lower extremity strength to 5/5 to facilitate weight bearing activity.         Start:  07/23/24    Expected End:  10/21/24

## 2024-08-30 ENCOUNTER — CLINICAL SUPPORT (OUTPATIENT)
Dept: AUDIOLOGY | Facility: CLINIC | Age: 74
End: 2024-08-30
Payer: COMMERCIAL

## 2024-08-30 ENCOUNTER — OFFICE VISIT (OUTPATIENT)
Dept: OPHTHALMOLOGY | Facility: CLINIC | Age: 74
End: 2024-08-30
Payer: COMMERCIAL

## 2024-08-30 DIAGNOSIS — H10.433 CHRONIC FOLLICULAR CONJUNCTIVITIS OF BOTH EYES: Primary | ICD-10-CM

## 2024-08-30 DIAGNOSIS — H90.A31 MIXED CONDUCTIVE AND SENSORINEURAL HEARING LOSS OF RIGHT EAR WITH RESTRICTED HEARING OF LEFT EAR: Primary | ICD-10-CM

## 2024-08-30 DIAGNOSIS — E11.9 TYPE 2 DIABETES MELLITUS WITHOUT COMPLICATION, WITHOUT LONG-TERM CURRENT USE OF INSULIN (MULTI): ICD-10-CM

## 2024-08-30 DIAGNOSIS — E11.42 DIABETIC PERIPHERAL NEUROPATHY (MULTI): ICD-10-CM

## 2024-08-30 DIAGNOSIS — R42 DIZZINESS AND GIDDINESS: ICD-10-CM

## 2024-08-30 DIAGNOSIS — H35.372 EPIRETINAL MEMBRANE (ERM) OF LEFT EYE: ICD-10-CM

## 2024-08-30 DIAGNOSIS — H40.1132 PRIMARY OPEN ANGLE GLAUCOMA (POAG) OF BOTH EYES, MODERATE STAGE: ICD-10-CM

## 2024-08-30 PROCEDURE — 99214 OFFICE O/P EST MOD 30 MIN: CPT | Performed by: OPHTHALMOLOGY

## 2024-08-30 PROCEDURE — 92550 TYMPANOMETRY & REFLEX THRESH: CPT

## 2024-08-30 PROCEDURE — 92557 COMPREHENSIVE HEARING TEST: CPT

## 2024-08-30 ASSESSMENT — TONOMETRY
OS_IOP_MMHG: 12
IOP_METHOD: TONOPEN
OD_IOP_MMHG: 13

## 2024-08-30 ASSESSMENT — EXTERNAL EXAM - LEFT EYE: OS_EXAM: NORMAL

## 2024-08-30 ASSESSMENT — ENCOUNTER SYMPTOMS: EYES NEGATIVE: 1

## 2024-08-30 ASSESSMENT — VISUAL ACUITY
CORRECTION_TYPE: GLASSES
METHOD: SNELLEN - LINEAR
OS_CC: 20/50+1
OD_CC: 20/30-2

## 2024-08-30 ASSESSMENT — EXTERNAL EXAM - RIGHT EYE: OD_EXAM: NORMAL

## 2024-08-30 ASSESSMENT — PACHYMETRY
OD_CT(UM): 623
OS_CT(UM): 590

## 2024-08-30 NOTE — PROGRESS NOTES
Assessment/Plan   Diagnoses and all orders for this visit:  Chronic follicular conjunctivitis of both eyes  - Sx of eye redness and foreign body (FB) sensation OU for 2.5 months, tried different dry eye and blepharitis treatment with no improvement.  - Discussed with pt extensively that his presentation is most likely 2/2 to IOP gtts  - Start OCT anti-histamine and cold compresses/face washing   - Stop brimonidine and timolol (can continue latanoprost) to see if sx improve. Will closely follow IOP.   N.B. Both timolol and latanorprost have been documented in pt allergies per [facility]. However, will hold off on brimonidine given higher likelihood of causing follicular conjunctivitis.    Epiretinal membrane (ERM) of left eye  Followed by Dr. Garnica    Primary open angle glaucoma (POAG) of both eyes, moderate stage  Pigment dispersion glaucoma  On brimonidine and timolol bid OU and latanoprost qhs OU  Thick pachy OU and HVF does not show large scotoma (if any) and RNFL done previously is WNL OD and thin OS but pt has PPA and is a myope  Will discuss with Dr. Adamson other alternatives for IOP control if his sxs get better        Dry eye, both eyes  - On cyclosporine        2 weeks for IOP check

## 2024-08-30 NOTE — PROGRESS NOTES
ADULT AUDIOLOGY EVALUATION    Name:  Truong Armijo  :  1950  Age:  74 y.o.  Date of Evaluation:  2024     IMPRESSIONS     Today's test results indicate normal middle ear functioning bilaterally, with normal sloping to mild largely sensorineural hearing loss in the left ear, and normal to mild mixed hearing loss in the right ear. Word recognition is excellent in both ears.  Compared to previous audiologic evaluation on 10/10/2022, hearing thresholds have declined in the low frequencies in both ears (R>L).    RECOMMENDATIONS     Medical follow up with ENT. Department was contacted for further patient scheduling. Patient was also provided with card for MOISES Rajput, as he has seen this provider in the past.  Return for hearing or vestibular evaluation in conjunction with medical evaluation.     Time: 2385-5012    HISTORY     Truong Armijo is seen today for an audiologic evaluation. He reports experiencing sudden severe dizziness that started in the evening around 2 weeks ago. This lasted all night, and patient felt mostly better in the morning, but still had residual dizziness. Patient reports that this dizziness has continued to occur several times a week and lasts for hours at a time. He describes dizziness as instability, nausea, and feeling off-centered. Truong noted that he experienced less dizziness around 10-12 years ago and received physical therapy for this with improvement in symptoms over time. He has been taking meclizine regularly to help with symptoms.     Patient feels that his hearing is stable since his last test. He was previously evaluated on 10/10/2022, which demonstrated normal sloping to mild sensorineural hearing loss bilaterally. Patient noted that he has had recent discomfort and stuffiness in his right ear. He also endorsed intermittent bilateral tinnitus. He has not noticed an associated between these symptoms and his dizziness. Patient denied history of otologic  surgeries.      TEST RESULTS     Otoscopic Evaluation:  Right Ear: Clear ear canal with unremarkable tympanic membrane  Left Ear: Clear ear canal with unremarkable tympanic membrane    Tympanometry:   Right Ear: Normal, type A tympanogram with normal ear canal volume, peak pressure and compliance.   Left Ear: Normal, type A tympanogram with normal ear canal volume, peak pressure and compliance.     Ipsilateral Acoustic Reflexes:   Right Ear: Present 500-4000 Hz.   Left Ear: Present 500-4000 Hz.     Pure Tone Audiometry (125-8000 Hz):     Right Ear: Mild mixed hearing loss from 125-500 Hz, rising to normal hearing sensitivity at 1000 Hz only, and falling back to mild from 2014-6362 Hz. A 15 dB mixed/conductive component is present at 500 Hz and 4000 Hz.    Left Ear: Normal hearing sensitivity from 125-1000 Hz, sloping to mild largely sensorineural hearing loss from 3412-4723 Hz. A slight (15 dB) conductive component is present at 4000 Hz only.    *Re-tested with high frequency headphones and thresholds improved slightly in both ears.    Speech Audiometry:   Right Ear:    Speech Reception Threshold (SRT) was obtained at 25 dBHL using monitored live voice (MLV).   Word Recognition scores were excellent (92%) in quiet when words were presented at 65 dBHL using recorded NU-6 word list ordered by difficulty.  Left Ear:    Speech Reception Threshold (SRT) was obtained at 25 dBHL using monitored live voice (MLV).   Word Recognition scores were excellent (100%) in quiet when words were presented at 65 dBHL using recorded NU-6 word list ordered by difficulty.       Previous Test:  Right ear: Hearing thresholds have declined at 250-500 Hz compared to previous test from 10/10/2022.  Left ear: Hearing thresholds have declined slightly at 250-500 Hz compared to previous test from 10/10/2022.      Joseph Clarke, CCC-A  Clinical Audiologist    Degree of Hearing Sensitivity Decibel Range   Within Normal Limits (WNL) 0-25   Mild  26-40   Moderate 41-55   Moderately-Severe 56-70   Severe 71-90   Profound 91+      Key   CNT/DNT Could Not Test/Did Not Test   TM Tympanic Membrane   WNL Within Normal Limits   HA Hearing Aid   SNHL Sensorineural Hearing Loss   CHL Conductive Hearing Loss   NIHL Noise-Induced Hearing Loss   ECV Ear Canal Volume   MLV Monitored Live Voice     AUDIOGRAM

## 2024-08-30 NOTE — PATIENT INSTRUCTIONS
- Start oral anti-histamine (zyrtec one time daily)   - Continue cyclosporine twice daily both eyes   - Start cold washes of face 2-3 times per day   - Stop brimonidine and timolol   - Continue latanoprost at night both eyes

## 2024-09-02 RX ORDER — DAPAGLIFLOZIN 10 MG/1
10 TABLET, FILM COATED ORAL DAILY
Qty: 90 TABLET | Refills: 0 | Status: SHIPPED | OUTPATIENT
Start: 2024-09-02 | End: 2024-09-03 | Stop reason: SDUPTHER

## 2024-09-03 DIAGNOSIS — E11.9 TYPE 2 DIABETES MELLITUS WITHOUT COMPLICATION, WITHOUT LONG-TERM CURRENT USE OF INSULIN (MULTI): ICD-10-CM

## 2024-09-03 DIAGNOSIS — E11.42 DIABETIC PERIPHERAL NEUROPATHY (MULTI): ICD-10-CM

## 2024-09-03 RX ORDER — DAPAGLIFLOZIN 10 MG/1
10 TABLET, FILM COATED ORAL DAILY
Qty: 90 TABLET | Refills: 0 | Status: SHIPPED | OUTPATIENT
Start: 2024-09-03

## 2024-09-03 NOTE — PROGRESS NOTES
[facility]  [facility]  [address]  [facility] [id number] [phone]     [facility]  New Visit Note      Subjective   Patient ID: Truong Armijo is a 74 y.o. male with past medical history significant for Overweight, Hypertension, diabetes, Vit D deficiency, Chronic Obstructive Pulmonary Disease, Transient ischemic attack, Depression, Anxiety, Post-traumatic stress disorder, Insomnia and Sleep disorder breathing,    9/4/2024: The patient is here alone today and was referred by Geriatric Psychiatry Josue Hernandez MD PhD for comprehensive sleep medicine evaluation due to suspected sleep apnea, excessive daytime sleepiness/fatigue, and difficulty falling/staying asleep. This month, he tried to decrease Diazepam and use Benadryl and Tylenol PM to deal with his insomnia. However, he has difficulty falling asleep, drowsy driving, and Excessive Daytime Sleepiness. He reports that he had insomnia issues since childhood. About 15 years ago, he started Diazepam, which was prescribed by his PCP, which helped him sleep. But he would like to wean it. Therefore, he came here to establish care. His ESS:11 and JENNY:19 today.      HPI  Patient had been having these symptoms for the past 60 years. Patient never had sleep study done yet.       SLEEP STUDY HISTORY: (personally reviewed raw data such as interpretation report, data sheet, hypnogram, and titration table if available and applicable)  N/A    SLEEP-WAKE SCHEDULE  Bedtime: 11 PM-MN  on weekdays, same on weekends  Subjective sleep latency: 15-35 minutes with Diazepam/ Tylenol PM  Problems falling asleep: sometimes  Number of awakenings: 1 times per night spontaneously for unknown reasons  Falls back asleep in 1 hour  Problems staying asleep: Yes  Final wake time: 5:30-8 AM on weekdays, same on weekends  Out of bed time: 6-9 AM  on weekdays, same on  weekends  Shift work: No  Naps: Yes, 10- 90 minutes  Feels rested after a nap: Yes   Average sleep duration (excluding naps): 5-7 hours    SLEEP ENVIRONMENT  Sleep location: bed  Sleep status: sleeps with partner  Room is dark:  Yes  Room is quiet: Yes  Room is cool: Yes  Bed comfort: good    SLEEP HABITS:   Activities before bedtime: watch TV, listen music  Activities in bed: no  Preferred sleep position: side    SLEEP ROS:  Night symptoms: POSITIVE for mouth breathing, night sweats during sleep, and heartburn or sour taste in mouth at night  Morning symptoms: POSITIVE for unrefreshing sleep and morning dry mouth  Daytime symptoms POSITIVE for excessive daytime sleepiness and fatigue  Hypersomnia / narcolepsy symptoms: Patient denies symptoms of a hypersomnolence disorder such as sleep paralysis, sleep-related hallucinations, and cataplexy.   RLS symptoms: Patient denies RLS symptoms.  Movements in sleep: Patient denies problematic movements in sleep such as seizures during sleep, frequent leg kicks / jerks while asleep, sleep-related bruxism, and waking up with bedsheets in disarray.  Parasomnia symptoms: Patient denies symptoms of parasomnia such as sleepwalking, sleeptalking, sleep-eating, acting out dreams, and nightmares.     WEIGHT: stable    REVIEW OF SYSTEMS: All other systems have been reviewed and are negative.    PERTINENT SOCIAL HISTORY:  Occupation: retired  Smoking: No   ETOH: No   Marijuana: No   Caffeine: Yes, 2 cups of coffee 1 is in the morning, 2 cup is 4 pm  Sleep aids: Yes, Benadryl and Tylenol PM, or Diazepam 5 mg  Claustrophobia: No     PERTINENT PAST SURGICAL HISTORY:  non-contributory    PERTINENT FAMILY HISTORY:  Patient denies family history of any sleep disorder.  Patient denies family history of sleep apnea.    Active Problems, Allergy List, Medication List, and PMH/PSH/FH/Social Hx have been reviewed and reconciled in chart. No significant changes unless documented in the pertinent  chart section. Updates made when necessary.       Objective   Vitals:    09/04/24 0929   BP: 117/77   Pulse: 80   Temp: 35.9 °C (96.6 °F)   SpO2: 98%      REVIEW OF SYSTEMS  Review of Systems are all negative      ALLERGIES  Allergies   Allergen Reactions    Cigarette Smoke Other     Mental unclear     Caffeine Unknown     Pt denies    Latanoprost Unknown     Pt denies    Timolol Other     Pt denies    Wheat Flour Unknown     Pt denies this as sensitivity    Yeast, Dried Unknown     Pt denies as a sensitivity       MEDICATIONS  Current Outpatient Medications   Medication Sig Dispense Refill    atorvastatin (Lipitor) 40 mg tablet Take 1 tablet (40 mg) by mouth once daily at bedtime. 90 tablet 1    brimonidine (AlphaGAN P) 0.15 % ophthalmic solution Administer 1 drop into both eyes once daily. 5 mL 6    buPROPion XL (Wellbutrin XL) 150 mg 24 hr tablet TAKE 1 TABLET BY MOUTH EVERY DAY IN THE MORNING AND MID AFTERNOON. 180 tablet 1    cholecalciferol (Vitamin D-3) 50 MCG (2000 UT) tablet Take 2 tablets (4,000 Units) by mouth once daily in the evening. Take with meals.      coenzyme Q10-vitamin E 100-5 mg-unit capsule TAKE 200 MG BY MOUTH ONCE DAILY. 180 capsule 2    cycloSPORINE (Restasis) 0.05 % ophthalmic emulsion Administer 1 drop into both eyes 2 times a day. 60 mL 11    diclofenac sodium 1 % kit Place 4 g on the skin once daily.      Farxiga 10 mg TAKE 1 TABLET BY MOUTH EVERY DAY 90 tablet 0    Farxiga 5 mg TAKE 1 TABLET BY MOUTH EVERY DAY 90 tablet 0    FLUoxetine (PROzac) 40 mg capsule TAKE 1 CAPSULE BY MOUTH EVERY DAY 90 capsule 1    FreeStyle Lite Strips strip 2 times a day.      glipiZIDE (Glucotrol) 5 mg tablet TAKE 2 TABLETS (10 MG) BY MOUTH 2 TIMES A DAY BEFORE MEALS. 360 tablet 0    ibuprofen (Motrin) capsule Take 1 capsule (200 mg) by mouth.      Januvia 100 mg tablet TAKE 1 TABLET BY MOUTH EVERY DAY 90 tablet 0    latanoprost (Xalatan) 0.005 % ophthalmic solution Administer 1 drop into both eyes once  daily at bedtime. 2.5 mL 11    levothyroxine (Synthroid, Levoxyl) 100 mcg tablet Take 1 tablet (100 mcg) by mouth once daily. as directed 90 tablet 1    lisinopril 10 mg tablet Take 1 tablet (10 mg) by mouth once daily. 90 tablet 2    meclizine (Antivert) 12.5 mg tablet Take 1 tablet (12.5 mg) by mouth 3 times a day as needed for dizziness. 30 tablet 11    metFORMIN (Glucophage) 500 mg tablet TAKE 2 TABLETS BY MOUTH TWICE A  tablet 0    nystatin (Mycostatin) cream 2-3 GM ON THE SKIN TWICE A DAY      omega-3 acid ethyl esters (Lovaza) 1 gram capsule TAKE 4 CAPSULES (4 G) BY MOUTH ONCE DAILY IN THE EVENING. TAKE WITH MEALS. 360 capsule 0    pantoprazole (ProtoNix) 40 mg EC tablet Take 1 tablet (40 mg) by mouth once daily. 90 tablet 2    polyethylene glycol-electrolytes 420 gram solution Take by mouth.  drink 1/2 beginning at 6pm night before the procedure and start drinking the 2nd 1/2 5 hours before procedure time      risperiDONE (RisperDAL) 1 mg tablet Take 1 tablet (1 mg) by mouth 2 times a day. 180 tablet 1    testosterone 20.25 mg/1.25 gram (1.62 %) gel in metered-dose pump Place 2 Pump on the skin once daily. 75 g 0    timolol (Timoptic) 0.5 % ophthalmic solution ADMINISTER 1 DROP INTO BOTH EYES 2 TIMES A DAY. 30 mL 2    tobramycin-dexamethasone (Tobradex) ophthalmic suspension Administer 1 drop into both eyes every 4 hours while awake. 10 mL 2    triamcinolone (Kenalog) 0.1 % cream 1 Gram      VITAMIN B COMPLEX ORAL Take 1 capsule by mouth once daily.       No current facility-administered medications for this visit.         Physical Exam  Constitutional:alert and oriented to time, place, and person  Sinus: - tenderness to palpation  Palate: Narrow Mallampati 3, + Tongue scalloping, + macroglossia  Lungs: Clear to auscultation bilateral, no rales  Heart: Regular rate and rhythm, no murmurs    Assessment/Plan   Truong Armijo is a 74 y.o. male who is seen to evaluate for possible obstructive sleep  apnea. The pathophysiology of sleep apnea, diagnostic testing (HST vs PSG), treatment options (PAP, oral appliance, surgery, hypoglossal nerve stimulator called Inspire), and supportive management (weight loss, positional therapy, smoking cessation, avoidance of alcohol and sedatives) were discussed with the patient in detail. Risk factors of sleep apnea as well as cardiometabolic and neurocognitive sequelae associated with untreated sleep apnea were also discussed. Lastly, patient was advised to avoid driving vehicle or operating heavy machinery when sleepy.     Truong Armijo with the following problems:     # SLEEP DISORDERED BREATHING:  -This is likely sleep apnea based on the the history and physical examination.   -Truong Armijo has not yet had a sleep study.  -Instruct patient to complete an in lab sleep study.  -We consider treatment as indicated when testing is complete.      # CHRONIC SLEEP ONSET/ SLEEP MAINTENANCE INSOMNIA:  -likely due to poor sleep hygiene, and untreated sleep apnea.  -Take Diazepam 5 mg or Benadryl and Tylenol PM  -JENNY: 19  -Sleep hygiene discuss in the clinic.    # DEPRESSION and ANXIETY/ Paranoia:   -Truong Flores taking FLUoxetine (PROzac) 40 mg risperiDONE (RisperDAL) 1 mg and participating in psychotherapy twice every week,  -Denies HI/SI     # HYPERTENSION:  -Blood pressure was controlled today. To control the blood pressure better, instruct the patient to take anti-hypertension medication at bedtime and a water pill in the waking time.  -Denies headache, palpitation, and syncope in the clinic.  -Follows with PCP/ Cardiology     # OVERWEIGHT:  -with a BMI of 26.19. Truong Armijo most recent Bicarbonate was 28  Bicarbonate   Date Value Ref Range Status   08/09/2024 28 21 - 32 mmol/L Final   -Encourage to have regular exercise to manage weight well.    # XEROSTOMIA:  -Instruct Truong Armijoto purchase the Biotene gel to ease the dry mouth symptom,       RTC 2-3 weeks  after sleep study       All of patient's questions were answered. He verbalizes understanding and agreement with my assessment and plan.

## 2024-09-04 ENCOUNTER — OFFICE VISIT (OUTPATIENT)
Dept: SLEEP MEDICINE | Facility: HOSPITAL | Age: 74
End: 2024-09-04
Payer: COMMERCIAL

## 2024-09-04 VITALS
DIASTOLIC BLOOD PRESSURE: 77 MMHG | TEMPERATURE: 96.6 F | OXYGEN SATURATION: 98 % | WEIGHT: 204 LBS | BODY MASS INDEX: 26.19 KG/M2 | SYSTOLIC BLOOD PRESSURE: 117 MMHG | HEART RATE: 80 BPM

## 2024-09-04 DIAGNOSIS — F41.8 MIXED ANXIETY DEPRESSIVE DISORDER: ICD-10-CM

## 2024-09-04 DIAGNOSIS — E11.9 TYPE 2 DIABETES MELLITUS WITHOUT COMPLICATION, WITHOUT LONG-TERM CURRENT USE OF INSULIN (MULTI): ICD-10-CM

## 2024-09-04 DIAGNOSIS — G47.30 SLEEP-RELATED BREATHING DISORDER: Primary | ICD-10-CM

## 2024-09-04 DIAGNOSIS — F51.04 PSYCHOPHYSIOLOGICAL INSOMNIA: ICD-10-CM

## 2024-09-04 DIAGNOSIS — G47.00 INSOMNIA, UNSPECIFIED TYPE: ICD-10-CM

## 2024-09-04 DIAGNOSIS — I10 BENIGN ESSENTIAL HYPERTENSION: ICD-10-CM

## 2024-09-04 DIAGNOSIS — J43.1 PANLOBULAR EMPHYSEMA (MULTI): ICD-10-CM

## 2024-09-04 PROBLEM — G45.9 TIA (TRANSIENT ISCHEMIC ATTACK): Status: RESOLVED | Noted: 2023-09-14 | Resolved: 2024-09-04

## 2024-09-04 PROBLEM — I95.2 HYPOTENSION DUE TO DRUGS: Status: RESOLVED | Noted: 2023-09-14 | Resolved: 2024-09-04

## 2024-09-04 PROCEDURE — 3048F LDL-C <100 MG/DL: CPT

## 2024-09-04 PROCEDURE — 99204 OFFICE O/P NEW MOD 45 MIN: CPT

## 2024-09-04 PROCEDURE — 4010F ACE/ARB THERAPY RXD/TAKEN: CPT

## 2024-09-04 PROCEDURE — 1159F MED LIST DOCD IN RCRD: CPT

## 2024-09-04 PROCEDURE — 3044F HG A1C LEVEL LT 7.0%: CPT

## 2024-09-04 PROCEDURE — 3074F SYST BP LT 130 MM HG: CPT

## 2024-09-04 PROCEDURE — 1160F RVW MEDS BY RX/DR IN RCRD: CPT

## 2024-09-04 PROCEDURE — 3078F DIAST BP <80 MM HG: CPT

## 2024-09-04 PROCEDURE — G2211 COMPLEX E/M VISIT ADD ON: HCPCS

## 2024-09-04 PROCEDURE — 1036F TOBACCO NON-USER: CPT

## 2024-09-04 PROCEDURE — 1126F AMNT PAIN NOTED NONE PRSNT: CPT

## 2024-09-04 PROCEDURE — 99214 OFFICE O/P EST MOD 30 MIN: CPT

## 2024-09-04 ASSESSMENT — SLEEP AND FATIGUE QUESTIONNAIRES
HOW LIKELY ARE YOU TO NOD OFF OR FALL ASLEEP WHILE WATCHING TV: HIGH CHANCE OF DOZING
HOW LIKELY ARE YOU TO NOD OFF OR FALL ASLEEP WHEN YOU ARE A PASSENGER IN A CAR FOR AN HOUR WITHOUT A BREAK: WOULD NEVER DOZE
HOW LIKELY ARE YOU TO NOD OFF OR FALL ASLEEP WHILE LYING DOWN TO REST IN THE AFTERNOON WHEN CIRCUMSTANCES PERMIT: MODERATE CHANCE OF DOZING
WAKING_TOO_EARLY: VERY SEVERE
WORRIED_DISTRESSED_DUE_TO_SLEEP: SOMEWHAT
SLEEP_PROBLEM_NOTICEABLE_TO_OTHERS: SOMEWHAT
SITING INACTIVE IN A PUBLIC PLACE LIKE A CLASS ROOM OR A MOVIE THEATER: MODERATE CHANCE OF DOZING
DIFFICULTY_STAYING_ASLEEP: SEVERE
SLEEP_PROBLEM_INTERFERES_DAILY_ACTIVITIES: MUCH
DIFFICULTY_FALLING_ASLEEP: MODERATE
HOW LIKELY ARE YOU TO NOD OFF OR FALL ASLEEP WHILE SITTING AND TALKING TO SOMEONE: WOULD NEVER DOZE
HOW LIKELY ARE YOU TO NOD OFF OR FALL ASLEEP IN A CAR, WHILE STOPPED FOR A FEW MINUTES IN TRAFFIC: WOULD NEVER DOZE
HOW LIKELY ARE YOU TO NOD OFF OR FALL ASLEEP WHILE SITTING AND READING: MODERATE CHANCE OF DOZING
SATISFACTION_WITH_CURRENT_SLEEP_PATTERN: DISSATISFIED
ESS-CHAD TOTAL SCORE: 11
HOW LIKELY ARE YOU TO NOD OFF OR FALL ASLEEP WHILE SITTING QUIETLY AFTER LUNCH WITHOUT ALCOHOL: MODERATE CHANCE OF DOZING

## 2024-09-04 ASSESSMENT — LIFESTYLE VARIABLES
HOW OFTEN DO YOU HAVE A DRINK CONTAINING ALCOHOL: NEVER
HOW OFTEN DO YOU HAVE SIX OR MORE DRINKS ON ONE OCCASION: NEVER
SKIP TO QUESTIONS 9-10: 1
AUDIT-C TOTAL SCORE: 0
HOW MANY STANDARD DRINKS CONTAINING ALCOHOL DO YOU HAVE ON A TYPICAL DAY: PATIENT DOES NOT DRINK

## 2024-09-04 ASSESSMENT — PAIN SCALES - GENERAL: PAINLEVEL: 0-NO PAIN

## 2024-09-04 NOTE — PATIENT INSTRUCTIONS
[facility] Sleep Medicine  [facility]  [address]  [facility] [id number] [phone]       Thank you for coming to the Sleep Medicine Clinic today! Your sleep medicine provider today was: MOISES Chou Below is a summary of your treatment plan, patient education, other important information, and our contact numbers.    Dear Mr. Truong Armijo     Your Sleep Provider Today: MOISES Chou  Your Primary Care Physician: Ki Ahuja MD   Your Referring Provider: Josue Hernandez MD PhD    Thank you for visiting  Sleep Medicine Clinic !     1. We will proceed with an IN-CENTER sleep study to check your risk of sleep apnea. The lab will call you and schedule it for you.     2. Please do not drive when you are sleepy and start practicing the sleep hygiene as discussed in clinic.    3. Please start using Biotene to ease your dry mouth if needed.    4.  FOR QUESTIONS AND CONCERNS:   a) : In case of problems with machine or mask interface, please contact your DME company first. [employer] is the company who provides you the machine and/or PAP supplies / accessories. If [employer] is your DME, you can reach them at [phone].   b):  To schedule, cancel, or reschedule SLEEP STUDY appointments, please call [phone]  c): Please call my office with issues or questions: [phone] ([place]); [phone] ([facility]); [phone] ([place])    If you have a CPAP or BiPAP machine at home, please bring the unit and all accessories including the power cord to your appointments unless I tell you otherwise. Please have knowledge of the DME company you worked with to receive your PAP device. If you have copies of any previous sleep testing completed outside of , please bring with you to clinic as well. This information will make our visits more productive.     If you are new to CPAP or BiPAP, please note the minimum  usage insurance requires to continuing coverage for the equipment as noted by your DME company. Please discuss equipment issues (PAP unit, mask fit, humidification, etc.) with your DME company first.       In the event that you are running more than 10 minutes late to your appointment, I will kindly ask you to reschedule. Thanks.      TREATMENT PLAN     Follow-up Appointment:   Follow-up in 2-3 weeks after sleep study.    PATIENT EDUCATION     OBSTRUCTIVE SLEEP APNEA (THONY) is a sleep disorder where your upper airway muscles relax during sleep and the airway intermittently and repetitively narrows and collapses leading to partially blocked airway (hypopnea) or completely blocked airway (apnea) which, in turn, can disrupt breathing in sleep, lower oxygen levels while you sleep and cause night time wakings. Because both apnea and hypopnea may cause higher carbon dioxide or low oxygen levels, untreated THONY can lead to heart arrhythmia, elevation of blood pressure, and make it harder for the body to consolidate memory and facilitate metabolism (leading to higher blood sugars at night). Frequent partial arousals occur during sleep resulting in sleep deprivation and daytime sleepiness. THONY is associated with an increased risk of cardiovascular disease, stroke, hypertension, and insulin resistance. Moreover, untreated THONY with excessive daytime sleepiness can increase the risk of motor vehicular accidents.    Below are conservative strategies for THONY regardless of THONY severity are:   Positional therapy - Avoid sleeping on your back.   Healthy diet and regular exercise to optimize weight is highly encouraged.   Avoid alcohol late in the evening and sedative-hypnotics as these substances can make sleep apnea worse.   Improve breathing through the nose with intranasal steroid spray, saline rinse, or antihistamines    Safety: Avoid driving vehicle and operating heavy equipment while sleepy. Drowsy driving may lead to  life-threatening motor vehicle accidents. A person driving while sleepy is 5 times more likely to have an accident. If you feel sleepy, pull over and take a short power nap (sleep for less than 30 minutes). Otherwise, ask somebody to drive you.    Treatment options for sleep apnea include weight management, positional therapy, Positive Airway Therapy (PAP) therapy, oral appliance therapy, hypoglossal nerve stimulator (Inspire) and select airway surgeries.    Sleep Testing for sleep apnea: The best and ideal way to check out if you have sleep apnea is to do an overnight sleep study in the sleep laboratory. Alternatively, a home sleep apnea test can also be done depending on your insurance and risk factors.     If you are having a home sleep apnea test, kindly allot 1 hour during pickup of the testing kit as you will have to complete paperwork and listen to the sleep technician for in-person on-the-spot demonstration and instructions on how to hook up the testing kit at home. Do the test for 1 day and start off with sleeping on your back. If you sleep on your side in the middle of night or you have always been a side or stomach-sleeper, it is ok as long as you have some time on your back and off-back.     If you are having an overnight in-lab sleep study, please make sure to bring toiletries, a comfy pillow, additional warm blankets, and any nighttime medications (include as-needed inhaler, pain pill, etc) that you may regularly take. Also, be sure to eat dinner before you arrive as we generally do not provide meals inside the sleep testing center. Lastly, in order to fall asleep faster in the sleep testing center, we advise patients to wake up 2 hours earlier on the morning of scheduled testing and avoid napping 2 days prior testing. Sometimes, your sleep provider may prescribe a sleep aid to be taken at lights out in the sleep testing center. If you are taking a sleep aid, consider having somebody pick you up after  the sleep testing.    Overnight sleep studies may be scheduled on a weekday or weekend. We also perform daytime testing for shift workers on a case-by-case basis.    Once you have booked an appointment to do the sleep study, please contact my office for follow-up visit to discuss results.    On the other hand, if you have any of the following, please consider calling the sleep testing center to RESCHEDULE your sleep study appointment:  If you tested positive for COVID within 10 days of your sleep study appointment.  If you were exposed to somebody who was confirmed for COVID within 10 days of your sleep study appointment and now you are having symptoms of possible COVID  If you have fever>100F or any acute symptoms that you think will lead to poor sleep during testing (e.g. new or worsening stuffy nose not relieved by steroid nasal spray)  If you have traveled domestically or internationally in the last month and now you are having symptoms of possible COVID    Trouble falling asleep or trouble staying asleep is called INSOMNIA and it can be caused by many different things such as untreated sleep apnea, anxiety, depression, stress, poor sleep habits, and other medical conditions or medications. The best way to treat insomnia is to treat the cause. In general, we can all benefit from better sleep hygiene (also known as good sleep habits). Below are recommendations to help you improve your sleep so you can fall asleep, stay asleep, and wake up feeling refreshed.    Keep a routine bedtime and rise time even on weekends and non-work days. This helps your biological clock stay in sync with your sleep needs. If you currently have variable sleep-wake schedule, start off by waking up and getting out of bed the same time every day, even on weekends or non-work days. Whether you have a good or poor sleep the night before, waking up at the same time every day can help re-train and reset your body clock.  Go to bed when you are  "sleepy and not when tired nor before your goal bedtime. Long periods of time in bed will lead to fragmented, shallow, broken sleep.   Use the bed for sleep and intimacy only. Do not watch TV, eat, read or use phone/laptop in bed. Keeping sleep as the only activity in bed will help re-associate bed equals sleep.  Get up when you cannot sleep in 15-20 minutes. When you are unable to sleep, exit the bed, and go to another room or chair in bedroom, do something boring, calm, relaxing, distracting, or non-stimulating in dim lighting until you feel sleepy enough to fall back asleep. Avoid stimulating activity or any triggers for mental thinking (e.g. cellphone). Repeat as needed.  Avoid napping during the day to build up sleep pressure so that it won't be hard for you to fall asleep at night. Napping, particularly in the late afternoon or early evening may interfere with your night's sleep. If naps are necessary, limit naps under 15-20 minutes and not later than 3 PM.   Create a \"buffer zone\" or \"wind-down time\". This is a quiet time prior to bedtime, typically at least 30 minutes and perhaps as long as 1-2 hours. During this time, you should do things that are enjoyable, relaxing, and not necessarily goal-oriented like performing relaxation exercises, listening to relaxing music, reading a boring book, dimming the lights, or eating a light bedtime snack like a glass of milk and banana which can promote sleep. Activities that promote relaxation before sleep is important because these can help quiet the mind and relax the body to get into the right state for sleep.   Do not worry or plan in bed. If you are worrying, planning, feeling anxious, or cannot shut off your thoughts, get up and stay out of bed until you have quieted your mind. Set up a “scheduled worry time” in the morning or late afternoon to write down your worries and stressors as well as plans for the following day.   Keep your bedroom quiet, dark, and cool. " "Ideal sleeping temperature is 65F. If light bothers you, get a slumber mask or blackout shades. If noise bothers you, put ear plugs or get a white noise machine. Alternatively, you may turn on fan or humidifier to create a constant background noise to eliminate unexpected sounds that would otherwise wake you up in the middle of your sleep.  Keep your bedroom technology free. Avoid exposure to TV and electronics 1-2 hours prior to bedtime. May also consider wearing \"blue light blocker\" eyeglasses.  Turn the clock around to avoid clock-watching. Thoughts of the time can cause frustration and make it more difficult to go to sleep.   Other things to avoid to help   Avoid  caffeine (including chocolate) intake in the late afternoon and early evening. Limit the amount of caffeine and consume before noon.   Avoid smoking 2-3 hours before bedtime. Like caffeine, nicotine in cigarette smoking acts a stimulant.   Avoid alcohol intake 2-3 hours before bedtime. Although alcohol may seem to help you fall asleep more easily as it slows down brain activity, it also disrupts your sleep during the night by causing frequent awakenings as the effect of alcohol wears off. Additionally, alcohol worsens snoring and sleep apnea  Avoid eating a heavy meal (high-fat, high-carbohydrate, gas-producing foods) at dinner and 2-3 hours before bedtime.    Avoid drinking more than 8 oz liquids in the evening. Limit fluid intake at 8 oz during dinner. Restrict fluids after dinner. May take sips of water enough to swallow bedtime medications. Void at bedtime.  Avoid physical exercise or hot shower/bath within 2 hours of bedtime. Regular exercise can improve sleep quality, but exercising or having a hot shower/bath too close to bedtime can disrupt sleep onset. The best time to exercise to help sleep is in the late afternoon or early evening but not within 2 hours of bedtime. In order to promote sleep, hot shower/bath can be taken in the evening for " 15-20 minutes but not within 2 hours of going to bed.   Avoid sleeping with pets or kids if they appear to bother your sleep and/or sleep quality.  Last but the least, DO NOT TRY TOO HARD TO SLEEP. JUST ALLOW SLEEP TO UNFOLD.    MOST IMPORTANTLY:  BE PATIENT!  BE CONSISTENT!  Your sleep problem developed over time so it will take some time and consistency to return to a more normal sleep pattern. By following the suggestions listed above, you should see gradual sleep improvements over time.    Also you have been recommended to do Cognitive Behavioral Therapy for Insomnia (CBT-I). CBT-I is widely recognized as an effective treatment for a wide range of insomnias. CBT-I is typically made up of a number of components including assessment, behavioral and cognitive interventions, motivational techniques, and relapse prevention skills. An overwhelming amount of evidence suggests that CBT-I is as effective as hypnotics and the newer non-benzodiazepine sleep aides in the acute treatment and is more effective than medication in the long term treatment of insomnia.     Below are some resources for CBT-I:  CBT-I at  with Taylor Kolby, NP  GoToSleep- online course via [facility]. Self-guided. One time charge to sign up.  SleepEZ- Free self-guided course from the [facility] https://www.veterantraining.va.gov/insomnia/  Dr. Briceño - one on one CBT-I service [email or web address]    You can also go to the following EDUCATION WEBSITES for further information:   American Academy of Sleep Medicine http://sleepeducation.org  National Sleep Foundation: https://sleepfoundation.org  American Sleep Apnea Association: https://www.sleepapnea.org (for patients with sleep apnea)  Narcolepsy Network: https://www.narcolepsynetwork.org (for patients with narcolepsy)  [employer]: https://www.wakeupnarcolepsy.org (for patients with narcolepsy)  Hypersomnia Foundation: https://www.hypersomniafoundation.org (for patients with idiopathic  hypersomnia)  RLS foundation: https://www.rls.org (for patients with restless leg syndrome)    IMPORTANT INFORMATION     Call 911 for medical emergencies.  Our offices are generally open from Monday-Friday, 8 am - 5 pm.   There are no supporting services by either the sleep doctors or their staff on weekends and Holidays, or after 5 PM on weekdays.   If you need to get in touch with me, you may either call my office number or you can use [employer].  If a referral for a test, for CPAP, or for another specialist was made, and you have not heard about scheduling this within a week, please call scheduling at [phone] ([phone]).  If you are unable to make your appointment for clinic or an overnight study, kindly call the office or sleep testing center at least 48 hours in advance to cancel and reschedule.  If you are on CPAP, please bring your device's card and/or the device to each clinic appointment.   In case of problems with PAP machine or mask interface, please contact your [employer]urable [employer]) company first. [employer] is the company who provides you the machine and/or PAP supplies.       PRESCRIPTIONS     We require 7 days advanced notice for prescription refills. If we do not receive the request in this time, we cannot guarantee that your medication will be refilled in time.    IMPORTANT PHONE NUMBERS     Sleep Medicine Clinic Fax: [phone]  Appointments (for Pediatric Sleep Clinic): [phone] - option 1  Appointments (for Adult Sleep Clinic): [phone] - option 2  Appointments (For Sleep Studies): [phone] - option 3  Behavioral Sleep Medicine: [phone]  Sleep Surgery: [phone]  Nutrition Service: [phone]  Weight management clinics with endocrinology: [phone]  Bariatric Services: [phone] (includes weight loss medications and weight loss surgery)  [facility]: [phone] (offers holistic approaches to weight management)  ENT  (Otolaryngology): [phone]  Headache Clinic (Neurology): [phone]  Neurology: [phone]  Psychiatry: [phone]  Pulmonary Function Testing (PFT) Center: [phone]  Pulmonary Medicine: [phone]  Medical Service [employer]): [phone]      OUR SLEEP TESTING LOCATIONS     Our team will contact you to schedule your sleep study, however, you can contact us as follow:  Main Phone Line (scheduling only): [phone]), option 3  Adult and Pediatric Locations  [facility] (6 years and older): [facility] - 4th floor ([address]) After hours line: [phone]  [facility] (Main campus: All ages): [facility], 6th floor. After hours line: [phone]   [place] (5 years and older; younger considered on case-by-case basis): [address]; [address], [address]. Scheduling  After hours line: [phone]   [place] (6 years and older): [address]; [address]; [address]   [place] (6 years and older): [address], [address]  After hours line: [phone]   [place] (13 years and older) in [place]: [address], 2nd floor  After hours line: [phone]  [facility] (13 year and older): [address]  After hours line: [phone]     Adult Only Locations:   [place] (18 years and older): [address] [facility], 2nd floor   [place] (18 years and older): [address]; 4th floor  After hours line: [phone]  [facility] (18 years and older) at [place]: [id number] [place] [address]  After hours line: [phone]     CONTACTING YOUR SLEEP MEDICINE PROVIDER AND SLEEP TEAM      For issues with your machine or mask interface, please call your DME provider first. DME stands for durable medical company. DME is the company who provides you the machine and/or PAP supplies / accessories.   To schedule, cancel, or reschedule SLEEP STUDY APPOINTMENTS,  "please call the Main Phone Line at [phone] ([phone] - option 3.   To schedule, cancel, or reschedule CLINIC APPOINTMENTS, you can do it in \"MyChart\", call [phone] to speak with my  (Marleny Lance), or call the Main Phone Line at [phone] - option 2  For CLINICAL QUESTIONS or MEDICATION REFILLS, please call direct line for Adult Sleep Nurses at [phone].   Lastly, you can also send a message directly to your provider through \"My Chart\", which is the email service through your  Records Account: [email or web address]       Here at [facility], we wish you a restful sleep!   "

## 2024-09-05 ENCOUNTER — OFFICE VISIT (OUTPATIENT)
Dept: OTOLARYNGOLOGY | Facility: CLINIC | Age: 74
End: 2024-09-05
Payer: COMMERCIAL

## 2024-09-05 VITALS
WEIGHT: 206 LBS | BODY MASS INDEX: 26.44 KG/M2 | DIASTOLIC BLOOD PRESSURE: 66 MMHG | OXYGEN SATURATION: 99 % | TEMPERATURE: 97.2 F | SYSTOLIC BLOOD PRESSURE: 116 MMHG | HEART RATE: 101 BPM | HEIGHT: 74 IN

## 2024-09-05 DIAGNOSIS — H90.6 MIXED HEARING LOSS, BILATERAL: Primary | ICD-10-CM

## 2024-09-05 DIAGNOSIS — R42 DIZZINESS AND GIDDINESS: ICD-10-CM

## 2024-09-05 DIAGNOSIS — R42 VERTIGO: ICD-10-CM

## 2024-09-05 PROCEDURE — 1159F MED LIST DOCD IN RCRD: CPT | Performed by: NURSE PRACTITIONER

## 2024-09-05 PROCEDURE — 3008F BODY MASS INDEX DOCD: CPT | Performed by: NURSE PRACTITIONER

## 2024-09-05 PROCEDURE — 4010F ACE/ARB THERAPY RXD/TAKEN: CPT | Performed by: NURSE PRACTITIONER

## 2024-09-05 PROCEDURE — 3048F LDL-C <100 MG/DL: CPT | Performed by: NURSE PRACTITIONER

## 2024-09-05 PROCEDURE — 99214 OFFICE O/P EST MOD 30 MIN: CPT | Performed by: NURSE PRACTITIONER

## 2024-09-05 PROCEDURE — 1126F AMNT PAIN NOTED NONE PRSNT: CPT | Performed by: NURSE PRACTITIONER

## 2024-09-05 PROCEDURE — 3044F HG A1C LEVEL LT 7.0%: CPT | Performed by: NURSE PRACTITIONER

## 2024-09-05 PROCEDURE — 99204 OFFICE O/P NEW MOD 45 MIN: CPT | Performed by: NURSE PRACTITIONER

## 2024-09-05 PROCEDURE — 1036F TOBACCO NON-USER: CPT | Performed by: NURSE PRACTITIONER

## 2024-09-05 PROCEDURE — 3074F SYST BP LT 130 MM HG: CPT | Performed by: NURSE PRACTITIONER

## 2024-09-05 PROCEDURE — 3078F DIAST BP <80 MM HG: CPT | Performed by: NURSE PRACTITIONER

## 2024-09-05 ASSESSMENT — PATIENT HEALTH QUESTIONNAIRE - PHQ9
10. IF YOU CHECKED OFF ANY PROBLEMS, HOW DIFFICULT HAVE THESE PROBLEMS MADE IT FOR YOU TO DO YOUR WORK, TAKE CARE OF THINGS AT HOME, OR GET ALONG WITH OTHER PEOPLE: SOMEWHAT DIFFICULT
6. FEELING BAD ABOUT YOURSELF - OR THAT YOU ARE A FAILURE OR HAVE LET YOURSELF OR YOUR FAMILY DOWN: NEARLY EVERY DAY
1. LITTLE INTEREST OR PLEASURE IN DOING THINGS: NEARLY EVERY DAY
2. FEELING DOWN, DEPRESSED OR HOPELESS: NEARLY EVERY DAY
9. THOUGHTS THAT YOU WOULD BE BETTER OFF DEAD, OR OF HURTING YOURSELF: NOT AT ALL
3. TROUBLE FALLING OR STAYING ASLEEP OR SLEEPING TOO MUCH: SEVERAL DAYS
4. FEELING TIRED OR HAVING LITTLE ENERGY: NEARLY EVERY DAY
7. TROUBLE CONCENTRATING ON THINGS, SUCH AS READING THE NEWSPAPER OR WATCHING TELEVISION: SEVERAL DAYS
SUM OF ALL RESPONSES TO PHQ9 QUESTIONS 1 AND 2: 6
SUM OF ALL RESPONSES TO PHQ QUESTIONS 1-9: 16
8. MOVING OR SPEAKING SO SLOWLY THAT OTHER PEOPLE COULD HAVE NOTICED. OR THE OPPOSITE, BEING SO FIGETY OR RESTLESS THAT YOU HAVE BEEN MOVING AROUND A LOT MORE THAN USUAL: SEVERAL DAYS
5. POOR APPETITE OR OVEREATING: SEVERAL DAYS

## 2024-09-05 ASSESSMENT — COLUMBIA-SUICIDE SEVERITY RATING SCALE - C-SSRS
2. HAVE YOU ACTUALLY HAD ANY THOUGHTS OF KILLING YOURSELF?: NO
6. HAVE YOU EVER DONE ANYTHING, STARTED TO DO ANYTHING, OR PREPARED TO DO ANYTHING TO END YOUR LIFE?: NO
1. IN THE PAST MONTH, HAVE YOU WISHED YOU WERE DEAD OR WISHED YOU COULD GO TO SLEEP AND NOT WAKE UP?: NO

## 2024-09-05 ASSESSMENT — ENCOUNTER SYMPTOMS
DEPRESSION: 1
LOSS OF SENSATION IN FEET: 1
OCCASIONAL FEELINGS OF UNSTEADINESS: 1

## 2024-09-05 ASSESSMENT — PAIN SCALES - GENERAL: PAINLEVEL: 0-NO PAIN

## 2024-09-05 NOTE — PROGRESS NOTES
Subjective   Patient ID: Truong Armijo is a 74 y.o. male who presents for No chief complaint on file..    HPI  Patient here for dizziness.  2 weeks ago he had an episode of severe dizziness. He describes it as feeling like he was tilting. This lasted for hours. Head position didn't trigger it, he was sitting and watching TV. No illness prior to this. Some days he has no dizziness, and some days he's dizzy all of the time.  Denies headaches, blurry vision, and double vision.   Bright lights and loud sounds do not make the patient dizzy. Denies pressure induced dizziness. Denies autophony. Denies positional vertigo.      He denies hearing loss. He has tinnitus. Denies ear pain and drainage.   Denies previous ear surgery, loud noise exposure, and he does have a family history of hearing loss.      Had eye infection for the last 2-3 mos. Is following with optho.    Had vertigo 12 years ago and did PT.    Patient Active Problem List   Diagnosis    Acne vulgaris    Acute pain of left knee    Arthralgia of knee    Adverse effect of drug    Anorexia    Atrophic retina    Benign essential hypertension    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    Chest pain of uncertain etiology    Chronic low self esteem    Chronic otitis externa of left ear    Chronic otitis media    Contusion of lower back    COPD (chronic obstructive pulmonary disease) (Multi)    Asthmatic bronchitis (HHS-HCC)    Degeneration of intervertebral disc of cervical region    Diabetic Charcot's foot (Multi)    Diabetic gastroenteropathy (Multi)    Diabetic peripheral neuropathy (Multi)    Hypercholesterolemia    Enlarged prostate without lower urinary tract symptoms (luts)    Epididymitis    Fatigue    Generalized weakness    High triglycerides    Diabetes mellitus (Multi)    Hyperglycemia    Hypothyroidism, unspecified    Impaired vision    Insomnia    Lateral epicondylitis of right elbow    Leg weakness    Trigger point of extremity    Lumbar disc herniation    Mixed  anxiety depressive disorder    Post traumatic stress disorder (PTSD)    Mixed hearing loss    Moderate episode of recurrent major depressive disorder (Multi)    Neuroleptic-induced parkinsonism (Multi)    Organic impotence    Paranoia (psychosis) (Multi)    Paranoid ideation (Multi)    Penile pain, chronic    Peripheral neuropathy    Persistent testicular pain    Primary hypothyroidism    Primary osteoarthritis of left knee    Primary osteoarthritis of right knee    PUD (peptic ulcer disease)    Pulmonary atelectasis    Reactive confusion    Reflux esophagitis    Right inguinal hernia    Schizoaffective disorder (Multi)    SOB (shortness of breath)    Subjective tinnitus of both ears    Hypogonadism male    Testicular hypofunction    Trigeminal neuralgia    Urinary frequency    Vasovagal reaction    Vertigo    Vitamin D deficiency    Medicare annual wellness visit, subsequent    Glaucoma    Post-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 chronic fatigue    Posterior capsular opacification of both eyes, not obscuring vision    Pseudophakia of both eyes    PVD (posterior vitreous detachment), both eyes    Presbyopia of both eyes    Myopia of both eyes with astigmatism and presbyopia    Photopsia    Acephalgic migraine    Radiculopathy, lumbar region    Nevus of choroid of left eye    Acute bacterial conjunctivitis of both eyes    Epiretinal membrane (ERM) of left eye    Dry eyes, bilateral    Chronic follicular conjunctivitis of both eyes    Sleep-related breathing disorder     Past Surgical History:   Procedure Laterality Date    TONSILLECTOMY  09/20/2013    Tonsillectomy     Review of Systems    All other systems have been reviewed and are negative for complaints except for those mentioned in history of present illness, past medical history and problem list.     Objective   Physical Exam    Constitutional: No fever, chills, weight loss or weight gain  General appearance: Appears well, well-nourished, well groomed. No acute  distress.    Communication: Normal communication    Psychiatric: Oriented to person, place and time. Normal mood and affect.    Neurologic: Cranial nerves II-XII grossly intact and symmetric bilaterally.    Head and Face:  Head: Atraumatic with no masses, lesions or scarring.  Face: Normal symmetry. No scars or deformities.  TMJ: Normal, no trismus.    Eyes: Conjunctiva not edematous or erythematous. PERRLA    Right Ear: External inspection of ear with no deformity, scars, or masses. EAC is clear.  TM is intact with no sign of infection, effusion, or retraction.  No perforation seen.     Left Ear: External inspection of ear with no deformity, scars, or masses. EAC is clear.  TM is intact with no sign of infection, effusion, or retraction.  No perforation seen.     Apple lateralizes to the left.  AC > BC bilaterally.     Nose: External inspection of nose: No nasal lesions, lacerations or scars. Anterior rhinoscopy with limited visualization past the inferior turbinates. No tenderness on frontal or maxillary sinus palpation.    Oral Cavity/Mouth: Oral cavity and oropharynx mucosa moist and pink. No lesions or masses. Dentition normal. Tonsils appear surgically removed. Uvula is midline. Tongue with no masses or lesions. Tongue with good mobility. The oropharynx is clear.    Neck: Normal appearing, symmetric, trachea midline.     Cardiovascular: Examination of peripheral vascular system shows no clubbing or cyanosis.    Respiratory: No respiratory distress increased work of breathing. Inspection of the chest with symmetric chest expansion and normal respiratory effort.    Skin: No head and neck rashes.    Lymph nodes: No adenopathy.     On vestibular exam, oculomotor function assessment shows normal ocular pursuits and saccades. There is no spontaneous nystagmus. Head Thrust test is negative bilaterally.  Positional testing including Dickinson Hallpike is negative bilaterally.  Postural testing performed. Romberg is negative  for any obvious weakness/sway. Tandem Gait is negative for any obvious weakness/sway.     Diagnostic Results       Reviewed thyroid US.     Assessment/Plan   Diagnoses and all orders for this visit:  Mixed hearing loss, bilateral  Vertigo  Dizziness and giddiness    The physiology of balance control was explained. The likely possible etiologies were reviewed and the patient was thoroughly evaluated for BPPV, vestibular neuritis/labyrinthitis, vestibular hypofunction, Menieres Disease, and SCCD. I believe the patient may have a peripheral vestibular disorder. I recommended Balance function testing further evaluate for peripheral vestibular dysfunction.  I also recommend CT IAC to rule out semicircular canal dehiscence due to his dizziness and mixed hearing loss.  I will follow-up with results.  Patient will continue follow-up with ophthalmology regarding his eye infection.    He also follows with psychiatry/psychology.    All questions answered to patient's satisfaction.       KETURAH Lin-CNP 09/05/24 3:45 PM

## 2024-09-05 NOTE — PATIENT INSTRUCTIONS
- Call [phone] to schedule your CT IAC  - Schedule balance function tests by calling [phone]  I will follow up with results.    Welcome to Arlene Antoniablanka's clinic. We are here to assist you through your ENT care at [facility].  Arlene is a Nurse Practitioner who specializes in General ENT. This means that she specializes in taking care of patients with usual ENT issues such as nasal congestion, allergy symptoms, sinusitis, hearing loss, ear infections, ear wax removal, hoarseness, sore throat, throat infections, reflux and some swallowing issues. She also sees patients regarding dizziness and vertigo.   Kerry is Arlene's  and she answers the office phone from 7:30am-4pm Mon-Fri. Call [phone]. She can help you with scheduling of appointments, and general questions and information. You may need to leave a message if she is helping another patient. In this case, someone from the team will call you back the same day if you leave your message before 3pm, or the next business morning.  Arlene currently sees patients at [facility] on Mondays, Wednesdays and Thursdays.  She works closely with audiologists to solve issues with hearing. She is also in very close contact with her collaborative physicians. Dr. Britt, a surgeon who specializes in general ENT and rhinology. She also works closely with otologist (ear surgeon) Dr. Cote and head and neck surgery Dr. Mcintyre.   Others who may be included in your care are dieticians, social workers, allergists, gastroenterologists, neurologists, and physical therapists. Arlene will provide these referrals as needed. Please let her know if you would like to request a specific referral.  Arlene makes every effort to run on time for your appointments. Therefore, if you are more than 15 minutes late unrelated to a scan or another appointment such as therapy or audiology, your appointment will need to be rescheduled for  another day. We appreciate your understanding.   We look forward to working with you to meet your healthcare goals.

## 2024-09-10 ENCOUNTER — OFFICE VISIT (OUTPATIENT)
Dept: BEHAVIORAL HEALTH | Facility: CLINIC | Age: 74
End: 2024-09-10
Payer: COMMERCIAL

## 2024-09-10 VITALS
SYSTOLIC BLOOD PRESSURE: 96 MMHG | HEART RATE: 108 BPM | RESPIRATION RATE: 18 BRPM | TEMPERATURE: 97.6 F | BODY MASS INDEX: 26.17 KG/M2 | DIASTOLIC BLOOD PRESSURE: 66 MMHG | WEIGHT: 203.8 LBS

## 2024-09-10 DIAGNOSIS — F25.0 SCHIZOAFFECTIVE DISORDER, BIPOLAR TYPE (MULTI): ICD-10-CM

## 2024-09-10 PROCEDURE — 1159F MED LIST DOCD IN RCRD: CPT | Performed by: PSYCHIATRY & NEUROLOGY

## 2024-09-10 PROCEDURE — 99214 OFFICE O/P EST MOD 30 MIN: CPT | Performed by: PSYCHIATRY & NEUROLOGY

## 2024-09-10 PROCEDURE — 1036F TOBACCO NON-USER: CPT | Performed by: PSYCHIATRY & NEUROLOGY

## 2024-09-10 PROCEDURE — 3078F DIAST BP <80 MM HG: CPT | Performed by: PSYCHIATRY & NEUROLOGY

## 2024-09-10 PROCEDURE — 1160F RVW MEDS BY RX/DR IN RCRD: CPT | Performed by: PSYCHIATRY & NEUROLOGY

## 2024-09-10 PROCEDURE — 3074F SYST BP LT 130 MM HG: CPT | Performed by: PSYCHIATRY & NEUROLOGY

## 2024-09-10 PROCEDURE — 3048F LDL-C <100 MG/DL: CPT | Performed by: PSYCHIATRY & NEUROLOGY

## 2024-09-10 PROCEDURE — 1126F AMNT PAIN NOTED NONE PRSNT: CPT | Performed by: PSYCHIATRY & NEUROLOGY

## 2024-09-10 PROCEDURE — 99214 OFFICE O/P EST MOD 30 MIN: CPT | Mod: AM | Performed by: PSYCHIATRY & NEUROLOGY

## 2024-09-10 PROCEDURE — 4010F ACE/ARB THERAPY RXD/TAKEN: CPT | Performed by: PSYCHIATRY & NEUROLOGY

## 2024-09-10 PROCEDURE — 3044F HG A1C LEVEL LT 7.0%: CPT | Performed by: PSYCHIATRY & NEUROLOGY

## 2024-09-10 ASSESSMENT — ENCOUNTER SYMPTOMS
DEPRESSED MOOD: 1
NERVOUS/ANXIOUS: 1
LOSS OF SENSATION IN FEET: 1
DYSPHORIC MOOD: 1
OCCASIONAL FEELINGS OF UNSTEADINESS: 1

## 2024-09-10 ASSESSMENT — PATIENT HEALTH QUESTIONNAIRE - PHQ9
4. FEELING TIRED OR HAVING LITTLE ENERGY: SEVERAL DAYS
8. MOVING OR SPEAKING SO SLOWLY THAT OTHER PEOPLE COULD HAVE NOTICED. OR THE OPPOSITE, BEING SO FIGETY OR RESTLESS THAT YOU HAVE BEEN MOVING AROUND A LOT MORE THAN USUAL: NOT AT ALL
2. FEELING DOWN, DEPRESSED OR HOPELESS: MORE THAN HALF THE DAYS
10. IF YOU CHECKED OFF ANY PROBLEMS, HOW DIFFICULT HAVE THESE PROBLEMS MADE IT FOR YOU TO DO YOUR WORK, TAKE CARE OF THINGS AT HOME, OR GET ALONG WITH OTHER PEOPLE: SOMEWHAT DIFFICULT
1. LITTLE INTEREST OR PLEASURE IN DOING THINGS: SEVERAL DAYS
9. THOUGHTS THAT YOU WOULD BE BETTER OFF DEAD, OR OF HURTING YOURSELF: NOT AT ALL
5. POOR APPETITE OR OVEREATING: NOT AT ALL
7. TROUBLE CONCENTRATING ON THINGS, SUCH AS READING THE NEWSPAPER OR WATCHING TELEVISION: NOT AT ALL
6. FEELING BAD ABOUT YOURSELF - OR THAT YOU ARE A FAILURE OR HAVE LET YOURSELF OR YOUR FAMILY DOWN: SEVERAL DAYS
SUM OF ALL RESPONSES TO PHQ9 QUESTIONS 1 AND 2: 3
SUM OF ALL RESPONSES TO PHQ QUESTIONS 1-9: 6
3. TROUBLE FALLING OR STAYING ASLEEP OR SLEEPING TOO MUCH: SEVERAL DAYS

## 2024-09-10 ASSESSMENT — PAIN SCALES - GENERAL: PAINLEVEL: 0-NO PAIN
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
## 2024-09-11 ENCOUNTER — TREATMENT (OUTPATIENT)
Dept: PHYSICAL THERAPY | Facility: CLINIC | Age: 74
End: 2024-09-11
Payer: COMMERCIAL

## 2024-09-11 ENCOUNTER — APPOINTMENT (OUTPATIENT)
Dept: PRIMARY CARE | Facility: CLINIC | Age: 74
End: 2024-09-11
Payer: COMMERCIAL

## 2024-09-11 VITALS
HEART RATE: 72 BPM | DIASTOLIC BLOOD PRESSURE: 70 MMHG | HEIGHT: 74 IN | RESPIRATION RATE: 16 BRPM | BODY MASS INDEX: 26.31 KG/M2 | WEIGHT: 205 LBS | SYSTOLIC BLOOD PRESSURE: 120 MMHG

## 2024-09-11 DIAGNOSIS — Z00.00 MEDICARE ANNUAL WELLNESS VISIT, SUBSEQUENT: Primary | ICD-10-CM

## 2024-09-11 DIAGNOSIS — E03.9 PRIMARY HYPOTHYROIDISM: ICD-10-CM

## 2024-09-11 DIAGNOSIS — I10 BENIGN ESSENTIAL HYPERTENSION: ICD-10-CM

## 2024-09-11 DIAGNOSIS — L89.152 PRESSURE INJURY OF SACRAL REGION, STAGE 2 (MULTI): ICD-10-CM

## 2024-09-11 DIAGNOSIS — E11.9 TYPE 2 DIABETES MELLITUS WITHOUT COMPLICATION, WITHOUT LONG-TERM CURRENT USE OF INSULIN (MULTI): ICD-10-CM

## 2024-09-11 DIAGNOSIS — E78.00 HYPERCHOLESTEROLEMIA: ICD-10-CM

## 2024-09-11 DIAGNOSIS — Z00.00 ROUTINE GENERAL MEDICAL EXAMINATION AT HEALTH CARE FACILITY: ICD-10-CM

## 2024-09-11 DIAGNOSIS — M25.562 ACUTE PAIN OF LEFT KNEE: ICD-10-CM

## 2024-09-11 DIAGNOSIS — J43.1 PANLOBULAR EMPHYSEMA (MULTI): ICD-10-CM

## 2024-09-11 DIAGNOSIS — K21.00 GASTROESOPHAGEAL REFLUX DISEASE WITH ESOPHAGITIS WITHOUT HEMORRHAGE: ICD-10-CM

## 2024-09-11 PROCEDURE — 3048F LDL-C <100 MG/DL: CPT | Performed by: INTERNAL MEDICINE

## 2024-09-11 PROCEDURE — 1036F TOBACCO NON-USER: CPT | Performed by: INTERNAL MEDICINE

## 2024-09-11 PROCEDURE — G0439 PPPS, SUBSEQ VISIT: HCPCS | Performed by: INTERNAL MEDICINE

## 2024-09-11 PROCEDURE — 97110 THERAPEUTIC EXERCISES: CPT | Mod: GP,CQ | Performed by: PHYSICAL THERAPY ASSISTANT

## 2024-09-11 PROCEDURE — 4010F ACE/ARB THERAPY RXD/TAKEN: CPT | Performed by: INTERNAL MEDICINE

## 2024-09-11 PROCEDURE — 3078F DIAST BP <80 MM HG: CPT | Performed by: INTERNAL MEDICINE

## 2024-09-11 PROCEDURE — 1160F RVW MEDS BY RX/DR IN RCRD: CPT | Performed by: INTERNAL MEDICINE

## 2024-09-11 PROCEDURE — 1159F MED LIST DOCD IN RCRD: CPT | Performed by: INTERNAL MEDICINE

## 2024-09-11 PROCEDURE — 3044F HG A1C LEVEL LT 7.0%: CPT | Performed by: INTERNAL MEDICINE

## 2024-09-11 PROCEDURE — 3008F BODY MASS INDEX DOCD: CPT | Performed by: INTERNAL MEDICINE

## 2024-09-11 PROCEDURE — 99214 OFFICE O/P EST MOD 30 MIN: CPT | Performed by: INTERNAL MEDICINE

## 2024-09-11 PROCEDURE — 3074F SYST BP LT 130 MM HG: CPT | Performed by: INTERNAL MEDICINE

## 2024-09-11 PROCEDURE — 1170F FXNL STATUS ASSESSED: CPT | Performed by: INTERNAL MEDICINE

## 2024-09-11 ASSESSMENT — ENCOUNTER SYMPTOMS
CONSTITUTIONAL NEGATIVE: 1
ENDOCRINE NEGATIVE: 1
ALLERGIC/IMMUNOLOGIC NEGATIVE: 1
DEPRESSION: 1
PSYCHIATRIC NEGATIVE: 1
MUSCULOSKELETAL NEGATIVE: 1
LOSS OF SENSATION IN FEET: 1
OCCASIONAL FEELINGS OF UNSTEADINESS: 1
CARDIOVASCULAR NEGATIVE: 1
GASTROINTESTINAL NEGATIVE: 1
EYES NEGATIVE: 1
NEUROLOGICAL NEGATIVE: 1
HEMATOLOGIC/LYMPHATIC NEGATIVE: 1
RESPIRATORY NEGATIVE: 1

## 2024-09-11 ASSESSMENT — ACTIVITIES OF DAILY LIVING (ADL)
BATHING: INDEPENDENT
MANAGING_FINANCES: INDEPENDENT
TAKING_MEDICATION: INDEPENDENT
GROCERY_SHOPPING: INDEPENDENT
DOING_HOUSEWORK: INDEPENDENT
DRESSING: INDEPENDENT

## 2024-09-11 ASSESSMENT — PATIENT HEALTH QUESTIONNAIRE - PHQ9
SUM OF ALL RESPONSES TO PHQ QUESTIONS 1-9: 10
1. LITTLE INTEREST OR PLEASURE IN DOING THINGS: MORE THAN HALF THE DAYS
2. FEELING DOWN, DEPRESSED OR HOPELESS: NEARLY EVERY DAY
9. THOUGHTS THAT YOU WOULD BE BETTER OFF DEAD, OR OF HURTING YOURSELF: NOT AT ALL
7. TROUBLE CONCENTRATING ON THINGS, SUCH AS READING THE NEWSPAPER OR WATCHING TELEVISION: SEVERAL DAYS
8. MOVING OR SPEAKING SO SLOWLY THAT OTHER PEOPLE COULD HAVE NOTICED. OR THE OPPOSITE, BEING SO FIGETY OR RESTLESS THAT YOU HAVE BEEN MOVING AROUND A LOT MORE THAN USUAL: NOT AT ALL
SUM OF ALL RESPONSES TO PHQ9 QUESTIONS 1 AND 2: 5
10. IF YOU CHECKED OFF ANY PROBLEMS, HOW DIFFICULT HAVE THESE PROBLEMS MADE IT FOR YOU TO DO YOUR WORK, TAKE CARE OF THINGS AT HOME, OR GET ALONG WITH OTHER PEOPLE: VERY DIFFICULT
3. TROUBLE FALLING OR STAYING ASLEEP OR SLEEPING TOO MUCH: MORE THAN HALF THE DAYS
4. FEELING TIRED OR HAVING LITTLE ENERGY: MORE THAN HALF THE DAYS
6. FEELING BAD ABOUT YOURSELF - OR THAT YOU ARE A FAILURE OR HAVE LET YOURSELF OR YOUR FAMILY DOWN: NOT AT ALL
5. POOR APPETITE OR OVEREATING: NOT AT ALL

## 2024-09-11 NOTE — ASSESSMENT & PLAN NOTE
DM - NIDDM  . Reviewed with patient / Will check  HbA1c and fasting blood sugars. Educate home self monitoring and diary keeping(reviewed with patient home blood sugar levels /diary). Educate extensively low calorie diet and weight loss with exercise. Reviewed BS- diary and Rx. Regimen. [place] renal protection with ARB/ACEI.               Educate compliance with diet and Rx. And educate risks and autcomes.    Continuesandrefill the Metformin 500 mg BID with meals  Januvia 100 mg daily and Farxiga 5 mg daily

## 2024-09-11 NOTE — ASSESSMENT & PLAN NOTE
COPD - no wheezing, no SOB, no Crackles heard/ Exacerbation. Get CXR. Continues mild exercises and inhalers. [place] preventive care and vaccinations. Add advair inhalers and spiriva inhaler.

## 2024-09-11 NOTE — PROGRESS NOTES
"Subjective   Patient ID: Truong Armijo is a 74 y.o. male who presents for Medicare Annual Wellness Visit Subsequent (Mcr/Follow up).    HPI     Review of Systems   Constitutional: Negative.    HENT: Negative.     Eyes: Negative.    Respiratory: Negative.     Cardiovascular: Negative.    Gastrointestinal: Negative.    Endocrine: Negative.    Musculoskeletal: Negative.    Skin: Negative.    Allergic/Immunologic: Negative.    Neurological: Negative.    Hematological: Negative.    Psychiatric/Behavioral: Negative.     All other systems reviewed and are negative.      Objective   Ht 1.88 m (6' 2\")   Wt 93 kg (205 lb)   BMI 26.32 kg/m²   Blood pressure 120/70, pulse 72, resp. rate 16, height 1.88 m (6' 2\"), weight 93 kg (205 lb).   Physical Exam  Vitals and nursing note reviewed.   Constitutional:       Appearance: Normal appearance.   HENT:      Head: Normocephalic and atraumatic.      Right Ear: Tympanic membrane, ear canal and external ear normal.      Left Ear: Tympanic membrane, ear canal and external ear normal. There is no impacted cerumen.      Nose: Nose normal.      Mouth/Throat:      Mouth: Mucous membranes are moist.      Pharynx: Oropharynx is clear.   Eyes:      Extraocular Movements: Extraocular movements intact.      Conjunctiva/sclera: Conjunctivae normal.      Pupils: Pupils are equal, round, and reactive to light.   Cardiovascular:      Rate and Rhythm: Normal rate and regular rhythm.      Pulses: Normal pulses.      Heart sounds: Normal heart sounds. No murmur heard.  Pulmonary:      Effort: Pulmonary effort is normal. No respiratory distress.      Breath sounds: Normal breath sounds. No stridor. No wheezing, rhonchi or rales.   Chest:      Chest wall: No tenderness.   Abdominal:      General: Abdomen is flat. Bowel sounds are normal. There is no distension.      Palpations: Abdomen is soft. There is no mass.      Tenderness: There is no abdominal tenderness. There is no right CVA tenderness, left " CVA tenderness, guarding or rebound.      Hernia: No hernia is present.   Musculoskeletal:         General: Normal range of motion.      Cervical back: Normal range of motion and neck supple.   Skin:     General: Skin is warm.      Capillary Refill: Capillary refill takes less than 2 seconds.   Neurological:      General: No focal deficit present.      Mental Status: He is alert.      Cranial Nerves: No cranial nerve deficit.      Sensory: No sensory deficit.      Motor: No weakness.      Coordination: Coordination normal.      Gait: Gait normal.      Deep Tendon Reflexes: Reflexes normal.   Psychiatric:         Mood and Affect: Mood normal.         Behavior: Behavior normal. Behavior is cooperative.         Thought Content: Thought content normal.         Judgment: Judgment normal.         Assessment/Plan   Problem List Items Addressed This Visit             ICD-10-CM    Benign essential hypertension I10     HTN - hypertension well/controlled .Target BP < 130/80  achieved. Educate low salt diet and exercise with weight loss. Educate home self monitoring and diary keeping. Educate risks of elevate blood pressure and benefits of prompt treatment.  Refill Lisinopril           COPD (chronic obstructive pulmonary disease) (Multi) J44.9     COPD - no wheezing, no SOB, no Crackles heard/ Exacerbation. Get CXR. Continues mild exercises and inhalers. [place] preventive care and vaccinations. Add advair inhalers and spiriva inhaler.             Hypercholesterolemia E78.00     Hypercholesterolemia - Monitor lipid profile and educate patient upon risks of high cholesterol and targets. Educate diet and change in lifestyle and increase in exercises - Refill: Atorvastatin  20 mg daily and educate compliance with medication and diet.          Diabetes mellitus (Multi) E11.9     DM - NIDDM  . Reviewed with patient / Will check  HbA1c and fasting blood sugars. Educate home self monitoring and diary keeping(reviewed with patient  home blood sugar levels /diary). Educate extensively low calorie diet and weight loss with exercise. Reviewed BS- diary and Rx. Regimen. [place] renal protection with ARB/ACEI.               Educate compliance with diet and Rx. And educate risks and autcomes.    Continuesandrefill the Metformin 500 mg BID with meals  Januvia 100 mg daily and Farxiga 5 mg daily                    Primary hypothyroidism E03.9     Hypothyroidism - Symptoms well/controlled (weight gain, fatigue, constipation, cold intolerance). Check TSH continues dose of Synthroid/Levothyroxine  of  88 bmcg/qD.          Reflux esophagitis K21.00     GERD - Acid reflux disease. Rx. PPI (Prilosec/Prevacid/Protonix/Nexium) and educate diet and life style changes. Referred patient to an endoscopy (EGD) and check H. Pylori titers.          Medicare annual wellness visit, subsequent - Primary Z00.00     No recent hospitalizations.    All medications reviewed and reconciled by me the provider..  No use of controlled substances or opiates.    Family history, social history reviewed, no changes.    Patient does not smoke.    Patient does not drink.    Patient hydrates adequately daily.  Eats a well-balanced healthy diet.     Exercises adequately including walking and doing weightbearing exercises.    Patient denies any difficulty with memory or cognition.     Denies any difficulty with hearing.  Patient does not wear hearing aids.    No fall risk.  No recent falls.  Denies any difficulty walking.    Patient with no history of depression anxiety, denies any loss of interest, no feeling of sadness, no lack of motivation.    Patient is independent in all ADLs and IADLs.  Independent bathing, dressing, walking.  Takes care of own finances, shopping and cooking.     End-of-life decision-making power of  reviewed with patient.     Risk Factors Identified During Visit: None.   Influenza: influenza vaccine was previously given.   Pneumovax 23: Pneumovax 23  vaccine was previously given.   Prevnar 13: Prevnar 13 vaccine was previously given.   Shingles Vaccine: Shingles vaccine was previously given.   Prostate cancer screening: Screening is current.   Colorectal Cancer Screening: screening is current.   Abdominal Aortic Aneurysm screening: screening is current.   HIV screening: screening not indicated.       Preventive measures - Recommend ASAP :  Specialist. Last Colonoscopy in 2022 (educate patient risks of colon cancer) refer patient to GI specialist. Ophthalmology and retina exam recommend yearly exams refer patient to an Ophthalmologist. BPH - (Benign Prostatic Hypertrophy) refer patient to an Urologist for rectal exam and PSA check.          Pressure injury of sacral region, stage 2 (Multi) L89.152     Start Desitin (Zinc oxide paste) - and get cushioning and doughnut pillow           Other Visit Diagnoses         Codes    Routine general medical examination at health care facility     Z00.00               COPD (chronic obstructive pulmonary disease) (Multi) J44.9        COPD - no wheezing, no SOB, no Crackles heard/ Exacerbation. Get CXR. Continues mild exercises and inhalers. [place] preventive care and vaccinations. Add advair inhalers and spiriva inhaler.            Diabetic peripheral neuropathy (Multi) E11.42       Neuropathy/ - positive numbness, tingling, discomfort (needles pricking, cold feeling) in distal lower extremities(toes, feet, legs), secondary to DM/Radiculopathy/idiopathic. Recommend: Gabapentin(Neurontin) 300 daily(at bed time) upward taper up to 2 gm/day or Lyrica 75 mg daily if failure of treatment. Also recommend: treatment of Diabetes(control levels of blood sugars), lumbar/ cervical disc disease (Physical Therapy), and check electrolytes and Thyroid function. Also address regenrative measures: B12 intrmusculary (Monthly) and Folic acid supplementation. Recommend EMG. Start Amitriptyline 25 mg daily / . Start - Tonic water - and Tramadol  50 mg TID.            Enlarged prostate without lower urinary tract symptoms (luts) N40.0       BPH - Benign PROSTATIC Hypertrophy, + Dysuria/ Increased in Nocturia and frequency of urination check PSA, His father had prostate cancer in his late 70's close to 80 years old- Educate exercises and Change in life style, prescribe vitamin E 400 IU qD. Consider referral to urology.            Fatigue R53.83       Last Assessment & Plan Note   Written:Ki Ahuja MD7/22/2024 12:17 PM     Fatigue - check CMP(metabolic panel and elctrolytes) , CBC(complete blood cell count), TSH(thyroid function). Insomnia may play a role and sleep studies(rule out sleep apnea) are recommended . Educate sleep hygiene. Consider anxiety disorder vs. depression. Consider Stress test, and 2DECHO.                  Diabetes mellitus (Multi) E11.9       DM - NIDDM  . Reviewed with patient / Will check  HbA1c and fasting blood sugars. Educate home self monitoring and diary keeping(reviewed with patient home blood sugar levels /diary). Educate extensively low calorie diet and weight loss with exercise. Reviewed BS- diary and Rx. Regimen. [place] renal protection with ARB/ACEI.               Educate compliance with diet and Rx. And educate risks and autcomes.    Continuesandrefill the Metformin 500 mg BID with meals  Januvia 100 mg daily and Farxiga 5 mg daily                      Hypothyroidism, unspecified E03.9       Hypothyroidism - Symptoms well/not controlled (weight gain, fatigue, constipation, cold intolerance). Check TSH continue/change dose of Synthroid/Levothyroxine  of 100 mcg/qD.            Reflux esophagitis K21.00       GERD - Acid reflux disease. Rx. PPI (Prilosec/Prevacid/Protonix/Nexium) and educate diet and life style changes. Referred patient to an endoscopy (EGD) and check H. Pylori titers.            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 otitis vs. Eustachian tube dysfunction vs. advanced cervical disc disease causing encroachment of the Vertebral arteries vs. viral infection of the Vestibular nerve sensor - recommend : Antivert /Meclizine 12.5 mg TID , treat infection of the sinuses or ears , increase intake of fluids, educate exercises , avoid operating machinery, monitor closely, rest.             Relevant Medications     meclizine (Antivert) 12.5 mg tablet                          Acute pain of left knee M25.562         DJD - Degenerative Joint Disease, Chronic Arthritis, of the Left more than the right  Knee. . Pain control, and anti-inflammatory meds : consider Kenalog injection and start Voltaren gel 1% topically BID,  and  Tylenol 325 mg TID PRN. Educate exercises and referred the patient to PT (Physical Therapy). Get X-Ray's.               Benign essential hypertension I10       HTN - hypertension well/controlled .Target BP < 130/80  achieved. Educate low salt diet and exercise with weight loss. Educate home self monitoring and diary keeping. Educate risks of elevate blood pressure and benefits of prompt treatment.  Refill Lisinopril             Relevant Orders     Comprehensive Metabolic Panel     Hypercholesterolemia E78.00       Hypercholesterolemia - Monitor lipid profile and educate patient upon risks of high cholesterol and targets. Educate diet and change in lifestyle and increase in exercises - Refill: Atorvastatin  20 mg daily and educate compliance with medication and diet.            Relevant Orders     Lipid Panel     Fatigue R53.83       Fatigue - check CMP(metabolic panel and elctrolytes) , CBC(complete blood cell count), TSH(thyroid function). Insomnia may play a role and sleep studies(rule out sleep apnea) are recommended . Educate sleep hygiene. Consider anxiety disorder vs. depression. Consider Stress test, and 2DECHO.             Diabetes  mellitus (Multi) - Primary E11.9       DM - NIDDM  . Reviewed with patient / Will check  HbA1c and fasting blood sugars. Educate home self monitoring and diary keeping(reviewed with patient home blood sugar levels /diary). Educate extensively low calorie diet and weight loss with exercise. Reviewed BS- diary and Rx. Regimen. [place] renal protection with ARB/ACEI.               Educate compliance with diet and Rx. And educate risks and autcomes.    Continuesandrefill the Metformin 500 mg BID with meals  Januvia 100 mg daily and Farxiga 5 mg daily                         Relevant Orders     Hemoglobin A1C     Primary hypothyroidism E03.9       Hypothyroidism - Symptoms well/controlled (weight gain, fatigue, constipation, cold intolerance). Check TSH continues dose of Synthroid/Levothyroxine  of  88 bmcg/qD.            Relevant Orders     CBC and Auto Differential     TSH with reflex to Free T4 if abnormal     Reflux esophagitis K21.00       GERD - Acid reflux disease. Rx. PPI (Prilosec/Prevacid/Protonix/Nexium) and educate diet and life style changes. Referred patient to an endoscopy (EGD) and check H. Pylori titers.            Hypogonadism male E29.1       Check levels and refill supplement of Testosterone            Relevant Orders     Prostate Specific Antigen     Essential (primary) hypertension I10       HTN - hypertension well/controlled .Target BP < 130/80  achieved. Educate low salt diet and exercise with weight loss. Educate home self monitoring and diary keeping. Educate risks of elevate blood pressure and benefits of prompt treatment.  Refill lisinopril             Other Visit Diagnoses           Codes     Goiter     E04.9     Relevant Orders     US thyroid     Acute pain of both knees     M25.561, M25.562     Relevant Orders     XR knee 1-2 views bilateral     Benign prostatic hyperplasia with urinary frequency     N40.1, R35.0     Relevant Orders     Prostate Specific Antigen                              Immunizations/Injections       very important  Immunizations from outside sources need reconciliation.       Flu vaccine, quadrivalent, high-dose, preservative free, age 65y+ (FLUZONE)10/18/2023, 10/17/2022, 10/8/2020  Flu vaccine, trivalent, preservative free, HIGH-DOSE, age 65y+ (Fluzone)10/14/2019, 10/17/2018, 10/4/2017  Flu vaccine, trivalent, preservative free, age 6 months and greater (Fluarix/Fluzone/Flulaval)10/5/2015, 10/1/2015  Influenza, Seasonal, Quadrivalent, Mucvnrzkwy11/27/2021  Influenza, seasonal, urmfltcsow53/1/2021, 9/23/2011, 10/8/2009,  ... (1 more)  Moderna SARS-CoV-2 Vaccination3/27/2021, 2/28/2021  Pfizer COVID-19 vaccine, Fall 2023, 12 years and older, (30mcg/0.3mL)1/23/2024  Pfizer COVID-19 vaccine, bivalent, age 12 years and older (30 mcg/0.3 mL)9/15/2022  Pfizer Purple Cap SARS-CoV-211/3/2021  Pneumococcal conjugate vaccine, 13-valent (PREVNAR 13)9/15/2014  Pneumococcal conjugate vaccine, 20-valent (PREVNAR 20)5/5/2023  Pneumococcal polysaccharide vaccine, 23-valent, age 2 years and older (PNEUMOVAX 23)1/1/2014  Tdap vaccine, age 7 year and older (BOOSTRIX, ADACEL)6/5/2023, 11/26/2017  Zoster vaccine, recombinant, adult (SHINGRIX)8/8/2023, 6/5/2023, 12/13/2020,  ... (1 more)  Zoster, live10/25/2017, 5/22/2014

## 2024-09-13 ENCOUNTER — APPOINTMENT (OUTPATIENT)
Dept: OPHTHALMOLOGY | Facility: CLINIC | Age: 74
End: 2024-09-13
Payer: COMMERCIAL

## 2024-09-13 DIAGNOSIS — Z96.1 PSEUDOPHAKIA: ICD-10-CM

## 2024-09-13 DIAGNOSIS — H10.433 CHRONIC FOLLICULAR CONJUNCTIVITIS OF BOTH EYES: Primary | ICD-10-CM

## 2024-09-13 DIAGNOSIS — H43.813 PVD (POSTERIOR VITREOUS DETACHMENT), BOTH EYES: ICD-10-CM

## 2024-09-13 DIAGNOSIS — H35.372 EPIRETINAL MEMBRANE (ERM) OF LEFT EYE: ICD-10-CM

## 2024-09-13 DIAGNOSIS — H04.123 DRY EYES, BILATERAL: ICD-10-CM

## 2024-09-13 DIAGNOSIS — H40.1132 PRIMARY OPEN ANGLE GLAUCOMA (POAG) OF BOTH EYES, MODERATE STAGE: ICD-10-CM

## 2024-09-13 PROCEDURE — 99214 OFFICE O/P EST MOD 30 MIN: CPT | Performed by: OPHTHALMOLOGY

## 2024-09-13 RX ORDER — CETIRIZINE HYDROCHLORIDE 5 MG/1
5 TABLET, CHEWABLE ORAL DAILY
COMMUNITY

## 2024-09-13 ASSESSMENT — TONOMETRY
OS_IOP_MMHG: 25
OD_IOP_MMHG: 26
IOP_METHOD: GOLDMANN APPLANATION

## 2024-09-13 ASSESSMENT — VISUAL ACUITY
OS_CC: 20/50-2
OD_CC: 20/25-1
METHOD: SNELLEN - LINEAR

## 2024-09-13 ASSESSMENT — CONF VISUAL FIELD
OD_NORMAL: 1
OS_INFERIOR_TEMPORAL_RESTRICTION: 0
OD_INFERIOR_NASAL_RESTRICTION: 0
OD_SUPERIOR_TEMPORAL_RESTRICTION: 0
OD_INFERIOR_TEMPORAL_RESTRICTION: 0
OS_INFERIOR_NASAL_RESTRICTION: 0
OS_NORMAL: 1
OS_SUPERIOR_NASAL_RESTRICTION: 0
OD_SUPERIOR_NASAL_RESTRICTION: 0
OS_SUPERIOR_TEMPORAL_RESTRICTION: 0

## 2024-09-13 ASSESSMENT — EXTERNAL EXAM - LEFT EYE: OS_EXAM: NORMAL

## 2024-09-13 ASSESSMENT — PACHYMETRY
OD_CT(UM): 623
OS_CT(UM): 590

## 2024-09-13 ASSESSMENT — ENCOUNTER SYMPTOMS: EYES NEGATIVE: 1

## 2024-09-13 ASSESSMENT — EXTERNAL EXAM - RIGHT EYE: OD_EXAM: NORMAL

## 2024-09-13 NOTE — PROGRESS NOTES
"Assessment/Plan   Diagnoses and all orders for this visit:  Chronic follicular conjunctivitis of both eyes  - Sx of eye redness and foreign body (FB) sensation OU for 2.5 months, tried different dry eye and blepharitis treatment with no improvement.  - Discussed with pt extensively that his presentation is most likely 2/2 to IOP gtts  - Most likely, pt sxs are 2/2 brimonidine and timolol use (preservatives)  - Has been off both gtts since last visit with only minor improvement  IOP is 26/25 today  Gave the pt sample of iuyzeh (PF latanoprost) and sent rx for betimol to be used bid  Explained to pt that although I believe his sxs are 2/2 irritation form gtts/preservatives, I will not be sure until this is confirmed by stopping the drops.  Also, discussed with pt that I do not see any signs of infection in his eyes. Positive culture that was done by Dr. Griffiths could be from eyelid bacteria.  He also thyroid nodules and was wondering if his sxs are related to KAYLIE but I do not see any proptosis  Has mild ectropion OD which could be also responsible  Asked pt about latanoprost allergy and he mentioned that previously when he was treated at [facility], he thought latanoprost could cause him \"brain fog\" but that's not the case anymore.   He also would like to try allergy gtts. Advised him to use pataday qday OU    Epiretinal membrane (ERM) of left eye  Followed by Dr. Garnica  Explained to pt, ERM OS is Visually significant and could be taken care of by Dr. Garnica once his surface is better    Primary open angle glaucoma (POAG) of both eyes, moderate stage  Pigment dispersion glaucoma  Thick pachy OU and HVF does not show large scotoma (if any) and RNFL done previously is WNL OD and thin OS but pt has PPA and is a myope  Will discuss with Dr. Adamson other alternatives for IOP control if his sxs get better        Dry eye, both eyes  - On cyclosporine        1 month      "

## 2024-09-13 NOTE — LETTER
"September 13, 2024    Ki Ahuja MD  [address] [place] [facility]  [place] [facility] [address]    Patient: Truong Armijo   YOB: 1950   Date of Visit: 9/13/2024       Dear Dr. Ki Ahuja MD:    Thank you for referring Truong Armijo to me for evaluation. Below are the relevant portions of the exam, along with my assessment and plan of care.    Vision readings for this visit:  Visual Acuity (Snellen - Linear)         Right Left    Dist cc 20/25-1 20/50-2          Tonometry (Goldmann Applanation, 10:50 AM)         Right Left    Pressure 26 25            Assessment/Plan:    Chronic follicular conjunctivitis of both eyes  - Sx of eye redness and foreign body (FB) sensation OU for 2.5 months, tried different dry eye and blepharitis treatment with no improvement.  - Discussed with pt extensively that his presentation is most likely 2/2 to IOP gtts  - Most likely, pt sxs are 2/2 brimonidine and timolol use (preservatives)  - Has been off both gtts since last visit with only minor improvement  IOP is 26/25 today  Gave the pt sample of iuyzeh (PF latanoprost) and sent rx for betimol to be used bid  Explained to pt that although I believe his sxs are 2/2 irritation form gtts/preservatives, I will not be sure until this is confirmed by stopping the drops.  Also, discussed with pt that I do not see any signs of infection in his eyes. Positive culture that was done by Dr. Griffiths could be from eyelid bacteria.  He also thyroid nodules and was wondering if his sxs are related to KAYLIE but I do not see any proptosis  Has mild ectropion OD which could be also responsible  Asked pt about latanoprost allergy and he mentioned that previously when he was treated at [facility], he thought latanoprost could cause him \"brain fog\" but that's not the case anymore.   He also would like to try allergy gtts. Advised him to use pataday qday OU    Epiretinal membrane (ERM) of left " eye  Followed by Dr. Garnica  Explained to pt, ERM OS is Visually significant and could be taken care of by Dr. Garnica once his surface is better    Primary open angle glaucoma (POAG) of both eyes, moderate stage  Pigment dispersion glaucoma  Thick pachy OU and HVF does not show large scotoma (if any) and RNFL done previously is WNL OD and thin OS but pt has PPA and is a myope  Will discuss with Dr. Adamson other alternatives for IOP control if his sxs get better        Dry eye, both eyes  - On cyclosporine        1 month    If you have questions, please do not hesitate to call me. I look forward to following Truong along with you.         Sincerely,        Nigel Plasencia MD        CC:   No Recipients

## 2024-09-15 NOTE — PROGRESS NOTES
Physical Therapy    Physical Therapy Treatment    Patient Name: Truong Armijo  MRN: [id number]  Today's Date: 9/11/2024    Time Entry:    230p-315p  Therapeutic exercise = 40 min            Insurance : [facility]   Authorization /Certification / Visits allowed: 20  Visit 5/20               Assessment:  Fair improvement in body mechanics after lifting instructions. Most challenged by squatting vs flexing at waist during lifting and during squats as exercise.   Plan:  Continue with machines, as tolerated. Ask how clearing his condo is going and review lifting, as needed. Standing [facility], . Check on auth approval.     Current Problem    Problem List Items Addressed This Visit             ICD-10-CM    Acute pain of left knee M25.562         Subjective    Forced to lift/move a lot of items in his condo for repairs after water issues in his bathroom . C/o soreness vs pain.   Pain  Soreness vs pain     Objective   Squats w/ heels raised and knees over toes ; no knee c/o        Treatments:   Bike x 5min  LP 45# 2 x 10   HR on LP 25# 2 x 10   HSC 22# 3 x 10   Lifting body mechanics for #11 box :   -get close/squat/head up and tighten ab before lifting/ stand up in 1 motion  -hold weight at body w/ elbows 90deg flexion and adducted for core brace  -putting weight down:   -head up and tighten ab/squat / lower weight between knees   Squats at railing x 10   HR 2 x 10     OP EDUCATION:       Goals:  Active       PT Problem       Patient will report compliance with his home exercise program.        Start:  07/23/24    Expected End:  10/21/24            Patient will report improvement through the LEFS to show improved activity tolerance.         Start:  07/23/24    Expected End:  10/21/24            Patient will demonstrate improvements in lower extremity strength to 5/5 to facilitate weight bearing activity.         Start:  07/23/24    Expected End:  10/21/24

## 2024-09-18 ENCOUNTER — TREATMENT (OUTPATIENT)
Dept: PHYSICAL THERAPY | Facility: CLINIC | Age: 74
End: 2024-09-18
Payer: COMMERCIAL

## 2024-09-18 DIAGNOSIS — M25.562 ACUTE PAIN OF LEFT KNEE: ICD-10-CM

## 2024-09-18 PROCEDURE — 97110 THERAPEUTIC EXERCISES: CPT | Mod: GP,CQ | Performed by: PHYSICAL THERAPY ASSISTANT

## 2024-09-18 NOTE — PROGRESS NOTES
Physical Therapy    Physical Therapy Treatment    Patient Name: Truong Armijo  MRN: [id number]  Today's Date: 9/18/2024    Time Entry:   Time Calculation  Start Time: 1430  Stop Time: 1515  Time Calculation (min): 45 min   PT Therapeutic Procedures Time Entry  Therapeutic Exercise Time Entry: 40      Insurance : [facility]   Authorization /Certification / Visits allowed: 20 --8 authorized  Visit 6/8               Assessment:  Did well with machines and encouraged him to resume exercising at his local gym.    Plan:  Continue with machines    Current Problem    Problem List Items Addressed This Visit             ICD-10-CM    Acute pain of left knee M25.562           Subjective    Only has pain OOB.   Pain  Soreness vs pain     Objective   No pain behavior note during sessio        Treatments:   Bike x 5min  LP 45# 2 x 10   HR on LP 25# 2 x 10   HSC 22# 3 x 10   KE #11 2 x 10    HR 2 x 10   Back extension #40 3 x 10     OP EDUCATION:       Goals:  Active       PT Problem       Patient will report compliance with his home exercise program.        Start:  07/23/24    Expected End:  10/21/24            Patient will report improvement through the LEFS to show improved activity tolerance.         Start:  07/23/24    Expected End:  10/21/24            Patient will demonstrate improvements in lower extremity strength to 5/5 to facilitate weight bearing activity.         Start:  07/23/24    Expected End:  10/21/24

## 2024-09-20 DIAGNOSIS — H40.1132 PRIMARY OPEN ANGLE GLAUCOMA (POAG) OF BOTH EYES, MODERATE STAGE: Primary | ICD-10-CM

## 2024-09-24 DIAGNOSIS — H40.1132 PRIMARY OPEN ANGLE GLAUCOMA (POAG) OF BOTH EYES, MODERATE STAGE: ICD-10-CM

## 2024-09-26 ENCOUNTER — TREATMENT (OUTPATIENT)
Dept: PHYSICAL THERAPY | Facility: CLINIC | Age: 74
End: 2024-09-26
Payer: COMMERCIAL

## 2024-09-26 DIAGNOSIS — M25.562 ACUTE PAIN OF LEFT KNEE: ICD-10-CM

## 2024-09-26 DIAGNOSIS — M54.16 RADICULOPATHY, LUMBAR REGION: ICD-10-CM

## 2024-09-26 DIAGNOSIS — M51.26 LUMBAR DISC HERNIATION: Primary | ICD-10-CM

## 2024-09-26 PROCEDURE — 97110 THERAPEUTIC EXERCISES: CPT | Mod: GP

## 2024-09-26 NOTE — PROGRESS NOTES
Physical Therapy    Physical Therapy Treatment    Patient Name: Truong Armijo  MRN: [id number]  Today's Date: 9/26/2024    Time Entry:   Time Calculation  Start Time: 1445  Stop Time: 1515  Time Calculation (min): 30 min     PT Therapeutic Procedures Time Entry  Therapeutic Exercise Time Entry: 30  Visit: 7/8    Assessment:   The patient did well with machines.   Plan:   The patient is progressing well with formal physical therapy. His pain is well controlled and his strength and ROM are within normal limits.     Current Problem  Problem List Items Addressed This Visit             ICD-10-CM    Acute pain of left knee M25.562    Lumbar disc herniation - Primary M51.26    Radiculopathy, lumbar region M54.16         Subjective   The patient reports that his knee pain is now tolerable. He has access to a gym where he performs the machines. He reports that the numbness in his feet has subsided. He states his basement flooded and he had to do a lot of heavy lifting. States he did not injure himself in the process. He reports that he would like to continue formal PT.        Objective     L knee ROM: 0-0-130  L Knee strength   Flexion 5/5  Extension 5/5   Normal gait pattern  Slightly tender to palpation along the medial and lateral borders of the patella.     Treatments:   Bike x 5min  LP 45# 3 x 10   HR 3 x 10  HSC 33# 2 x 10   Back extension #40 3 x 10    Goals:  Active       PT Problem       Patient will report compliance with his home exercise program.        Start:  07/23/24    Expected End:  10/21/24            Patient will report improvement through the LEFS to show improved activity tolerance.         Start:  07/23/24    Expected End:  10/21/24            Patient will demonstrate improvements in lower extremity strength to 5/5 to facilitate weight bearing activity.         Start:  07/23/24    Expected End:  10/21/24

## 2024-09-27 RX ORDER — LATANOPROST OPHTHALMIC SOLUTION 0.005% 50 UG/ML
1 SOLUTION/ DROPS TOPICAL NIGHTLY
Qty: 30 EACH | Refills: 11 | Status: SHIPPED | OUTPATIENT
Start: 2024-09-27 | End: 2025-09-27

## 2024-09-30 ENCOUNTER — APPOINTMENT (OUTPATIENT)
Dept: OPHTHALMOLOGY | Facility: CLINIC | Age: 74
End: 2024-09-30
Payer: COMMERCIAL

## 2024-09-30 ENCOUNTER — TREATMENT (OUTPATIENT)
Dept: PHYSICAL THERAPY | Facility: CLINIC | Age: 74
End: 2024-09-30
Payer: COMMERCIAL

## 2024-09-30 DIAGNOSIS — H40.1132 PRIMARY OPEN ANGLE GLAUCOMA (POAG) OF BOTH EYES, MODERATE STAGE: ICD-10-CM

## 2024-09-30 DIAGNOSIS — M51.26 LUMBAR DISC HERNIATION: ICD-10-CM

## 2024-09-30 DIAGNOSIS — M25.562 ACUTE PAIN OF LEFT KNEE: Primary | ICD-10-CM

## 2024-09-30 PROCEDURE — 99213 OFFICE O/P EST LOW 20 MIN: CPT | Performed by: OPHTHALMOLOGY

## 2024-09-30 PROCEDURE — 97110 THERAPEUTIC EXERCISES: CPT | Mod: GP,CQ | Performed by: PHYSICAL THERAPY ASSISTANT

## 2024-09-30 PROCEDURE — 92134 CPTRZ OPH DX IMG PST SGM RTA: CPT | Performed by: OPHTHALMOLOGY

## 2024-09-30 ASSESSMENT — TONOMETRY
OS_IOP_MMHG: 16
OD_IOP_MMHG: 16
IOP_METHOD: GOLDMANN APPLANATION

## 2024-09-30 ASSESSMENT — VISUAL ACUITY
OS_PH_CC: 20/40
OD_CC: 20/25-1
METHOD: SNELLEN - LINEAR
CORRECTION_TYPE: GLASSES
OS_CC: 20/50

## 2024-09-30 ASSESSMENT — EXTERNAL EXAM - LEFT EYE: OS_EXAM: NORMAL

## 2024-09-30 ASSESSMENT — EXTERNAL EXAM - RIGHT EYE: OD_EXAM: NORMAL

## 2024-09-30 ASSESSMENT — PACHYMETRY
OS_CT(UM): 590
OD_CT(UM): 623

## 2024-09-30 NOTE — PROGRESS NOTES
Physical Therapy    Physical Therapy Treatment    Patient Name: Truong Armijo  MRN: [id number]  Today's Date: 9/30/2024    Time Entry:   Time Calculation  Start Time: 1345  Stop Time: 1440  Time Calculation (min): 55 min     PT Therapeutic Procedures Time Entry  Therapeutic Exercise Time Entry: 55  Visit: 8/8    Assessment:   L LE weaker than R during S LE on leg press, HSC , and KE. Encouraged him to his anorexia fears w/ PCP or Behavioral Med MD.  Plan:  Recommend 3-5 more sessions for LE strengthening for left knee and  low back support.      Current Problem  Problem List Items Addressed This Visit             ICD-10-CM    Acute pain of left knee - Primary M25.562    Lumbar disc herniation M51.26         Subjective   Doing well . Has been to his gym once for swimming only. He is concerned re: becoming anorexic again.        Objective   L knee weaker on SLE leg press , HSC, KE        Treatments:   Bike x 5min  LP 60# 2 x 10 ; 40# R LE x 10 , 20# 2 x 10 R/L   KE #11 SLE 2 x 10 R/L  HSC 33# 2 x 10 , #11 SLE 2 x 10 R/L  Back extension #75 3 x 10    Goals:  Active       PT Problem       Patient will report compliance with his home exercise program.        Start:  07/23/24    Expected End:  10/21/24            Patient will report improvement through the LEFS to show improved activity tolerance.         Start:  07/23/24    Expected End:  10/21/24            Patient will demonstrate improvements in lower extremity strength to 5/5 to facilitate weight bearing activity.         Start:  07/23/24    Expected End:  10/21/24

## 2024-09-30 NOTE — PROGRESS NOTES
1-oag on meds allergy to preservative in topical meds. On Iyushea, gave same of ziotan. Waiting on prior authorization to go through  Rto 3mos hv  2-reduced vision os--octDME +/-JENNIFER refer retinaf

## 2024-10-02 ENCOUNTER — HOSPITAL ENCOUNTER (OUTPATIENT)
Dept: RADIOLOGY | Facility: CLINIC | Age: 74
Discharge: HOME | End: 2024-10-02
Payer: COMMERCIAL

## 2024-10-02 DIAGNOSIS — H90.6 MIXED HEARING LOSS, BILATERAL: ICD-10-CM

## 2024-10-02 DIAGNOSIS — R42 VERTIGO: ICD-10-CM

## 2024-10-02 DIAGNOSIS — F43.10 POST-TRAUMATIC STRESS DISORDER, UNSPECIFIED: ICD-10-CM

## 2024-10-02 PROCEDURE — 70480 CT ORBIT/EAR/FOSSA W/O DYE: CPT

## 2024-10-02 RX ORDER — FLUOXETINE HYDROCHLORIDE 40 MG/1
40 CAPSULE ORAL DAILY
Qty: 90 CAPSULE | Refills: 1 | Status: SHIPPED | OUTPATIENT
Start: 2024-10-02

## 2024-10-07 NOTE — PROGRESS NOTES
ADULT BALANCE FUNCTION TEST (BFT)      Name:  Truong Armijo  :  1950  Age:  74 y.o.  Date of Evaluation:  ***    IMPRESSIONS     Overall normal vestibular examination; no evidence of active vestibular system involvement to account for patient reported symptoms. There were no indications of peripheral or central vestibular system pathway involvement. There were no indications of active Benign Paroxysmal Positional Vertigo (BPPV) present. Normal observation of gait and transfers. Bedside postural control findings demonstrate normal functional balance with varying sensory information measured on the mCTSIB. Patient's risk of falling based on today's evaluation is low.    RECOMMENDATIONS     Consider re-evaluation as medically indicated.  Maintain a healthy lifestyle to help body function overall.  Continue monitoring per ENT/PCP preference.    Time: ***    HISTORY     Patient was seen for Balance Function Testing (BFT) due to a history of dizziness/imbalance. Vestibular case history collected via patient-clinician interview, patient chart review, and patient questionnaires.    Patient reported history of dizziness described as ***.  Symptoms began ***.  Episodes occur *** and last several *** before subsiding.  Most recent episode occurred ***.  Associated symptoms include ***.  Symptoms are provoked by ***.  Symptoms are alleviated by ***.  Overall the patient's symptoms have *** over time.  This patient has had a total of *** falls due to their symptoms.   Denied any symptoms provoked by sneezing or coughing, as well as any presence of autophony.   Denied any recent medication changes.   Relevant medical history includes ***.  Most recent audiologic evaluation performed on *** by *** revealed ***. Tympanometry revealed normal eardrum mobility and canal volume, bilaterally.  Most recent vertigo evaluation performed on *** by *** revealed ***.   Patient reported complying with pre-test instructions.  "    EVALUATION     See VNG, vHIT, & VEMP Raw Data in \"Media\"    TEST RESULTS     BEDSIDE ASSESSMENT TEST  The bedside assessment is an optional portion of the test battery to further assist in differential diagnosis and screen for eye abnormalities which may affect testing.    Otoscopy: {CERUMEN2:74312}  Tympanometry: {TYMPSC:11714::\"Tympanometry revealed normal ear canal volume, peak pressure, and compliance, consistent with normal middle ear function (Type A).\"} ***  Extra-Ocular Range of Motion: {ROM:08419::\"normal.\"}Extra-Ocular Range of Motion is performed to evaluate any eye abnormalities prior to testing.  Cover/Uncover: {COVER/UNCOVER:24091::\"normal.\"} Cover/Uncover test is performed to evaluate for skewed deviation of the eyes prior to testing.  Cervical Neck Exam: {NECKEXAM:64513::\"normal.\"} Cervical Neck is performed to evaluate any restrictions or pain with neck movement prior to testing.  Vertebral Artery Screen: {VAS:21410::\"normal.\"} Vertebral Artery Screen is performed to evaluate for vertebrobasilar insufficiency prior to testing.   Ocular Counter-Roll: {OCR:27773::\"normal.\"} Ocular Counter-Roll is performed to evaluate ocular tilt reaction and assess otolith organ function prior to testing.  VOR Cancellation: {VORC:25520::\"normal.\"} VOR Cancellation is performed to evaluate fixation suppression prior to testing.  Dynamic Visual Acuity: {DVA:50672::\"normal.\"} DVA is performed to evaluate the VOR and visual/vestibular interaction.***  Modified Clinical Test of Sensory Interaction on Balance (mCTSIB): {mCTSIB:62909::\"normal.\"} mCTSIB is performed to evaluate balance with varying sensory information.      VIDEONYSTAGMOGRAPHY (VNG) TEST  VNG provides objective indications of peripheral and central vestibulo-ocular pathway involvement. Ocular motor testing to visually guided targets is conducted using a dual channel video-recording technique for the recording of eye movement in the horizontal and " "vertical planes. Air caloric testing is performed at 48 degrees C and 24 degrees C.    Spontaneous Nystagmus test was {SPONTANEOUSNYSTAGMUS:53619::\"absent.\"} Spontaneous nystagmus testing may help with the identification of an acute or uncompensated peripheral vestibular lesion.   Gaze Nystagmus test was {GAZENYSTAGMUS:17008::\"normal.\"} Gaze nystagmus testing is to evaluate for nystagmus that is evoked by holding eye gaze in any particular direction. True gaze nystagmus is amplified when vision is denied.   Random Saccades test was {OCULOMOTORS:14867::\"normal.\"} Random saccade testing is to evaluate patient's ability to make fast random eye movements along a horizontal moving target.   Smooth Pursuit/Tracking test was {OCULOMOTORS:66964::\"normal.\"} Smooth pursuit/tracking testing is to evaluate the ability to move eyes with a single smoothly moving target.   Optokinetic nystagmus testing was {OCULOMOTORS:12130::\"normal.\"} This full-field OPK test is to evaluate the ability of central nervous system to stabilize vision during sustained head movement after the VOR system loses effectiveness.   Bonesteel-Hallpike testing was {BPPV:63014::\"normal.\"} Mj-Hallpike testing is to provide a diagnosis of Benign Paroxysmal Positional Vertigo (BPPV) of the vertical semicircular canals on the side which is most affected.  Roll testing was {BPPV:42208::\"normal.\"} Roll testing is to provide a diagnosis of Benign Paroxysmal Positional Vertigo (BPPV) of the horizontal semicircular canals on the side which is most affected.  Positional testing was {POSITIONALNYSTAGMUS:24799::\"normal.\"} Positional testing is to evaluate patient's ability to hold a steady gaze while in different positions.  {CALORICS:00242} caloric testing was {CALORICS2:73056}. Unilateral weakness of ***% to the *** which is {NORMAL/ABNORMAL:49545} and a directional preponderance of ***% to the *** which is {NORMAL/ABNORMAL:63901}. Caloric testing is to evaluate for " "peripheral vestibular lesion.    NOTE: monothermal caloric testing is indicated when:  slow phase velocity of the nystagmus is 8 degrees/second or greater  less than 15% difference in the degree of nystagmus between the ears  spontaneous or positional nystagmus observed during testing is less than 5 degrees/second      VIDEO HEAD IMPULSE TEST (vHIT)  The vHIT procedure provides objective assessment of the high frequency vestibulo-ocular reflex (VOR) for each semicircular canal. Rapid, random horizontal and vertical thrusts are applied to the patient's head to provoke the VOR. The vHIT procedure includes two separate paradigms: Head Impulse Paradigm (HIMP) and Suppression Head Impulse Paradigm (SHIMP). SHIMP is an optional paradigm that is not appropriate to perform for every patient. However, it is appropriate to perform SHIMP when there is verified evidence of possible vestibulopathy in the traditional HIMP test.     Head Impulse Paradigm (HIMP)   Right Ear   Canal Gain Overt Saccades Covert Saccades   Lateral *** absent absent   Anterior *** absent absent   Posterior *** absent absent        Head Impulse Paradigm (HIMP)   Left Ear   Canal Gain Overt Saccades Covert Saccades   Lateral *** absent absent   Anterior *** absent absent   Posterior *** absent absent     {VHITGAIN:85991::\"Total gain for all canals tested was within normal limits \"} (<0.80 is abnormal for lateral, <0.70 is abnormal for vertical).  {HIMPSACCADES:09491::\"There was no evidence of overt or covert saccades throughout testing.\"}      Suppression Head Impulse Paradigm (SHIMP)    Canal Gain Corrective Anti-Compensatory Saccades   Right Lateral *** present   Left Lateral *** present     {VHITGAIN:44787::\"Total gain for all canals tested was within normal limits \"} (<0.80 is abnormal for lateral).  {SHIMPSACCADES:02366::\"There was evidence of appropriate corrective anti-compensatory saccades throughout testing.\"}      CERVICAL VESTIBULAR EVOKED " "MYOGENIC POTENTIALS (cVEMP)  The cVEMP procedure is an evoked potential used to test the saccule and its afferent pathway. An asymmetry ratio is utilized to determine side of lesion. The cVEMP was recorded with the patient cervical extension to produce isolated contraction of the ipsilateral sternocleidomastoid (SCM) muscle. The cVEMP was recorded using a 500 Hz tone burst or 1000 Hz tone burst at a rate of 5.1.      Ear Presentation Level Amplitude P1 Latency N1 Latency  Amplitude Asymmetry Ratio   Right *** dB nHL *** µV *** ms *** ms ***%   Left *** dB nHL *** µV *** ms *** ms       Superior Canal Dehiscence Screening (75 dB nHL): Negative bilaterally    {cVEMP:32279::\"Replicable cVEMP responses were within normal limits, bilaterally. \"}{VEMP ratio:81811::\"The amplitude asymmetry ratio of ***% was not significant (>33% = abnormal)\"}        OCULAR VESTIBULAR EVOKED MYOGENIC POTENTIALS (oVEMP)  The oVEMP procedure is an evoked potential used to test the utricle and its afferent pathway. An asymmetry ratio is utilized to determine side of lesion. This is a contralateral recording. The oVEMP was recorded with the patient seated upright with eyes tilted upward to produce isolated contraction of the contralateral inferior oblique muscle. The oVEMP were recorded using a 500 Hz, 2000 Hz, 4000 Hz tone burst at a rate of 5.1.       Ear Presentation Level Amplitude N1 Latency  P1 Latency  Amplitude Asymmetry Ratio   Right *** dB nHL *** µV *** ms *** ms ***%   Left *** dB nHL *** µV *** ms *** ms       Meniere's Disease Screening (2000 Hz): Negative bilaterally  Superior Canal Dehiscence Screening (4000 Hz): Negative bilaterally    {oVEMP:78731::\"Replicable oVEMP responses were within normal limits, bilaterally. \"} {VEMP ratio:77992::\"The amplitude asymmetry ratio of ***% was not significant (>33% = abnormal)\"}      Raw data reviewed by a member of the Vestibular & Balance Disorders team. Testing and interpretation of " results completed by JOSEPH Rivera, CCC-A. It was my pleasure to evaluate this patient.     Additional Attendees: ***    Joseph Rivera, CCC-A  Senior Clinical Vestibular Audiologist

## 2024-10-08 ENCOUNTER — APPOINTMENT (OUTPATIENT)
Dept: OPHTHALMOLOGY | Facility: CLINIC | Age: 74
End: 2024-10-08
Payer: COMMERCIAL

## 2024-10-08 RX ORDER — TAFLUPROST OPTHALMIC 0 MG/.3ML
1 SOLUTION/ DROPS OPHTHALMIC 2 TIMES DAILY
COMMUNITY
End: 2024-10-08 | Stop reason: ENTERED-IN-ERROR

## 2024-10-09 DIAGNOSIS — E78.1 HIGH TRIGLYCERIDES: ICD-10-CM

## 2024-10-09 DIAGNOSIS — E03.9 HYPOTHYROIDISM, UNSPECIFIED: ICD-10-CM

## 2024-10-09 DIAGNOSIS — E11.9 TYPE 2 DIABETES MELLITUS WITHOUT COMPLICATION, WITHOUT LONG-TERM CURRENT USE OF INSULIN (MULTI): ICD-10-CM

## 2024-10-09 RX ORDER — GLIPIZIDE 5 MG/1
10 TABLET ORAL
Qty: 360 TABLET | Refills: 0 | Status: SHIPPED | OUTPATIENT
Start: 2024-10-09

## 2024-10-09 RX ORDER — ATORVASTATIN CALCIUM 40 MG/1
40 TABLET, FILM COATED ORAL NIGHTLY
Qty: 90 TABLET | Refills: 1 | Status: SHIPPED | OUTPATIENT
Start: 2024-10-09

## 2024-10-09 RX ORDER — LEVOTHYROXINE SODIUM 100 UG/1
100 TABLET ORAL DAILY
Qty: 90 TABLET | Refills: 1 | Status: SHIPPED | OUTPATIENT
Start: 2024-10-09

## 2024-10-10 ENCOUNTER — DOCUMENTATION (OUTPATIENT)
Dept: OPHTHALMOLOGY | Facility: CLINIC | Age: 74
End: 2024-10-10
Payer: COMMERCIAL

## 2024-10-10 DIAGNOSIS — H40.1132 PRIMARY OPEN ANGLE GLAUCOMA (POAG) OF BOTH EYES, MODERATE STAGE: ICD-10-CM

## 2024-10-10 DIAGNOSIS — H40.1132 PRIMARY OPEN ANGLE GLAUCOMA (POAG) OF BOTH EYES, MODERATE STAGE: Primary | ICD-10-CM

## 2024-10-10 RX ORDER — TIMOLOL 5 MG/ML
1 SOLUTION/ DROPS OPHTHALMIC 2 TIMES DAILY
Qty: 60 EACH | Refills: 11 | Status: SHIPPED | OUTPATIENT
Start: 2024-10-10 | End: 2024-10-11

## 2024-10-10 RX ORDER — TIMOLOL 5 MG/ML
1 SOLUTION/ DROPS OPHTHALMIC 2 TIMES DAILY
COMMUNITY
End: 2024-10-10 | Stop reason: SDUPTHER

## 2024-10-10 NOTE — PROGRESS NOTES
Per Dr. Adamson: Have patient do the Timolol 0.5% PF bid ou    Spoke to patient notified him of this change.     Sent message to Prior Auth team to process the prior auth.    -Sarita

## 2024-10-11 RX ORDER — TIMOLOL MALEATE 5 MG/ML
1 SOLUTION/ DROPS OPHTHALMIC 2 TIMES DAILY
Qty: 1 ML | Refills: 0 | Status: SHIPPED | OUTPATIENT
Start: 2024-10-11

## 2024-10-15 ENCOUNTER — TELEMEDICINE (OUTPATIENT)
Dept: BEHAVIORAL HEALTH | Facility: CLINIC | Age: 74
End: 2024-10-15
Payer: COMMERCIAL

## 2024-10-15 ENCOUNTER — APPOINTMENT (OUTPATIENT)
Dept: OPHTHALMOLOGY | Facility: CLINIC | Age: 74
End: 2024-10-15
Payer: COMMERCIAL

## 2024-10-15 ENCOUNTER — APPOINTMENT (OUTPATIENT)
Dept: AUDIOLOGY | Facility: CLINIC | Age: 74
End: 2024-10-15
Payer: COMMERCIAL

## 2024-10-15 DIAGNOSIS — F25.1 SCHIZOAFFECTIVE DISORDER, DEPRESSIVE TYPE (MULTI): ICD-10-CM

## 2024-10-15 DIAGNOSIS — F43.10 POST TRAUMATIC STRESS DISORDER (PTSD): ICD-10-CM

## 2024-10-15 DIAGNOSIS — R45.81 CHRONIC LOW SELF ESTEEM: ICD-10-CM

## 2024-10-15 DIAGNOSIS — F41.8 MIXED ANXIETY DEPRESSIVE DISORDER: ICD-10-CM

## 2024-10-15 PROCEDURE — 1160F RVW MEDS BY RX/DR IN RCRD: CPT | Performed by: PSYCHIATRY & NEUROLOGY

## 2024-10-15 PROCEDURE — 3048F LDL-C <100 MG/DL: CPT | Performed by: PSYCHIATRY & NEUROLOGY

## 2024-10-15 PROCEDURE — 3044F HG A1C LEVEL LT 7.0%: CPT | Performed by: PSYCHIATRY & NEUROLOGY

## 2024-10-15 PROCEDURE — 99214 OFFICE O/P EST MOD 30 MIN: CPT | Performed by: PSYCHIATRY & NEUROLOGY

## 2024-10-15 PROCEDURE — 4010F ACE/ARB THERAPY RXD/TAKEN: CPT | Performed by: PSYCHIATRY & NEUROLOGY

## 2024-10-15 PROCEDURE — 1159F MED LIST DOCD IN RCRD: CPT | Performed by: PSYCHIATRY & NEUROLOGY

## 2024-10-15 ASSESSMENT — ENCOUNTER SYMPTOMS
DEPRESSED MOOD: 1
NERVOUS/ANXIOUS: 1
DYSPHORIC MOOD: 1
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
## 2024-10-15 NOTE — ASSESSMENT & PLAN NOTE
The FDA risks, benefits & alternatives to the medications prescribed were explained to you and your support person(s) today. You & your support person(s) were able to understand & repeat these risks, benefits & alternatives to these prescribed medications. You and your support person(s) have agreed to proceed with treatment with the medications discussed based on the conclusion that the benefit outweighs the risks of this treatment regimen: continue fluoxetine 40 mg daily, continue bupropion  mg daily, continue risperidone 1 mg twice daily,

## 2024-10-18 DIAGNOSIS — E29.1 HYPOGONADISM MALE: ICD-10-CM

## 2024-10-18 RX ORDER — TESTOSTERONE 20.25 MG/1.25G
2 GEL TOPICAL DAILY
Qty: 75 G | Refills: 0 | Status: SHIPPED | OUTPATIENT
Start: 2024-10-18

## 2024-10-21 ENCOUNTER — APPOINTMENT (OUTPATIENT)
Dept: PRIMARY CARE | Facility: CLINIC | Age: 74
End: 2024-10-21
Payer: COMMERCIAL

## 2024-10-21 VITALS
BODY MASS INDEX: 26.56 KG/M2 | WEIGHT: 207 LBS | SYSTOLIC BLOOD PRESSURE: 110 MMHG | RESPIRATION RATE: 19 BRPM | DIASTOLIC BLOOD PRESSURE: 70 MMHG | HEIGHT: 74 IN | HEART RATE: 73 BPM | OXYGEN SATURATION: 99 %

## 2024-10-21 DIAGNOSIS — J43.1 PANLOBULAR EMPHYSEMA (MULTI): ICD-10-CM

## 2024-10-21 DIAGNOSIS — K21.9 GASTROESOPHAGEAL REFLUX DISEASE WITHOUT ESOPHAGITIS: ICD-10-CM

## 2024-10-21 DIAGNOSIS — E78.1 HIGH TRIGLYCERIDES: ICD-10-CM

## 2024-10-21 DIAGNOSIS — E78.00 HYPERCHOLESTEROLEMIA: ICD-10-CM

## 2024-10-21 DIAGNOSIS — K27.9 PEPTIC ULCER, SITE UNSPECIFIED, UNSPECIFIED AS ACUTE OR CHRONIC, WITHOUT HEMORRHAGE OR PERFORATION: ICD-10-CM

## 2024-10-21 DIAGNOSIS — Z23 FLU VACCINE NEED: ICD-10-CM

## 2024-10-21 DIAGNOSIS — E03.9 ACQUIRED HYPOTHYROIDISM: ICD-10-CM

## 2024-10-21 DIAGNOSIS — G93.39 OTHER POST INFECTION AND RELATED FATIGUE SYNDROMES: ICD-10-CM

## 2024-10-21 DIAGNOSIS — I10 BENIGN ESSENTIAL HYPERTENSION: Primary | ICD-10-CM

## 2024-10-21 DIAGNOSIS — K12.0 RECURRENT CANKER SORES: ICD-10-CM

## 2024-10-21 DIAGNOSIS — E11.9 TYPE 2 DIABETES MELLITUS WITHOUT COMPLICATION, WITHOUT LONG-TERM CURRENT USE OF INSULIN (MULTI): ICD-10-CM

## 2024-10-21 PROCEDURE — 3078F DIAST BP <80 MM HG: CPT | Performed by: INTERNAL MEDICINE

## 2024-10-21 PROCEDURE — 3008F BODY MASS INDEX DOCD: CPT | Performed by: INTERNAL MEDICINE

## 2024-10-21 PROCEDURE — 3044F HG A1C LEVEL LT 7.0%: CPT | Performed by: INTERNAL MEDICINE

## 2024-10-21 PROCEDURE — 99215 OFFICE O/P EST HI 40 MIN: CPT | Performed by: INTERNAL MEDICINE

## 2024-10-21 PROCEDURE — G0008 ADMIN INFLUENZA VIRUS VAC: HCPCS | Performed by: INTERNAL MEDICINE

## 2024-10-21 PROCEDURE — G2211 COMPLEX E/M VISIT ADD ON: HCPCS | Performed by: INTERNAL MEDICINE

## 2024-10-21 PROCEDURE — 3048F LDL-C <100 MG/DL: CPT | Performed by: INTERNAL MEDICINE

## 2024-10-21 PROCEDURE — 4010F ACE/ARB THERAPY RXD/TAKEN: CPT | Performed by: INTERNAL MEDICINE

## 2024-10-21 PROCEDURE — 90662 IIV NO PRSV INCREASED AG IM: CPT | Performed by: INTERNAL MEDICINE

## 2024-10-21 PROCEDURE — 1159F MED LIST DOCD IN RCRD: CPT | Performed by: INTERNAL MEDICINE

## 2024-10-21 PROCEDURE — 3074F SYST BP LT 130 MM HG: CPT | Performed by: INTERNAL MEDICINE

## 2024-10-21 PROCEDURE — 1036F TOBACCO NON-USER: CPT | Performed by: INTERNAL MEDICINE

## 2024-10-21 RX ORDER — VALACYCLOVIR HYDROCHLORIDE 1 G/1
1000 TABLET, FILM COATED ORAL 3 TIMES DAILY
Qty: 15 TABLET | Refills: 2 | Status: SHIPPED | OUTPATIENT
Start: 2024-10-21

## 2024-10-21 RX ORDER — ATORVASTATIN CALCIUM 20 MG/1
20 TABLET, FILM COATED ORAL NIGHTLY
Qty: 90 TABLET | Refills: 1 | Status: SHIPPED | OUTPATIENT
Start: 2024-10-21

## 2024-10-21 RX ORDER — TIMOLOL 5 MG/ML
1 SOLUTION/ DROPS OPHTHALMIC 2 TIMES DAILY
COMMUNITY
Start: 2024-10-12

## 2024-10-21 RX ORDER — PANTOPRAZOLE SODIUM 40 MG/1
40 TABLET, DELAYED RELEASE ORAL DAILY
Qty: 90 TABLET | Refills: 2 | Status: SHIPPED | OUTPATIENT
Start: 2024-10-21

## 2024-10-21 RX ORDER — COLCHICINE 0.6 MG/1
0.6 TABLET ORAL DAILY
Qty: 30 TABLET | Refills: 0 | Status: SHIPPED | OUTPATIENT
Start: 2024-10-21 | End: 2024-11-20

## 2024-10-21 RX ORDER — PANTOPRAZOLE SODIUM 40 MG/1
40 TABLET, DELAYED RELEASE ORAL DAILY
Qty: 30 TABLET | Refills: 1 | Status: SHIPPED | OUTPATIENT
Start: 2024-10-21 | End: 2024-12-20

## 2024-10-21 ASSESSMENT — ENCOUNTER SYMPTOMS
PSYCHIATRIC NEGATIVE: 1
EYES NEGATIVE: 1
GASTROINTESTINAL NEGATIVE: 1
NEUROLOGICAL NEGATIVE: 1
ALLERGIC/IMMUNOLOGIC NEGATIVE: 1
MUSCULOSKELETAL NEGATIVE: 1
CONSTITUTIONAL NEGATIVE: 1
HEMATOLOGIC/LYMPHATIC NEGATIVE: 1
RESPIRATORY NEGATIVE: 1
CARDIOVASCULAR NEGATIVE: 1
ENDOCRINE NEGATIVE: 1

## 2024-10-21 NOTE — ASSESSMENT & PLAN NOTE
DM - NIDDM  . Reviewed with patient / Will check  HbA1c and fasting blood sugars. Educate home self monitoring and diary keeping(reviewed with patient home blood sugar levels /diary). Educate extensively low calorie diet and weight loss with exercise. Reviewed BS- diary and Rx. Regimen. [place] renal protection with ARB/ACEI.               Educate compliance with diet and Rx. And educate risks and autcomes.    Continuesandrefill the Metformin 500 mg BID with meals  Januvia 100 mg daily and Farxiga 5 mg daily

## 2024-10-21 NOTE — PROGRESS NOTES
"Subjective   Patient ID: Truong Armijo is a 74 y.o. male who presents for Follow-up (1 month follow up/Canker soars on tongue /High dose flu shot given ).    HPI     Review of Systems   Constitutional: Negative.    HENT: Negative.     Eyes: Negative.    Respiratory: Negative.     Cardiovascular: Negative.    Gastrointestinal: Negative.    Endocrine: Negative.    Musculoskeletal: Negative.    Skin: Negative.    Allergic/Immunologic: Negative.    Neurological: Negative.    Hematological: Negative.    Psychiatric/Behavioral: Negative.     All other systems reviewed and are negative.      Objective   /70   Pulse 73   Resp 19   Ht 1.88 m (6' 2\")   Wt 93.9 kg (207 lb)   SpO2 99%   BMI 26.58 kg/m²   Blood pressure 110/70, pulse 73, resp. rate 19, height 1.88 m (6' 2\"), weight 93.9 kg (207 lb), SpO2 99%.   Physical Exam  Vitals and nursing note reviewed.   Constitutional:       Appearance: Normal appearance.   HENT:      Head: Normocephalic and atraumatic.      Right Ear: Tympanic membrane, ear canal and external ear normal.      Left Ear: Tympanic membrane, ear canal and external ear normal. There is no impacted cerumen.      Nose: Nose normal.      Mouth/Throat:      Mouth: Mucous membranes are moist.      Pharynx: Oropharynx is clear.   Eyes:      Extraocular Movements: Extraocular movements intact.      Conjunctiva/sclera: Conjunctivae normal.      Pupils: Pupils are equal, round, and reactive to light.   Cardiovascular:      Rate and Rhythm: Normal rate and regular rhythm.      Pulses: Normal pulses.      Heart sounds: Normal heart sounds. No murmur heard.  Pulmonary:      Effort: Pulmonary effort is normal. No respiratory distress.      Breath sounds: Normal breath sounds. No stridor. No wheezing, rhonchi or rales.   Chest:      Chest wall: No tenderness.   Abdominal:      General: Abdomen is flat. Bowel sounds are normal. There is no distension.      Palpations: Abdomen is soft. There is no mass.      " Tenderness: There is no abdominal tenderness. There is no right CVA tenderness, left CVA tenderness, guarding or rebound.      Hernia: No hernia is present.   Musculoskeletal:         General: Normal range of motion.      Cervical back: Normal range of motion and neck supple.   Skin:     General: Skin is warm.      Capillary Refill: Capillary refill takes less than 2 seconds.   Neurological:      General: No focal deficit present.      Mental Status: He is alert.      Cranial Nerves: No cranial nerve deficit.      Sensory: No sensory deficit.      Motor: No weakness.      Coordination: Coordination normal.      Gait: Gait normal.      Deep Tendon Reflexes: Reflexes normal.   Psychiatric:         Mood and Affect: Mood normal.         Behavior: Behavior normal. Behavior is cooperative.         Thought Content: Thought content normal.         Judgment: Judgment normal.         Assessment/Plan   Problem List Items Addressed This Visit             ICD-10-CM    Benign essential hypertension - Primary I10     HTN - hypertension well/controlled .Target BP < 130/80  achieved. Educate low salt diet and exercise with weight loss. Educate home self monitoring and diary keeping. Educate risks of elevate blood pressure and benefits of prompt treatment.  Refill Lisinopril           COPD (chronic obstructive pulmonary disease) (Multi) J44.9     COPD - no wheezing, no SOB, no Crackles heard/ Exacerbation. Get CXR. Continues mild exercises and inhalers. [place] preventive care and vaccinations. Add advair inhalers and spiriva inhaler.                Hypercholesterolemia E78.00     Hypercholesterolemia - Monitor lipid profile and educate patient upon risks of high cholesterol and targets. Educate diet and change in lifestyle and increase in exercises - Refill: Atorvastatin  20 mg daily and educate compliance with medication and diet.          Fatigue R53.83     Fatigue - check CMP(metabolic panel and elctrolytes) , CBC(complete  blood cell count), TSH(thyroid function). Insomnia may play a role and sleep studies(rule out sleep apnea) are recommended . Educate sleep hygiene. Consider anxiety disorder vs. depression. Consider Stress test, and 2DECHO.           High triglycerides E78.1    Relevant Medications    atorvastatin (Lipitor) 20 mg tablet    Diabetes mellitus (Multi) E11.9     DM - NIDDM  . Reviewed with patient / Will check  HbA1c and fasting blood sugars. Educate home self monitoring and diary keeping(reviewed with patient home blood sugar levels /diary). Educate extensively low calorie diet and weight loss with exercise. Reviewed BS- diary and Rx. Regimen. [place] renal protection with ARB/ACEI.               Educate compliance with diet and Rx. And educate risks and autcomes.    Continuesandrefill the Metformin 500 mg BID with meals  Januvia 100 mg daily and Farxiga 5 mg daily                   Hypothyroidism, unspecified E03.9     Hypothyroidism - Symptoms well/not controlled (weight gain, fatigue, constipation, cold intolerance). Check TSH continue/change dose of Synthroid/Levothyroxine of 100 mcg/qD.          Recurrent canker sores K12.0     Possible due to HSV  - start Valtrex 1 gm TID for 5 days and consider Sweet syndrome and start Colchicine 0.6 mg daily  and start Vitamin B complex daily  -          Relevant Medications    valACYclovir (Valtrex) 1 gram tablet    colchicine 0.6 mg tablet     Other Visit Diagnoses         Codes    Flu vaccine need     Z23    Relevant Orders    Flu vaccine, trivalent, preservative free, HIGH-DOSE, age 65y+ (Fluzone) (Completed)    Gastroesophageal reflux disease without esophagitis     K21.9    Relevant Medications    pantoprazole (ProtoNix) 40 mg EC tablet              Benign essential hypertension I10        HTN - hypertension well/controlled .Target BP < 130/80  achieved. Educate low salt diet and exercise with weight loss. Educate home self monitoring and diary keeping. Educate risks of  elevate blood pressure and benefits of prompt treatment.  Refill Lisinopril             COPD (chronic obstructive pulmonary disease) (Multi) J44.9       COPD - no wheezing, no SOB, no Crackles heard/ Exacerbation. Get CXR. Continues mild exercises and inhalers. [place] preventive care and vaccinations. Add advair inhalers and spiriva inhaler.                Hypercholesterolemia E78.00       Hypercholesterolemia - Monitor lipid profile and educate patient upon risks of high cholesterol and targets. Educate diet and change in lifestyle and increase in exercises - Refill: Atorvastatin  20 mg daily and educate compliance with medication and diet.            Diabetes mellitus (Multi) E11.9       DM - NIDDM  . Reviewed with patient / Will check  HbA1c and fasting blood sugars. Educate home self monitoring and diary keeping(reviewed with patient home blood sugar levels /diary). Educate extensively low calorie diet and weight loss with exercise. Reviewed BS- diary and Rx. Regimen. [place] renal protection with ARB/ACEI.               Educate compliance with diet and Rx. And educate risks and autcomes.    Continuesandrefill the Metformin 500 mg BID with meals  Januvia 100 mg daily and Farxiga 5 mg daily                      Primary hypothyroidism E03.9       Hypothyroidism - Symptoms well/controlled (weight gain, fatigue, constipation, cold intolerance). Check TSH continues dose of Synthroid/Levothyroxine  of  88 bmcg/qD.            Reflux esophagitis K21.00       GERD - Acid reflux disease. Rx. PPI (Prilosec/Prevacid/Protonix/Nexium) and educate diet and life style changes. Referred patient to an endoscopy (EGD) and check H. Pylori titers.                       Pressure injury of sacral region, stage 2 (Multi) L89.152       Start Desitin (Zinc oxide paste) - and get cushioning and doughnut pillow             Other Visit Diagnoses           Codes     Routine general medical examination at health care facility     Z00.00                       COPD (chronic obstructive pulmonary disease) (Multi) J44.9         COPD - no wheezing, no SOB, no Crackles heard/ Exacerbation. Get CXR. Continues mild exercises and inhalers. [place] preventive care and vaccinations. Add advair inhalers and spiriva inhaler.            Diabetic peripheral neuropathy (Multi) E11.42       Neuropathy/ - positive numbness, tingling, discomfort (needles pricking, cold feeling) in distal lower extremities(toes, feet, legs), secondary to DM/Radiculopathy/idiopathic. Recommend: Gabapentin(Neurontin) 300 daily(at bed time) upward taper up to 2 gm/day or Lyrica 75 mg daily if failure of treatment. Also recommend: treatment of Diabetes(control levels of blood sugars), lumbar/ cervical disc disease (Physical Therapy), and check electrolytes and Thyroid function. Also address regenrative measures: B12 intrmusculary (Monthly) and Folic acid supplementation. Recommend EMG. Start Amitriptyline 25 mg daily / . Start - Tonic water - and Tramadol 50 mg TID.            Enlarged prostate without lower urinary tract symptoms (luts) N40.0       BPH - Benign PROSTATIC Hypertrophy, + Dysuria/ Increased in Nocturia and frequency of urination check PSA, His father had prostate cancer in his late 70's close to 80 years old- Educate exercises and Change in life style, prescribe vitamin E 400 IU qD. Consider referral to urology.            Fatigue R53.83       Last Assessment & Plan Note   Written:Ki Ahuja MD7/22/2024 12:17 PM     Fatigue - check CMP(metabolic panel and elctrolytes) , CBC(complete blood cell count), TSH(thyroid function). Insomnia may play a role and sleep studies(rule out sleep apnea) are recommended . Educate sleep hygiene. Consider anxiety disorder vs. depression. Consider Stress test, and 2DECHO.                   Diabetes mellitus (Multi) E11.9       DM - NIDDM  . Reviewed with patient / Will check  HbA1c and fasting blood sugars. Educate home self  monitoring and diary keeping(reviewed with patient home blood sugar levels /diary). Educate extensively low calorie diet and weight loss with exercise. Reviewed BS- diary and Rx. Regimen. [place] renal protection with ARB/ACEI.               Educate compliance with diet and Rx. And educate risks and autcomes.    Continuesandrefill the Metformin 500 mg BID with meals  Januvia 100 mg daily and Farxiga 5 mg daily                      Hypothyroidism, unspecified E03.9       Hypothyroidism - Symptoms well/not controlled (weight gain, fatigue, constipation, cold intolerance). Check TSH continue/change dose of Synthroid/Levothyroxine  of 100 mcg/qD.            Reflux esophagitis K21.00       GERD - Acid reflux disease. Rx. PPI (Prilosec/Prevacid/Protonix/Nexium) and educate diet and life style changes. Referred patient to an endoscopy (EGD) and check H. Pylori titers.            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 otitis vs. Eustachian tube dysfunction vs. advanced cervical disc disease causing encroachment of the Vertebral arteries vs. viral infection of the Vestibular nerve sensor - recommend : Antivert /Meclizine 12.5 mg TID , treat infection of the sinuses or ears , increase intake of fluids, educate exercises , avoid operating machinery, monitor closely, rest.             Relevant Medications     meclizine (Antivert) 12.5 mg tablet                                 Acute pain of left knee M25.562         DJD - Degenerative Joint Disease, Chronic Arthritis, of the Left more than the right  Knee. . Pain control, and anti-inflammatory meds : consider Kenalog injection and start Voltaren gel 1% topically BID,  and  Tylenol 325 mg TID PRN. Educate exercises and referred the patient to PT (Physical Therapy). Get X-Ray's.               Benign essential  hypertension I10       HTN - hypertension well/controlled .Target BP < 130/80  achieved. Educate low salt diet and exercise with weight loss. Educate home self monitoring and diary keeping. Educate risks of elevate blood pressure and benefits of prompt treatment.  Refill Lisinopril             Relevant Orders     Comprehensive Metabolic Panel     Hypercholesterolemia E78.00       Hypercholesterolemia - Monitor lipid profile and educate patient upon risks of high cholesterol and targets. Educate diet and change in lifestyle and increase in exercises - Refill: Atorvastatin  20 mg daily and educate compliance with medication and diet.            Relevant Orders     Lipid Panel     Fatigue R53.83       Fatigue - check CMP(metabolic panel and elctrolytes) , CBC(complete blood cell count), TSH(thyroid function). Insomnia may play a role and sleep studies(rule out sleep apnea) are recommended . Educate sleep hygiene. Consider anxiety disorder vs. depression. Consider Stress test, and 2DECHO.             Diabetes mellitus (Multi) - Primary E11.9       DM - NIDDM  . Reviewed with patient / Will check  HbA1c and fasting blood sugars. Educate home self monitoring and diary keeping(reviewed with patient home blood sugar levels /diary). Educate extensively low calorie diet and weight loss with exercise. Reviewed BS- diary and Rx. Regimen. [place] renal protection with ARB/ACEI.               Educate compliance with diet and Rx. And educate risks and autcomes.    Continuesandrefill the Metformin 500 mg BID with meals  Januvia 100 mg daily and Farxiga 5 mg daily                         Relevant Orders     Hemoglobin A1C     Primary hypothyroidism E03.9       Hypothyroidism - Symptoms well/controlled (weight gain, fatigue, constipation, cold intolerance). Check TSH continues dose of Synthroid/Levothyroxine  of  88 bmcg/qD.            Relevant Orders     CBC and Auto Differential     TSH with reflex to Free T4 if abnormal      Reflux esophagitis K21.00       GERD - Acid reflux disease. Rx. PPI (Prilosec/Prevacid/Protonix/Nexium) and educate diet and life style changes. Referred patient to an endoscopy (EGD) and check H. Pylori titers.            Hypogonadism male E29.1       Check levels and refill supplement of Testosterone            Relevant Orders     Prostate Specific Antigen     Essential (primary) hypertension I10       HTN - hypertension well/controlled .Target BP < 130/80  achieved. Educate low salt diet and exercise with weight loss. Educate home self monitoring and diary keeping. Educate risks of elevate blood pressure and benefits of prompt treatment.  Refill lisinopril             Other Visit Diagnoses           Codes     Goiter     E04.9     Relevant Orders     US thyroid     Acute pain of both knees     M25.561, M25.562     Relevant Orders     XR knee 1-2 views bilateral     Benign prostatic hyperplasia with urinary frequency     N40.1, R35.0     Relevant Orders     Prostate Specific Antigen                             Immunizations/Injections       very important  Immunizations from outside sources need reconciliation.       Flu vaccine, quadrivalent, high-dose, preservative free, age 65y+ (FLUZONE)10/18/2023, 10/17/2022, 10/8/2020  Flu vaccine, trivalent, preservative free, HIGH-DOSE, age 65y+ (Fluzone)10/14/2019, 10/17/2018, 10/4/2017  Flu vaccine, trivalent, preservative free, age 6 months and greater (Fluarix/Fluzone/Flulaval)10/5/2015, 10/1/2015  Influenza, Seasonal, Quadrivalent, Gfiubirszt29/27/2021  Influenza, seasonal, mlufgxcklz18/1/2021, 9/23/2011, 10/8/2009,  ... (1 more)  Moderna SARS-CoV-2 Vaccination3/27/2021, 2/28/2021  Pfizer COVID-19 vaccine, Fall 2023, 12 years and older, (30mcg/0.3mL)1/23/2024  Pfizer COVID-19 vaccine, bivalent, age 12 years and older (30 mcg/0.3 mL)9/15/2022  Pfizer Purple Cap SARS-CoV-211/3/2021  Pneumococcal conjugate vaccine, 13-valent (PREVNAR 13)9/15/2014  Pneumococcal conjugate  vaccine, 20-valent (PREVNAR 20)5/5/2023  Pneumococcal polysaccharide vaccine, 23-valent, age 2 years and older (PNEUMOVAX 23)1/1/2014  Tdap vaccine, age 7 year and older (BOOSTRIX, ADACEL)6/5/2023, 11/26/2017  Zoster vaccine, recombinant, adult (SHINGRIX)8/8/2023, 6/5/2023, 12/13/2020,  ... (1 more)  Zoster, live10/25/2017, 5/22/2014                Immunizations/Injections      very important  Immunizations from outside sources need reconciliation.      Flu vaccine, quadrivalent, high-dose, preservative free, age 65y+ (FLUZONE)10/18/2023, 10/17/2022, 10/8/2020  Flu vaccine, trivalent, preservative free, HIGH-DOSE, age 65y+ (Fluzone)10/14/2019, 10/17/2018, 10/4/2017  Flu vaccine, trivalent, preservative free, age 6 months and greater (Fluarix/Fluzone/Flulaval)10/5/2015, 10/1/2015  Influenza, Seasonal, Quadrivalent, Fndzcnpvbt80/27/2021  Influenza, seasonal, mmgaeocxja94/1/2021, 9/23/2011, 10/8/2009,  ... (1 more)  Moderna SARS-CoV-2 Vaccination3/27/2021, 2/28/2021  Pfizer COVID-19 vaccine, 12 years and older, (30mcg/0.3mL) (Comirnaty)1/23/2024  Pfizer COVID-19 vaccine, bivalent, age 12 years and older (30 mcg/0.3 mL)9/15/2022  Pfizer Purple Cap SARS-CoV-211/3/2021  Pneumococcal conjugate vaccine, 13-valent (PREVNAR 13)9/15/2014  Pneumococcal conjugate vaccine, 20-valent (PREVNAR 20)5/5/2023  Pneumococcal polysaccharide vaccine, 23-valent, age 2 years and older (PNEUMOVAX 23)1/1/2014  Tdap vaccine, age 7 year and older (BOOSTRIX, ADACEL)6/5/2023, 11/26/2017  Zoster vaccine, recombinant, adult (SHINGRIX)8/8/2023, 6/5/2023, 12/13/2020,  ... (1 more)  Zoster, live10/25/2017, 5/22/2014Time Spent  Time spent directly with patient, family or caregiver: 45 minutes  Documentation Time: 15 minutes

## 2024-10-21 NOTE — ASSESSMENT & PLAN NOTE
Possible due to HSV  - start Valtrex 1 gm TID for 5 days and consider Sweet syndrome and start Colchicine 0.6 mg daily  and start Vitamin B complex daily  -

## 2024-10-21 NOTE — ASSESSMENT & PLAN NOTE
COPD - no wheezing, no SOB, no Crackles heard/ Exacerbation. Get CXR. Continues mild exercises and inhalers. [place] preventive care and vaccinations. Add advair inhalers and spiriva inhaler.

## 2024-10-22 ENCOUNTER — APPOINTMENT (OUTPATIENT)
Dept: OTOLARYNGOLOGY | Facility: CLINIC | Age: 74
End: 2024-10-22
Payer: COMMERCIAL

## 2024-10-22 ENCOUNTER — OFFICE VISIT (OUTPATIENT)
Dept: OPHTHALMOLOGY | Facility: CLINIC | Age: 74
End: 2024-10-22
Payer: COMMERCIAL

## 2024-10-22 DIAGNOSIS — H35.373 EPIRETINAL MEMBRANE, BOTH EYES: ICD-10-CM

## 2024-10-22 DIAGNOSIS — H35.372 EPIRETINAL MEMBRANE (ERM) OF LEFT EYE: ICD-10-CM

## 2024-10-22 DIAGNOSIS — E11.9 TYPE 2 DIABETES MELLITUS WITHOUT COMPLICATION, WITHOUT LONG-TERM CURRENT USE OF INSULIN (MULTI): ICD-10-CM

## 2024-10-22 DIAGNOSIS — D31.32 NEVUS OF CHOROID OF LEFT EYE: Primary | ICD-10-CM

## 2024-10-22 PROCEDURE — 99213 OFFICE O/P EST LOW 20 MIN: CPT

## 2024-10-22 PROCEDURE — 92134 CPTRZ OPH DX IMG PST SGM RTA: CPT | Mod: BILATERAL PROCEDURE

## 2024-10-22 ASSESSMENT — ENCOUNTER SYMPTOMS
RESPIRATORY NEGATIVE: 0
ALLERGIC/IMMUNOLOGIC NEGATIVE: 0
GASTROINTESTINAL NEGATIVE: 0
HEMATOLOGIC/LYMPHATIC NEGATIVE: 0
NEUROLOGICAL NEGATIVE: 0
ENDOCRINE NEGATIVE: 0
CARDIOVASCULAR NEGATIVE: 0
PSYCHIATRIC NEGATIVE: 0
CONSTITUTIONAL NEGATIVE: 0
EYES NEGATIVE: 1
MUSCULOSKELETAL NEGATIVE: 0

## 2024-10-22 ASSESSMENT — PACHYMETRY
OS_CT(UM): 590
OD_CT(UM): 623

## 2024-10-22 ASSESSMENT — SLIT LAMP EXAM - LIDS
COMMENTS: DERMATOCHALASIS
COMMENTS: DERMATOCHALASIS

## 2024-10-22 ASSESSMENT — CONF VISUAL FIELD
OS_SUPERIOR_TEMPORAL_RESTRICTION: 0
OD_SUPERIOR_TEMPORAL_RESTRICTION: 0
OD_INFERIOR_TEMPORAL_RESTRICTION: 0
OD_SUPERIOR_NASAL_RESTRICTION: 0
OD_INFERIOR_NASAL_RESTRICTION: 0
OD_NORMAL: 1
OS_INFERIOR_NASAL_RESTRICTION: 0
OS_INFERIOR_TEMPORAL_RESTRICTION: 0
OS_SUPERIOR_NASAL_RESTRICTION: 0
OS_NORMAL: 1
METHOD: COUNTING FINGERS

## 2024-10-22 ASSESSMENT — VISUAL ACUITY
OS_CC: 20/30
OD_CC: 20/25
METHOD: SNELLEN - LINEAR
CORRECTION_TYPE: GLASSES

## 2024-10-22 ASSESSMENT — CUP TO DISC RATIO
OD_RATIO: 0.5
OS_RATIO: 0.7

## 2024-10-22 ASSESSMENT — TONOMETRY
IOP_METHOD: TONOPEN
OD_IOP_MMHG: 15
OS_IOP_MMHG: 14

## 2024-10-22 ASSESSMENT — EXTERNAL EXAM - LEFT EYE: OS_EXAM: NORMAL

## 2024-10-22 ASSESSMENT — EXTERNAL EXAM - RIGHT EYE: OD_EXAM: NORMAL

## 2024-10-22 NOTE — PROGRESS NOTES
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 No IRF or SRF    - Left eye (OS): ERM stage 3. Tractional pseudocysts, slightly worsened from prior       #Impression/Plan#   - ERM OS > OD. 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 Adamson

## 2024-10-27 DIAGNOSIS — E11.9 TYPE 2 DIABETES MELLITUS WITHOUT COMPLICATIONS (MULTI): ICD-10-CM

## 2024-10-28 RX ORDER — METFORMIN HYDROCHLORIDE 500 MG/1
1000 TABLET ORAL 2 TIMES DAILY
Qty: 360 TABLET | Refills: 0 | Status: SHIPPED | OUTPATIENT
Start: 2024-10-28

## 2024-10-31 DIAGNOSIS — E11.9 TYPE 2 DIABETES MELLITUS WITHOUT COMPLICATIONS (MULTI): ICD-10-CM

## 2024-11-01 RX ORDER — SITAGLIPTIN 100 MG/1
100 TABLET, FILM COATED ORAL DAILY
Qty: 90 TABLET | Refills: 0 | Status: SHIPPED | OUTPATIENT
Start: 2024-11-01

## 2024-11-07 ENCOUNTER — TELEPHONE (OUTPATIENT)
Dept: BEHAVIORAL HEALTH | Facility: CLINIC | Age: 74
End: 2024-11-07

## 2024-11-07 ENCOUNTER — TELEPHONE (OUTPATIENT)
Dept: BEHAVIORAL HEALTH | Facility: CLINIC | Age: 74
End: 2024-11-07
Payer: COMMERCIAL

## 2024-11-12 DIAGNOSIS — K12.0 RECURRENT CANKER SORES: ICD-10-CM

## 2024-11-12 DIAGNOSIS — H40.10X1 OPEN-ANGLE GLAUCOMA OF BOTH EYES, MILD STAGE, UNSPECIFIED OPEN-ANGLE GLAUCOMA TYPE: Primary | ICD-10-CM

## 2024-11-12 RX ORDER — TIMOLOL 5 MG/ML
1 SOLUTION/ DROPS OPHTHALMIC 2 TIMES DAILY
Qty: 10 EACH | Refills: 11 | Status: SHIPPED | OUTPATIENT
Start: 2024-11-12

## 2024-11-12 RX ORDER — COLCHICINE 0.6 MG/1
0.6 TABLET ORAL DAILY
Qty: 90 TABLET | Refills: 1 | Status: SHIPPED | OUTPATIENT
Start: 2024-11-12 | End: 2025-05-11

## 2024-11-19 ENCOUNTER — APPOINTMENT (OUTPATIENT)
Dept: BEHAVIORAL HEALTH | Facility: CLINIC | Age: 74
End: 2024-11-19
Payer: COMMERCIAL

## 2024-11-25 ENCOUNTER — APPOINTMENT (OUTPATIENT)
Dept: ENDOCRINOLOGY | Facility: CLINIC | Age: 74
End: 2024-11-25
Payer: COMMERCIAL

## 2024-11-25 ENCOUNTER — OFFICE VISIT (OUTPATIENT)
Dept: PRIMARY CARE | Facility: CLINIC | Age: 74
End: 2024-11-25
Payer: COMMERCIAL

## 2024-11-25 ENCOUNTER — APPOINTMENT (OUTPATIENT)
Dept: OTOLARYNGOLOGY | Facility: CLINIC | Age: 74
End: 2024-11-25
Payer: COMMERCIAL

## 2024-11-25 VITALS
BODY MASS INDEX: 25.93 KG/M2 | HEIGHT: 74 IN | WEIGHT: 202 LBS | DIASTOLIC BLOOD PRESSURE: 65 MMHG | HEART RATE: 80 BPM | RESPIRATION RATE: 21 BRPM | SYSTOLIC BLOOD PRESSURE: 110 MMHG | OXYGEN SATURATION: 98 %

## 2024-11-25 DIAGNOSIS — R53.83 OTHER FATIGUE: ICD-10-CM

## 2024-11-25 DIAGNOSIS — E11.9 TYPE 2 DIABETES MELLITUS WITHOUT COMPLICATION, WITHOUT LONG-TERM CURRENT USE OF INSULIN (MULTI): Primary | ICD-10-CM

## 2024-11-25 DIAGNOSIS — E03.9 ACQUIRED HYPOTHYROIDISM: ICD-10-CM

## 2024-11-25 DIAGNOSIS — N40.0 ENLARGED PROSTATE WITHOUT LOWER URINARY TRACT SYMPTOMS (LUTS): ICD-10-CM

## 2024-11-25 DIAGNOSIS — E29.1 TESTICULAR HYPOFUNCTION: ICD-10-CM

## 2024-11-25 DIAGNOSIS — I10 BENIGN ESSENTIAL HYPERTENSION: ICD-10-CM

## 2024-11-25 DIAGNOSIS — K21.00 GASTROESOPHAGEAL REFLUX DISEASE WITH ESOPHAGITIS WITHOUT HEMORRHAGE: ICD-10-CM

## 2024-11-25 DIAGNOSIS — E11.42 DIABETIC PERIPHERAL NEUROPATHY (MULTI): ICD-10-CM

## 2024-11-25 DIAGNOSIS — E78.00 HYPERCHOLESTEROLEMIA: ICD-10-CM

## 2024-11-25 DIAGNOSIS — E11.610 CHARCOT FOOT DUE TO DIABETES MELLITUS (MULTI): ICD-10-CM

## 2024-11-25 PROCEDURE — 1159F MED LIST DOCD IN RCRD: CPT | Performed by: INTERNAL MEDICINE

## 2024-11-25 PROCEDURE — 3074F SYST BP LT 130 MM HG: CPT | Performed by: INTERNAL MEDICINE

## 2024-11-25 PROCEDURE — 99215 OFFICE O/P EST HI 40 MIN: CPT | Performed by: INTERNAL MEDICINE

## 2024-11-25 PROCEDURE — 3048F LDL-C <100 MG/DL: CPT | Performed by: INTERNAL MEDICINE

## 2024-11-25 PROCEDURE — 3078F DIAST BP <80 MM HG: CPT | Performed by: INTERNAL MEDICINE

## 2024-11-25 PROCEDURE — 1036F TOBACCO NON-USER: CPT | Performed by: INTERNAL MEDICINE

## 2024-11-25 PROCEDURE — G2211 COMPLEX E/M VISIT ADD ON: HCPCS | Performed by: INTERNAL MEDICINE

## 2024-11-25 PROCEDURE — 3044F HG A1C LEVEL LT 7.0%: CPT | Performed by: INTERNAL MEDICINE

## 2024-11-25 PROCEDURE — 3008F BODY MASS INDEX DOCD: CPT | Performed by: INTERNAL MEDICINE

## 2024-11-25 PROCEDURE — 4010F ACE/ARB THERAPY RXD/TAKEN: CPT | Performed by: INTERNAL MEDICINE

## 2024-11-25 RX ORDER — TIMOLOL 5.12 MG/ML
1 SOLUTION/ DROPS OPHTHALMIC 2 TIMES DAILY
COMMUNITY
Start: 2024-11-01

## 2024-11-25 ASSESSMENT — ENCOUNTER SYMPTOMS
ENDOCRINE NEGATIVE: 1
NEUROLOGICAL NEGATIVE: 1
PSYCHIATRIC NEGATIVE: 1
CONSTITUTIONAL NEGATIVE: 1
MUSCULOSKELETAL NEGATIVE: 1
CARDIOVASCULAR NEGATIVE: 1
ALLERGIC/IMMUNOLOGIC NEGATIVE: 1
RESPIRATORY NEGATIVE: 1
GASTROINTESTINAL NEGATIVE: 1
EYES NEGATIVE: 1
HEMATOLOGIC/LYMPHATIC NEGATIVE: 1

## 2024-11-25 NOTE — PROGRESS NOTES
"Subjective   Patient ID: Truong Armijo is a 74 y.o. male who presents for Follow-up (Follow up ).    HPI     Review of Systems   Constitutional: Negative.    HENT: Negative.     Eyes: Negative.    Respiratory: Negative.     Cardiovascular: Negative.    Gastrointestinal: Negative.    Endocrine: Negative.    Musculoskeletal: Negative.    Skin: Negative.    Allergic/Immunologic: Negative.    Neurological: Negative.    Hematological: Negative.    Psychiatric/Behavioral: Negative.     All other systems reviewed and are negative.      Objective   Pulse 80   Resp 21   Ht 1.88 m (6' 2\")   Wt 91.6 kg (202 lb)   SpO2 98%   BMI 25.94 kg/m²   Blood pressure 110/65, pulse 80, resp. rate 21, height 1.88 m (6' 2\"), weight 91.6 kg (202 lb), SpO2 98%.   Physical Exam  Vitals and nursing note reviewed.   Constitutional:       Appearance: Normal appearance.   HENT:      Head: Normocephalic and atraumatic.      Right Ear: Tympanic membrane, ear canal and external ear normal.      Left Ear: Tympanic membrane, ear canal and external ear normal. There is no impacted cerumen.      Nose: Nose normal.      Mouth/Throat:      Mouth: Mucous membranes are moist.      Pharynx: Oropharynx is clear.   Eyes:      Extraocular Movements: Extraocular movements intact.      Conjunctiva/sclera: Conjunctivae normal.      Pupils: Pupils are equal, round, and reactive to light.   Cardiovascular:      Rate and Rhythm: Normal rate and regular rhythm.      Pulses:           Dorsalis pedis pulses are 1+ on the right side and 1+ on the left side.        Posterior tibial pulses are 1+ on the right side and 1+ on the left side.      Heart sounds: Normal heart sounds. No murmur heard.  Pulmonary:      Effort: Pulmonary effort is normal. No respiratory distress.      Breath sounds: Normal breath sounds. No stridor. No wheezing, rhonchi or rales.   Chest:      Chest wall: No tenderness.   Abdominal:      General: Abdomen is flat. Bowel sounds are normal. " There is no distension.      Palpations: Abdomen is soft. There is no mass.      Tenderness: There is no abdominal tenderness. There is no right CVA tenderness, left CVA tenderness, guarding or rebound.      Hernia: No hernia is present.   Musculoskeletal:      Cervical back: Normal range of motion and neck supple.      Right foot: Decreased range of motion. Charcot foot present.      Left foot: Decreased range of motion. Charcot foot present.   Feet:      Right foot:      Protective Sensation:  1 site tested. 2 sites sensed.      Skin integrity: Callus present.      Left foot:      Protective Sensation:  1 site tested. 2 sites sensed.      Skin integrity: Callus present.   Skin:     General: Skin is warm.      Capillary Refill: Capillary refill takes less than 2 seconds.   Neurological:      General: No focal deficit present.      Mental Status: He is alert.      Cranial Nerves: No cranial nerve deficit.      Sensory: No sensory deficit.      Motor: No weakness.      Coordination: Coordination normal.      Gait: Gait normal.      Deep Tendon Reflexes: Reflexes normal.   Psychiatric:         Mood and Affect: Mood normal.         Behavior: Behavior normal. Behavior is cooperative.         Thought Content: Thought content normal.         Judgment: Judgment normal.         Assessment/Plan   Problem List Items Addressed This Visit             ICD-10-CM    Benign essential hypertension I10     HTN - hypertension well/controlled .Target BP < 130/80  achieved. Educate low salt diet and exercise with weight loss. Educate home self monitoring and diary keeping. Educate risks of elevate blood pressure and benefits of prompt treatment.  Refill Lisinopril           Charcot foot due to diabetes mellitus (Multi) E11.610    Relevant Orders    Diabetic Shoes    Diabetic peripheral neuropathy (Multi) E11.42     Neuropathy/ - positive numbness, tingling, discomfort (needles pricking, cold feeling) in distal lower extremities(toes,  feet, legs), secondary to DM/Radiculopathy/idiopathic. Recommend: Gabapentin(Neurontin) 300 daily(at bed time) upward taper up to 2 gm/day or Lyrica 75 mg daily if failure of treatment. Also recommend: treatment of Diabetes(control levels of blood sugars), lumbar/ cervical disc disease (Physical Therapy), and check electrolytes and Thyroid function. Also address regenrative measures: B12 intrmusculary (Monthly) and Folic acid supplementation. Recommend EMG. Start Amitriptyline 25 mg daily / . Start - Tonic water - and Tramadol 50 mg TID.             Hypercholesterolemia E78.00     Hypercholesterolemia - Monitor lipid profile and educate patient upon risks of high cholesterol and targets. Educate diet and change in lifestyle and increase in exercises - Refill: Atorvastatin  20 mg daily and educate compliance with medication and diet.             Enlarged prostate without lower urinary tract symptoms (luts) N40.0     BPH - Benign PROSTATIC Hypertrophy, + Dysuria/ Increased in Nocturia and frequency of urination check PSA, His father had prostate cancer in his late 70's close to 80 years old- Educate exercises and Change in life style, prescribe vitamin E 400 IU qD. Consider referral to urology.          Diabetes mellitus (Multi) - Primary E11.9     DM - NIDDM  . Reviewed with patient / Will check  HbA1c and fasting blood sugars. Educate home self monitoring and diary keeping(reviewed with patient home blood sugar levels /diary). Educate extensively low calorie diet and weight loss with exercise. Reviewed BS- diary and Rx. Regimen. [place] renal protection with ARB/ACEI.               Educate compliance with diet and Rx. And educate risks and autcomes.    Continuesandrefill the Metformin 500 mg BID with meals  Januvia 100 mg daily and Farxiga 5 mg daily                   Hypothyroidism, unspecified E03.9     Hypothyroidism - Symptoms well/not controlled (weight gain, fatigue, constipation, cold intolerance). Check  TSH continue/change dose of Synthroid/Levothyroxine of 100 mcg/qD.             Reflux esophagitis K21.00     GERD - Acid reflux disease. Rx. PPI (Prilosec/Prevacid/Protonix/Nexium) and educate diet and life style changes. Referred patient to an endoscopy (EGD) and check H. Pylori titers.          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 Educate low salt diet and exercise with weight loss. Educate home self monitoring and diary keeping. Educate risks of elevate blood pressure and benefits of prompt treatment.  Refill Lisinopril             COPD (chronic obstructive pulmonary disease) (Multi) J44.9       COPD - no wheezing, no SOB, no Crackles heard/ Exacerbation. Get CXR. Continues mild exercises and inhalers. [place] preventive care and vaccinations. Add advair inhalers and spiriva inhaler.                   Hypercholesterolemia E78.00       Hypercholesterolemia - Monitor lipid profile and educate patient upon risks of high cholesterol and targets. Educate diet and change in lifestyle and increase in exercises - Refill: Atorvastatin  20 mg daily and educate compliance with medication and diet.            Fatigue R53.83       Fatigue - check CMP(metabolic panel and elctrolytes) , CBC(complete blood cell count), TSH(thyroid function). Insomnia may play a role and sleep studies(rule out sleep apnea) are recommended . Educate sleep hygiene. Consider anxiety disorder vs. depression. Consider Stress test, and 2DECHO.             High triglycerides E78.1     Relevant Medications     atorvastatin (Lipitor) 20 mg tablet     Diabetes mellitus (Multi) E11.9       DM - NIDDM  . Reviewed with patient / Will check  HbA1c and fasting blood  sugars. Educate home self monitoring and diary keeping(reviewed with patient home blood sugar levels /diary). Educate extensively low calorie diet and weight loss with exercise. Reviewed BS- diary and Rx. Regimen. [place] renal protection with ARB/ACEI.               Educate compliance with diet and Rx. And educate risks and autcomes.    Continuesandrefill the Metformin 500 mg BID with meals  Januvia 100 mg daily and Farxiga 5 mg daily                      Hypothyroidism, unspecified E03.9       Hypothyroidism - Symptoms well/not controlled (weight gain, fatigue, constipation, cold intolerance). Check TSH continue/change dose of Synthroid/Levothyroxine of 100 mcg/qD.            Recurrent canker sores K12.0       Possible due to HSV  - start Valtrex 1 gm TID for 5 days and consider Sweet syndrome and start Colchicine 0.6 mg daily  and start Vitamin B complex daily  -            Relevant Medications     valACYclovir (Valtrex) 1 gram tablet     colchicine 0.6 mg tablet      Other Visit Diagnoses           Codes     Flu vaccine need     Z23     Relevant Orders     Flu vaccine, trivalent, preservative free, HIGH-DOSE, age 65y+ (Fluzone) (Completed)     Gastroesophageal reflux disease without esophagitis     K21.9     Relevant Medications     pantoprazole (ProtoNix) 40 mg EC tablet                        Benign essential hypertension I10         HTN - hypertension well/controlled .Target BP < 130/80  achieved. Educate low salt diet and exercise with weight loss. Educate home self monitoring and diary keeping. Educate risks of elevate blood pressure and benefits of prompt treatment.  Refill Lisinopril             COPD (chronic obstructive pulmonary disease) (Multi) J44.9       COPD - no wheezing, no SOB, no Crackles heard/ Exacerbation. Get CXR. Continues mild exercises and inhalers. [place] preventive care and vaccinations. Add advair inhalers and spiriva inhaler.                Hypercholesterolemia E78.00        Hypercholesterolemia - Monitor lipid profile and educate patient upon risks of high cholesterol and targets. Educate diet and change in lifestyle and increase in exercises - Refill: Atorvastatin  20 mg daily and educate compliance with medication and diet.            Diabetes mellitus (Multi) E11.9       DM - NIDDM  . Reviewed with patient / Will check  HbA1c and fasting blood sugars. Educate home self monitoring and diary keeping(reviewed with patient home blood sugar levels /diary). Educate extensively low calorie diet and weight loss with exercise. Reviewed BS- diary and Rx. Regimen. [place] renal protection with ARB/ACEI.               Educate compliance with diet and Rx. And educate risks and autcomes.    Continuesandrefill the Metformin 500 mg BID with meals  Januvia 100 mg daily and Farxiga 5 mg daily                      Primary hypothyroidism E03.9       Hypothyroidism - Symptoms well/controlled (weight gain, fatigue, constipation, cold intolerance). Check TSH continues dose of Synthroid/Levothyroxine  of  88 bmcg/qD.            Reflux esophagitis K21.00       GERD - Acid reflux disease. Rx. PPI (Prilosec/Prevacid/Protonix/Nexium) and educate diet and life style changes. Referred patient to an endoscopy (EGD) and check H. Pylori titers.                          Pressure injury of sacral region, stage 2 (Multi) L89.152       Start Desitin (Zinc oxide paste) - and get cushioning and doughnut pillow             Other Visit Diagnoses           Codes     Routine general medical examination at health care facility     Z00.00                          COPD (chronic obstructive pulmonary disease) (Multi) J44.9         COPD - no wheezing, no SOB, no Crackles heard/ Exacerbation. Get CXR. Continues mild exercises and inhalers. [place] preventive care and vaccinations. Add advair inhalers and spiriva inhaler.            Diabetic peripheral neuropathy (Multi) E11.42       Neuropathy/ - positive numbness, tingling,  discomfort (needles pricking, cold feeling) in distal lower extremities(toes, feet, legs), secondary to DM/Radiculopathy/idiopathic. Recommend: Gabapentin(Neurontin) 300 daily(at bed time) upward taper up to 2 gm/day or Lyrica 75 mg daily if failure of treatment. Also recommend: treatment of Diabetes(control levels of blood sugars), lumbar/ cervical disc disease (Physical Therapy), and check electrolytes and Thyroid function. Also address regenrative measures: B12 intrmusculary (Monthly) and Folic acid supplementation. Recommend EMG. Start Amitriptyline 25 mg daily / . Start - Tonic water - and Tramadol 50 mg TID.            Enlarged prostate without lower urinary tract symptoms (luts) N40.0       BPH - Benign PROSTATIC Hypertrophy, + Dysuria/ Increased in Nocturia and frequency of urination check PSA, His father had prostate cancer in his late 70's close to 80 years old- Educate exercises and Change in life style, prescribe vitamin E 400 IU qD. Consider referral to urology.            Fatigue R53.83       Last Assessment & Plan Note   Written:Ki Ahuja MD7/22/2024 12:17 PM     Fatigue - check CMP(metabolic panel and elctrolytes) , CBC(complete blood cell count), TSH(thyroid function). Insomnia may play a role and sleep studies(rule out sleep apnea) are recommended . Educate sleep hygiene. Consider anxiety disorder vs. depression. Consider Stress test, and 2DECHO.                   Diabetes mellitus (Multi) E11.9       DM - NIDDM  . Reviewed with patient / Will check  HbA1c and fasting blood sugars. Educate home self monitoring and diary keeping(reviewed with patient home blood sugar levels /diary). Educate extensively low calorie diet and weight loss with exercise. Reviewed BS- diary and Rx. Regimen. [place] renal protection with ARB/ACEI.               Educate compliance with diet and Rx. And educate risks and autcomes.    Continuesandrefill the Metformin 500 mg BID with meals  Januvia 100 mg daily and  Farxiga 5 mg daily                      Hypothyroidism, unspecified E03.9       Hypothyroidism - Symptoms well/not controlled (weight gain, fatigue, constipation, cold intolerance). Check TSH continue/change dose of Synthroid/Levothyroxine  of 100 mcg/qD.            Reflux esophagitis K21.00       GERD - Acid reflux disease. Rx. PPI (Prilosec/Prevacid/Protonix/Nexium) and educate diet and life style changes. Referred patient to an endoscopy (EGD) and check H. Pylori titers.            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 otitis vs. Eustachian tube dysfunction vs. advanced cervical disc disease causing encroachment of the Vertebral arteries vs. viral infection of the Vestibular nerve sensor - recommend : Antivert /Meclizine 12.5 mg TID , treat infection of the sinuses or ears , increase intake of fluids, educate exercises , avoid operating machinery, monitor closely, rest.             Relevant Medications     meclizine (Antivert) 12.5 mg tablet                                 Acute pain of left knee M25.562         DJD - Degenerative Joint Disease, Chronic Arthritis, of the Left more than the right  Knee. . Pain control, and anti-inflammatory meds : consider Kenalog injection and start Voltaren gel 1% topically BID,  and  Tylenol 325 mg TID PRN. Educate exercises and referred the patient to PT (Physical Therapy). Get X-Ray's.               Benign essential hypertension I10       HTN - hypertension well/controlled .Target BP < 130/80  achieved. Educate low salt diet and exercise with weight loss. Educate home self monitoring and diary keeping. Educate risks of elevate blood pressure and benefits of prompt treatment.  Refill Lisinopril             Relevant Orders     Comprehensive Metabolic Panel     Hypercholesterolemia E78.00       Hypercholesterolemia  - Monitor lipid profile and educate patient upon risks of high cholesterol and targets. Educate diet and change in lifestyle and increase in exercises - Refill: Atorvastatin  20 mg daily and educate compliance with medication and diet.            Relevant Orders     Lipid Panel     Fatigue R53.83       Fatigue - check CMP(metabolic panel and elctrolytes) , CBC(complete blood cell count), TSH(thyroid function). Insomnia may play a role and sleep studies(rule out sleep apnea) are recommended . Educate sleep hygiene. Consider anxiety disorder vs. depression. Consider Stress test, and 2DECHO.             Diabetes mellitus (Multi) - Primary E11.9       DM - NIDDM  . Reviewed with patient / Will check  HbA1c and fasting blood sugars. Educate home self monitoring and diary keeping(reviewed with patient home blood sugar levels /diary). Educate extensively low calorie diet and weight loss with exercise. Reviewed BS- diary and Rx. Regimen. [place] renal protection with ARB/ACEI.               Educate compliance with diet and Rx. And educate risks and autcomes.    Continuesandrefill the Metformin 500 mg BID with meals  Januvia 100 mg daily and Farxiga 5 mg daily                         Relevant Orders     Hemoglobin A1C     Primary hypothyroidism E03.9       Hypothyroidism - Symptoms well/controlled (weight gain, fatigue, constipation, cold intolerance). Check TSH continues dose of Synthroid/Levothyroxine  of  88 bmcg/qD.            Relevant Orders     CBC and Auto Differential     TSH with reflex to Free T4 if abnormal     Reflux esophagitis K21.00       GERD - Acid reflux disease. Rx. PPI (Prilosec/Prevacid/Protonix/Nexium) and educate diet and life style changes. Referred patient to an endoscopy (EGD) and check H. Pylori titers.            Hypogonadism male E29.1       Check levels and refill supplement of Testosterone            Relevant Orders     Prostate Specific Antigen     Essential (primary) hypertension I10        HTN - hypertension well/controlled .Target BP < 130/80  achieved. Educate low salt diet and exercise with weight loss. Educate home self monitoring and diary keeping. Educate risks of elevate blood pressure and benefits of prompt treatment.  Refill lisinopril             Other Visit Diagnoses           Codes     Goiter     E04.9     Relevant Orders     US thyroid     Acute pain of both knees     M25.561, M25.562     Relevant Orders     XR knee 1-2 views bilateral     Benign prostatic hyperplasia with urinary frequency     N40.1, R35.0     Relevant Orders     Prostate Specific Antigen                             Immunizations/Injections       very important  Immunizations from outside sources need reconciliation.       Flu vaccine, quadrivalent, high-dose, preservative free, age 65y+ (FLUZONE)10/18/2023, 10/17/2022, 10/8/2020  Flu vaccine, trivalent, preservative free, HIGH-DOSE, age 65y+ (Fluzone)10/14/2019, 10/17/2018, 10/4/2017  Flu vaccine, trivalent, preservative free, age 6 months and greater (Fluarix/Fluzone/Flulaval)10/5/2015, 10/1/2015  Influenza, Seasonal, Quadrivalent, Efpzgarken58/27/2021  Influenza, seasonal, chljwodruz87/1/2021, 9/23/2011, 10/8/2009,  ... (1 more)  Moderna SARS-CoV-2 Vaccination3/27/2021, 2/28/2021  Pfizer COVID-19 vaccine, Fall 2023, 12 years and older, (30mcg/0.3mL)1/23/2024  Pfizer COVID-19 vaccine, bivalent, age 12 years and older (30 mcg/0.3 mL)9/15/2022  Pfizer Purple Cap SARS-CoV-211/3/2021  Pneumococcal conjugate vaccine, 13-valent (PREVNAR 13)9/15/2014  Pneumococcal conjugate vaccine, 20-valent (PREVNAR 20)5/5/2023  Pneumococcal polysaccharide vaccine, 23-valent, age 2 years and older (PNEUMOVAX 23)1/1/2014  Tdap vaccine, age 7 year and older (BOOSTRIX, ADACEL)6/5/2023, 11/26/2017  Zoster vaccine, recombinant, adult (SHINGRIX)8/8/2023, 6/5/2023, 12/13/2020,  ... (1 more)  Zoster, live10/25/2017, 5/22/2014

## 2024-11-25 NOTE — ASSESSMENT & PLAN NOTE
DM - NIDDM  . Reviewed with patient / Will check  HbA1c and fasting blood sugars. Educate home self monitoring and diary keeping(reviewed with patient home blood sugar levels /diary). Educate extensively low calorie diet and weight loss with exercise. Reviewed BS- diary and Rx. Regimen. [place] renal protection with ARB/ACEI.               Educate compliance with diet and Rx. And educate risks and autcomes.    Continuesandrefill the Metformin 500 mg BID with meals  Januvia 100 mg daily and Farxiga 5 mg daily

## 2024-11-26 ENCOUNTER — APPOINTMENT (OUTPATIENT)
Dept: BEHAVIORAL HEALTH | Facility: CLINIC | Age: 74
End: 2024-11-26
Payer: COMMERCIAL

## 2024-11-26 DIAGNOSIS — G47.09 OTHER INSOMNIA: ICD-10-CM

## 2024-11-26 DIAGNOSIS — R45.81 CHRONIC LOW SELF ESTEEM: ICD-10-CM

## 2024-11-26 DIAGNOSIS — F25.1 SCHIZOAFFECTIVE DISORDER, DEPRESSIVE TYPE (MULTI): Primary | ICD-10-CM

## 2024-11-26 DIAGNOSIS — F41.8 MIXED ANXIETY DEPRESSIVE DISORDER: ICD-10-CM

## 2024-11-26 PROCEDURE — 4010F ACE/ARB THERAPY RXD/TAKEN: CPT | Performed by: NURSE PRACTITIONER

## 2024-11-26 PROCEDURE — 1159F MED LIST DOCD IN RCRD: CPT | Performed by: NURSE PRACTITIONER

## 2024-11-26 PROCEDURE — 3048F LDL-C <100 MG/DL: CPT | Performed by: NURSE PRACTITIONER

## 2024-11-26 PROCEDURE — 3044F HG A1C LEVEL LT 7.0%: CPT | Performed by: NURSE PRACTITIONER

## 2024-11-26 PROCEDURE — 99213 OFFICE O/P EST LOW 20 MIN: CPT | Performed by: NURSE PRACTITIONER

## 2024-11-26 PROCEDURE — 1160F RVW MEDS BY RX/DR IN RCRD: CPT | Performed by: NURSE PRACTITIONER
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
## 2024-12-02 ENCOUNTER — LAB (OUTPATIENT)
Dept: LAB | Facility: LAB | Age: 74
End: 2024-12-02
Payer: COMMERCIAL

## 2024-12-02 ENCOUNTER — APPOINTMENT (OUTPATIENT)
Dept: OTOLARYNGOLOGY | Facility: CLINIC | Age: 74
End: 2024-12-02
Payer: COMMERCIAL

## 2024-12-02 VITALS — BODY MASS INDEX: 26.06 KG/M2 | WEIGHT: 203.1 LBS | HEIGHT: 74 IN | TEMPERATURE: 97.1 F

## 2024-12-02 DIAGNOSIS — R13.10 DYSPHAGIA, UNSPECIFIED TYPE: Primary | ICD-10-CM

## 2024-12-02 DIAGNOSIS — K14.6 TONGUE BURNING SENSATION: ICD-10-CM

## 2024-12-02 LAB
HERPES SIMPLEX VIRUS 1 IGG: 0.2 INDEX
HERPES SIMPLEX VIRUS 2 IGG: <0.2 INDEX

## 2024-12-02 PROCEDURE — 36415 COLL VENOUS BLD VENIPUNCTURE: CPT

## 2024-12-02 PROCEDURE — 3048F LDL-C <100 MG/DL: CPT

## 2024-12-02 PROCEDURE — 3044F HG A1C LEVEL LT 7.0%: CPT

## 2024-12-02 PROCEDURE — 31575 DIAGNOSTIC LARYNGOSCOPY: CPT

## 2024-12-02 PROCEDURE — 1159F MED LIST DOCD IN RCRD: CPT

## 2024-12-02 PROCEDURE — 1036F TOBACCO NON-USER: CPT

## 2024-12-02 PROCEDURE — 86696 HERPES SIMPLEX TYPE 2 TEST: CPT

## 2024-12-02 PROCEDURE — 86695 HERPES SIMPLEX TYPE 1 TEST: CPT

## 2024-12-02 PROCEDURE — 3008F BODY MASS INDEX DOCD: CPT

## 2024-12-02 PROCEDURE — 4010F ACE/ARB THERAPY RXD/TAKEN: CPT

## 2024-12-02 PROCEDURE — 99214 OFFICE O/P EST MOD 30 MIN: CPT

## 2024-12-02 PROCEDURE — 1160F RVW MEDS BY RX/DR IN RCRD: CPT

## 2024-12-02 ASSESSMENT — PATIENT HEALTH QUESTIONNAIRE - PHQ9
SUM OF ALL RESPONSES TO PHQ9 QUESTIONS 1 AND 2: 2
1. LITTLE INTEREST OR PLEASURE IN DOING THINGS: SEVERAL DAYS
10. IF YOU CHECKED OFF ANY PROBLEMS, HOW DIFFICULT HAVE THESE PROBLEMS MADE IT FOR YOU TO DO YOUR WORK, TAKE CARE OF THINGS AT HOME, OR GET ALONG WITH OTHER PEOPLE: SOMEWHAT DIFFICULT
2. FEELING DOWN, DEPRESSED OR HOPELESS: SEVERAL DAYS

## 2024-12-03 NOTE — PROGRESS NOTES
Reason For Consult  Chief Complaint   Patient presents with    New Patient Visit     Last 3 months sensitive tongue and sores  Trouble with food getting stuck         HISTORY OF PRESENT ILLNESS:  Truong Armijo, who is a 74 y.o. male presenting for an initial visit for dysphagia.  The patient reports the dysphagia has been occurring with solids and will occasional cough after drinking liquids. The patient reports increased effort when swallowing and reports liquids will usually help get solids down. The patient reports an episode where he felt like the food was stuck and had to regurgitate the solids back up. The patient had a MBS  1/31/18, with no aspiration episodes seen. The patient reports breathing has been stable. Patient denies any changes to voice. Patient reports canker sores with burning on the tongue that has been occurring over the past 3 months. The patient reports former smoking history.        Past Medical History  He has a past medical history of Abnormality of gait (05/16/2012), Acute constipation (01/26/2024), Altered mental status, unspecified (08/28/2019), Asthma (09/26/2019), Benign neoplasm of eyelid including canthus (12/28/2017), Benign neoplasm of left choroid (09/18/2015), Cervicalgia (05/16/2012), Change in bowel habit (12/09/2021), Condition not found (09/14/2023), Disorganized schizophrenia (Multi), Dysphagia (01/26/2024), Dysphagia, oropharyngeal phase (12/09/2021), Elbow pain (01/26/2024), Elbow sprain (01/26/2024), Hemorrhage in optic nerve sheath (01/26/2024), Impairment of balance (01/26/2024), Nausea (01/26/2024), Neurogenic bladder (01/26/2024), Noninfective gastroenteritis and colitis, unspecified (12/09/2021), Nuclear sclerotic cataract of both eyes (09/26/2019), Other conditions influencing health status (12/09/2021), 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), Other specified anxiety disorders (05/13/2020), Other specified anxiety disorders (07/02/2021), Other  specified anxiety disorders (11/03/2017), Otitis media, unspecified, bilateral (12/12/2014), Peripheral vertigo (10/01/2012), Personal history of other diseases of the nervous system and sense organs (10/17/2022), Personal history of other diseases of the nervous system and sense organs (12/12/2014), Personal history of other diseases of the respiratory system (08/17/2015), Personal history of other diseases of the respiratory system (12/12/2014), Personal history of other endocrine, nutritional and metabolic disease (10/12/2016), Personal history of other mental and behavioral disorders, Personal history of other specified conditions (12/09/2021), Personal history of other specified conditions (09/03/2021), Personal history of other specified conditions (12/09/2021), Personal history of other specified conditions (12/09/2021), Personal history of other specified conditions (12/09/2021), Personal history of other specified conditions (07/25/2018), Personal history of other specified conditions (12/09/2021), Personal history of other specified conditions (03/07/2014), Postconcussional syndrome, Primary open angle glaucoma (POAG) (09/14/2023), Problem related to primary support group, unspecified (07/30/2019), Subjective tinnitus (01/26/2024), and Urinary tract infection (01/26/2024).  Surgical History  He has a past surgical history that includes Tonsillectomy (09/20/2013).     Social History  He reports that he quit smoking about 42 years ago. His smoking use included cigars. He has never been exposed to tobacco smoke. He has never used smokeless tobacco. He reports that he does not drink alcohol and does not use drugs.    Allergies  Cigarette smoke; Caffeine; Cat dander; Latanoprost; Timolol; Wheat flour; and Yeast, dried    Review of Systems  All 10 systems were reviewed and negative except for above.      Physical Exam  CONSTITUTIONAL: Well developed, well nourished.    VOICE: mild variable  "hoarseness  RESPIRATION: Breathing comfortably, no stridor.    NEURO: Alert and oriented x3, cranial nerves II-XII intact and symmetric bilaterally.    EARS: Normal external ears, external auditory canals, normal hearing to conversational voice.    NOSE: External nose midline, anterior rhinoscopy is normal with limited visualization to the anterior aspect of the interior turbinates. No lesions noted.     ORAL CAVITY/OROPHARYNX/LIPS: Normal mucous membranes, normal floor of mouth/tongue/OP, no masses or lesions are noted.    SKIN: Neck skin is intact.    PSYCH: Alert and oriented with appropriate mood and affect.        Last Recorded Vitals  Temperature 36.2 °C (97.1 °F), height 1.88 m (6' 2\"), weight 92.1 kg (203 lb 1.6 oz).    Procedure  PROCEDURE NOTE:  Recommended flexible laryngoscopy/stroboscopy.  Risks, benefits,  and alternatives were explained.  They wish to proceed and provide verbal consent.     PROCEDURE:  Flexible Laryngoscopy, CPT 57876  Flexible laryngoscopy with stroboscopy, CPT 12637     POSTPROCEDURE DIAGNOSIS:    INDICATIONS: Inability to tolerate mirror exam or abnormal findings on mirror, Flexible Laryngoscopy/Stroboscopy performed to assess one of the followin. Diagnosis of symptomatic disorder involving the voice, swallow, upper aerodigestive tract, including THONY disorders, or  2. Preoperative evaluation of vocal cord function for individuals undergoing surgery where the RLN or vagus nerves are at risk of injury, or  3. Further evaluation of abnormalities of the upper aerodigestive tract discovered by another modality, such as CT, MRI, bronchoscopy or EGD    Description of Procedure:    After adequate afrin and lidocaine spray, I advanced the endoscope.  Visualization of the nasopharynx, vallecula, posterior pharyngeal walls, pyriform, epiglottis and post cricoid areas was unremarkable.  The following laryngeal findings were noted:    vocal cord movement: good mobility bilaterally. "   closure was difficult to assess due to increased AP and FVC compression.  Mucosal wave was not assessed  Compression was increased AP = FVC   edema was none  interarytenoid edema   lesions were none  the subglottis was widely patent  Pharyngeal wall squeeze was normal  Thick-sticky mucus sitting on walls of larynx and in piriform sinuses.     Procedure well tolerated.   Relevant Results  MBS-1/31/18    ASSESSMENT AND PLAN:   This is an initial visit for dysphagia and tongue burning with clinical findings notable for good vc movement with increased AP and FVC compression. Thick mucus sitting in throat. Colp water gargles for mucus management. Will repeat MBSS. Placed order for HSV lab work regarding tongue burning. Will call with results from swallow study and next steps. All questions answered. Patient agreeable to plan.     Diagnoses are exacerbated by:    We discussed the treatment options to include, medical and surgical options.  We have decided to proceed as follows:   MBSS ordered   Lab work placed  Will call with results from testing and next steps.

## 2024-12-08 DIAGNOSIS — I10 BENIGN ESSENTIAL HYPERTENSION: ICD-10-CM

## 2024-12-09 RX ORDER — OMEGA-3-ACID ETHYL ESTERS 1 G/1
4 CAPSULE, LIQUID FILLED ORAL
Qty: 360 CAPSULE | Refills: 0 | Status: SHIPPED | OUTPATIENT
Start: 2024-12-09

## 2024-12-10 ENCOUNTER — OFFICE VISIT (OUTPATIENT)
Dept: BEHAVIORAL HEALTH | Facility: CLINIC | Age: 74
End: 2024-12-10
Payer: COMMERCIAL

## 2024-12-10 VITALS
SYSTOLIC BLOOD PRESSURE: 113 MMHG | HEART RATE: 89 BPM | BODY MASS INDEX: 26.08 KG/M2 | TEMPERATURE: 95.9 F | DIASTOLIC BLOOD PRESSURE: 80 MMHG | RESPIRATION RATE: 16 BRPM | WEIGHT: 203.1 LBS

## 2024-12-10 DIAGNOSIS — F41.8 MIXED ANXIETY DEPRESSIVE DISORDER: ICD-10-CM

## 2024-12-10 DIAGNOSIS — Z79.899 MEDICATION MANAGEMENT: ICD-10-CM

## 2024-12-10 DIAGNOSIS — F25.1 SCHIZOAFFECTIVE DISORDER, DEPRESSIVE TYPE (MULTI): ICD-10-CM

## 2024-12-10 DIAGNOSIS — R45.81 CHRONIC LOW SELF ESTEEM: ICD-10-CM

## 2024-12-10 PROCEDURE — 3074F SYST BP LT 130 MM HG: CPT | Performed by: PSYCHIATRY & NEUROLOGY

## 2024-12-10 PROCEDURE — 99214 OFFICE O/P EST MOD 30 MIN: CPT | Mod: AM | Performed by: PSYCHIATRY & NEUROLOGY

## 2024-12-10 PROCEDURE — 3044F HG A1C LEVEL LT 7.0%: CPT | Performed by: PSYCHIATRY & NEUROLOGY

## 2024-12-10 PROCEDURE — 1036F TOBACCO NON-USER: CPT | Performed by: PSYCHIATRY & NEUROLOGY

## 2024-12-10 PROCEDURE — 3079F DIAST BP 80-89 MM HG: CPT | Performed by: PSYCHIATRY & NEUROLOGY

## 2024-12-10 PROCEDURE — 3048F LDL-C <100 MG/DL: CPT | Performed by: PSYCHIATRY & NEUROLOGY

## 2024-12-10 PROCEDURE — 4010F ACE/ARB THERAPY RXD/TAKEN: CPT | Performed by: PSYCHIATRY & NEUROLOGY

## 2024-12-10 PROCEDURE — 99214 OFFICE O/P EST MOD 30 MIN: CPT | Performed by: PSYCHIATRY & NEUROLOGY

## 2024-12-10 ASSESSMENT — PATIENT HEALTH QUESTIONNAIRE - PHQ9
5. POOR APPETITE OR OVEREATING: SEVERAL DAYS
2. FEELING DOWN, DEPRESSED OR HOPELESS: NOT AT ALL
SUM OF ALL RESPONSES TO PHQ9 QUESTIONS 1 AND 2: 0
6. FEELING BAD ABOUT YOURSELF - OR THAT YOU ARE A FAILURE OR HAVE LET YOURSELF OR YOUR FAMILY DOWN: SEVERAL DAYS
8. MOVING OR SPEAKING SO SLOWLY THAT OTHER PEOPLE COULD HAVE NOTICED. OR THE OPPOSITE, BEING SO FIGETY OR RESTLESS THAT YOU HAVE BEEN MOVING AROUND A LOT MORE THAN USUAL: SEVERAL DAYS
1. LITTLE INTEREST OR PLEASURE IN DOING THINGS: NOT AT ALL
3. TROUBLE FALLING OR STAYING ASLEEP OR SLEEPING TOO MUCH: SEVERAL DAYS
SUM OF ALL RESPONSES TO PHQ QUESTIONS 1-9: 11
1. LITTLE INTEREST OR PLEASURE IN DOING THINGS: MORE THAN HALF THE DAYS
SUM OF ALL RESPONSES TO PHQ9 QUESTIONS 1 AND 2: 4
2. FEELING DOWN, DEPRESSED OR HOPELESS: MORE THAN HALF THE DAYS
10. IF YOU CHECKED OFF ANY PROBLEMS, HOW DIFFICULT HAVE THESE PROBLEMS MADE IT FOR YOU TO DO YOUR WORK, TAKE CARE OF THINGS AT HOME, OR GET ALONG WITH OTHER PEOPLE: SOMEWHAT DIFFICULT
9. THOUGHTS THAT YOU WOULD BE BETTER OFF DEAD, OR OF HURTING YOURSELF: SEVERAL DAYS
7. TROUBLE CONCENTRATING ON THINGS, SUCH AS READING THE NEWSPAPER OR WATCHING TELEVISION: SEVERAL DAYS
4. FEELING TIRED OR HAVING LITTLE ENERGY: SEVERAL DAYS

## 2024-12-10 ASSESSMENT — ENCOUNTER SYMPTOMS
LOSS OF SENSATION IN FEET: 1
DYSPHORIC MOOD: 1
OCCASIONAL FEELINGS OF UNSTEADINESS: 0
DEPRESSED MOOD: 1

## 2024-12-10 NOTE — ASSESSMENT & PLAN NOTE
Your diagnosis is schizoaffective disorder with of chronic depressed features. You deny any suicidal or homicidal ideation or plans. I did inform you that I would be away the next three weeks of December with Dr. Rebolledo and Radha SCHOFIELD covering in my absence.     OARRS was reviewed today with no concerning findings evidenced.      You did follow up with neurology to further assess your mental status fluctuations. You saw Dr. Wyman who related these to a diagnosis of migraine equivalents and recommended treatment.     The plan is to continue bupropion  mg morning and middle afternoon daily, you have elected to stop risperidone 1 mg twice daily in the morning and late afternoon which you announced you have done unilaterally prior to this appointment because of concerns of parkinsonian features/risks; continue fluoxetine to 40 mg daily because of depression and you can continue diazepam 5 mg twice a day and at bedtime as needed only. You clearly are able to recite back the risks, benefits and alternatives of these medications as discussed with you today.     The FDA risks of antipsychotics including increased risk of sudden death, heart attack, brain stroke, irreversible neurological movement disorders, parkinsonism, cardiac toxicity, weight gain and diabetes of this medication were discussed. You were able to recite back the risks, benefits, alternatives with respect to antipsychotics and in particular risperidone.     The FDA risks of antidepressants including increased risk of suicide, cardiotoxicity, lowered seizure threshold, the serotonin syndrome with serotonin agents and anticholinergic effects have been discussed. We also discussed that at least in the case of SSRIs there is interference with warfarin and nonsteroidal inflammatories and can cause increased bleeding and hemorrhage. You were able to recite back the risks, benefits, alternatives with respect to your antidepressants in particular  bupropion and fluoxetine.     The FDA risks of benzodiazepines were discussed which includes impairment of memory and cognition, increased falls, addiction and dangerous withdrawals if stopped suddenly which can cause significant morbidity and or mortality. There is also risk of respiratory suppression alone or with other sedative or other medications which can lead to morbidity and mortality. You clearly were able to recite back the risks, benefits and alternatives to the benzodiazepines discussed. Orders:    Referral to Home Health; Future

## 2024-12-10 NOTE — PROGRESS NOTES
Subjective   Patient ID: Truong Armijo is a 74 y.o. male who presents for Follow-up. I am having mental fog.    HPI: The patient report having cognitive difficulties and depression.     Active and Past Problems  Problems    · Acute pain of both knees (338.19,719.46) (M25.561,M25.562)   · Adult hypothyroidism (244.9) (E03.9)   · Adverse drug reaction (E947.9) (T50.905A)   · Adverse effect of drug, initial encounter (E947.9) (T50.905A)   · Anorexia (783.0) (R63.0)   · Anxiety and depression (300.00,311) (F41.9,F32.A)   · Anxiety disorder (300.00) (F41.9)   · Arthralgia of knee, unspecified laterality (719.46) (M25.569)   · Arthralgia of left shoulder region (719.41) (M25.512)   · Arthralgia of right shoulder region (719.41) (M25.511)   · Asthmatic bronchitis (493.90) (J45.909)   · Atelectasis (518.0) (J98.11)   · Atelectasis, bilateral (518.0) (J98.11)   · Atrophic retina (362.60) (H35.89)   · Back pain (724.5) (M54.9)   · Balance problem (781.99) (R26.89)   · Benign essential hypertension (401.1) (I10)   · Bilateral artificial lens implant (V43.1) (Z96.1)   · Bilateral myopia (367.1) (H52.13)   · Bilateral sensorineural hearing loss (389.18) (H90.3)   · Bilateral shoulder pain, unspecified chronicity (719.41) (M25.511,M25.512)   · Cervical back pain with evidence of disc disease (722.91) (M50.90)   · Cervical disc disease (722.91) (M50.90)   · Cervical myofascial pain syndrome (729.1) (M79.18)   · 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)   · Chest pain of uncertain etiology (786.59) (R07.9)   · Chronic depression (311) (F32.A)   · Chronic low self esteem (799.29) (R45.81)   · Chronic otitis externa of left ear (380.23) (H60.62)   · Chronic otitis media (382.9) (H66.90)   · Chronic primary angle-closure glaucoma of both eyes, mild stage (365.23) (H40.2231)   · Chronic primary angle-closure glaucoma of left eye, indeterminate stage (365.23)  (H40.2224)   · Chronic primary angle-closure glaucoma of left eye, unspecified  glaucoma stage  (365.23) (H40.2220)   · Constipation (564.00) (K59.00)   · Contusion of lower back (922.31) (S30.0XXA)   · COPD (chronic obstructive pulmonary disease) (496) (J44.9)   · Cough variant asthma (493.82) (J45.991)   · Degeneration of intervertebral disc of cervical region (722.4) (M50.30)   · Depression with anxiety (300.4) (F41.8)   · Diabetes mellitus (250.00) (E11.9)   · Diabetic Charcot's foot (250.60,713.5) (E11.610)   · Diabetic gastroenteropathy (250.60,579.8) (E11.69,K52.9)   · Diabetic peripheral neuropathy (250.60,357.2) (E11.42)   · Dizziness (780.4) (R42)   · Elbow pain (719.42) (M25.529)   · Elevated IOP (365.00) (H40.059)   · Elevated LDL cholesterol level (272.0) (E78.00)   · Encounter for immunization (V03.89) (Z23)   · Enlarged prostate without lower urinary tract symptoms (luts) (600.00) (N40.0)   · Epididymitis (604.90) (N45.1)   · Fatigue (780.79) (R53.83)   · Fluctuating mental status (780.97) (R41.82)   · Generalized weakness (780.79) (R53.1)   · Glaucoma (365.9) (H40.9)   · High triglycerides (272.1) (E78.1)   · HTN (hypertension) (401.9) (I10)   · Hyperglycemia (790.29) (R73.9)   · Hypogonadism male (257.2) (E29.1)   · Hypotension due to drugs (458.8,E947.9) (I95.2)   · Impaired vision (369.9) (H54.7)   · Increased urinary frequency (788.41) (R35.0)   · Insomnia (780.52) (G47.00)   · Intoxication   · Lateral epicondylitis of right elbow (726.32) (M77.11)   · Leg weakness (729.89) (R29.898)   · Lumbar disc herniation (722.10) (M51.26)   · Lumbar pain (724.2) (M54.50)   · Medication management (V58.69) (Z79.899)   · Migraine equivalent (346.20) (G43.109)   · Mixed hearing loss (389.20) (H90.8)   · Moderate episode of recurrent major depressive disorder (296.32) (F33.1)   · Neck pain (723.1) (M54.2)   · Neck pain, acute (723.1) (M54.2)   · Neurogenic bladder (596.54) (N31.9)   · 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,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)   · Optic nerve hemorrhage (377.42) (H47.029)   ·  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)   · Other specified hypotension (458.8) (I95.89)   · Pain in both feet (729.5) (M79.671,M79.672)   · Paranoia (psychosis) (297.1) (F22)   · Paranoid ideation (297.8) (F22)   · Penile pain, chronic (607.9) (N48.89,G89.29)   · Peripheral neuropathy (356.9) (G62.9)   · Persistent testicular pain (608.9) (N50.819)   · Poorly controlled diabetes mellitus (250.00) (E11.65)   · Post traumatic stress disorder (PTSD) (309.81) (F43.10)   · Primary hypothyroidism (244.9) (E03.9)   · Primary open angle glaucoma of both eyes, mild stage (365.11,365.71) (H40.1131)   · Primary osteoarthritis of left knee (715.16) (M17.12)   · Primary osteoarthritis of right knee (715.16) (M17.11)   · PUD (peptic ulcer disease) (533.90) (K27.9)   · Reactive confusion (298.2) (F44.89)   · Reflux esophagitis (530.11) (K21.00)   · Right inguinal hernia (550.90) (K40.90)   · Schizoaffective disorder (295.70) (F25.9)   · Screening PSA (prostate specific antigen) (V76.44) (Z12.5)   · SOB (shortness of breath) (786.05) (R06.02)   · SOB (shortness of breath) (786.05) (R06.02)   · Sprain of right elbow, initial encounter (841.9) (S53.401A)   · Stress reaction (308.9) (F43.0)   · Subjective tinnitus of both ears (388.31) (H93.13)   · 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)   · Thyroid disorder (246.9) (E07.9)   · TIA (transient ischemic attack) (435.9) (G45.9)   · Trigeminal neuralgia (350.1) (G50.0)   · Trigger point of extremity (729.5) (M79.609)   · Urinary frequency (788.41) (R35.0)   · Urinary tract infection (599.0) (N39.0)   · Urinary urgency (788.63) (R39.15)   · Vasovagal reaction (780.2) (R55)   · Vertigo (780.4) (R42)   · Vitamin D deficiency (268.9) (E55.9)     Past Medical History  Problems    · History of Acute bilateral otitis media (382.9) (H66.93)   · Resolved Date: 15 Oct 2015   · History of Change in bowel habits (787.99) (R19.4)   · Resolved Date: 09 Dec 2021   · History of Change in mental status  (780.97) (R41.82)   · Resolved Date: 17 Sep 2019   · History of Chronic diarrhea of unknown origin (787.91) (K52.9)   · Resolved Date: 09 Dec 2021   · History of Depression with anxiety (300.4) (F41.8)   · Resolved Date: 03 Nov 2017   · History of Depression with anxiety (300.4) (F41.8)   · Resolved Date: 13 May 2020   · History of Depression with anxiety (300.4) (F41.8)   · Resolved Date: 02 Jul 2021   · History of Dysphagia, oropharyngeal phase (787.22) (R13.12)   · Resolved Date: 09 Dec 2021   · History of acute otitis media (V12.49) (Z86.69)   · Resolved Date: 15 Oct 2015   · History of acute pharyngitis (V12.69) (Z87.09)   · Resolved Date: 15 Oct 2015   · History of acute sinusitis (V12.69) (Z87.09)   · Resolved Date: 15 Oct 2015   · History of chronic diarrhea   · Resolved Date: 09 Dec 2021   · History of dysphagia (V13.89) (Z87.898)   · Resolved Date: 09 Dec 2021   · History of dysphagia (V13.89) (Z87.898)   · Resolved Date: 09 Dec 2021   · History of fatigue (V13.89) (Z87.898)   · Resolved Date: 28 Apr 2014   · History of hypercholesterolemia (V12.29) (Z86.39)   · Resolved Date: 12 Oct 2016   · History of nausea (V12.79) (Z87.898)   · Resolved Date: 09 Dec 2021   · History of nausea (V12.79) (Z87.898)   · Resolved Date: 09 Dec 2021   · History of nausea and vomiting (V12.79) (Z87.898)   · Resolved Date: 09 Dec 2021   · History of psychiatric hospitalization (V11.9) (Z86.59)   · Several psychiatric hospitalizations including when in his early 20's he was diagnosed      with disorganized schizophrenia.   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   · Single    Mental Status Exam     General: In no acute distress with depressed mood.   Appearance: Exacerbation of chronic depressive features with poor self esteem.   Attitude: Cooperative.   Behavior: Exacerbation of chronic depressive features with poor self esteem.   Motor Activity: No agitation or retardation. No EPS/TD. Normal gait and station.   Speech: Regular rate, rhythm, volume and tone, spontaneous, fluent.   Mood: Exacerbation of chronic depressive features with poor self esteem.   Affect: Exacerbation of chronic depressive features with poor self esteem.   Thought Process: Exacerbation of chronic depressive features with poor self esteem.   Thought Content: Exacerbation of chronic depressive features with poor self esteem.   Thought Perception: Thought disturbance with chronic depressive features with poor self esteem.   Cognition: Thought disturbance with chronic depressive features with poor self esteem.   Insight: Insight limitations.   Judgment:  Judgment limitations.       Appearance: well-groomed.   Build: average.   Demeanor: average.   Eye Contact: average.   Motor Activity: average.   Speech: clear.   Language: Neurologic language is intact.   Fund of Knowledge: intact fund of knowledge.   Delusions: None Reported.   Self Harm: None Reported.   Aggressive: None Reported.   Mood: depressed and anxious . Exacerbation of chronic depressive features with poor self esteem.   Affect: labile . Exacerbation of chronic depressive features with poor self esteem.   Orientation: alert, oriented x3.   Manner: cooperative.   Thought process: goal-directed.   Thought association: displays rational thought process.   Content of thought: Mr. HANNY HILL denies any suicidal or homicidal ideation or plans.   Abstract/ Rational Thought: minimal impairment   Memory: grossly intact.   Behavior: calm.   Attention/Concentration: normal.   Cognition: intact.   Intelligence Estimate: average.   Executive Function: intact.   Insight: minimal impairment.   Judgement: minimal impairment.         Review of Systems   Neurological:         Mental Status Exam     General: In no acute distress with depressed mood.   Appearance: Exacerbation of chronic depressive features with poor self esteem.   Attitude: Cooperative.   Behavior: Exacerbation of chronic depressive features with poor self esteem.   Motor Activity: No agitation or retardation. No EPS/TD. Normal gait and station.   Speech: Regular rate, rhythm, volume and tone, spontaneous, fluent.   Mood: Exacerbation of chronic depressive features with poor self esteem.   Affect: Exacerbation of chronic depressive features with poor self esteem.   Thought Process: Exacerbation of chronic depressive features with poor self esteem.   Thought Content: Exacerbation of chronic depressive features with poor self esteem.   Thought Perception: Thought disturbance with chronic depressive features with poor self esteem.   Cognition: Thought  disturbance with chronic depressive features with poor self esteem.   Insight: Insight limitations.   Judgment: Judgment limitations.       Appearance: well-groomed.   Build: average.   Demeanor: average.   Eye Contact: average.   Motor Activity: average.   Speech: clear.   Language: Neurologic language is intact.   Fund of Knowledge: intact fund of knowledge.   Delusions: None Reported.   Self Harm: None Reported.   Aggressive: None Reported.   Mood: depressed and anxious . Exacerbation of chronic depressive features with poor self esteem.   Affect: labile . Exacerbation of chronic depressive features with poor self esteem.   Orientation: alert, oriented x3.   Manner: cooperative.   Thought process: goal-directed.   Thought association: displays rational thought process.   Content of thought: Mr. HANNY HILL denies any suicidal or homicidal ideation or plans.   Abstract/ Rational Thought: minimal impairment   Memory: grossly intact.   Behavior: calm.   Attention/Concentration: normal.   Cognition: intact.   Intelligence Estimate: average.   Executive Function: intact.   Insight: minimal impairment.   Judgement: minimal impairment.      Psychiatric/Behavioral:  Positive for dysphoric mood.         Mental Status Exam     General: In no acute distress with depressed mood.   Appearance: Exacerbation of chronic depressive features with poor self esteem.   Attitude: Cooperative.   Behavior: Exacerbation of chronic depressive features with poor self esteem.   Motor Activity: No agitation or retardation. No EPS/TD. Normal gait and station.   Speech: Regular rate, rhythm, volume and tone, spontaneous, fluent.   Mood: Exacerbation of chronic depressive features with poor self esteem.   Affect: Exacerbation of chronic depressive features with poor self esteem.   Thought Process: Exacerbation of chronic depressive features with poor self esteem.   Thought Content: Exacerbation of chronic depressive features with poor self  esteem.   Thought Perception: Thought disturbance with chronic depressive features with poor self esteem.   Cognition: Thought disturbance with chronic depressive features with poor self esteem.   Insight: Insight limitations.   Judgment: Judgment limitations.       Appearance: well-groomed.   Build: average.   Demeanor: average.   Eye Contact: average.   Motor Activity: average.   Speech: clear.   Language: Neurologic language is intact.   Fund of Knowledge: intact fund of knowledge.   Delusions: None Reported.   Self Harm: None Reported.   Aggressive: None Reported.   Mood: depressed and anxious . Exacerbation of chronic depressive features with poor self esteem.   Affect: labile . Exacerbation of chronic depressive features with poor self esteem.   Orientation: alert, oriented x3.   Manner: cooperative.   Thought process: goal-directed.   Thought association: displays rational thought process.   Content of thought: Mr. HANNY HILL denies any suicidal or homicidal ideation or plans.   Abstract/ Rational Thought: minimal impairment   Memory: grossly intact.   Behavior: calm.   Attention/Concentration: normal.   Cognition: intact.   Intelligence Estimate: average.   Executive Function: intact.   Insight: minimal impairment.   Judgement: minimal impairment.        Psych Review of Symptoms:    ADHD: Patient denied any symptoms.         Anxiety: Patient denied any symptoms.         Developmental and Sensory Concerns: Patient denied any symptoms.         Depressive Symptoms:   Depressed mood.       Disruptive and Conduct Symptoms: Patient denied any symptoms.         Eating / Feeding Concerns: Patient denied any symptoms.         Elimination Symptoms: Patient denied any symptoms.         Manic Symptoms: Patient denied any symptoms.         Obsessive-Compulsive Symptoms: Patient denied any symptoms.         Psychotic Symptoms: Patient denied any symptoms.           Trauma Related Symptoms: Patient denied any symptoms.            Sleep Concerns: Patient denied any symptoms.             Objective   Physical Exam  Neurological:      Mental Status: He is oriented to person, place, and time.      Comments: Mental Status Exam     General: In no acute distress with depressed mood.   Appearance: Exacerbation of chronic depressive features with poor self esteem.   Attitude: Cooperative.   Behavior: Exacerbation of chronic depressive features with poor self esteem.   Motor Activity: No agitation or retardation. No EPS/TD. Normal gait and station.   Speech: Regular rate, rhythm, volume and tone, spontaneous, fluent.   Mood: Exacerbation of chronic depressive features with poor self esteem.   Affect: Exacerbation of chronic depressive features with poor self esteem.   Thought Process: Exacerbation of chronic depressive features with poor self esteem.   Thought Content: Exacerbation of chronic depressive features with poor self esteem.   Thought Perception: Thought disturbance with chronic depressive features with poor self esteem.   Cognition: Thought disturbance with chronic depressive features with poor self esteem.   Insight: Insight limitations.   Judgment: Judgment limitations.       Appearance: well-groomed.   Build: average.   Demeanor: average.   Eye Contact: average.   Motor Activity: average.   Speech: clear.   Language: Neurologic language is intact.   Fund of Knowledge: intact fund of knowledge.   Delusions: None Reported.   Self Harm: None Reported.   Aggressive: None Reported.   Mood: depressed and anxious . Exacerbation of chronic depressive features with poor self esteem.   Affect: labile . Exacerbation of chronic depressive features with poor self esteem.   Orientation: alert, oriented x3.   Manner: cooperative.   Thought process: goal-directed.   Thought association: displays rational thought process.   Content of thought: Mr. HANNY HILL denies any suicidal or homicidal ideation or plans.   Abstract/ Rational Thought:  minimal impairment   Memory: grossly intact.   Behavior: calm.   Attention/Concentration: normal.   Cognition: intact.   Intelligence Estimate: average.   Executive Function: intact.   Insight: minimal impairment.   Judgement: minimal impairment.      Psychiatric:      Comments: Mental Status Exam     General: In no acute distress with depressed mood.   Appearance: Exacerbation of chronic depressive features with poor self esteem.   Attitude: Cooperative.   Behavior: Exacerbation of chronic depressive features with poor self esteem.   Motor Activity: No agitation or retardation. No EPS/TD. Normal gait and station.   Speech: Regular rate, rhythm, volume and tone, spontaneous, fluent.   Mood: Exacerbation of chronic depressive features with poor self esteem.   Affect: Exacerbation of chronic depressive features with poor self esteem.   Thought Process: Exacerbation of chronic depressive features with poor self esteem.   Thought Content: Exacerbation of chronic depressive features with poor self esteem.   Thought Perception: Thought disturbance with chronic depressive features with poor self esteem.   Cognition: Thought disturbance with chronic depressive features with poor self esteem.   Insight: Insight limitations.   Judgment: Judgment limitations.       Appearance: well-groomed.   Build: average.   Demeanor: average.   Eye Contact: average.   Motor Activity: average.   Speech: clear.   Language: Neurologic language is intact.   Fund of Knowledge: intact fund of knowledge.   Delusions: None Reported.   Self Harm: None Reported.   Aggressive: None Reported.   Mood: depressed and anxious . Exacerbation of chronic depressive features with poor self esteem.   Affect: labile . Exacerbation of chronic depressive features with poor self esteem.   Orientation: alert, oriented x3.   Manner: cooperative.   Thought process: goal-directed.   Thought association: displays rational thought process.   Content of thought:   HANNY HILL denies any suicidal or homicidal ideation or plans.   Abstract/ Rational Thought: minimal impairment   Memory: grossly intact.   Behavior: calm.   Attention/Concentration: normal.   Cognition: intact.   Intelligence Estimate: average.   Executive Function: intact.   Insight: minimal impairment.   Judgement: minimal impairment.            Lab Review:   Lab on 12/02/2024   Component Date Value    HSV 1, IgG 12/02/2024 0.2     HSV 2, 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    Office Visit on 08/19/2024   Component Date Value    Tissue/Wound Culture/Sme* 08/19/2024 (1+) Rare Mixed Gram-Positive and Gram-Negative Bacteria     Gram Stain 08/19/2024 No polymorphonuclear leukocytes seen     Gram Stain 08/19/2024 No organisms seen    Lab on 08/09/2024   Component Date Value    WBC 08/09/2024 8.2     nRBC 08/09/2024 0.0     RBC 08/09/2024 4.75     Hemoglobin 08/09/2024 14.7     Hematocrit 08/09/2024 45.2     MCV 08/09/2024 95     MCH 08/09/2024 30.9     MCHC 08/09/2024 32.5     RDW 08/09/2024 13.2     Platelets 08/09/2024 253     Neutrophils % 08/09/2024 63.0     Immature Granulocytes %,* 08/09/2024 0.2     Lymphocytes % 08/09/2024 23.1     Monocytes % 08/09/2024 11.4     Eosinophils % 08/09/2024 1.7     Basophils % 08/09/2024 0.6     Neutrophils Absolute 08/09/2024 5.19     Immature Granulocytes Ab* 08/09/2024 0.02     Lymphocytes Absolute 08/09/2024 1.90     Monocytes Absolute 08/09/2024 0.94 (H)     Eosinophils Absolute 08/09/2024 0.14     Basophils Absolute 08/09/2024 0.05     Glucose 08/09/2024 107 (H)     Sodium 08/09/2024 139     Potassium 08/09/2024 4.4     Chloride 08/09/2024 100     Bicarbonate 08/09/2024 28     Anion Gap 08/09/2024 15     Urea Nitrogen 08/09/2024 26 (H)     Creatinine 08/09/2024 0.94     eGFR 08/09/2024 86     Calcium 08/09/2024 9.7     Albumin 08/09/2024 4.6     Alkaline Phosphatase 08/09/2024 36     Total Protein 08/09/2024 7.3     AST 08/09/2024 14     Bilirubin, Total 08/09/2024 0.8     ALT  08/09/2024 16     Cholesterol 08/09/2024 127     HDL-Cholesterol 08/09/2024 41.0     Cholesterol/HDL Ratio 08/09/2024 3.1     LDL Calculated 08/09/2024 65     VLDL 08/09/2024 21     Triglycerides 08/09/2024 105     Non HDL Cholesterol 08/09/2024 86     Thyroid Stimulating Horm* 08/09/2024 2.43     Prostate Specific AG 08/09/2024 3.68     Hemoglobin A1C 08/09/2024 5.5     Estimated Average Glucose 08/09/2024 111        Assessment/Plan   Psychiatric Risk Assessment  Violence Risk Assessment: none  Acute Risk of Harm to Others is Considered: low   Suicide Risk Assessment: age > 65 yrs old and   Protective Factors against Suicide: adherence to  treatment, fear of suicide, moral objections to suicide, positive family relationships, and sense of responsibility toward family  Acute Risk of Harm to Self is Considered: low    Imminent Risk of Suicide or Serious Self-Injury: Low   Chronic Risk of Suicide of Serious Self-Injury: Low  Risk factors: Age, depression history and   Protective factors: Denies current suicidal ideation, denies history of suicide attempts , willingness to seek help and support , gender, access to a variety of clinical interventions , and receiving and engaged in care for mental, physical, and substance use disorders      Imminent Risk of Violence or Homicide: Low   Risk Factors: No significant risk factors identified on screening  Protective Factors: Lack of known history of harm to others , Lack of known history of violent ideation , and lack of known access to firearms.     Assessment & Plan  Schizoaffective disorder, depressive type (Multi)  Your diagnosis is schizoaffective disorder with of chronic depressed features. You deny any suicidal or homicidal ideation or plans. I did inform you that I would be away the next three weeks of December with Dr. Rebolledo and Radha SCHOFIELD covering in my absence.     O[facility] was reviewed today with no concerning findings evidenced.      You did follow  up with neurology to further assess your mental status fluctuations. You saw Dr. Wyman who related these to a diagnosis of migraine equivalents and recommended treatment.     The plan is to continue bupropion  mg morning and middle afternoon daily, you have elected to stop risperidone 1 mg twice daily in the morning and late afternoon which you announced you have done unilaterally prior to this appointment because of concerns of parkinsonian features/risks; continue fluoxetine to 40 mg daily because of depression and you can continue diazepam 5 mg twice a day and at bedtime as needed only. You clearly are able to recite back the risks, benefits and alternatives of these medications as discussed with you today.     The FDA risks of antipsychotics including increased risk of sudden death, heart attack, brain stroke, irreversible neurological movement disorders, parkinsonism, cardiac toxicity, weight gain and diabetes of this medication were discussed. You were able to recite back the risks, benefits, alternatives with respect to antipsychotics and in particular risperidone.     The FDA risks of antidepressants including increased risk of suicide, cardiotoxicity, lowered seizure threshold, the serotonin syndrome with serotonin agents and anticholinergic effects have been discussed. We also discussed that at least in the case of SSRIs there is interference with warfarin and nonsteroidal inflammatories and can cause increased bleeding and hemorrhage. You were able to recite back the risks, benefits, alternatives with respect to your antidepressants in particular bupropion and fluoxetine.     The FDA risks of benzodiazepines were discussed which includes impairment of memory and cognition, increased falls, addiction and dangerous withdrawals if stopped suddenly which can cause significant morbidity and or mortality. There is also risk of respiratory suppression alone or with other sedative or other medications  which can lead to morbidity and mortality. You clearly were able to recite back the risks, benefits and alternatives to the benzodiazepines discussed.   Orders:    Referral to Home Health; Future    Mixed anxiety depressive disorder  Your diagnosis is schizoaffective disorder with of chronic depressed features. You deny any suicidal or homicidal ideation or plans. I did inform you that I would be away the next three weeks of December with Dr. Rebolledo and Radha SCHOFIELD covering in my absence.     OARRS was reviewed today with no concerning findings evidenced.      You did follow up with neurology to further assess your mental status fluctuations. You saw Dr. Wyman who related these to a diagnosis of migraine equivalents and recommended treatment.     The plan is to continue bupropion  mg morning and middle afternoon daily, you have elected to stop risperidone 1 mg twice daily in the morning and late afternoon which you announced you have done unilaterally prior to this appointment because of concerns of parkinsonian features/risks; continue fluoxetine to 40 mg daily because of depression and you can continue diazepam 5 mg twice a day and at bedtime as needed only. You clearly are able to recite back the risks, benefits and alternatives of these medications as discussed with you today.     The FDA risks of antipsychotics including increased risk of sudden death, heart attack, brain stroke, irreversible neurological movement disorders, parkinsonism, cardiac toxicity, weight gain and diabetes of this medication were discussed. You were able to recite back the risks, benefits, alternatives with respect to antipsychotics and in particular risperidone.     The FDA risks of antidepressants including increased risk of suicide, cardiotoxicity, lowered seizure threshold, the serotonin syndrome with serotonin agents and anticholinergic effects have been discussed. We also discussed that at least in the case of SSRIs  there is interference with warfarin and nonsteroidal inflammatories and can cause increased bleeding and hemorrhage. You were able to recite back the risks, benefits, alternatives with respect to your antidepressants in particular bupropion and fluoxetine.     The FDA risks of benzodiazepines were discussed which includes impairment of memory and cognition, increased falls, addiction and dangerous withdrawals if stopped suddenly which can cause significant morbidity and or mortality. There is also risk of respiratory suppression alone or with other sedative or other medications which can lead to morbidity and mortality. You clearly were able to recite back the risks, benefits and alternatives to the benzodiazepines discussed.   Orders:    Referral to Home Health; Future    Chronic low self esteem  Your diagnosis is schizoaffective disorder with of chronic depressed features. You deny any suicidal or homicidal ideation or plans. I did inform you that I would be away the next three weeks of December with Dr. Rebolledo and Radha SCHOFIELD covering in my absence.     OARRS was reviewed today with no concerning findings evidenced.      You did follow up with neurology to further assess your mental status fluctuations. You saw Dr. Wyman who related these to a diagnosis of migraine equivalents and recommended treatment.     The plan is to continue bupropion  mg morning and middle afternoon daily, you have elected to stop risperidone 1 mg twice daily in the morning and late afternoon which you announced you have done unilaterally prior to this appointment because of concerns of parkinsonian features/risks; continue fluoxetine to 40 mg daily because of depression and you can continue diazepam 5 mg twice a day and at bedtime as needed only. You clearly are able to recite back the risks, benefits and alternatives of these medications as discussed with you today.     The FDA risks of antipsychotics including increased risk  of sudden death, heart attack, brain stroke, irreversible neurological movement disorders, parkinsonism, cardiac toxicity, weight gain and diabetes of this medication were discussed. You were able to recite back the risks, benefits, alternatives with respect to antipsychotics and in particular risperidone.     The FDA risks of antidepressants including increased risk of suicide, cardiotoxicity, lowered seizure threshold, the serotonin syndrome with serotonin agents and anticholinergic effects have been discussed. We also discussed that at least in the case of SSRIs there is interference with warfarin and nonsteroidal inflammatories and can cause increased bleeding and hemorrhage. You were able to recite back the risks, benefits, alternatives with respect to your antidepressants in particular bupropion and fluoxetine.     The FDA risks of benzodiazepines were discussed which includes impairment of memory and cognition, increased falls, addiction and dangerous withdrawals if stopped suddenly which can cause significant morbidity and or mortality. There is also risk of respiratory suppression alone or with other sedative or other medications which can lead to morbidity and mortality. You clearly were able to recite back the risks, benefits and alternatives to the benzodiazepines discussed. Orders:    Referral to Home Health; Future    Please follow up in monthly through psychiatry or [facility] and sooner virtually if needed as discussed today. For all urgent or emergency matters please call 911, go to urgent care or the emergency department of the nearest hospital.    Time:   Prep time on date of the patient encounter: 5 minutes.   Time spent directly with patient/family/caregiver: 25 minutes.   Additional time spent on patient care activities:  minutes.   Documentation time: 5 minutes.   Total time on date of patient encounter: 35 minutes.

## 2024-12-12 ENCOUNTER — APPOINTMENT (OUTPATIENT)
Dept: OTOLARYNGOLOGY | Facility: CLINIC | Age: 74
End: 2024-12-12
Payer: COMMERCIAL

## 2024-12-12 ENCOUNTER — TELEPHONE (OUTPATIENT)
Dept: BEHAVIORAL HEALTH | Facility: CLINIC | Age: 74
End: 2024-12-12

## 2024-12-12 DIAGNOSIS — F25.1 SCHIZOAFFECTIVE DISORDER, DEPRESSIVE TYPE (MULTI): ICD-10-CM

## 2024-12-12 DIAGNOSIS — F41.8 MIXED ANXIETY DEPRESSIVE DISORDER: ICD-10-CM

## 2024-12-12 RX ORDER — DIAZEPAM 5 MG/1
5 TABLET ORAL NIGHTLY PRN
Qty: 90 TABLET | Refills: 0 | Status: SHIPPED | OUTPATIENT
Start: 2024-12-12 | End: 2025-03-12

## 2024-12-16 ENCOUNTER — LAB (OUTPATIENT)
Dept: LAB | Facility: LAB | Age: 74
End: 2024-12-16
Payer: COMMERCIAL

## 2024-12-16 ENCOUNTER — HOSPITAL ENCOUNTER (OUTPATIENT)
Dept: RADIOLOGY | Facility: HOSPITAL | Age: 74
Discharge: HOME | End: 2024-12-16
Payer: COMMERCIAL

## 2024-12-16 DIAGNOSIS — Z79.899 MEDICATION MANAGEMENT: ICD-10-CM

## 2024-12-16 DIAGNOSIS — R13.10 DYSPHAGIA, UNSPECIFIED TYPE: ICD-10-CM

## 2024-12-16 PROCEDURE — 74230 X-RAY XM SWLNG FUNCJ C+: CPT | Performed by: RADIOLOGY

## 2024-12-16 PROCEDURE — 92611 MOTION FLUOROSCOPY/SWALLOW: CPT | Mod: GN

## 2024-12-16 PROCEDURE — 74230 X-RAY XM SWLNG FUNCJ C+: CPT

## 2024-12-16 PROCEDURE — 80346 BENZODIAZEPINES1-12: CPT

## 2024-12-16 PROCEDURE — 2500000005 HC RX 250 GENERAL PHARMACY W/O HCPCS

## 2024-12-16 NOTE — PROGRESS NOTES
Speech-Language Pathology    Outpatient Modified Barium Swallow Study    Patient Name: Truong Armijo  MRN: [id number]  : 1950  Today's Date: 24             Modified Barium Swallow Study completed. Informed verbal consent obtained prior to completion of exam. Trials of thin, nectar/mildly thick liquid, honey/moderately thick liquid, puree, regular solids and barium tablet with thin liquid were given.     Modified Barium Swallow Study completed. Informed verbal consent obtained prior to completion of exam. The study was completed per protocol with various liquid barium consistencies, pudding, solids and a 13mm barium tablet.  A 1.9 cm or .75 inch (outer diameter) ring was placed on the chin in the lateral view and on the lateral, left side of the neck in the a-p view in order to complete objective measurements during swallowing. The anatomic structures and function of the oropharynx, larynx, hypopharynx and cervical esophagus were evaluated.    SLP: DAVON Quigley   Contact info: [employer] secure chat; phone: [phone]      Reason for Referral: globus pharyngeus  Patient Hx: unorganized schizophrenia, anxiety, dysphagia  Respiratory Status: Room air  Current diet: regular/thin    Pain:  Pain Scale: 0-10  Ratin      DIET RECOMMENDATIONS:   - Easy to Chew (IDDSI Level 7)  - Thin liquids (IDDSI Level 0)  Per the results of today's MBSS, patient to continue baseline diet of regular consistencies and thin liquids following swallow strategies listed below:    STRATEGIES:  - Small bites  - Small, single sips  - Alternate consistencies  - Upright for all PO intake  - Swallow 2-3 times per bite/sip  - Medications whole in puree or as best tolerated  - Limit distractions      SLP PLAN:  Skilled SLP Services: No further skilled SLP intervention is warranted for dysphagia at this time.    Education Provided: Results and recommendations per MBSS, with video review; recommendations and POC at this time. Verbal  understanding and agreement given on all accounts.     Treatment Provided Today: ST provided extensive education and training to pt/pt family regarding anatomy/physiology of swallow function, risk factors of aspiration/aspiration pna & how to mitigate factors, diet modifications, and the use of compensatory swallow strategies to promote pt safety upon PO intake including two swallows all bites and sips, alternate liquids and solids.     Additional Medical Consults Suggested:   - Dental    Repeat Study: Per MD or treating SLP       Mechanics of the Swallow Summary:  ORAL PHASE:  Lip Closure - No labial escape/anterior loss of bolus   Tongue Control During Bolus Hold - Escape to lateral buccal cavity and/or floor of mouth   Bolus prep/mastication - Slow prolonged mastication with complete re-collection necessary   Bolus transport/lingual motion - Brisk tongue motion for A-P movement of the bolus   Oral residue - Residue collection on oral structure     PHARYNGEAL PHASE:  Initiation of pharyngeal swallow - Bolus head at pit of pyriforms   Soft palate elevation - No bolus between soft palate/pharyngeal wall   Laryngeal elevation - Partial superior movement of thyroid cartilage and/or partial approximation of arytenoids to epiglottic petiole   Anterior hyoid excursion - Complete anterior movement   Epiglottic movement - Complete inversion    Laryngeal vestibule closure - Complete - no air/contrast in laryngeal vestibule   Pharyngeal stripping wave - Complete  Pharyngeal contraction (A/P view) - Complete  Pharyngoesophageal segment opening - Complete distension and complete duration/no obstruction of flow of bolus   Tongue base retraction - Narrow column of contrast or air between tongue base and pharyngeal wall   Pharyngeal residue - Collection of residue within or on the pharyngeal structures     ESOPHAGEAL PHASE:  Esophageal clearance - Complete clearance  The patient does have the start of cervical osteophytes but  "they do not impact peristalsis at this time.      SLP Impressions with Severity Rating:   Pt presents with age appropriate swallowing skills upon completion of modified barium swallow study this date. Swallowing physiology is detailed above. Impairments most impacting swallowing safety and efficiency include poor mastication due to pain/sensitivity to left side when chewing. Patient demonstrated WFL pharyngeal stage swallowing skills. No penetration and no aspiration was visualized during study.     *Of note: The A-P bolus follow-through is not intended to be utilized as a diagnostic assessment of the esophagus, rather a tool to observe the biomechanical aspects of the swallow continuum and to inform the need for further evaluation by medical specialists, as applicable.     Strategies attempted- Dry swallow following the initial swallow resulted in improved clearance of solids. Alternating liquids assisted in removing solid stasis.      OUTCOME MEASURES:  Functional Oral Intake Scale  Functional Oral Intake Scale: Level 6        total oral diet with multiple consistencies without special preparations but specific food limitations       Eating Assessment Tool (EAT-10)   0=No problem, 1=Mild problem, 2=Mild to moderate problem, 3=Moderate problem, 4=Severe problem  Patient rated three areas a 3, the highest number he gave. Those areas were \"interference with going out to eat\", \"food sticking in my throat\", \"swallowing is stressful\".  '\"Swallowing solids and pills\" were judged to be a 2 each, \"along with pleasure of eating\". \"Swallowing was painful\" scored a 1. The rest of the questions scored a zero. The patient's total was 16/30.         A total score of 3 or above may indicate difficulty with swallowing safely and/or efficiently      Rosenbek's Penetration Aspiration Scale  Thin Liquids: 1. NO ASPIRATION & NO PENETRATION - no aspiration, contrast does not enter airway  [place] Thick Liquids: 1. NO ASPIRATION & NO " PENETRATION - no aspiration, contrast does not enter airway  Honey Thick Liquids: 1. NO ASPIRATION & NO PENETRATION - no aspiration, contrast does not enter airway  Puree: 1. NO ASPIRATION & NO PENETRATION - no aspiration, contrast does not enter airway  Soft Solid: 1. NO ASPIRATION & NO PENETRATION - no aspiration, contrast does not enter airway  Solids: 1. NO ASPIRATION & NO PENETRATION - no aspiration, contrast does not enter airway     The patient reported pain and sensitivity with mastication. He stated he is going to his dentist to see about getting implants.   SLP reviewed the results and recommendations following the study, including choosing softer foods, smaller bites and other compensatory strategies.   The patient verbalized comprehension of the information presented.   No further ST is warranted at this time.

## 2024-12-18 ENCOUNTER — LAB (OUTPATIENT)
Dept: LAB | Facility: LAB | Age: 74
End: 2024-12-18
Payer: COMMERCIAL

## 2024-12-18 DIAGNOSIS — E11.42 DIABETIC PERIPHERAL NEUROPATHY (MULTI): ICD-10-CM

## 2024-12-18 DIAGNOSIS — R53.83 OTHER FATIGUE: ICD-10-CM

## 2024-12-18 DIAGNOSIS — E11.9 TYPE 2 DIABETES MELLITUS WITHOUT COMPLICATION, WITHOUT LONG-TERM CURRENT USE OF INSULIN (MULTI): ICD-10-CM

## 2024-12-18 DIAGNOSIS — E78.00 HYPERCHOLESTEROLEMIA: ICD-10-CM

## 2024-12-18 LAB
1OH-MIDAZOLAM UR CFM-MCNC: <25 NG/ML
7AMINOCLONAZEPAM UR CFM-MCNC: <25 NG/ML
A-OH ALPRAZ UR CFM-MCNC: <25 NG/ML
ALBUMIN SERPL BCP-MCNC: 4.6 G/DL (ref 3.4–5)
ALP SERPL-CCNC: 32 U/L (ref 33–136)
ALPRAZ UR CFM-MCNC: <25 NG/ML
ALT SERPL W P-5'-P-CCNC: 15 U/L (ref 10–52)
ANION GAP SERPL CALC-SCNC: 12 MMOL/L (ref 10–20)
AST SERPL W P-5'-P-CCNC: 10 U/L (ref 9–39)
BASOPHILS # BLD AUTO: 0.05 X10*3/UL (ref 0–0.1)
BASOPHILS NFR BLD AUTO: 0.6 %
BILIRUB SERPL-MCNC: 1 MG/DL (ref 0–1.2)
BUN SERPL-MCNC: 24 MG/DL (ref 6–23)
CALCIUM SERPL-MCNC: 10 MG/DL (ref 8.6–10.6)
CHLORDIAZEP UR CFM-MCNC: <25 NG/ML
CHLORIDE SERPL-SCNC: 102 MMOL/L (ref 98–107)
CHOLEST SERPL-MCNC: 120 MG/DL (ref 0–199)
CHOLESTEROL/HDL RATIO: 2.9
CLONAZEPAM UR CFM-MCNC: <25 NG/ML
CO2 SERPL-SCNC: 30 MMOL/L (ref 21–32)
CREAT SERPL-MCNC: 1.05 MG/DL (ref 0.5–1.3)
DIAZEPAM UR CFM-MCNC: <25 NG/ML
EGFRCR SERPLBLD CKD-EPI 2021: 75 ML/MIN/1.73M*2
EOSINOPHIL # BLD AUTO: 0.14 X10*3/UL (ref 0–0.4)
EOSINOPHIL NFR BLD AUTO: 1.7 %
ERYTHROCYTE [DISTWIDTH] IN BLOOD BY AUTOMATED COUNT: 13.5 % (ref 11.5–14.5)
EST. AVERAGE GLUCOSE BLD GHB EST-MCNC: 105 MG/DL
GLUCOSE SERPL-MCNC: 136 MG/DL (ref 74–99)
HBA1C MFR BLD: 5.3 %
HCT VFR BLD AUTO: 46.5 % (ref 41–52)
HDLC SERPL-MCNC: 40.8 MG/DL
HGB BLD-MCNC: 15.3 G/DL (ref 13.5–17.5)
IMM GRANULOCYTES # BLD AUTO: 0.03 X10*3/UL (ref 0–0.5)
IMM GRANULOCYTES NFR BLD AUTO: 0.4 % (ref 0–0.9)
LDLC SERPL CALC-MCNC: 57 MG/DL
LORAZEPAM UR CFM-MCNC: <25 NG/ML
LYMPHOCYTES # BLD AUTO: 2.04 X10*3/UL (ref 0.8–3)
LYMPHOCYTES NFR BLD AUTO: 24.2 %
MCH RBC QN AUTO: 30.5 PG (ref 26–34)
MCHC RBC AUTO-ENTMCNC: 32.9 G/DL (ref 32–36)
MCV RBC AUTO: 93 FL (ref 80–100)
MIDAZOLAM UR CFM-MCNC: <25 NG/ML
MONOCYTES # BLD AUTO: 0.87 X10*3/UL (ref 0.05–0.8)
MONOCYTES NFR BLD AUTO: 10.3 %
NEUTROPHILS # BLD AUTO: 5.29 X10*3/UL (ref 1.6–5.5)
NEUTROPHILS NFR BLD AUTO: 62.8 %
NON HDL CHOLESTEROL: 79 MG/DL (ref 0–149)
NORDIAZEPAM UR CFM-MCNC: 237 NG/ML
NRBC BLD-RTO: 0 /100 WBCS (ref 0–0)
OXAZEPAM UR CFM-MCNC: 632 NG/ML
PLATELET # BLD AUTO: 264 X10*3/UL (ref 150–450)
POTASSIUM SERPL-SCNC: 4.3 MMOL/L (ref 3.5–5.3)
PROT SERPL-MCNC: 7 G/DL (ref 6.4–8.2)
RBC # BLD AUTO: 5.02 X10*6/UL (ref 4.5–5.9)
SODIUM SERPL-SCNC: 140 MMOL/L (ref 136–145)
TEMAZEPAM UR CFM-MCNC: 611 NG/ML
TRIGL SERPL-MCNC: 110 MG/DL (ref 0–149)
VLDL: 22 MG/DL (ref 0–40)
WBC # BLD AUTO: 8.4 X10*3/UL (ref 4.4–11.3)

## 2024-12-18 PROCEDURE — 80061 LIPID PANEL: CPT

## 2024-12-18 PROCEDURE — 83036 HEMOGLOBIN GLYCOSYLATED A1C: CPT

## 2024-12-18 PROCEDURE — 85025 COMPLETE CBC W/AUTO DIFF WBC: CPT

## 2024-12-18 PROCEDURE — 36415 COLL VENOUS BLD VENIPUNCTURE: CPT

## 2024-12-18 PROCEDURE — 80053 COMPREHEN METABOLIC PANEL: CPT

## 2024-12-23 DIAGNOSIS — E11.9 TYPE 2 DIABETES MELLITUS WITHOUT COMPLICATION, WITHOUT LONG-TERM CURRENT USE OF INSULIN (MULTI): ICD-10-CM

## 2024-12-23 RX ORDER — BLOOD-GLUCOSE METER
1 KIT MISCELLANEOUS 2 TIMES DAILY
Qty: 200 STRIP | Refills: 2 | Status: SHIPPED | OUTPATIENT
Start: 2024-12-23

## 2025-01-04 DIAGNOSIS — E11.9 TYPE 2 DIABETES MELLITUS WITHOUT COMPLICATION, WITHOUT LONG-TERM CURRENT USE OF INSULIN (MULTI): ICD-10-CM

## 2025-01-06 ENCOUNTER — APPOINTMENT (OUTPATIENT)
Dept: PRIMARY CARE | Facility: CLINIC | Age: 75
End: 2025-01-06
Payer: COMMERCIAL

## 2025-01-06 VITALS
SYSTOLIC BLOOD PRESSURE: 110 MMHG | HEART RATE: 88 BPM | DIASTOLIC BLOOD PRESSURE: 65 MMHG | RESPIRATION RATE: 22 BRPM | WEIGHT: 200 LBS | HEIGHT: 74 IN | OXYGEN SATURATION: 97 % | BODY MASS INDEX: 25.67 KG/M2

## 2025-01-06 DIAGNOSIS — K21.00 GASTROESOPHAGEAL REFLUX DISEASE WITH ESOPHAGITIS WITHOUT HEMORRHAGE: ICD-10-CM

## 2025-01-06 DIAGNOSIS — F25.8 OTHER SCHIZOAFFECTIVE DISORDERS: ICD-10-CM

## 2025-01-06 DIAGNOSIS — E29.1 HYPOGONADISM MALE: ICD-10-CM

## 2025-01-06 DIAGNOSIS — I10 BENIGN ESSENTIAL HYPERTENSION: ICD-10-CM

## 2025-01-06 DIAGNOSIS — Z00.00 ROUTINE GENERAL MEDICAL EXAMINATION AT HEALTH CARE FACILITY: Primary | ICD-10-CM

## 2025-01-06 DIAGNOSIS — E11.9 TYPE 2 DIABETES MELLITUS WITHOUT COMPLICATION, WITHOUT LONG-TERM CURRENT USE OF INSULIN (MULTI): ICD-10-CM

## 2025-01-06 DIAGNOSIS — E03.9 HYPOTHYROIDISM, UNSPECIFIED: ICD-10-CM

## 2025-01-06 DIAGNOSIS — J43.1 PANLOBULAR EMPHYSEMA (MULTI): ICD-10-CM

## 2025-01-06 DIAGNOSIS — G93.39 OTHER POST INFECTION AND RELATED FATIGUE SYNDROMES: ICD-10-CM

## 2025-01-06 DIAGNOSIS — E78.00 HYPERCHOLESTEROLEMIA: ICD-10-CM

## 2025-01-06 DIAGNOSIS — Z00.00 MEDICARE ANNUAL WELLNESS VISIT, SUBSEQUENT: ICD-10-CM

## 2025-01-06 DIAGNOSIS — E11.610 CHARCOT FOOT DUE TO DIABETES MELLITUS (MULTI): ICD-10-CM

## 2025-01-06 DIAGNOSIS — N40.0 ENLARGED PROSTATE WITHOUT LOWER URINARY TRACT SYMPTOMS (LUTS): ICD-10-CM

## 2025-01-06 DIAGNOSIS — G62.89 OTHER POLYNEUROPATHY: ICD-10-CM

## 2025-01-06 PROBLEM — L89.152 PRESSURE INJURY OF SACRAL REGION, STAGE 2 (MULTI): Status: RESOLVED | Noted: 2024-09-11 | Resolved: 2025-01-06

## 2025-01-06 PROCEDURE — 3008F BODY MASS INDEX DOCD: CPT | Performed by: INTERNAL MEDICINE

## 2025-01-06 PROCEDURE — 1036F TOBACCO NON-USER: CPT | Performed by: INTERNAL MEDICINE

## 2025-01-06 PROCEDURE — 4010F ACE/ARB THERAPY RXD/TAKEN: CPT | Performed by: INTERNAL MEDICINE

## 2025-01-06 PROCEDURE — 1170F FXNL STATUS ASSESSED: CPT | Performed by: INTERNAL MEDICINE

## 2025-01-06 PROCEDURE — G0439 PPPS, SUBSEQ VISIT: HCPCS | Performed by: INTERNAL MEDICINE

## 2025-01-06 PROCEDURE — 1159F MED LIST DOCD IN RCRD: CPT | Performed by: INTERNAL MEDICINE

## 2025-01-06 PROCEDURE — 99214 OFFICE O/P EST MOD 30 MIN: CPT | Performed by: INTERNAL MEDICINE

## 2025-01-06 PROCEDURE — 1160F RVW MEDS BY RX/DR IN RCRD: CPT | Performed by: INTERNAL MEDICINE

## 2025-01-06 PROCEDURE — 3074F SYST BP LT 130 MM HG: CPT | Performed by: INTERNAL MEDICINE

## 2025-01-06 PROCEDURE — 3078F DIAST BP <80 MM HG: CPT | Performed by: INTERNAL MEDICINE

## 2025-01-06 RX ORDER — GLIPIZIDE 5 MG/1
10 TABLET ORAL
Qty: 360 TABLET | Refills: 0 | Status: SHIPPED | OUTPATIENT
Start: 2025-01-06

## 2025-01-06 RX ORDER — LEVOTHYROXINE SODIUM 100 UG/1
100 TABLET ORAL DAILY
Qty: 90 TABLET | Refills: 1 | Status: CANCELLED | OUTPATIENT
Start: 2025-01-06

## 2025-01-06 ASSESSMENT — ENCOUNTER SYMPTOMS
DEPRESSION: 0
RESPIRATORY NEGATIVE: 1
LOSS OF SENSATION IN FEET: 1
OCCASIONAL FEELINGS OF UNSTEADINESS: 0
ALLERGIC/IMMUNOLOGIC NEGATIVE: 1
NEUROLOGICAL NEGATIVE: 1
CONSTITUTIONAL NEGATIVE: 1
HEMATOLOGIC/LYMPHATIC NEGATIVE: 1
ENDOCRINE NEGATIVE: 1
GASTROINTESTINAL NEGATIVE: 1
PSYCHIATRIC NEGATIVE: 1
ARTHRALGIAS: 1
CARDIOVASCULAR NEGATIVE: 1
EYES NEGATIVE: 1

## 2025-01-06 ASSESSMENT — PATIENT HEALTH QUESTIONNAIRE - PHQ9
SUM OF ALL RESPONSES TO PHQ9 QUESTIONS 1 AND 2: 6
1. LITTLE INTEREST OR PLEASURE IN DOING THINGS: NEARLY EVERY DAY
9. THOUGHTS THAT YOU WOULD BE BETTER OFF DEAD, OR OF HURTING YOURSELF: NOT AT ALL
5. POOR APPETITE OR OVEREATING: SEVERAL DAYS
SUM OF ALL RESPONSES TO PHQ QUESTIONS 1-9: 13
2. FEELING DOWN, DEPRESSED OR HOPELESS: NEARLY EVERY DAY
6. FEELING BAD ABOUT YOURSELF - OR THAT YOU ARE A FAILURE OR HAVE LET YOURSELF OR YOUR FAMILY DOWN: NEARLY EVERY DAY
7. TROUBLE CONCENTRATING ON THINGS, SUCH AS READING THE NEWSPAPER OR WATCHING TELEVISION: SEVERAL DAYS
10. IF YOU CHECKED OFF ANY PROBLEMS, HOW DIFFICULT HAVE THESE PROBLEMS MADE IT FOR YOU TO DO YOUR WORK, TAKE CARE OF THINGS AT HOME, OR GET ALONG WITH OTHER PEOPLE: VERY DIFFICULT
3. TROUBLE FALLING OR STAYING ASLEEP OR SLEEPING TOO MUCH: SEVERAL DAYS
4. FEELING TIRED OR HAVING LITTLE ENERGY: SEVERAL DAYS
8. MOVING OR SPEAKING SO SLOWLY THAT OTHER PEOPLE COULD HAVE NOTICED. OR THE OPPOSITE, BEING SO FIGETY OR RESTLESS THAT YOU HAVE BEEN MOVING AROUND A LOT MORE THAN USUAL: NOT AT ALL

## 2025-01-06 ASSESSMENT — ACTIVITIES OF DAILY LIVING (ADL)
MANAGING_FINANCES: INDEPENDENT
DRESSING: INDEPENDENT
TAKING_MEDICATION: INDEPENDENT
DOING_HOUSEWORK: INDEPENDENT
GROCERY_SHOPPING: INDEPENDENT
BATHING: INDEPENDENT

## 2025-01-06 NOTE — ASSESSMENT & PLAN NOTE
Peripheral neuropathy and foot deformity requiring deep diabetic shoes     Orders:    Diabetic Shoes

## 2025-01-06 NOTE — ASSESSMENT & PLAN NOTE
COPD - no wheezing, no SOB, no Crackles heard/ Exacerbation. Get CXR. Continues mild exercises and inhalers. [place] preventive care and vaccinations. Add advair inhalers and spiriva inhaler.

## 2025-01-06 NOTE — PROGRESS NOTES
"Subjective   Reason for Visit: Truong Armijo is an 74 y.o. male here for a Medicare Wellness visit.          Reviewed all medications by prescribing practitioner or clinical pharmacist (such as prescriptions, OTCs, herbal therapies and supplements) and documented in the medical record.    HPI    Patient Care Team:  Ki Ahuja MD as PCP - General (Internal Medicine)  Ki Ahuja MD as PCP - [facility] PCP  Josue Hernandez MD PhD as Psychiatrist (Geriatric Psychiatry)     Review of Systems   Constitutional: Negative.    HENT: Negative.     Eyes: Negative.    Respiratory: Negative.     Cardiovascular: Negative.    Gastrointestinal: Negative.    Endocrine: Negative.    Musculoskeletal:  Positive for arthralgias.   Skin: Negative.    Allergic/Immunologic: Negative.    Neurological: Negative.    Hematological: Negative.    Psychiatric/Behavioral: Negative.     All other systems reviewed and are negative.      Objective   Vitals:  /65   Pulse 88   Resp 22   Ht 1.88 m (6' 2\")   Wt 90.7 kg (200 lb)   SpO2 97%   BMI 25.68 kg/m²       Physical Exam  Vitals and nursing note reviewed.   Constitutional:       Appearance: Normal appearance.   HENT:      Head: Normocephalic and atraumatic.      Right Ear: Tympanic membrane, ear canal and external ear normal.      Left Ear: Tympanic membrane, ear canal and external ear normal. There is no impacted cerumen.      Nose: Nose normal.      Mouth/Throat:      Mouth: Mucous membranes are moist.      Pharynx: Oropharynx is clear.   Eyes:      Extraocular Movements: Extraocular movements intact.      Conjunctiva/sclera: Conjunctivae normal.      Pupils: Pupils are equal, round, and reactive to light.   Cardiovascular:      Rate and Rhythm: Normal rate and regular rhythm.      Pulses: Normal pulses.      Heart sounds: Normal heart sounds. No murmur heard.  Pulmonary:      Effort: Pulmonary effort is normal. No respiratory distress.      Breath sounds: " Normal breath sounds. No stridor. No wheezing, rhonchi or rales.   Chest:      Chest wall: No tenderness.   Abdominal:      General: Abdomen is flat. Bowel sounds are normal. There is no distension.      Palpations: Abdomen is soft. There is no mass.      Tenderness: There is no abdominal tenderness. There is no right CVA tenderness, left CVA tenderness, guarding or rebound.      Hernia: No hernia is present.   Musculoskeletal:         General: Normal range of motion.      Cervical back: Normal range of motion and neck supple.   Skin:     General: Skin is warm.      Capillary Refill: Capillary refill takes less than 2 seconds.   Neurological:      General: No focal deficit present.      Mental Status: He is alert.      Cranial Nerves: No cranial nerve deficit.      Sensory: Sensory deficit present.      Motor: No weakness.      Coordination: Coordination normal.      Gait: Gait normal.      Deep Tendon Reflexes: Reflexes abnormal.   Psychiatric:         Mood and Affect: Mood normal.         Behavior: Behavior normal. Behavior is cooperative.         Thought Content: Thought content normal.         Judgment: Judgment normal.         Assessment & Plan  Hypothyroidism, unspecified  Hypothyroidism - Symptoms well/not controlled (weight gain, fatigue, constipation, cold intolerance). Check TSH continue/change dose of Synthroid/Levothyroxine of 100 mcg/qD.                Routine general medical examination at health care facility    Orders:    1 Year Follow Up In Primary Care - Wellness Exam; Future    Benign essential hypertension  HTN - hypertension well/controlled .Target BP < 130/80  achieved. Educate low salt diet and exercise with weight loss. Educate home self monitoring and diary keeping. Educate risks of elevate blood pressure and benefits of prompt treatment.  Refill Lisinopril           Hypercholesterolemia  Hypercholesterolemia - Monitor lipid profile and educate patient upon risks of high cholesterol and  targets. Educate diet and change in lifestyle and increase in exercises - Refill: Atorvastatin  20 mg daily and educate compliance with medication and diet.             Type 2 diabetes mellitus without complication, without long-term current use of insulin (Multi)  DM - NIDDM  . Reviewed with patient / Will check  HbA1c and fasting blood sugars. Educate home self monitoring and diary keeping(reviewed with patient home blood sugar levels /diary). Educate extensively low calorie diet and weight loss with exercise. Reviewed BS- diary and Rx. Regimen. [place] renal protection with ARB/ACEI.               Educate compliance with diet and Rx. And educate risks and autcomes.    Continuesandrefill the Metformin 500 mg BID with meals  Januvia 100 mg daily and Farxiga 5 mg daily                   Hypogonadism male  Check levels and refill supplement of Testosterone          Gastroesophageal reflux disease with esophagitis without hemorrhage  GERD - Acid reflux disease. Rx. PPI (Prilosec/Prevacid/Protonix/Nexium) and educate diet and life style changes. Referred patient to an endoscopy (EGD) and check H. Pylori titers.          Enlarged prostate without lower urinary tract symptoms (luts)  BPH - Benign PROSTATIC Hypertrophy, + Dysuria/ Increased in Nocturia and frequency of urination check PSA, His father had prostate cancer in his late 70's close to 80 years old- Educate exercises and Change in life style, prescribe vitamin E 400 IU qD. Consider referral to urology.          Medicare annual wellness visit, subsequent  No recent hospitalizations.     All medications reviewed and reconciled by me the provider..  No use of controlled substances or opiates.     Family history, social history reviewed, no changes.     Patient does not smoke.     Patient does not drink.     Patient hydrates adequately daily.  Eats a well-balanced healthy diet.      Exercises adequately including walking and doing weightbearing exercises.      Patient denies any difficulty with memory or cognition.      Denies any difficulty with hearing.  Patient does not wear hearing aids.     No fall risk.  No recent falls.  Denies any difficulty walking.     Patient with no history of depression anxiety, denies any loss of interest, no feeling of sadness, no lack of motivation.     Patient is independent in all ADLs and IADLs.  Independent bathing, dressing, walking.  Takes care of own finances, shopping and cooking.      End-of-life decision-making power of  reviewed with patient.      Risk Factors Identified During Visit: None.   Influenza: influenza vaccine was previously given.   Pneumovax 23: Pneumovax 23 vaccine was previously given.   Prevnar 13: Prevnar 13 vaccine was previously given.   Shingles Vaccine: Shingles vaccine was previously given.   Prostate cancer screening: Screening is current.   Colorectal Cancer Screening: screening is current.   Abdominal Aortic Aneurysm screening: screening is current.   HIV screening: screening not indicated.        Preventive measures - Recommend ASAP :  Specialist. Last Colonoscopy in 2022 (educate patient risks of colon cancer) refer patient to GI specialist. Ophthalmology and retina exam recommend yearly exams refer patient to an Ophthalmologist. BPH - (Benign Prostatic Hypertrophy) refer patient to an Urologist for rectal exam and PSA check.          Other polyneuropathy  Neuropathy/ - positive numbness, tingling, discomfort (needles pricking, cold feeling) in distal lower extremities(toes, feet, legs), secondary to DM/Radiculopathy/idiopathic. Recommend: Gabapentin(Neurontin) 300 daily(at bed time) upward taper up to  2 gm/day or Lyrica 75 mg daily if failure of treatment. Also recommend: treatment of Diabetes(control levels of blood sugars), lumbar/ cervical  disc disease (Physical Therapy), and check electrolytes and Thyroid function. Also address regenrative measures: B12 intrmusculary (Monthly) and  Folic acid supplementation. Recommend  EMG. Start Amitriptyline 25 mg daily / . Start - Tonic water -          Panlobular emphysema (Multi)  COPD - no wheezing, no SOB, no Crackles heard/ Exacerbation. Get CXR. Continues mild exercises and inhalers. [place] preventive care and vaccinations. Add advair inhalers and spiriva inhaler.                 Other post infection and related fatigue syndromes  Fatigue - check CMP(metabolic panel and elctrolytes) , CBC(complete blood cell count), TSH(thyroid function). Insomnia may play a role and sleep studies(rule out sleep apnea) are recommended . Educate sleep hygiene. Consider anxiety disorder vs. depression. Consider Stress test, and 2DECHO.           Charcot foot due to diabetes mellitus (Multi)  Peripheral neuropathy and foot deformity requiring deep diabetic shoes     Orders:    Diabetic Shoes    Other schizoaffective disorders                Cardiac Risk Assessment  15 - 20 minutes were spent discussing Cardiovascular risk and, if needed, lifestyle modifications were recommended, including nutritional choices, exercise, and elimination of habits contributing to risk.   Aspirin use/disuse was discussed following the guidelines below:  low dose ASA ( mg) should be considered:    If prior Heart Attack/Stroke/Peripheral vascular disease:  Generally recommend daily low dose aspirin unless extremely high bleeding risk (e.g., gastrointestinal).    If no prior Heart Attack/Stroke/Peripheral vascular disease:              Age over 70: Do not use Aspirin for prevention    Age less than 70 and 10-year cardiovascular disease risk is >20%: use low dose Aspirin for prevention.                  Benign essential hypertension I10        HTN - hypertension well/controlled .Target BP < 130/80  achieved. Educate low salt diet and exercise with weight loss. Educate home self monitoring and diary keeping. Educate risks of elevate blood pressure and benefits of prompt treatment.   Refill Lisinopril             Charcot foot due to diabetes mellitus (Multi) E11.610     Relevant Orders     Diabetic Shoes     Diabetic peripheral neuropathy (Multi) E11.42       Neuropathy/ - positive numbness, tingling, discomfort (needles pricking, cold feeling) in distal lower extremities(toes, feet, legs), secondary to DM/Radiculopathy/idiopathic. Recommend: Gabapentin(Neurontin) 300 daily(at bed time) upward taper up to 2 gm/day or Lyrica 75 mg daily if failure of treatment. Also recommend: treatment of Diabetes(control levels of blood sugars), lumbar/ cervical disc disease (Physical Therapy), and check electrolytes and Thyroid function. Also address regenrative measures: B12 intrmusculary (Monthly) and Folic acid supplementation. Recommend EMG. Start Amitriptyline 25 mg daily / . Start - Tonic water - and Tramadol 50 mg TID.                Hypercholesterolemia E78.00       Hypercholesterolemia - Monitor lipid profile and educate patient upon risks of high cholesterol and targets. Educate diet and change in lifestyle and increase in exercises - Refill: Atorvastatin  20 mg daily and educate compliance with medication and diet.                Enlarged prostate without lower urinary tract symptoms (luts) N40.0       BPH - Benign PROSTATIC Hypertrophy, + Dysuria/ Increased in Nocturia and frequency of urination check PSA, His father had prostate cancer in his late 70's close to 80 years old- Educate exercises and Change in life style, prescribe vitamin E 400 IU qD. Consider referral to urology.            Diabetes mellitus (Multi) - Primary E11.9       DM - NIDDM  . Reviewed with patient / Will check  HbA1c and fasting blood sugars. Educate home self monitoring and diary keeping(reviewed with patient home blood sugar levels /diary). Educate extensively low calorie diet and weight loss with exercise. Reviewed BS- diary and Rx. Regimen. [place] renal protection with ARB/ACEI.               Educate compliance with  diet and Rx. And educate risks and autcomes.    Continuesandrefill the Metformin 500 mg BID with meals  Januvia 100 mg daily and Farxiga 5 mg daily                      Hypothyroidism, unspecified E03.9       Hypothyroidism - Symptoms well/not controlled (weight gain, fatigue, constipation, cold intolerance). Check TSH continue/change dose of Synthroid/Levothyroxine of 100 mcg/qD.                Reflux esophagitis K21.00       GERD - Acid reflux disease. Rx. PPI (Prilosec/Prevacid/Protonix/Nexium) and educate diet and life style changes. Referred patient to an endoscopy (EGD) and check H. Pylori titers.            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 Educate low salt diet and exercise with weight loss. Educate home self monitoring and diary keeping. Educate risks of elevate blood pressure and benefits of prompt treatment.  Refill Lisinopril             COPD (chronic obstructive pulmonary disease) (Multi) J44.9       COPD - no wheezing, no SOB, no Crackles heard/ Exacerbation. Get CXR. Continues mild exercises and inhalers. [place] preventive care and vaccinations. Add advair inhalers and spiriva inhaler.                   Hypercholesterolemia E78.00       Hypercholesterolemia - Monitor lipid profile and educate patient upon risks of high cholesterol and targets. Educate diet and change in lifestyle and increase in exercises - Refill: Atorvastatin  20 mg daily and educate compliance with medication and diet.            Fatigue R53.83       Fatigue - check CMP(metabolic panel and elctrolytes) , CBC(complete blood cell count), TSH(thyroid function). Insomnia may play a role and sleep studies(rule out sleep  apnea) are recommended . Educate sleep hygiene. Consider anxiety disorder vs. depression. Consider Stress test, and 2DECHO.             High triglycerides E78.1     Relevant Medications     atorvastatin (Lipitor) 20 mg tablet     Diabetes mellitus (Multi) E11.9       DM - NIDDM  . Reviewed with patient / Will check  HbA1c and fasting blood sugars. Educate home self monitoring and diary keeping(reviewed with patient home blood sugar levels /diary). Educate extensively low calorie diet and weight loss with exercise. Reviewed BS- diary and Rx. Regimen. [place] renal protection with ARB/ACEI.               Educate compliance with diet and Rx. And educate risks and autcomes.    Continuesandrefill the Metformin 500 mg BID with meals  Januvia 100 mg daily and Farxiga 5 mg daily                      Hypothyroidism, unspecified E03.9       Hypothyroidism - Symptoms well/not controlled (weight gain, fatigue, constipation, cold intolerance). Check TSH continue/change dose of Synthroid/Levothyroxine of 100 mcg/qD.            Recurrent canker sores K12.0       Possible due to HSV  - start Valtrex 1 gm TID for 5 days and consider Sweet syndrome and start Colchicine 0.6 mg daily  and start Vitamin B complex daily  -            Relevant Medications     valACYclovir (Valtrex) 1 gram tablet     colchicine 0.6 mg tablet      Other Visit Diagnoses           Codes     Flu vaccine need     Z23     Relevant Orders     Flu vaccine, trivalent, preservative free, HIGH-DOSE, age 65y+ (Fluzone) (Completed)     Gastroesophageal reflux disease without esophagitis     K21.9     Relevant Medications     pantoprazole (ProtoNix) 40 mg EC tablet                            Benign essential hypertension I10         HTN - hypertension well/controlled .Target BP < 130/80  achieved. Educate low salt diet and exercise with weight loss. Educate home self monitoring and diary keeping. Educate risks of elevate blood pressure and benefits of prompt  treatment.  Refill Lisinopril             COPD (chronic obstructive pulmonary disease) (Multi) J44.9       COPD - no wheezing, no SOB, no Crackles heard/ Exacerbation. Get CXR. Continues mild exercises and inhalers. [place] preventive care and vaccinations. Add advair inhalers and spiriva inhaler.                Hypercholesterolemia E78.00       Hypercholesterolemia - Monitor lipid profile and educate patient upon risks of high cholesterol and targets. Educate diet and change in lifestyle and increase in exercises - Refill: Atorvastatin  20 mg daily and educate compliance with medication and diet.            Diabetes mellitus (Multi) E11.9       DM - NIDDM  . Reviewed with patient / Will check  HbA1c and fasting blood sugars. Educate home self monitoring and diary keeping(reviewed with patient home blood sugar levels /diary). Educate extensively low calorie diet and weight loss with exercise. Reviewed BS- diary and Rx. Regimen. [place] renal protection with ARB/ACEI.               Educate compliance with diet and Rx. And educate risks and autcomes.    Continuesandrefill the Metformin 500 mg BID with meals  Januvia 100 mg daily and Farxiga 5 mg daily                      Primary hypothyroidism E03.9       Hypothyroidism - Symptoms well/controlled (weight gain, fatigue, constipation, cold intolerance). Check TSH continues dose of Synthroid/Levothyroxine  of  88 bmcg/qD.            Reflux esophagitis K21.00       GERD - Acid reflux disease. Rx. PPI (Prilosec/Prevacid/Protonix/Nexium) and educate diet and life style changes. Referred patient to an endoscopy (EGD) and check H. Pylori titers.                          Pressure injury of sacral region, stage 2 (Multi) L89.152       Start Desitin (Zinc oxide paste) - and get cushioning and doughnut pillow             Other Visit Diagnoses           Codes     Routine general medical examination at health care facility     Z00.00                          COPD (chronic  obstructive pulmonary disease) (Multi) J44.9         COPD - no wheezing, no SOB, no Crackles heard/ Exacerbation. Get CXR. Continues mild exercises and inhalers. [place] preventive care and vaccinations. Add advair inhalers and spiriva inhaler.            Diabetic peripheral neuropathy (Multi) E11.42       Neuropathy/ - positive numbness, tingling, discomfort (needles pricking, cold feeling) in distal lower extremities(toes, feet, legs), secondary to DM/Radiculopathy/idiopathic. Recommend: Gabapentin(Neurontin) 300 daily(at bed time) upward taper up to 2 gm/day or Lyrica 75 mg daily if failure of treatment. Also recommend: treatment of Diabetes(control levels of blood sugars), lumbar/ cervical disc disease (Physical Therapy), and check electrolytes and Thyroid function. Also address regenrative measures: B12 intrmusculary (Monthly) and Folic acid supplementation. Recommend EMG. Start Amitriptyline 25 mg daily / . Start - Tonic water - and Tramadol 50 mg TID.            Enlarged prostate without lower urinary tract symptoms (luts) N40.0       BPH - Benign PROSTATIC Hypertrophy, + Dysuria/ Increased in Nocturia and frequency of urination check PSA, His father had prostate cancer in his late 70's close to 80 years old- Educate exercises and Change in life style, prescribe vitamin E 400 IU qD. Consider referral to urology.            Fatigue R53.83       Last Assessment & Plan Note   Written:Ki Ahuja MD7/22/2024 12:17 PM     Fatigue - check CMP(metabolic panel and elctrolytes) , CBC(complete blood cell count), TSH(thyroid function). Insomnia may play a role and sleep studies(rule out sleep apnea) are recommended . Educate sleep hygiene. Consider anxiety disorder vs. depression. Consider Stress test, and 2DECHO.                   Diabetes mellitus (Multi) E11.9       DM - NIDDM  . Reviewed with patient / Will check  HbA1c and fasting blood sugars. Educate home self monitoring and diary keeping(reviewed with  patient home blood sugar levels /diary). Educate extensively low calorie diet and weight loss with exercise. Reviewed BS- diary and Rx. Regimen. [place] renal protection with ARB/ACEI.               Educate compliance with diet and Rx. And educate risks and autcomes.    Continuesandrefill the Metformin 500 mg BID with meals  Januvia 100 mg daily and Farxiga 5 mg daily                      Hypothyroidism, unspecified E03.9       Hypothyroidism - Symptoms well/not controlled (weight gain, fatigue, constipation, cold intolerance). Check TSH continue/change dose of Synthroid/Levothyroxine  of 100 mcg/qD.            Reflux esophagitis K21.00       GERD - Acid reflux disease. Rx. PPI (Prilosec/Prevacid/Protonix/Nexium) and educate diet and life style changes. Referred patient to an endoscopy (EGD) and check H. Pylori titers.            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 otitis vs. Eustachian tube dysfunction vs. advanced cervical disc disease causing encroachment of the Vertebral arteries vs. viral infection of the Vestibular nerve sensor - recommend : Antivert /Meclizine 12.5 mg TID , treat infection of the sinuses or ears , increase intake of fluids, educate exercises , avoid operating machinery, monitor closely, rest.             Relevant Medications     meclizine (Antivert) 12.5 mg tablet                                 Acute pain of left knee M25.562         DJD - Degenerative Joint Disease, Chronic Arthritis, of the Left more than the right  Knee. . Pain control, and anti-inflammatory meds : consider Kenalog injection and start Voltaren gel 1% topically BID,  and  Tylenol 325 mg TID PRN. Educate exercises and referred the patient to PT (Physical Therapy). Get X-Ray's.               Benign essential hypertension I10       HTN - hypertension  well/controlled .Target BP < 130/80  achieved. Educate low salt diet and exercise with weight loss. Educate home self monitoring and diary keeping. Educate risks of elevate blood pressure and benefits of prompt treatment.  Refill Lisinopril             Relevant Orders     Comprehensive Metabolic Panel     Hypercholesterolemia E78.00       Hypercholesterolemia - Monitor lipid profile and educate patient upon risks of high cholesterol and targets. Educate diet and change in lifestyle and increase in exercises - Refill: Atorvastatin  20 mg daily and educate compliance with medication and diet.            Relevant Orders     Lipid Panel     Fatigue R53.83       Fatigue - check CMP(metabolic panel and elctrolytes) , CBC(complete blood cell count), TSH(thyroid function). Insomnia may play a role and sleep studies(rule out sleep apnea) are recommended . Educate sleep hygiene. Consider anxiety disorder vs. depression. Consider Stress test, and 2DECHO.             Diabetes mellitus (Multi) - Primary E11.9       DM - NIDDM  . Reviewed with patient / Will check  HbA1c and fasting blood sugars. Educate home self monitoring and diary keeping(reviewed with patient home blood sugar levels /diary). Educate extensively low calorie diet and weight loss with exercise. Reviewed BS- diary and Rx. Regimen. [place] renal protection with ARB/ACEI.               Educate compliance with diet and Rx. And educate risks and autcomes.    Continuesandrefill the Metformin 500 mg BID with meals  Januvia 100 mg daily and Farxiga 5 mg daily                         Relevant Orders     Hemoglobin A1C     Primary hypothyroidism E03.9       Hypothyroidism - Symptoms well/controlled (weight gain, fatigue, constipation, cold intolerance). Check TSH continues dose of Synthroid/Levothyroxine  of  88 bmcg/qD.            Relevant Orders     CBC and Auto Differential     TSH with reflex to Free T4 if abnormal     Reflux esophagitis K21.00       GERD - Acid  reflux disease. Rx. PPI (Prilosec/Prevacid/Protonix/Nexium) and educate diet and life style changes. Referred patient to an endoscopy (EGD) and check H. Pylori titers.            Hypogonadism male E29.1       Check levels and refill supplement of Testosterone            Relevant Orders     Prostate Specific Antigen     Essential (primary) hypertension I10       HTN - hypertension well/controlled .Target BP < 130/80  achieved. Educate low salt diet and exercise with weight loss. Educate home self monitoring and diary keeping. Educate risks of elevate blood pressure and benefits of prompt treatment.  Refill lisinopril             Other Visit Diagnoses           Codes     Goiter     E04.9     Relevant Orders     US thyroid     Acute pain of both knees     M25.561, M25.562     Relevant Orders     XR knee 1-2 views bilateral     Benign prostatic hyperplasia with urinary frequency     N40.1, R35.0     Relevant Orders     Prostate Specific Antigen                             Immunizations/Injections       very important  Immunizations from outside sources need reconciliation.       Flu vaccine, quadrivalent, high-dose, preservative free, age 65y+ (FLUZONE)10/18/2023, 10/17/2022, 10/8/2020  Flu vaccine, trivalent, preservative free, HIGH-DOSE, age 65y+ (Fluzone)10/14/2019, 10/17/2018, 10/4/2017  Flu vaccine, trivalent, preservative free, age 6 months and greater (Fluarix/Fluzone/Flulaval)10/5/2015, 10/1/2015  Influenza, Seasonal, Quadrivalent, Xrrgpbjhcq72/27/2021  Influenza, seasonal, ijclagugrr80/1/2021, 9/23/2011, 10/8/2009,  ... (1 more)  Moderna SARS-CoV-2 Vaccination3/27/2021, 2/28/2021  Pfizer COVID-19 vaccine, Fall 2023, 12 years and older, (30mcg/0.3mL)1/23/2024  Pfizer COVID-19 vaccine, bivalent, age 12 years and older (30 mcg/0.3 mL)9/15/2022  Pfizer Purple Cap SARS-CoV-211/3/2021  Pneumococcal conjugate vaccine, 13-valent (PREVNAR 13)9/15/2014  Pneumococcal conjugate vaccine, 20-valent (PREVNAR  20)5/5/2023  Pneumococcal polysaccharide vaccine, 23-valent, age 2 years and older (PNEUMOVAX 23)1/1/2014  Tdap vaccine, age 7 year and older (BOOSTRIX, ADACEL)6/5/2023, 11/26/2017  Zoster vaccine, recombinant, adult (SHINGRIX)8/8/2023, 6/5/2023, 12/13/2020,  ... (1 more)  Zoster, live10/25/2017, 5/22/2014                  Immunizations/Injections      very important  Immunizations from outside sources need reconciliation.      COVID-19, mRNA, LNP-S, PF, 30 mcg/0.3 mL dose11/3/2021  Flu vaccine, quadrivalent, high-dose, preservative free, age 65y+ (FLUZONE)10/18/2023, 10/17/2022, 10/8/2020  Flu vaccine, trivalent, preservative free, HIGH-DOSE, age 65y+ (Fluzone)10/21/2024, 10/14/2019, 10/17/2018,  ... (1 more)  Flu vaccine, trivalent, preservative free, age 6 months and greater (Fluarix/Fluzone/Flulaval)10/5/2015, 10/1/2015  Influenza, Seasonal, Quadrivalent, Ckmsccretn48/27/2021  Influenza, seasonal, kgrqosayjj12/1/2021, 9/23/2011, 10/8/2009,  ... (1 more)  Moderna COVID-19 vaccine, 12 years and older (50mcg/0.5mL)(Spikevax)11/13/2024  Moderna SARS-CoV-2 Vaccination3/27/2021, 2/28/2021  Pfizer COVID-19 vaccine, 12 years and older, (30mcg/0.3mL) (Comirnaty)1/23/2024  Pfizer COVID-19 vaccine, bivalent, age 12 years and older (30 mcg/0.3 mL)9/15/2022  Pneumococcal conjugate vaccine, 13-valent (PREVNAR 13)9/15/2014  Pneumococcal conjugate vaccine, 20-valent (PREVNAR 20)5/5/2023  Pneumococcal polysaccharide vaccine, 23-valent, age 2 years and older (PNEUMOVAX 23)1/1/2014  Tdap vaccine, age 7 year and older (BOOSTRIX, ADACEL)6/5/2023, 11/26/2017  Zoster vaccine, recombinant, adult (SHINGRIX)8/8/2023, 6/5/2023, 12/13/2020,  ... (1 more)  Zoster, live10/25/2017, 5/22/2014

## 2025-01-06 NOTE — ASSESSMENT & PLAN NOTE
DM - NIDDM  . Reviewed with patient / Will check  HbA1c and fasting blood sugars. Educate home self monitoring and diary keeping(reviewed with patient home blood sugar levels /diary). Educate extensively low calorie diet and weight loss with exercise. Reviewed BS- diary and Rx. Regimen. [place] renal protection with ARB/ACEI.               Educate compliance with diet and Rx. And educate risks and autcomes.    Continuesandrefill the Metformin 500 mg BID with meals  Januvia 100 mg daily and Farxiga 5 mg daily

## 2025-01-06 NOTE — ASSESSMENT & PLAN NOTE
Neuropathy/ - positive numbness, tingling, discomfort (needles pricking, cold feeling) in distal lower extremities(toes, feet, legs), secondary to DM/Radiculopathy/idiopathic. Recommend: Gabapentin(Neurontin) 300 daily(at bed time) upward taper up to  2 gm/day or Lyrica 75 mg daily if failure of treatment. Also recommend: treatment of Diabetes(control levels of blood sugars), lumbar/ cervical  disc disease (Physical Therapy), and check electrolytes and Thyroid function. Also address regenrative measures: B12 intrmusculary (Monthly) and Folic acid supplementation. Recommend  EMG. Start Amitriptyline 25 mg daily / . Start - Tonic water -

## 2025-01-06 NOTE — ASSESSMENT & PLAN NOTE
GERD - Acid reflux disease. Rx. PPI (Prilosec/Prevacid/Protonix/Nexium) and educate diet and life style changes. Referred patient to an endoscopy (EGD) and check H. Pylori titers.

## 2025-01-20 ENCOUNTER — APPOINTMENT (OUTPATIENT)
Dept: OPHTHALMOLOGY | Facility: CLINIC | Age: 75
End: 2025-01-20
Payer: COMMERCIAL

## 2025-01-20 DIAGNOSIS — D31.32 NEVUS OF CHOROID OF LEFT EYE: ICD-10-CM

## 2025-01-20 DIAGNOSIS — H35.373 EPIRETINAL MEMBRANE, BOTH EYES: Primary | ICD-10-CM

## 2025-01-20 PROCEDURE — 92250 FUNDUS PHOTOGRAPHY W/I&R: CPT

## 2025-01-20 PROCEDURE — 99213 OFFICE O/P EST LOW 20 MIN: CPT

## 2025-01-20 ASSESSMENT — CONF VISUAL FIELD
OD_SUPERIOR_NASAL_RESTRICTION: 0
OS_SUPERIOR_TEMPORAL_RESTRICTION: 0
OD_INFERIOR_TEMPORAL_RESTRICTION: 0
OD_INFERIOR_NASAL_RESTRICTION: 0
OS_INFERIOR_NASAL_RESTRICTION: 0
OD_SUPERIOR_TEMPORAL_RESTRICTION: 0
OS_INFERIOR_TEMPORAL_RESTRICTION: 0
OS_SUPERIOR_NASAL_RESTRICTION: 0

## 2025-01-20 ASSESSMENT — ENCOUNTER SYMPTOMS: EYES NEGATIVE: 1

## 2025-01-20 ASSESSMENT — VISUAL ACUITY
OS_PH_CC: 20/40
OS_PH_CC+: -2
METHOD: SNELLEN - LINEAR
OS_CC+: -2+1
CORRECTION_TYPE: GLASSES
OS_CC: 20/50
OD_CC+: -2
OD_CC: 20/30

## 2025-01-20 ASSESSMENT — EXTERNAL EXAM - LEFT EYE: OS_EXAM: NORMAL

## 2025-01-20 ASSESSMENT — PACHYMETRY
OD_CT(UM): 623
OS_CT(UM): 590

## 2025-01-20 ASSESSMENT — TONOMETRY
OS_IOP_MMHG: 16
OD_IOP_MMHG: 16
IOP_METHOD: GOLDMANN APPLANATION

## 2025-01-20 ASSESSMENT — SLIT LAMP EXAM - LIDS
COMMENTS: DERMATOCHALASIS
COMMENTS: DERMATOCHALASIS

## 2025-01-20 ASSESSMENT — CUP TO DISC RATIO
OS_RATIO: 0.7
OD_RATIO: 0.5

## 2025-01-20 ASSESSMENT — EXTERNAL EXAM - RIGHT EYE: OD_EXAM: NORMAL

## 2025-01-20 NOTE — PROGRESS NOTES
Epiretinal membrane (ERM) of both eyesH35.373  - OS>OD  - blurring of vision OS  - VA 20/25 to 20/30 OU     OCT 01/20/25     - Right eye (OD): ERM stage 1, normal RPE, outer and inner retinal layers. No IRF or SRF    - Left eye (OS): ERM stage 3. Tractional pseudocysts, slightly worsened from prior       #Impression/Plan#   - ERM OS > OD. 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 Adamson

## 2025-01-21 ENCOUNTER — TELEMEDICINE (OUTPATIENT)
Dept: BEHAVIORAL HEALTH | Facility: CLINIC | Age: 75
End: 2025-01-21
Payer: MEDICARE

## 2025-01-21 DIAGNOSIS — F25.1 SCHIZOAFFECTIVE DISORDER, DEPRESSIVE TYPE (MULTI): ICD-10-CM

## 2025-01-21 DIAGNOSIS — F41.8 MIXED ANXIETY DEPRESSIVE DISORDER: ICD-10-CM

## 2025-01-21 PROCEDURE — 99214 OFFICE O/P EST MOD 30 MIN: CPT | Performed by: PSYCHIATRY & NEUROLOGY

## 2025-01-21 PROCEDURE — 1160F RVW MEDS BY RX/DR IN RCRD: CPT | Performed by: PSYCHIATRY & NEUROLOGY

## 2025-01-21 PROCEDURE — 4010F ACE/ARB THERAPY RXD/TAKEN: CPT | Performed by: PSYCHIATRY & NEUROLOGY

## 2025-01-21 PROCEDURE — 1159F MED LIST DOCD IN RCRD: CPT | Performed by: PSYCHIATRY & NEUROLOGY

## 2025-01-21 ASSESSMENT — ENCOUNTER SYMPTOMS
DEPRESSED MOOD: 1
DYSPHORIC MOOD: 1
NERVOUS/ANXIOUS: 1

## 2025-01-21 NOTE — ASSESSMENT & PLAN NOTE
Your diagnosis is schizoaffective disorder with of chronic depressed features. You deny any suicidal or homicidal ideation or plans.      OARRS was reviewed today with no concerning findings evidenced.      You did follow up with neurology to further assess your mental status fluctuations. You saw Dr. Wyman who related these to a diagnosis of migraine equivalents and recommended treatment.     The plan is to continue bupropion  mg morning and middle afternoon daily, you have elected to stop risperidone 1 mg twice daily in the morning and late afternoon which you announced you have done unilaterally prior to this appointment because of concerns of parkinsonian features/risks; continue fluoxetine to 40 mg daily as well as bupropion  mg daily for depression and you can continue diazepam 5 mg as needed daily and at bedtime as needed only. You clearly are able to recite back the risks, benefits and alternatives of these medications as discussed with you today.     The FDA risks of antipsychotics including increased risk of sudden death, heart attack, brain stroke, irreversible neurological movement disorders, parkinsonism, cardiac toxicity, weight gain and diabetes of this medication were discussed. You were able to recite back the risks, benefits, alternatives with respect to antipsychotics and in particular risperidone.     The FDA risks of antidepressants including increased risk of suicide, cardiotoxicity, lowered seizure threshold, the serotonin syndrome with serotonin agents and anticholinergic effects have been discussed. We also discussed that at least in the case of SSRIs there is interference with warfarin and nonsteroidal inflammatories and can cause increased bleeding and hemorrhage. You were able to recite back the risks, benefits, alternatives with respect to your antidepressants in particular bupropion and fluoxetine.     The FDA risks of benzodiazepines were discussed which includes  impairment of memory and cognition, increased falls, addiction and dangerous withdrawals if stopped suddenly which can cause significant morbidity and or mortality. There is also risk of respiratory suppression alone or with other sedative or other medications which can lead to morbidity and mortality. You clearly were able to recite back the risks, benefits and alternatives to the benzodiazepines discussed.

## 2025-01-21 NOTE — PROGRESS NOTES
Subjective   Patient ID: Truong Armijo is a 74 y.o. male who presents for No chief complaint on file. I have a lot things going on.    Virtual Consent: The patient engaged in a telehealth session via Epic audio visual or phone with this provider practicing within the [facility]. The identity of the patient was verified by their date of birth and last four digits of their social security number. The provider demonstrated that confidentially was preserved at their location. The patient was informed that they were responsible for ensuring confidentially was secured at their location. The patient's location was documented for emergency purposes. The patient was informed of the necessary steps that would occur if an emergency was to occur or technology failed during session.   An interactive audio and video telecommunication system which permits real time communications between the patient (at home) and provider (at the home office) was utilized to provide this telehealth service.   Verbal consent was requested and obtained from Truong Armijo on this date, 01/21/25 for a telehealth visit.     HPI: The patient report many frustrations with multiple endeavors. The patient is managing at this time.    Active and Past Problems  Problems    · Acute pain of both knees (338.19,719.46) (M25.561,M25.562)   · Adult hypothyroidism (244.9) (E03.9)   · Adverse drug reaction (E947.9) (T50.905A)   · Adverse effect of drug, initial encounter (E947.9) (T50.905A)   · Anorexia (783.0) (R63.0)   · Anxiety and depression (300.00,311) (F41.9,F32.A)   · Anxiety disorder (300.00) (F41.9)   · Arthralgia of knee, unspecified laterality (719.46) (M25.569)   · Arthralgia of left shoulder region (719.41) (M25.512)   · Arthralgia of right shoulder region (719.41) (M25.511)   · Asthmatic bronchitis (493.90) (J45.909)   · Atelectasis (518.0) (J98.11)   · Atelectasis, bilateral (518.0) (J98.11)   · Atrophic retina (362.60) (H35.89)   · Back pain  (724.5) (M54.9)   · Balance problem (781.99) (R26.89)   · Benign essential hypertension (401.1) (I10)   · Bilateral artificial lens implant (V43.1) (Z96.1)   · Bilateral myopia (367.1) (H52.13)   · Bilateral sensorineural hearing loss (389.18) (H90.3)   · Bilateral shoulder pain, unspecified chronicity (719.41) (M25.511,M25.512)   · Cervical back pain with evidence of disc disease (722.91) (M50.90)   · Cervical disc disease (722.91) (M50.90)   · Cervical myofascial pain syndrome (729.1) (M79.18)   · 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)   · Chest pain of uncertain etiology (786.59) (R07.9)   · Chronic depression (311) (F32.A)   · Chronic low self esteem (799.29) (R45.81)   · Chronic otitis externa of left ear (380.23) (H60.62)   · Chronic otitis media (382.9) (H66.90)   · Chronic primary angle-closure glaucoma of both eyes, mild stage (365.23) (H40.2231)   · Chronic primary angle-closure glaucoma of left eye, indeterminate stage (365.23)  (H40.2224)   · Chronic primary angle-closure glaucoma of left eye, unspecified glaucoma stage  (365.23) (H40.2220)   · Constipation (564.00) (K59.00)   · Contusion of lower back (922.31) (S30.0XXA)   · COPD (chronic obstructive pulmonary disease) (496) (J44.9)   · Cough variant asthma (493.82) (J45.991)   · Degeneration of intervertebral disc of cervical region (722.4) (M50.30)   · Depression with anxiety (300.4) (F41.8)   · Diabetes mellitus (250.00) (E11.9)   · Diabetic Charcot's foot (250.60,713.5) (E11.610)   · Diabetic gastroenteropathy (250.60,579.8) (E11.69,K52.9)   · Diabetic peripheral neuropathy (250.60,357.2) (E11.42)   · Dizziness (780.4) (R42)   · Elbow pain (719.42) (M25.529)   · Elevated IOP (365.00) (H40.059)   · Elevated LDL cholesterol level (272.0) (E78.00)   · Encounter for immunization (V03.89) (Z23)   · Enlarged prostate without lower urinary tract symptoms (luts) (600.00) (N40.0)   · Epididymitis (604.90) (N45.1)   · Fatigue (780.79) (R53.83)   · Fluctuating  mental status (780.97) (R41.82)   · Generalized weakness (780.79) (R53.1)   · Glaucoma (365.9) (H40.9)   · High triglycerides (272.1) (E78.1)   · HTN (hypertension) (401.9) (I10)   · Hyperglycemia (790.29) (R73.9)   · Hypogonadism male (257.2) (E29.1)   · Hypotension due to drugs (458.8,E947.9) (I95.2)   · Impaired vision (369.9) (H54.7)   · Increased urinary frequency (788.41) (R35.0)   · Insomnia (780.52) (G47.00)   · Intoxication   · Lateral epicondylitis of right elbow (726.32) (M77.11)   · Leg weakness (729.89) (R29.898)   · Lumbar disc herniation (722.10) (M51.26)   · Lumbar pain (724.2) (M54.50)   · Medication management (V58.69) (Z79.899)   · Migraine equivalent (346.20) (G43.109)   · Mixed hearing loss (389.20) (H90.8)   · Moderate episode of recurrent major depressive disorder (296.32) (F33.1)   · Neck pain (723.1) (M54.2)   · Neck pain, acute (723.1) (M54.2)   · Neurogenic bladder (596.54) (N31.9)   · 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,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)   · Optic nerve hemorrhage (377.42) (H47.029)   · 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)   · Other specified hypotension (458.8) (I95.89)   · Pain in both feet (729.5) (M79.671,M79.672)   · Paranoia (psychosis) (297.1) (F22)   · Paranoid ideation (297.8) (F22)   · Penile pain, chronic (607.9) (N48.89,G89.29)   · Peripheral neuropathy (356.9) (G62.9)   · Persistent testicular pain (608.9) (N50.819)   · Poorly controlled diabetes mellitus (250.00) (E11.65)   · Post traumatic stress disorder (PTSD) (309.81) (F43.10)   · Primary hypothyroidism (244.9) (E03.9)   · Primary open angle glaucoma of both eyes, mild stage (365.11,365.71) (H40.1131)   · Primary osteoarthritis of left knee (715.16) (M17.12)   · Primary osteoarthritis of right knee (715.16) (M17.11)   · PUD (peptic ulcer disease) (533.90) (K27.9)   · Reactive confusion (298.2) (F44.89)   · Reflux esophagitis (530.11) (K21.00)   · Right inguinal hernia (550.90) (K40.90)   · Schizoaffective disorder  (295.70) (F25.9)   · Screening PSA (prostate specific antigen) (V76.44) (Z12.5)   · SOB (shortness of breath) (786.05) (R06.02)   · SOB (shortness of breath) (786.05) (R06.02)   · Sprain of right elbow, initial encounter (841.9) (S53.401A)   · Stress reaction (308.9) (F43.0)   · Subjective tinnitus of both ears (388.31) (H93.13)   · 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)   · Thyroid disorder (246.9) (E07.9)   · TIA (transient ischemic attack) (435.9) (G45.9)   · Trigeminal neuralgia (350.1) (G50.0)   · Trigger point of extremity (729.5) (M79.609)   · Urinary frequency (788.41) (R35.0)   · Urinary tract infection (599.0) (N39.0)   · Urinary urgency (788.63) (R39.15)   · Vasovagal reaction (780.2) (R55)   · Vertigo (780.4) (R42)   · Vitamin D deficiency (268.9) (E55.9)     Past Medical History  Problems    · History of Acute bilateral otitis media (382.9) (H66.93)   · Resolved Date: 15 Oct 2015   · History of Change in bowel habits (787.99) (R19.4)   · Resolved Date: 09 Dec 2021   · History of Change in mental status (780.97) (R41.82)   · Resolved Date: 17 Sep 2019   · History of Chronic diarrhea of unknown origin (787.91) (K52.9)   · Resolved Date: 09 Dec 2021   · History of Depression with anxiety (300.4) (F41.8)   · Resolved Date: 03 Nov 2017   · History of Depression with anxiety (300.4) (F41.8)   · Resolved Date: 13 May 2020   · History of Depression with anxiety (300.4) (F41.8)   · Resolved Date: 02 Jul 2021   · History of Dysphagia, oropharyngeal phase (787.22) (R13.12)   · Resolved Date: 09 Dec 2021   · History of acute otitis media (V12.49) (Z86.69)   · Resolved Date: 15 Oct 2015   · History of acute pharyngitis (V12.69) (Z87.09)   · Resolved Date: 15 Oct 2015   · History of acute sinusitis (V12.69) (Z87.09)   · Resolved Date: 15 Oct 2015   · History of chronic diarrhea   · Resolved Date: 09 Dec 2021   · History of dysphagia (V13.89) (Z87.898)   · Resolved Date: 09 Dec 2021   · History of dysphagia (V13.89)  (Z87.898)   · Resolved Date: 09 Dec 2021   · History of fatigue (V13.89) (Z87.898)   · Resolved Date: 28 Apr 2014   · History of hypercholesterolemia (V12.29) (Z86.39)   · Resolved Date: 12 Oct 2016   · History of nausea (V12.79) (Z87.898)   · Resolved Date: 09 Dec 2021   · History of nausea (V12.79) (Z87.898)   · Resolved Date: 09 Dec 2021   · History of nausea and vomiting (V12.79) (Z87.898)   · Resolved Date: 09 Dec 2021   · History of psychiatric hospitalization (V11.9) (Z86.59)   · Several psychiatric hospitalizations including when in his early 20's he was diagnosed      with disorganized schizophrenia.   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   · Single     Mental Status Exam     General: In no acute distress with depressed mood.   Appearance: Exacerbation of chronic depressive features with poor self esteem.   Attitude: Cooperative.   Behavior: Exacerbation of chronic depressive features with poor self esteem.   Motor Activity: No agitation or retardation. No EPS/TD. Normal gait and station.   Speech: Regular rate, rhythm, volume and tone, spontaneous, fluent.   Mood: Exacerbation of chronic depressive features with poor self esteem.   Affect: Exacerbation of chronic depressive features with poor self esteem.   Thought Process: Exacerbation of chronic depressive features with poor self esteem.   Thought Content: Exacerbation of chronic depressive features with poor self esteem.   Thought Perception: Thought disturbance with chronic depressive features with poor self esteem.   Cognition: Thought disturbance with chronic depressive features with poor self esteem.   Insight: Insight limitations.   Judgment: Judgment limitations.       Appearance: well-groomed.   Build: average.   Demeanor: average.   Eye Contact: average.   Motor Activity: average.   Speech: clear.   Language: Neurologic language is intact.   Fund of Knowledge: intact fund of knowledge.   Delusions: None Reported.   Self Harm: None Reported.   Aggressive: None Reported.   Mood: depressed and anxious . Exacerbation of chronic depressive features with poor self esteem.   Affect: labile . Exacerbation of chronic depressive features with poor self esteem.   Orientation: alert, oriented x3.   Manner: cooperative.   Thought process: goal-directed.   Thought association: displays rational thought process.   Content of thought: Mr. HANNY HILL denies any suicidal or homicidal ideation or plans.   Abstract/ Rational Thought: minimal impairment   Memory: grossly intact.   Behavior: calm.   Attention/Concentration: normal.   Cognition: intact.    Intelligence Estimate: average.   Executive Function: intact.   Insight: minimal impairment.   Judgement: minimal impairment.           Review of Systems   Neurological:         Mental Status Exam     General: In no acute distress with depressed mood.   Appearance: Exacerbation of chronic depressive features with poor self esteem.   Attitude: Cooperative.   Behavior: Exacerbation of chronic depressive features with poor self esteem.   Motor Activity: No agitation or retardation. No EPS/TD. Normal gait and station.   Speech: Regular rate, rhythm, volume and tone, spontaneous, fluent.   Mood: Exacerbation of chronic depressive features with poor self esteem.   Affect: Exacerbation of chronic depressive features with poor self esteem.   Thought Process: Exacerbation of chronic depressive features with poor self esteem.   Thought Content: Exacerbation of chronic depressive features with poor self esteem.   Thought Perception: Thought disturbance with chronic depressive features with poor self esteem.   Cognition: Thought disturbance with chronic depressive features with poor self esteem.   Insight: Insight limitations.   Judgment: Judgment limitations.       Appearance: well-groomed.   Build: average.   Demeanor: average.   Eye Contact: average.   Motor Activity: average.   Speech: clear.   Language: Neurologic language is intact.   Fund of Knowledge: intact fund of knowledge.   Delusions: None Reported.   Self Harm: None Reported.   Aggressive: None Reported.   Mood: depressed and anxious . Exacerbation of chronic depressive features with poor self esteem.   Affect: labile . Exacerbation of chronic depressive features with poor self esteem.   Orientation: alert, oriented x3.   Manner: cooperative.   Thought process: goal-directed.   Thought association: displays rational thought process.   Content of thought: Mr. HANNY HILL denies any suicidal or homicidal ideation or plans.   Abstract/ Rational Thought: minimal  impairment   Memory: grossly intact.   Behavior: calm.   Attention/Concentration: normal.   Cognition: intact.   Intelligence Estimate: average.   Executive Function: intact.   Insight: minimal impairment.   Judgement: minimal impairment.      Psychiatric/Behavioral:  Positive for dysphoric mood. The patient is nervous/anxious.         Mental Status Exam     General: In no acute distress with depressed mood.   Appearance: Exacerbation of chronic depressive features with poor self esteem.   Attitude: Cooperative.   Behavior: Exacerbation of chronic depressive features with poor self esteem.   Motor Activity: No agitation or retardation. No EPS/TD. Normal gait and station.   Speech: Regular rate, rhythm, volume and tone, spontaneous, fluent.   Mood: Exacerbation of chronic depressive features with poor self esteem.   Affect: Exacerbation of chronic depressive features with poor self esteem.   Thought Process: Exacerbation of chronic depressive features with poor self esteem.   Thought Content: Exacerbation of chronic depressive features with poor self esteem.   Thought Perception: Thought disturbance with chronic depressive features with poor self esteem.   Cognition: Thought disturbance with chronic depressive features with poor self esteem.   Insight: Insight limitations.   Judgment: Judgment limitations.       Appearance: well-groomed.   Build: average.   Demeanor: average.   Eye Contact: average.   Motor Activity: average.   Speech: clear.   Language: Neurologic language is intact.   Fund of Knowledge: intact fund of knowledge.   Delusions: None Reported.   Self Harm: None Reported.   Aggressive: None Reported.   Mood: depressed and anxious . Exacerbation of chronic depressive features with poor self esteem.   Affect: labile . Exacerbation of chronic depressive features with poor self esteem.   Orientation: alert, oriented x3.   Manner: cooperative.   Thought process: goal-directed.   Thought association: displays  rational thought process.   Content of thought: Mr. HANNY HILL denies any suicidal or homicidal ideation or plans.   Abstract/ Rational Thought: minimal impairment   Memory: grossly intact.   Behavior: calm.   Attention/Concentration: normal.   Cognition: intact.   Intelligence Estimate: average.   Executive Function: intact.   Insight: minimal impairment.   Judgement: minimal impairment.        Psych Review of Symptoms:    ADHD: Patient denied any symptoms.         Anxiety:   Generalized Anxiety Symptoms: Difficulty controlling worry.       Developmental and Sensory Concerns: Patient denied any symptoms.         Depressive Symptoms:   Depressed mood and irritable.       Disruptive and Conduct Symptoms: Patient denied any symptoms.         Eating / Feeding Concerns: Patient denied any symptoms.         Elimination Symptoms: Patient denied any symptoms.         Manic Symptoms: Patient denied any symptoms.         Obsessive-Compulsive Symptoms: Patient denied any symptoms.         Psychotic Symptoms: Patient denied any symptoms.           Trauma Related Symptoms: Patient denied any symptoms.           Sleep Concerns: Patient denied any symptoms.             Objective   Physical Exam  Neurological:      Mental Status: He is oriented to person, place, and time.      Comments: Mental Status Exam     General: In no acute distress with depressed mood.   Appearance: Exacerbation of chronic depressive features with poor self esteem.   Attitude: Cooperative.   Behavior: Exacerbation of chronic depressive features with poor self esteem.   Motor Activity: No agitation or retardation. No EPS/TD. Normal gait and station.   Speech: Regular rate, rhythm, volume and tone, spontaneous, fluent.   Mood: Exacerbation of chronic depressive features with poor self esteem.   Affect: Exacerbation of chronic depressive features with poor self esteem.   Thought Process: Exacerbation of chronic depressive features with poor self esteem.    Thought Content: Exacerbation of chronic depressive features with poor self esteem.   Thought Perception: Thought disturbance with chronic depressive features with poor self esteem.   Cognition: Thought disturbance with chronic depressive features with poor self esteem.   Insight: Insight limitations.   Judgment: Judgment limitations.       Appearance: well-groomed.   Build: average.   Demeanor: average.   Eye Contact: average.   Motor Activity: average.   Speech: clear.   Language: Neurologic language is intact.   Fund of Knowledge: intact fund of knowledge.   Delusions: None Reported.   Self Harm: None Reported.   Aggressive: None Reported.   Mood: depressed and anxious . Exacerbation of chronic depressive features with poor self esteem.   Affect: labile . Exacerbation of chronic depressive features with poor self esteem.   Orientation: alert, oriented x3.   Manner: cooperative.   Thought process: goal-directed.   Thought association: displays rational thought process.   Content of thought: Mr. HANNY HILL denies any suicidal or homicidal ideation or plans.   Abstract/ Rational Thought: minimal impairment   Memory: grossly intact.   Behavior: calm.   Attention/Concentration: normal.   Cognition: intact.   Intelligence Estimate: average.   Executive Function: intact.   Insight: minimal impairment.   Judgement: minimal impairment.      Psychiatric:      Comments: Mental Status Exam     General: In no acute distress with depressed mood.   Appearance: Exacerbation of chronic depressive features with poor self esteem.   Attitude: Cooperative.   Behavior: Exacerbation of chronic depressive features with poor self esteem.   Motor Activity: No agitation or retardation. No EPS/TD. Normal gait and station.   Speech: Regular rate, rhythm, volume and tone, spontaneous, fluent.   Mood: Exacerbation of chronic depressive features with poor self esteem.   Affect: Exacerbation of chronic depressive features with poor self  esteem.   Thought Process: Exacerbation of chronic depressive features with poor self esteem.   Thought Content: Exacerbation of chronic depressive features with poor self esteem.   Thought Perception: Thought disturbance with chronic depressive features with poor self esteem.   Cognition: Thought disturbance with chronic depressive features with poor self esteem.   Insight: Insight limitations.   Judgment: Judgment limitations.       Appearance: well-groomed.   Build: average.   Demeanor: average.   Eye Contact: average.   Motor Activity: average.   Speech: clear.   Language: Neurologic language is intact.   Fund of Knowledge: intact fund of knowledge.   Delusions: None Reported.   Self Harm: None Reported.   Aggressive: None Reported.   Mood: depressed and anxious . Exacerbation of chronic depressive features with poor self esteem.   Affect: labile . Exacerbation of chronic depressive features with poor self esteem.   Orientation: alert, oriented x3.   Manner: cooperative.   Thought process: goal-directed.   Thought association: displays rational thought process.   Content of thought: Mr. HANNY HILL denies any suicidal or homicidal ideation or plans.   Abstract/ Rational Thought: minimal impairment   Memory: grossly intact.   Behavior: calm.   Attention/Concentration: normal.   Cognition: intact.   Intelligence Estimate: average.   Executive Function: intact.   Insight: minimal impairment.   Judgement: minimal impairment.            Lab Review:   Lab on 12/18/2024   Component Date Value    WBC 12/18/2024 8.4     nRBC 12/18/2024 0.0     RBC 12/18/2024 5.02     Hemoglobin 12/18/2024 15.3     Hematocrit 12/18/2024 46.5     MCV 12/18/2024 93     MCH 12/18/2024 30.5     MCHC 12/18/2024 32.9     RDW 12/18/2024 13.5     Platelets 12/18/2024 264     Neutrophils % 12/18/2024 62.8     Immature Granulocytes %,* 12/18/2024 0.4     Lymphocytes % 12/18/2024 24.2     Monocytes % 12/18/2024 10.3     Eosinophils % 12/18/2024  1.7     Basophils % 12/18/2024 0.6     Neutrophils Absolute 12/18/2024 5.29     Immature Granulocytes Ab* 12/18/2024 0.03     Lymphocytes Absolute 12/18/2024 2.04     Monocytes Absolute 12/18/2024 0.87 (H)     Eosinophils Absolute 12/18/2024 0.14     Basophils Absolute 12/18/2024 0.05     Glucose 12/18/2024 136 (H)     Sodium 12/18/2024 140     Potassium 12/18/2024 4.3     Chloride 12/18/2024 102     Bicarbonate 12/18/2024 30     Anion Gap 12/18/2024 12     Urea Nitrogen 12/18/2024 24 (H)     Creatinine 12/18/2024 1.05     eGFR 12/18/2024 75     Calcium 12/18/2024 10.0     Albumin 12/18/2024 4.6     Alkaline Phosphatase 12/18/2024 32 (L)     Total Protein 12/18/2024 7.0     AST 12/18/2024 10     Bilirubin, Total 12/18/2024 1.0     ALT 12/18/2024 15     Cholesterol 12/18/2024 120     HDL-Cholesterol 12/18/2024 40.8     Cholesterol/HDL Ratio 12/18/2024 2.9     LDL Calculated 12/18/2024 57     VLDL 12/18/2024 22     Triglycerides 12/18/2024 110     Non HDL Cholesterol 12/18/2024 79     Hemoglobin A1C 12/18/2024 5.3     Estimated Average Glucose 12/18/2024 105    Lab on 12/16/2024   Component Date Value    Clonazepam 12/16/2024 <25     7-Aminoclonazepam 12/16/2024 <25     Alprazolam 12/16/2024 <25     Alpha-Hydroxyalprazolam 12/16/2024 <25     Midazolam 12/16/2024 <25     Alpha-Hydroxymidazolam 12/16/2024 <25     Chlordiazepoxide 12/16/2024 <25     Diazepam 12/16/2024 <25     Nordiazepam 12/16/2024 237 (H)     Temazepam 12/16/2024 611 (H)     Oxazepam 12/16/2024 632 (H)     Lorazepam 12/16/2024 <25    Lab on 12/02/2024   Component Date Value    HSV 1, IgG 12/02/2024 0.2     HSV 2, 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    Office Visit on 08/19/2024   Component Date Value    Tissue/Wound Culture/Sme* 08/19/2024 (1+) Rare Mixed Gram-Positive and Gram-Negative Bacteria     Gram Stain 08/19/2024 No polymorphonuclear leukocytes seen     Gram Stain 08/19/2024 No organisms seen    Lab on 08/09/2024   Component Date Value    WBC 08/09/2024 8.2      nRBC 08/09/2024 0.0     RBC 08/09/2024 4.75     Hemoglobin 08/09/2024 14.7     Hematocrit 08/09/2024 45.2     MCV 08/09/2024 95     MCH 08/09/2024 30.9     MCHC 08/09/2024 32.5     RDW 08/09/2024 13.2     Platelets 08/09/2024 253     Neutrophils % 08/09/2024 63.0     Immature Granulocytes %,* 08/09/2024 0.2     Lymphocytes % 08/09/2024 23.1     Monocytes % 08/09/2024 11.4     Eosinophils % 08/09/2024 1.7     Basophils % 08/09/2024 0.6     Neutrophils Absolute 08/09/2024 5.19     Immature Granulocytes Ab* 08/09/2024 0.02     Lymphocytes Absolute 08/09/2024 1.90     Monocytes Absolute 08/09/2024 0.94 (H)     Eosinophils Absolute 08/09/2024 0.14     Basophils Absolute 08/09/2024 0.05     Glucose 08/09/2024 107 (H)     Sodium 08/09/2024 139     Potassium 08/09/2024 4.4     Chloride 08/09/2024 100     Bicarbonate 08/09/2024 28     Anion Gap 08/09/2024 15     Urea Nitrogen 08/09/2024 26 (H)     Creatinine 08/09/2024 0.94     eGFR 08/09/2024 86     Calcium 08/09/2024 9.7     Albumin 08/09/2024 4.6     Alkaline Phosphatase 08/09/2024 36     Total Protein 08/09/2024 7.3     AST 08/09/2024 14     Bilirubin, Total 08/09/2024 0.8     ALT 08/09/2024 16     Cholesterol 08/09/2024 127     HDL-Cholesterol 08/09/2024 41.0     Cholesterol/HDL Ratio 08/09/2024 3.1     LDL Calculated 08/09/2024 65     VLDL 08/09/2024 21     Triglycerides 08/09/2024 105     Non HDL Cholesterol 08/09/2024 86     Thyroid Stimulating Horm* 08/09/2024 2.43     Prostate Specific AG 08/09/2024 3.68     Hemoglobin A1C 08/09/2024 5.5     Estimated Average Glucose 08/09/2024 111        Assessment/Plan   Psychiatric Risk Assessment  Violence Risk Assessment: none  Acute Risk of Harm to Others is Considered: low   Suicide Risk Assessment: age > 65 yrs old and   Protective Factors against Suicide: adherence to  treatment, fear of suicide, moral objections to suicide, positive family relationships, and sense of responsibility toward family  Acute Risk of  Harm to Self is Considered: low    Imminent Risk of Suicide or Serious Self-Injury: Low   Chronic Risk of Suicide of Serious Self-Injury: Low  Risk factors: Age, depression history and   Protective factors: Denies current suicidal ideation, denies history of suicide attempts , willingness to seek help and support , gender, access to a variety of clinical interventions , and receiving and engaged in care for mental, physical, and substance use disorders      Imminent Risk of Violence or Homicide: Low   Risk Factors: No significant risk factors identified on screening  Protective Factors: Lack of known history of harm to others , Lack of known history of violent ideation , and lack of known access to firearms.       Assessment & Plan  Schizoaffective disorder, depressive type (Multi)  Your diagnosis is schizoaffective disorder with of chronic depressed features. You deny any suicidal or homicidal ideation or plans.      [place] was reviewed today with no concerning findings evidenced.      You did follow up with neurology to further assess your mental status fluctuations. You saw Dr. Wyman who related these to a diagnosis of migraine equivalents and recommended treatment.     The plan is to continue bupropion  mg morning and middle afternoon daily, you have elected to stop risperidone 1 mg twice daily in the morning and late afternoon which you announced you have done unilaterally prior to this appointment because of concerns of parkinsonian features/risks; continue fluoxetine to 40 mg daily as well as bupropion  mg daily for depression and you can continue diazepam 5 mg as needed daily and at bedtime as needed only. You clearly are able to recite back the risks, benefits and alternatives of these medications as discussed with you today.     The FDA risks of antipsychotics including increased risk of sudden death, heart attack, brain stroke, irreversible neurological movement disorders, parkinsonism,  cardiac toxicity, weight gain and diabetes of this medication were discussed. You were able to recite back the risks, benefits, alternatives with respect to antipsychotics and in particular risperidone.     The FDA risks of antidepressants including increased risk of suicide, cardiotoxicity, lowered seizure threshold, the serotonin syndrome with serotonin agents and anticholinergic effects have been discussed. We also discussed that at least in the case of SSRIs there is interference with warfarin and nonsteroidal inflammatories and can cause increased bleeding and hemorrhage. You were able to recite back the risks, benefits, alternatives with respect to your antidepressants in particular bupropion and fluoxetine.     The FDA risks of benzodiazepines were discussed which includes impairment of memory and cognition, increased falls, addiction and dangerous withdrawals if stopped suddenly which can cause significant morbidity and or mortality. There is also risk of respiratory suppression alone or with other sedative or other medications which can lead to morbidity and mortality. You clearly were able to recite back the risks, benefits and alternatives to the benzodiazepines discussed.        Mixed anxiety depressive disorder  Your diagnosis is schizoaffective disorder with of chronic depressed features. You deny any suicidal or homicidal ideation or plans.      OARRS was reviewed today with no concerning findings evidenced.      You did follow up with neurology to further assess your mental status fluctuations. You saw Dr. Wyman who related these to a diagnosis of migraine equivalents and recommended treatment.     The plan is to continue bupropion  mg morning and middle afternoon daily, you have elected to stop risperidone 1 mg twice daily in the morning and late afternoon which you announced you have done unilaterally prior to this appointment because of concerns of parkinsonian features/risks; continue  fluoxetine to 40 mg daily as well as bupropion  mg daily for depression and you can continue diazepam 5 mg as needed daily and at bedtime as needed only. You clearly are able to recite back the risks, benefits and alternatives of these medications as discussed with you today.     The FDA risks of antipsychotics including increased risk of sudden death, heart attack, brain stroke, irreversible neurological movement disorders, parkinsonism, cardiac toxicity, weight gain and diabetes of this medication were discussed. You were able to recite back the risks, benefits, alternatives with respect to antipsychotics and in particular risperidone.     The FDA risks of antidepressants including increased risk of suicide, cardiotoxicity, lowered seizure threshold, the serotonin syndrome with serotonin agents and anticholinergic effects have been discussed. We also discussed that at least in the case of SSRIs there is interference with warfarin and nonsteroidal inflammatories and can cause increased bleeding and hemorrhage. You were able to recite back the risks, benefits, alternatives with respect to your antidepressants in particular bupropion and fluoxetine.     The FDA risks of benzodiazepines were discussed which includes impairment of memory and cognition, increased falls, addiction and dangerous withdrawals if stopped suddenly which can cause significant morbidity and or mortality. There is also risk of respiratory suppression alone or with other sedative or other medications which can lead to morbidity and mortality. You clearly were able to recite back the risks, benefits and alternatives to the benzodiazepines discussed.      Continue follow up monthly as you have scheduled.    Time:   Prep time on date of the patient encounter: 5 minutes.   Time spent directly with patient/family/caregiver: 20 minutes.   Additional time spent on patient care activities:  minutes.   Documentation time: 5 minutes.   Total time on  date of patient encounter: 30 minutes.

## 2025-01-21 NOTE — ASSESSMENT & PLAN NOTE
Your diagnosis is schizoaffective disorder with of chronic depressed features. You deny any suicidal or homicidal ideation or plans.      OARRS was reviewed today with no concerning findings evidenced.      You did follow up with neurology to further assess your mental status fluctuations. You saw Dr. Wyman who related these to a diagnosis of migraine equivalents and recommended treatment.     The plan is to continue bupropion  mg morning and middle afternoon daily, you have elected to stop risperidone 1 mg twice daily in the morning and late afternoon which you announced you have done unilaterally prior to this appointment because of concerns of parkinsonian features/risks; continue fluoxetine to 40 mg daily as well as bupropion  mg daily for depression and you can continue diazepam 5 mg as needed daily and at bedtime as needed only. You clearly are able to recite back the risks, benefits and alternatives of these medications as discussed with you today.     The FDA risks of antipsychotics including increased risk of sudden death, heart attack, brain stroke, irreversible neurological movement disorders, parkinsonism, cardiac toxicity, weight gain and diabetes of this medication were discussed. You were able to recite back the risks, benefits, alternatives with respect to antipsychotics and in particular risperidone.     The FDA risks of antidepressants including increased risk of suicide, cardiotoxicity, lowered seizure threshold, the serotonin syndrome with serotonin agents and anticholinergic effects have been discussed. We also discussed that at least in the case of SSRIs there is interference with warfarin and nonsteroidal inflammatories and can cause increased bleeding and hemorrhage. You were able to recite back the risks, benefits, alternatives with respect to your antidepressants in particular bupropion and fluoxetine.     The FDA risks of benzodiazepines were discussed which includes  impairment of memory and cognition, increased falls, addiction and dangerous withdrawals if stopped suddenly which can cause significant morbidity and or mortality. There is also risk of respiratory suppression alone or with other sedative or other medications which can lead to morbidity and mortality. You clearly were able to recite back the risks, benefits and alternatives to the benzodiazepines discussed.      Continue follow up monthly as you have scheduled.    Time:   Prep time on date of the patient encounter: 5 minutes.   Time spent directly with patient/family/caregiver: 20 minutes.   Additional time spent on patient care activities:  minutes.   Documentation time: 5 minutes.   Total time on date of patient encounter: 30 minutes.

## 2025-01-24 DIAGNOSIS — I10 ESSENTIAL (PRIMARY) HYPERTENSION: ICD-10-CM

## 2025-01-24 RX ORDER — LISINOPRIL 10 MG/1
10 TABLET ORAL DAILY
Qty: 90 TABLET | Refills: 1 | Status: SHIPPED | OUTPATIENT
Start: 2025-01-24

## 2025-01-28 DIAGNOSIS — E78.1 HIGH TRIGLYCERIDES: ICD-10-CM

## 2025-01-28 RX ORDER — ATORVASTATIN CALCIUM 20 MG/1
20 TABLET, FILM COATED ORAL NIGHTLY
Qty: 90 TABLET | Refills: 1 | Status: SHIPPED | OUTPATIENT
Start: 2025-01-28

## 2025-01-31 DIAGNOSIS — E11.9 TYPE 2 DIABETES MELLITUS WITHOUT COMPLICATIONS (MULTI): ICD-10-CM

## 2025-01-31 RX ORDER — METFORMIN HYDROCHLORIDE 500 MG/1
1000 TABLET ORAL 2 TIMES DAILY
Qty: 360 TABLET | Refills: 0 | Status: SHIPPED | OUTPATIENT
Start: 2025-01-31

## 2025-02-04 DIAGNOSIS — F41.8 MIXED ANXIETY DEPRESSIVE DISORDER: ICD-10-CM

## 2025-02-04 DIAGNOSIS — F25.1 SCHIZOAFFECTIVE DISORDER, DEPRESSIVE TYPE (MULTI): ICD-10-CM

## 2025-02-04 DIAGNOSIS — F43.10 POST-TRAUMATIC STRESS DISORDER, UNSPECIFIED: ICD-10-CM

## 2025-02-04 RX ORDER — FLUOXETINE HYDROCHLORIDE 40 MG/1
40 CAPSULE ORAL DAILY
Qty: 90 CAPSULE | Refills: 1 | Status: SHIPPED | OUTPATIENT
Start: 2025-02-04

## 2025-02-04 RX ORDER — BUPROPION HYDROCHLORIDE 150 MG/1
TABLET ORAL
Qty: 180 TABLET | Refills: 1 | Status: SHIPPED | OUTPATIENT
Start: 2025-02-04

## 2025-02-11 ENCOUNTER — OFFICE VISIT (OUTPATIENT)
Dept: BEHAVIORAL HEALTH | Facility: CLINIC | Age: 75
End: 2025-02-11
Payer: COMMERCIAL

## 2025-02-11 VITALS
WEIGHT: 195.3 LBS | HEART RATE: 98 BPM | DIASTOLIC BLOOD PRESSURE: 79 MMHG | RESPIRATION RATE: 20 BRPM | SYSTOLIC BLOOD PRESSURE: 138 MMHG | BODY MASS INDEX: 25.08 KG/M2 | TEMPERATURE: 96.3 F

## 2025-02-11 DIAGNOSIS — F25.0 SCHIZOAFFECTIVE DISORDER, BIPOLAR TYPE (MULTI): ICD-10-CM

## 2025-02-11 PROCEDURE — 3075F SYST BP GE 130 - 139MM HG: CPT | Performed by: PSYCHIATRY & NEUROLOGY

## 2025-02-11 PROCEDURE — 99214 OFFICE O/P EST MOD 30 MIN: CPT | Performed by: PSYCHIATRY & NEUROLOGY

## 2025-02-11 PROCEDURE — 4010F ACE/ARB THERAPY RXD/TAKEN: CPT | Performed by: PSYCHIATRY & NEUROLOGY

## 2025-02-11 PROCEDURE — 3078F DIAST BP <80 MM HG: CPT | Performed by: PSYCHIATRY & NEUROLOGY

## 2025-02-11 PROCEDURE — 1159F MED LIST DOCD IN RCRD: CPT | Performed by: PSYCHIATRY & NEUROLOGY

## 2025-02-11 PROCEDURE — 1160F RVW MEDS BY RX/DR IN RCRD: CPT | Performed by: PSYCHIATRY & NEUROLOGY

## 2025-02-11 PROCEDURE — 99214 OFFICE O/P EST MOD 30 MIN: CPT | Mod: AM | Performed by: PSYCHIATRY & NEUROLOGY

## 2025-02-11 ASSESSMENT — ANXIETY QUESTIONNAIRES
1. FEELING NERVOUS, ANXIOUS, OR ON EDGE: SEVERAL DAYS
GAD7 TOTAL SCORE: 4
7. FEELING AFRAID AS IF SOMETHING AWFUL MIGHT HAPPEN: NOT AT ALL
4. TROUBLE RELAXING: SEVERAL DAYS
IF YOU CHECKED OFF ANY PROBLEMS ON THIS QUESTIONNAIRE, HOW DIFFICULT HAVE THESE PROBLEMS MADE IT FOR YOU TO DO YOUR WORK, TAKE CARE OF THINGS AT HOME, OR GET ALONG WITH OTHER PEOPLE: SOMEWHAT DIFFICULT
2. NOT BEING ABLE TO STOP OR CONTROL WORRYING: SEVERAL DAYS
3. WORRYING TOO MUCH ABOUT DIFFERENT THINGS: SEVERAL DAYS
5. BEING SO RESTLESS THAT IT IS HARD TO SIT STILL: NOT AT ALL
6. BECOMING EASILY ANNOYED OR IRRITABLE: NOT AT ALL

## 2025-02-11 ASSESSMENT — ENCOUNTER SYMPTOMS
DEPRESSION: 1
LOSS OF SENSATION IN FEET: 0
OCCASIONAL FEELINGS OF UNSTEADINESS: 1

## 2025-02-11 NOTE — PROGRESS NOTES
Subjective   Patient ID: Truong Armijo is a 74 y.o. male who presents for No chief complaint on file. I need to eat more.    HPI: The patient report many frustrations with multiple endeavors. The patient is managing at this time. He is working maintaining his weight. He is currently trying to resume psychotherapy with a previous therapist.      Active and Past Problems  Problems    · Acute pain of both knees (338.19,719.46) (M25.561,M25.562)   · Adult hypothyroidism (244.9) (E03.9)   · Adverse drug reaction (E947.9) (T50.905A)   · Adverse effect of drug, initial encounter (E947.9) (T50.905A)   · Anorexia (783.0) (R63.0)   · Anxiety and depression (300.00,311) (F41.9,F32.A)   · Anxiety disorder (300.00) (F41.9)   · Arthralgia of knee, unspecified laterality (719.46) (M25.569)   · Arthralgia of left shoulder region (719.41) (M25.512)   · Arthralgia of right shoulder region (719.41) (M25.511)   · Asthmatic bronchitis (493.90) (J45.909)   · Atelectasis (518.0) (J98.11)   · Atelectasis, bilateral (518.0) (J98.11)   · Atrophic retina (362.60) (H35.89)   · Back pain (724.5) (M54.9)   · Balance problem (781.99) (R26.89)   · Benign essential hypertension (401.1) (I10)   · Bilateral artificial lens implant (V43.1) (Z96.1)   · Bilateral myopia (367.1) (H52.13)   · Bilateral sensorineural hearing loss (389.18) (H90.3)   · Bilateral shoulder pain, unspecified chronicity (719.41) (M25.511,M25.512)   · Cervical back pain with evidence of disc disease (722.91) (M50.90)   · Cervical disc disease (722.91) (M50.90)   · Cervical myofascial pain syndrome (729.1) (M79.18)   · 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)   · Chest pain of uncertain etiology (786.59) (R07.9)   · Chronic depression (311) (F32.A)   · Chronic low self esteem (799.29) (R45.81)   · Chronic otitis externa of left ear (380.23) (H60.62)   · Chronic otitis media (382.9) (H66.90)   · Chronic primary angle-closure glaucoma of both eyes, mild stage (365.23) (H40.2231)   ·  Chronic primary angle-closure glaucoma of left eye, indeterminate stage (365.23)  (H40.2224)   · Chronic primary angle-closure glaucoma of left eye, unspecified glaucoma stage  (365.23) (H40.2220)   · Constipation (564.00) (K59.00)   · Contusion of lower back (922.31) (S30.0XXA)   · COPD (chronic obstructive pulmonary disease) (496) (J44.9)   · Cough variant asthma (493.82) (J45.991)   · Degeneration of intervertebral disc of cervical region (722.4) (M50.30)   · Depression with anxiety (300.4) (F41.8)   · Diabetes mellitus (250.00) (E11.9)   · Diabetic Charcot's foot (250.60,713.5) (E11.610)   · Diabetic gastroenteropathy (250.60,579.8) (E11.69,K52.9)   · Diabetic peripheral neuropathy (250.60,357.2) (E11.42)   · Dizziness (780.4) (R42)   · Elbow pain (719.42) (M25.529)   · Elevated IOP (365.00) (H40.059)   · Elevated LDL cholesterol level (272.0) (E78.00)   · Encounter for immunization (V03.89) (Z23)   · Enlarged prostate without lower urinary tract symptoms (luts) (600.00) (N40.0)   · Epididymitis (604.90) (N45.1)   · Fatigue (780.79) (R53.83)   · Fluctuating mental status (780.97) (R41.82)   · Generalized weakness (780.79) (R53.1)   · Glaucoma (365.9) (H40.9)   · High triglycerides (272.1) (E78.1)   · HTN (hypertension) (401.9) (I10)   · Hyperglycemia (790.29) (R73.9)   · Hypogonadism male (257.2) (E29.1)   · Hypotension due to drugs (458.8,E947.9) (I95.2)   · Impaired vision (369.9) (H54.7)   · Increased urinary frequency (788.41) (R35.0)   · Insomnia (780.52) (G47.00)   · Intoxication   · Lateral epicondylitis of right elbow (726.32) (M77.11)   · Leg weakness (729.89) (R29.898)   · Lumbar disc herniation (722.10) (M51.26)   · Lumbar pain (724.2) (M54.50)   · Medication management (V58.69) (Z79.899)   · Migraine equivalent (346.20) (G43.109)   · Mixed hearing loss (389.20) (H90.8)   · Moderate episode of recurrent major depressive disorder (296.32) (F33.1)   · Neck pain (723.1) (M54.2)   · Neck pain, acute  (723.1) (M54.2)   · Neurogenic bladder (596.54) (N31.9)   · 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,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)   · Optic nerve hemorrhage (377.42) (H47.029)   · 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)   · Other specified hypotension (458.8) (I95.89)   · Pain in both feet (729.5) (M79.671,M79.672)   · Paranoia (psychosis) (297.1) (F22)   · Paranoid ideation (297.8) (F22)   · Penile pain, chronic (607.9) (N48.89,G89.29)   · Peripheral neuropathy (356.9) (G62.9)   · Persistent testicular pain (608.9) (N50.819)   · Poorly controlled diabetes mellitus (250.00) (E11.65)   · Post traumatic stress disorder (PTSD) (309.81) (F43.10)   · Primary hypothyroidism (244.9) (E03.9)   · Primary open angle glaucoma of both eyes, mild stage (365.11,365.71) (H40.1131)   · Primary osteoarthritis of left knee (715.16) (M17.12)   · Primary osteoarthritis of right knee (715.16) (M17.11)   · PUD (peptic ulcer disease) (533.90) (K27.9)   · Reactive confusion (298.2) (F44.89)   · Reflux esophagitis (530.11) (K21.00)   · Right inguinal hernia (550.90) (K40.90)   · Schizoaffective disorder (295.70) (F25.9)   · Screening PSA (prostate specific antigen) (V76.44) (Z12.5)   · SOB (shortness of breath) (786.05) (R06.02)   · SOB (shortness of breath) (786.05) (R06.02)   · Sprain of right elbow, initial encounter (841.9) (S53.401A)   · Stress reaction (308.9) (F43.0)   · Subjective tinnitus of both ears (388.31) (H93.13)   · 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)   · Thyroid disorder (246.9) (E07.9)   · TIA (transient ischemic attack) (435.9) (G45.9)   · Trigeminal neuralgia (350.1) (G50.0)   · Trigger point of extremity (729.5) (M79.609)   · Urinary frequency (788.41) (R35.0)   · Urinary tract infection (599.0) (N39.0)   · Urinary urgency (788.63) (R39.15)   · Vasovagal reaction (780.2) (R55)   · Vertigo (780.4) (R42)   · Vitamin D deficiency (268.9) (E55.9)     Past Medical History  Problems    · History of Acute bilateral otitis media (382.9)  (H66.93)   · Resolved Date: 15 Oct 2015   · History of Change in bowel habits (787.99) (R19.4)   · Resolved Date: 09 Dec 2021   · History of Change in mental status (780.97) (R41.82)   · Resolved Date: 17 Sep 2019   · History of Chronic diarrhea of unknown origin (787.91) (K52.9)   · Resolved Date: 09 Dec 2021   · History of Depression with anxiety (300.4) (F41.8)   · Resolved Date: 03 Nov 2017   · History of Depression with anxiety (300.4) (F41.8)   · Resolved Date: 13 May 2020   · History of Depression with anxiety (300.4) (F41.8)   · Resolved Date: 02 Jul 2021   · History of Dysphagia, oropharyngeal phase (787.22) (R13.12)   · Resolved Date: 09 Dec 2021   · History of acute otitis media (V12.49) (Z86.69)   · Resolved Date: 15 Oct 2015   · History of acute pharyngitis (V12.69) (Z87.09)   · Resolved Date: 15 Oct 2015   · History of acute sinusitis (V12.69) (Z87.09)   · Resolved Date: 15 Oct 2015   · History of chronic diarrhea   · Resolved Date: 09 Dec 2021   · History of dysphagia (V13.89) (Z87.898)   · Resolved Date: 09 Dec 2021   · History of dysphagia (V13.89) (Z87.898)   · Resolved Date: 09 Dec 2021   · History of fatigue (V13.89) (Z87.898)   · Resolved Date: 28 Apr 2014   · History of hypercholesterolemia (V12.29) (Z86.39)   · Resolved Date: 12 Oct 2016   · History of nausea (V12.79) (Z87.898)   · Resolved Date: 09 Dec 2021   · History of nausea (V12.79) (Z87.898)   · Resolved Date: 09 Dec 2021   · History of nausea and vomiting (V12.79) (Z87.898)   · Resolved Date: 09 Dec 2021   · History of psychiatric hospitalization (V11.9) (Z86.59)   · Several psychiatric hospitalizations including when in his early 20's he was diagnosed      with disorganized schizophrenia.   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   · Completed elementary school   · Completed high school   · Former smoker (V15.82) (Z87.891)   · Heterosexual   · No drug use   · Retired   · Retired from taxi driving for about 20 years. He did work for [employer] prior to driving a taxi.   · Single     Mental Status Exam     General: In no acute distress with depressed mood.   Appearance: Exacerbation of chronic depressive features with poor self esteem.   Attitude: Cooperative.   Behavior: Exacerbation of chronic depressive features with poor self esteem.   Motor Activity: No agitation or retardation. No EPS/TD. Normal gait and station.   Speech: Regular rate, rhythm, volume and tone, spontaneous, fluent.   Mood: Exacerbation of chronic depressive features with poor self esteem.   Affect: Exacerbation of chronic depressive features with poor self esteem.   Thought Process: Exacerbation of chronic depressive features with poor self esteem.   Thought Content: Exacerbation of chronic depressive features with poor self esteem.   Thought Perception: Thought disturbance with chronic depressive  features with poor self esteem.   Cognition: Thought disturbance with chronic depressive features with poor self esteem.   Insight: Insight limitations.   Judgment: Judgment limitations.       Appearance: well-groomed.   Build: average.   Demeanor: average.   Eye Contact: average.   Motor Activity: average.   Speech: clear.   Language: Neurologic language is intact.   Fund of Knowledge: intact fund of knowledge.   Delusions: None Reported.   Self Harm: None Reported.   Aggressive: None Reported.   Mood: depressed and anxious . Exacerbation of chronic depressive features with poor self esteem.   Affect: labile . Exacerbation of chronic depressive features with poor self esteem.   Orientation: alert, oriented x3.   Manner: cooperative.   Thought process: goal-directed.   Thought association: displays rational thought process.   Content of thought: Mr. HANNY HILL denies any suicidal or homicidal ideation or plans.   Abstract/ Rational Thought: minimal impairment   Memory: grossly intact.   Behavior: calm.   Attention/Concentration: normal.   Cognition: intact.   Intelligence Estimate: average.   Executive Function: intact.   Insight: minimal impairment.   Judgement: minimal impairment.         Review of Systems   Neurological:         Mental Status Exam     General: In no acute distress with depressed mood.   Appearance: Exacerbation of chronic depressive features with poor self esteem.   Attitude: Cooperative.   Behavior: Exacerbation of chronic depressive features with poor self esteem.   Motor Activity: No agitation or retardation. No EPS/TD. Normal gait and station.   Speech: Regular rate, rhythm, volume and tone, spontaneous, fluent.   Mood: Exacerbation of chronic depressive features with poor self esteem.   Affect: Exacerbation of chronic depressive features with poor self esteem.   Thought Process: Exacerbation of chronic depressive features with poor self esteem.   Thought Content: Exacerbation of chronic  depressive features with poor self esteem.   Thought Perception: Thought disturbance with chronic depressive features with poor self esteem.   Cognition: Thought disturbance with chronic depressive features with poor self esteem.   Insight: Insight limitations.   Judgment: Judgment limitations.       Appearance: well-groomed.   Build: average.   Demeanor: average.   Eye Contact: average.   Motor Activity: average.   Speech: clear.   Language: Neurologic language is intact.   Fund of Knowledge: intact fund of knowledge.   Delusions: None Reported.   Self Harm: None Reported.   Aggressive: None Reported.   Mood: depressed and anxious . Exacerbation of chronic depressive features with poor self esteem.   Affect: labile . Exacerbation of chronic depressive features with poor self esteem.   Orientation: alert, oriented x3.   Manner: cooperative.   Thought process: goal-directed.   Thought association: displays rational thought process.   Content of thought: Mr. HANNY HILL denies any suicidal or homicidal ideation or plans.   Abstract/ Rational Thought: minimal impairment   Memory: grossly intact.   Behavior: calm.   Attention/Concentration: normal.   Cognition: intact.   Intelligence Estimate: average.   Executive Function: intact.   Insight: minimal impairment.   Judgement: minimal impairment.      Psychiatric/Behavioral:          Mental Status Exam     General: In no acute distress with depressed mood.   Appearance: Exacerbation of chronic depressive features with poor self esteem.   Attitude: Cooperative.   Behavior: Exacerbation of chronic depressive features with poor self esteem.   Motor Activity: No agitation or retardation. No EPS/TD. Normal gait and station.   Speech: Regular rate, rhythm, volume and tone, spontaneous, fluent.   Mood: Exacerbation of chronic depressive features with poor self esteem.   Affect: Exacerbation of chronic depressive features with poor self esteem.   Thought Process: Exacerbation  of chronic depressive features with poor self esteem.   Thought Content: Exacerbation of chronic depressive features with poor self esteem.   Thought Perception: Thought disturbance with chronic depressive features with poor self esteem.   Cognition: Thought disturbance with chronic depressive features with poor self esteem.   Insight: Insight limitations.   Judgment: Judgment limitations.       Appearance: well-groomed.   Build: average.   Demeanor: average.   Eye Contact: average.   Motor Activity: average.   Speech: clear.   Language: Neurologic language is intact.   Fund of Knowledge: intact fund of knowledge.   Delusions: None Reported.   Self Harm: None Reported.   Aggressive: None Reported.   Mood: depressed and anxious . Exacerbation of chronic depressive features with poor self esteem.   Affect: labile . Exacerbation of chronic depressive features with poor self esteem.   Orientation: alert, oriented x3.   Manner: cooperative.   Thought process: goal-directed.   Thought association: displays rational thought process.   Content of thought: Mr. HANNY HILL denies any suicidal or homicidal ideation or plans.   Abstract/ Rational Thought: minimal impairment   Memory: grossly intact.   Behavior: calm.   Attention/Concentration: normal.   Cognition: intact.   Intelligence Estimate: average.   Executive Function: intact.   Insight: minimal impairment.   Judgement: minimal impairment.        Psych Review of Symptoms:    ADHD: Patient denied any symptoms.         Anxiety: Patient denied any symptoms.         Developmental and Sensory Concerns: Patient denied any symptoms.         Depressive Symptoms: Patient denied any symptoms.         Disruptive and Conduct Symptoms: Patient denied any symptoms.         Eating / Feeding Concerns: Patient denied any symptoms.         Elimination Symptoms: Patient denied any symptoms.         Manic Symptoms: Patient denied any symptoms.         Obsessive-Compulsive Symptoms:  Patient denied any symptoms.         Psychotic Symptoms: Patient denied any symptoms.           Trauma Related Symptoms: Patient denied any symptoms.           Sleep Concerns: Patient denied any symptoms.             Objective   Physical Exam  Neurological:      Mental Status: He is oriented to person, place, and time.      Comments: Mental Status Exam     General: In no acute distress with depressed mood.   Appearance: Exacerbation of chronic depressive features with poor self esteem.   Attitude: Cooperative.   Behavior: Exacerbation of chronic depressive features with poor self esteem.   Motor Activity: No agitation or retardation. No EPS/TD. Normal gait and station.   Speech: Regular rate, rhythm, volume and tone, spontaneous, fluent.   Mood: Exacerbation of chronic depressive features with poor self esteem.   Affect: Exacerbation of chronic depressive features with poor self esteem.   Thought Process: Exacerbation of chronic depressive features with poor self esteem.   Thought Content: Exacerbation of chronic depressive features with poor self esteem.   Thought Perception: Thought disturbance with chronic depressive features with poor self esteem.   Cognition: Thought disturbance with chronic depressive features with poor self esteem.   Insight: Insight limitations.   Judgment: Judgment limitations.       Appearance: well-groomed.   Build: average.   Demeanor: average.   Eye Contact: average.   Motor Activity: average.   Speech: clear.   Language: Neurologic language is intact.   Fund of Knowledge: intact fund of knowledge.   Delusions: None Reported.   Self Harm: None Reported.   Aggressive: None Reported.   Mood: depressed and anxious . Exacerbation of chronic depressive features with poor self esteem.   Affect: labile . Exacerbation of chronic depressive features with poor self esteem.   Orientation: alert, oriented x3.   Manner: cooperative.   Thought process: goal-directed.   Thought association: displays  rational thought process.   Content of thought: Mr. HANNY HILL denies any suicidal or homicidal ideation or plans.   Abstract/ Rational Thought: minimal impairment   Memory: grossly intact.   Behavior: calm.   Attention/Concentration: normal.   Cognition: intact.   Intelligence Estimate: average.   Executive Function: intact.   Insight: minimal impairment.   Judgement: minimal impairment.      Psychiatric:      Comments: Mental Status Exam     General: In no acute distress with depressed mood.   Appearance: Exacerbation of chronic depressive features with poor self esteem.   Attitude: Cooperative.   Behavior: Exacerbation of chronic depressive features with poor self esteem.   Motor Activity: No agitation or retardation. No EPS/TD. Normal gait and station.   Speech: Regular rate, rhythm, volume and tone, spontaneous, fluent.   Mood: Exacerbation of chronic depressive features with poor self esteem.   Affect: Exacerbation of chronic depressive features with poor self esteem.   Thought Process: Exacerbation of chronic depressive features with poor self esteem.   Thought Content: Exacerbation of chronic depressive features with poor self esteem.   Thought Perception: Thought disturbance with chronic depressive features with poor self esteem.   Cognition: Thought disturbance with chronic depressive features with poor self esteem.   Insight: Insight limitations.   Judgment: Judgment limitations.       Appearance: well-groomed.   Build: average.   Demeanor: average.   Eye Contact: average.   Motor Activity: average.   Speech: clear.   Language: Neurologic language is intact.   Fund of Knowledge: intact fund of knowledge.   Delusions: None Reported.   Self Harm: None Reported.   Aggressive: None Reported.   Mood: depressed and anxious . Exacerbation of chronic depressive features with poor self esteem.   Affect: labile . Exacerbation of chronic depressive features with poor self esteem.   Orientation: alert, oriented  x3.   Manner: cooperative.   Thought process: goal-directed.   Thought association: displays rational thought process.   Content of thought: Mr. HANNY HILL denies any suicidal or homicidal ideation or plans.   Abstract/ Rational Thought: minimal impairment   Memory: grossly intact.   Behavior: calm.   Attention/Concentration: normal.   Cognition: intact.   Intelligence Estimate: average.   Executive Function: intact.   Insight: minimal impairment.   Judgement: minimal impairment.            Lab Review:   Lab on 12/18/2024   Component Date Value    WBC 12/18/2024 8.4     nRBC 12/18/2024 0.0     RBC 12/18/2024 5.02     Hemoglobin 12/18/2024 15.3     Hematocrit 12/18/2024 46.5     MCV 12/18/2024 93     MCH 12/18/2024 30.5     MCHC 12/18/2024 32.9     RDW 12/18/2024 13.5     Platelets 12/18/2024 264     Neutrophils % 12/18/2024 62.8     Immature Granulocytes %,* 12/18/2024 0.4     Lymphocytes % 12/18/2024 24.2     Monocytes % 12/18/2024 10.3     Eosinophils % 12/18/2024 1.7     Basophils % 12/18/2024 0.6     Neutrophils Absolute 12/18/2024 5.29     Immature Granulocytes Ab* 12/18/2024 0.03     Lymphocytes Absolute 12/18/2024 2.04     Monocytes Absolute 12/18/2024 0.87 (H)     Eosinophils Absolute 12/18/2024 0.14     Basophils Absolute 12/18/2024 0.05     Glucose 12/18/2024 136 (H)     Sodium 12/18/2024 140     Potassium 12/18/2024 4.3     Chloride 12/18/2024 102     Bicarbonate 12/18/2024 30     Anion Gap 12/18/2024 12     Urea Nitrogen 12/18/2024 24 (H)     Creatinine 12/18/2024 1.05     eGFR 12/18/2024 75     Calcium 12/18/2024 10.0     Albumin 12/18/2024 4.6     Alkaline Phosphatase 12/18/2024 32 (L)     Total Protein 12/18/2024 7.0     AST 12/18/2024 10     Bilirubin, Total 12/18/2024 1.0     ALT 12/18/2024 15     Cholesterol 12/18/2024 120     HDL-Cholesterol 12/18/2024 40.8     Cholesterol/HDL Ratio 12/18/2024 2.9     LDL Calculated 12/18/2024 57     VLDL 12/18/2024 22     Triglycerides 12/18/2024 110     Non  HDL Cholesterol 12/18/2024 79     Hemoglobin A1C 12/18/2024 5.3     Estimated Average Glucose 12/18/2024 105    Lab on 12/16/2024   Component Date Value    Clonazepam 12/16/2024 <25     7-Aminoclonazepam 12/16/2024 <25     Alprazolam 12/16/2024 <25     Alpha-Hydroxyalprazolam 12/16/2024 <25     Midazolam 12/16/2024 <25     Alpha-Hydroxymidazolam 12/16/2024 <25     Chlordiazepoxide 12/16/2024 <25     Diazepam 12/16/2024 <25     Nordiazepam 12/16/2024 237 (H)     Temazepam 12/16/2024 611 (H)     Oxazepam 12/16/2024 632 (H)     Lorazepam 12/16/2024 <25    Lab on 12/02/2024   Component Date Value    HSV 1, IgG 12/02/2024 0.2     HSV 2, 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    Office Visit on 08/19/2024   Component Date Value    Tissue/Wound Culture/Sme* 08/19/2024 (1+) Rare Mixed Gram-Positive and Gram-Negative Bacteria     Gram Stain 08/19/2024 No polymorphonuclear leukocytes seen     Gram Stain 08/19/2024 No organisms seen      OARRS:  Josue Hernandez MD PhD on 2/11/2025  3:56 PM  I have personally reviewed the OARRS report for Truong Armijo. I have considered the risks of abuse, dependence, addiction and diversion    Is the patient prescribed a combination of a benzodiazepine and opioid?  Yes, I feel it is clincially indicated to continue the medication and have discussed with the patient risks/benefits/alternatives.    Last Urine Drug Screen / ordered today: Yes  Recent Results (from the past 8760 hours)   Benzodiazepine Confirmation, Urine    Collection Time: 12/16/24 12:53 PM   Result Value Ref Range    Clonazepam <25 <25 ng/mL    7-Aminoclonazepam <25 <25 ng/mL    Alprazolam <25 <25 ng/mL    Alpha-Hydroxyalprazolam <25 <25 ng/mL    Midazolam <25 <25 ng/mL    Alpha-Hydroxymidazolam <25 <25 ng/mL    Chlordiazepoxide <25 <25 ng/mL    Diazepam <25 <25 ng/mL    Nordiazepam 237 (H) <25 ng/mL    Temazepam 611 (H) <25 ng/mL    Oxazepam 632 (H) <25 ng/mL    Lorazepam <25 <25 ng/mL     Results are as expected.         Controlled  Substance Agreement:  Date of the Last Agreement: 12/10/24  Reviewed Controlled Substance Agreement including but not limited to the benefits, risks, and alternatives to treatment with a Controlled Substance medication(s).    Benzodiazepines:  What is the patient's goal of therapy? Minimize anxiety.  Is this being achieved with current treatment? Yes    LYLE-7:4  No data recorded    Activities of Daily Living:   Is your overall impression that this patient is benefiting (symptom reduction outweighs side effects) from benzodiazepine therapy? Yes     1. Physical Functioning: Better  2. Family Relationship: Better  3. Social Relationship: Better  4. Mood: Better  5. Sleep Patterns: Better  6. Overall Function: Better    Assessment/Plan   Psychiatric Risk Assessment  Violence Risk Assessment: none  Acute Risk of Harm to Others is Considered: low   Suicide Risk Assessment: age > 65 yrs old and   Protective Factors against Suicide: adherence to  treatment, fear of suicide, moral objections to suicide, positive family relationships, and sense of responsibility toward family  Acute Risk of Harm to Self is Considered: low    Imminent Risk of Suicide or Serious Self-Injury: Low   Chronic Risk of Suicide of Serious Self-Injury: Low  Risk factors: Age, depression history and   Protective factors: Denies current suicidal ideation, denies history of suicide attempts , willingness to seek help and support , gender, access to a variety of clinical interventions , and receiving and engaged in care for mental, physical, and substance use disorders      Imminent Risk of Violence or Homicide: Low   Risk Factors: No significant risk factors identified on screening  Protective Factors: Lack of known history of harm to others , Lack of known history of violent ideation , and lack of known access to firearms.     Assessment & Plan    Your diagnosis is schizoaffective disorder with of chronic depressed features. You deny any  suicidal or homicidal ideation or plans.      [facility] was reviewed today with no concerning findings evidenced.     The plan is to continue bupropion  mg morning and middle afternoon daily, you have elected to stop risperidone 1 mg twice daily in the morning and late afternoon which you announced you have done unilaterally prior to this appointment because of concerns of parkinsonian features/risks; continue fluoxetine to 40 mg daily as well as bupropion  mg daily for depression and you can continue diazepam 5 mg as needed daily and at bedtime as needed only. You clearly are able to recite back the risks, benefits and alternatives of these medications as discussed with you today.     The FDA risks of antipsychotics including increased risk of sudden death, heart attack, brain stroke, irreversible neurological movement disorders, parkinsonism, cardiac toxicity, weight gain and diabetes of this medication were discussed. You were able to recite back the risks, benefits, alternatives with respect to antipsychotics and in particular risperidone.     The FDA risks of antidepressants including increased risk of suicide, cardiotoxicity, lowered seizure threshold, the serotonin syndrome with serotonin agents and anticholinergic effects have been discussed. We also discussed that at least in the case of SSRIs there is interference with warfarin and nonsteroidal inflammatories and can cause increased bleeding and hemorrhage. You were able to recite back the risks, benefits, alternatives with respect to your antidepressants in particular bupropion and fluoxetine.     The FDA risks of benzodiazepines were discussed which includes impairment of memory and cognition, increased falls, addiction and dangerous withdrawals if stopped suddenly which can cause significant morbidity and or mortality. There is also risk of respiratory suppression alone or with other sedative or other medications which can lead to morbidity and  mortality. You clearly were able to recite back the risks, benefits and alternatives to the benzodiazepines discussed.     Follow up monthly.     Time:   Prep time on date of the patient encounter: 5 minutes.   Time spent directly with patient/family/caregiver: 25 minutes.   Additional time spent on patient care activities:  minutes.   Documentation time: 5 minutes.   Total time on date of patient encounter: 35 minutes.

## 2025-02-12 ENCOUNTER — TELEPHONE (OUTPATIENT)
Dept: GERIATRIC MEDICINE | Facility: CLINIC | Age: 75
End: 2025-02-12
Payer: COMMERCIAL

## 2025-02-13 DIAGNOSIS — H40.10X1 OPEN-ANGLE GLAUCOMA OF BOTH EYES, MILD STAGE, UNSPECIFIED OPEN-ANGLE GLAUCOMA TYPE: Primary | ICD-10-CM

## 2025-02-13 DIAGNOSIS — F22 PARANOIA (PSYCHOSIS) (MULTI): ICD-10-CM

## 2025-02-13 RX ORDER — TIMOLOL 5 MG/ML
1 SOLUTION/ DROPS OPHTHALMIC 2 TIMES DAILY
Qty: 10 EACH | Refills: 11 | Status: SHIPPED | OUTPATIENT
Start: 2025-02-13

## 2025-02-13 RX ORDER — RISPERIDONE 1 MG/1
1 TABLET ORAL 2 TIMES DAILY
Qty: 180 TABLET | Refills: 0 | Status: SHIPPED | OUTPATIENT
Start: 2025-02-13 | End: 2025-05-14

## 2025-02-17 DIAGNOSIS — E11.9 TYPE 2 DIABETES MELLITUS WITHOUT COMPLICATIONS (MULTI): ICD-10-CM

## 2025-02-18 NOTE — TELEPHONE ENCOUNTER
Patient states he is unbearably anxious and in the past you have prescribed risperidone for this. He is hoping that it can be re-prescribed. Medication prescribed by Dr. Hernandez. Patient notified.

## 2025-02-24 ENCOUNTER — APPOINTMENT (OUTPATIENT)
Dept: PRIMARY CARE | Facility: CLINIC | Age: 75
End: 2025-02-24
Payer: COMMERCIAL

## 2025-03-03 DIAGNOSIS — F41.8 MIXED ANXIETY DEPRESSIVE DISORDER: ICD-10-CM

## 2025-03-03 DIAGNOSIS — I10 BENIGN ESSENTIAL HYPERTENSION: ICD-10-CM

## 2025-03-03 RX ORDER — OMEGA-3-ACID ETHYL ESTERS 1 G/1
4 CAPSULE, LIQUID FILLED ORAL
Qty: 360 CAPSULE | Refills: 0 | Status: SHIPPED | OUTPATIENT
Start: 2025-03-03

## 2025-03-03 RX ORDER — DIAZEPAM 5 MG/1
5 TABLET ORAL NIGHTLY PRN
Qty: 90 TABLET | Refills: 0 | Status: SHIPPED | OUTPATIENT
Start: 2025-03-10 | End: 2025-06-08

## 2025-03-10 ENCOUNTER — APPOINTMENT (OUTPATIENT)
Dept: PRIMARY CARE | Facility: CLINIC | Age: 75
End: 2025-03-10
Payer: COMMERCIAL

## 2025-03-10 VITALS
WEIGHT: 200 LBS | BODY MASS INDEX: 25.67 KG/M2 | HEART RATE: 70 BPM | DIASTOLIC BLOOD PRESSURE: 70 MMHG | OXYGEN SATURATION: 98 % | SYSTOLIC BLOOD PRESSURE: 115 MMHG | HEIGHT: 74 IN | RESPIRATION RATE: 21 BRPM

## 2025-03-10 DIAGNOSIS — E29.1 HYPOGONADISM MALE: ICD-10-CM

## 2025-03-10 DIAGNOSIS — E03.9 PRIMARY HYPOTHYROIDISM: ICD-10-CM

## 2025-03-10 DIAGNOSIS — E78.00 HYPERCHOLESTEROLEMIA: ICD-10-CM

## 2025-03-10 DIAGNOSIS — R53.1 GENERALIZED WEAKNESS: ICD-10-CM

## 2025-03-10 DIAGNOSIS — F33.1 MODERATE EPISODE OF RECURRENT MAJOR DEPRESSIVE DISORDER: ICD-10-CM

## 2025-03-10 DIAGNOSIS — K21.00 GASTROESOPHAGEAL REFLUX DISEASE WITH ESOPHAGITIS WITHOUT HEMORRHAGE: ICD-10-CM

## 2025-03-10 DIAGNOSIS — G93.39 OTHER POST INFECTION AND RELATED FATIGUE SYNDROMES: ICD-10-CM

## 2025-03-10 DIAGNOSIS — I10 BENIGN ESSENTIAL HYPERTENSION: ICD-10-CM

## 2025-03-10 DIAGNOSIS — F41.8 MIXED ANXIETY DEPRESSIVE DISORDER: ICD-10-CM

## 2025-03-10 DIAGNOSIS — E29.1 TESTICULAR HYPOFUNCTION: ICD-10-CM

## 2025-03-10 DIAGNOSIS — N40.0 ENLARGED PROSTATE WITHOUT LOWER URINARY TRACT SYMPTOMS (LUTS): ICD-10-CM

## 2025-03-10 DIAGNOSIS — J43.1 PANLOBULAR EMPHYSEMA (MULTI): ICD-10-CM

## 2025-03-10 DIAGNOSIS — E11.9 TYPE 2 DIABETES MELLITUS WITHOUT COMPLICATION, WITHOUT LONG-TERM CURRENT USE OF INSULIN (MULTI): Primary | ICD-10-CM

## 2025-03-10 PROCEDURE — 3074F SYST BP LT 130 MM HG: CPT | Performed by: INTERNAL MEDICINE

## 2025-03-10 PROCEDURE — 1159F MED LIST DOCD IN RCRD: CPT | Performed by: INTERNAL MEDICINE

## 2025-03-10 PROCEDURE — 99215 OFFICE O/P EST HI 40 MIN: CPT | Performed by: INTERNAL MEDICINE

## 2025-03-10 PROCEDURE — 3008F BODY MASS INDEX DOCD: CPT | Performed by: INTERNAL MEDICINE

## 2025-03-10 PROCEDURE — G2211 COMPLEX E/M VISIT ADD ON: HCPCS | Performed by: INTERNAL MEDICINE

## 2025-03-10 PROCEDURE — 4010F ACE/ARB THERAPY RXD/TAKEN: CPT | Performed by: INTERNAL MEDICINE

## 2025-03-10 PROCEDURE — 1036F TOBACCO NON-USER: CPT | Performed by: INTERNAL MEDICINE

## 2025-03-10 PROCEDURE — 3078F DIAST BP <80 MM HG: CPT | Performed by: INTERNAL MEDICINE

## 2025-03-10 RX ORDER — DIAZEPAM 5 MG/1
5 TABLET ORAL NIGHTLY PRN
Qty: 90 TABLET | Refills: 0 | Status: SHIPPED | OUTPATIENT
Start: 2025-03-12 | End: 2025-06-10

## 2025-03-10 ASSESSMENT — ENCOUNTER SYMPTOMS
EYES NEGATIVE: 1
HEMATOLOGIC/LYMPHATIC NEGATIVE: 1
ALLERGIC/IMMUNOLOGIC NEGATIVE: 1
PSYCHIATRIC NEGATIVE: 1
RESPIRATORY NEGATIVE: 1
CARDIOVASCULAR NEGATIVE: 1
ENDOCRINE NEGATIVE: 1
MUSCULOSKELETAL NEGATIVE: 1
GASTROINTESTINAL NEGATIVE: 1
CONSTITUTIONAL NEGATIVE: 1
NEUROLOGICAL NEGATIVE: 1

## 2025-03-10 NOTE — PROGRESS NOTES
"Subjective   Patient ID: Truong Armijo is a 74 y.o. male who presents for Follow-up (6 week follow up). Drug reaction to risperidone   Making him very dizzy has major psychiatric issues and will a psychiatrist tomorro    HPI     Review of Systems   Constitutional: Negative.    HENT: Negative.     Eyes: Negative.    Respiratory: Negative.     Cardiovascular: Negative.    Gastrointestinal: Negative.    Endocrine: Negative.    Musculoskeletal: Negative.    Skin: Negative.    Allergic/Immunologic: Negative.    Neurological: Negative.    Hematological: Negative.    Psychiatric/Behavioral: Negative.     All other systems reviewed and are negative.      Objective   /70   Pulse 70   Resp 21   Ht 1.88 m (6' 2\")   Wt 90.7 kg (200 lb)   SpO2 98%   BMI 25.68 kg/m²     Physical Exam  Vitals and nursing note reviewed.   Constitutional:       Appearance: Normal appearance.   HENT:      Head: Normocephalic and atraumatic.      Right Ear: Tympanic membrane, ear canal and external ear normal.      Left Ear: Tympanic membrane, ear canal and external ear normal. There is no impacted cerumen.      Nose: Nose normal.      Mouth/Throat:      Mouth: Mucous membranes are moist.      Pharynx: Oropharynx is clear.   Eyes:      Extraocular Movements: Extraocular movements intact.      Conjunctiva/sclera: Conjunctivae normal.      Pupils: Pupils are equal, round, and reactive to light.   Cardiovascular:      Rate and Rhythm: Normal rate and regular rhythm.      Pulses: Normal pulses.      Heart sounds: Normal heart sounds. No murmur heard.  Pulmonary:      Effort: Pulmonary effort is normal. No respiratory distress.      Breath sounds: Normal breath sounds. No stridor. No wheezing, rhonchi or rales.   Chest:      Chest wall: No tenderness.   Abdominal:      General: Abdomen is flat. Bowel sounds are normal. There is no distension.      Palpations: Abdomen is soft. There is no mass.      Tenderness: There is no abdominal " tenderness. There is no right CVA tenderness, left CVA tenderness, guarding or rebound.      Hernia: No hernia is present.   Musculoskeletal:         General: Normal range of motion.      Cervical back: Normal range of motion and neck supple.   Skin:     General: Skin is warm.      Capillary Refill: Capillary refill takes less than 2 seconds.   Neurological:      General: No focal deficit present.      Mental Status: He is alert.      Cranial Nerves: No cranial nerve deficit.      Sensory: No sensory deficit.      Motor: No weakness.      Coordination: Coordination normal.      Gait: Gait normal.      Deep Tendon Reflexes: Reflexes normal.   Psychiatric:         Mood and Affect: Mood normal.         Behavior: Behavior normal. Behavior is cooperative.         Thought Content: Thought content normal.         Judgment: Judgment normal.         Assessment/Plan   Problem List Items Addressed This Visit             ICD-10-CM    Benign essential hypertension I10     HTN - hypertension well/controlled .Target BP < 130/80  achieved. Educate low salt diet and exercise with weight loss. Educate home self monitoring and diary keeping. Educate risks of elevate blood pressure and benefits of prompt treatment.  Refill Lisinopril              COPD (chronic obstructive pulmonary disease) (Multi) J44.9     COPD - no wheezing, no SOB, no Crackles heard/ Exacerbation. Get CXR. Continues mild exercises and inhalers. [place] preventive care and vaccinations. Add advair inhalers and spiriva inhaler.                   Hypercholesterolemia E78.00     Hypercholesterolemia - Monitor lipid profile and educate patient upon risks of high cholesterol and targets. Educate diet and change in lifestyle and increase in exercises - Refill: Atorvastatin  20 mg daily and educate compliance with medication and diet.              Enlarged prostate without lower urinary tract symptoms (luts) N40.0     BPH - Benign PROSTATIC Hypertrophy, +  Dysuria/Nocturia, check PSA, prescribe Flomax 0.4mg qD and Avodart 0.5 mg qD vs. Finasteride 5 mg qD.  Educate exercises and Change in life style, prescribe vitamin E 400 IU qD. Consider referral to urology.          Fatigue R53.83     Fatigue - check CMP(metabolic panel and elctrolytes) , CBC(complete blood cell count), TSH(thyroid function). Insomnia may play a role and sleep studies(rule out sleep apnea) are recommended . Educate sleep hygiene. Consider anxiety disorder vs. depression. Consider Stress test, and 2DECHO.           Generalized weakness R53.1     Weakness  - refer to PT ans strengthening exERCIESES         Diabetes mellitus (Multi) - Primary E11.9     DM - NIDDM  . Reviewed with patient / Will check  HbA1c and fasting blood sugars. Educate home self monitoring and diary keeping(reviewed with patient home blood sugar levels /diary). Educate extensively low calorie diet and weight loss with exercise. Reviewed BS- diary and Rx. Regimen. [place] renal protection with ARB/ACEI.               Educate compliance with diet and Rx. And educate risks and autcomes.    Continuesandrefill the Metformin 500 mg BID with meals  Januvia 100 mg daily and Farxiga 5 mg daily                   Moderate episode of recurrent major depressive disorder F33.1     Refer and follows with psych         Primary hypothyroidism E03.9     Hypothyroidism - Symptoms well/controlled (weight gain, fatigue, constipation, cold intolerance). Check TSH continues dose of Synthroid/Levothyroxine  of  88 bmcg/qD.          Reflux esophagitis K21.00     GERD - Acid reflux disease. Rx. PPI (Prilosec/Prevacid/Protonix/Nexium) and educate diet and life style changes. Referred patient to an endoscopy (EGD) and check H. Pylori titers.          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 Educate low salt diet and exercise with weight loss. Educate home self monitoring and diary keeping. Educate risks of elevate blood pressure and benefits of prompt treatment.  Refill Lisinopril          Hypercholesterolemia  Hypercholesterolemia - Monitor lipid profile and educate patient upon risks of high cholesterol and targets. Educate diet and change in lifestyle and increase in exercises - Refill: Atorvastatin  20 mg daily and educate compliance with medication and diet.             Type 2 diabetes mellitus without complication, without long-term current use of insulin (Multi)  DM - NIDDM  . Reviewed with patient / Will check  HbA1c and fasting blood sugars. Educate home self monitoring and diary keeping(reviewed with patient home blood sugar levels /diary). Educate extensively low calorie diet and weight loss with exercise. Reviewed BS- diary and Rx. Regimen. [place] renal protection with ARB/ACEI.               Educate compliance with diet and Rx. And educate risks and autcomes.    Continuesandrefill the Metformin 500 mg BID with meals  Januvia 100 mg daily and Farxiga 5 mg daily                   Hypogonadism male  Check levels and refill supplement of Testosterone         Gastroesophageal reflux disease with esophagitis without hemorrhage  GERD - Acid reflux disease. Rx. PPI (Prilosec/Prevacid/Protonix/Nexium) and educate diet and life style changes. Referred patient to an endoscopy (EGD) and check H. Pylori titers.         Enlarged prostate without lower urinary tract symptoms (luts)  BPH - Benign PROSTATIC Hypertrophy, + Dysuria/ Increased in Nocturia and frequency of urination check PSA, His father had prostate cancer in his late 70's close to 80 years old- Educate exercises and Change in life style, prescribe vitamin E 400 IU qD. Consider referral to urology.          Other polyneuropathy  Neuropathy/ - positive numbness, tingling,  discomfort (needles pricking, cold feeling) in distal lower extremities(toes, feet, legs), secondary to DM/Radiculopathy/idiopathic. Recommend: Gabapentin(Neurontin) 300 daily(at bed time) upward taper up to  2 gm/day or Lyrica 75 mg daily if failure of treatment. Also recommend: treatment of Diabetes(control levels of blood sugars), lumbar/ cervical  disc disease (Physical Therapy), and check electrolytes and Thyroid function. Also address regenrative measures: B12 intrmusculary (Monthly) and Folic acid supplementation. Recommend  EMG. Start Amitriptyline 25 mg daily / . Start - Tonic water -         Panlobular emphysema (Multi)  COPD - no wheezing, no SOB, no Crackles heard/ Exacerbation. Get CXR. Continues mild exercises and inhalers. [place] preventive care and vaccinations. Add advair inhalers and spiriva inhaler.                Other post infection and related fatigue syndromes  Fatigue - check CMP(metabolic panel and elctrolytes) , CBC(complete blood cell count), TSH(thyroid function). Insomnia may play a role and sleep studies(rule out sleep apnea) are recommended . Educate sleep hygiene. Consider anxiety disorder vs. depression. Consider Stress test, and 2DECHO.          Charcot foot due to diabetes mellitus (Multi)  Peripheral neuropathy and foot deformity requiring deep diabetic shoes      Orders:    Diabetic Shoes     Other schizoaffective disorders                 Cardiac Risk Assessment  15 - 20 minutes were spent discussing Cardiovascular risk and, if needed, lifestyle modifications were recommended, including nutritional choices, exercise, and elimination of habits contributing to risk.   Aspirin use/disuse was discussed following the guidelines below:  low dose ASA ( mg) should be considered:     If prior Heart Attack/Stroke/Peripheral vascular disease:  Generally recommend daily low dose aspirin unless extremely high bleeding risk (e.g., gastrointestinal).     If no prior Heart  Attack/Stroke/Peripheral vascular disease:                                   Age over 70: Do not use Aspirin for prevention                          Age less than 70 and 10-year cardiovascular disease risk is >20%: use low dose Aspirin for prevention.                                          Benign essential hypertension I10         HTN - hypertension well/controlled .Target BP < 130/80  achieved. Educate low salt diet and exercise with weight loss. Educate home self monitoring and diary keeping. Educate risks of elevate blood pressure and benefits of prompt treatment.  Refill Lisinopril             Charcot foot due to diabetes mellitus (Multi) E11.610     Relevant Orders     Diabetic Shoes     Diabetic peripheral neuropathy (Multi) E11.42       Neuropathy/ - positive numbness, tingling, discomfort (needles pricking, cold feeling) in distal lower extremities(toes, feet, legs), secondary to DM/Radiculopathy/idiopathic. Recommend: Gabapentin(Neurontin) 300 daily(at bed time) upward taper up to 2 gm/day or Lyrica 75 mg daily if failure of treatment. Also recommend: treatment of Diabetes(control levels of blood sugars), lumbar/ cervical disc disease (Physical Therapy), and check electrolytes and Thyroid function. Also address regenrative measures: B12 intrmusculary (Monthly) and Folic acid supplementation. Recommend EMG. Start Amitriptyline 25 mg daily / . Start - Tonic water - and Tramadol 50 mg TID.                Hypercholesterolemia E78.00       Hypercholesterolemia - Monitor lipid profile and educate patient upon risks of high cholesterol and targets. Educate diet and change in lifestyle and increase in exercises - Refill: Atorvastatin  20 mg daily and educate compliance with medication and diet.                Enlarged prostate without lower urinary tract symptoms (luts) N40.0       BPH - Benign PROSTATIC Hypertrophy, + Dysuria/ Increased in Nocturia and frequency of urination check PSA, His father had prostate  cancer in his late 70's close to 80 years old- Educate exercises and Change in life style, prescribe vitamin E 400 IU qD. Consider referral to urology.            Diabetes mellitus (Multi) - Primary E11.9       DM - NIDDM  . Reviewed with patient / Will check  HbA1c and fasting blood sugars. Educate home self monitoring and diary keeping(reviewed with patient home blood sugar levels /diary). Educate extensively low calorie diet and weight loss with exercise. Reviewed BS- diary and Rx. Regimen. [place] renal protection with ARB/ACEI.               Educate compliance with diet and Rx. And educate risks and autcomes.    Continuesandrefill the Metformin 500 mg BID with meals  Januvia 100 mg daily and Farxiga 5 mg daily                      Hypothyroidism, unspecified E03.9       Hypothyroidism - Symptoms well/not controlled (weight gain, fatigue, constipation, cold intolerance). Check TSH continue/change dose of Synthroid/Levothyroxine of 100 mcg/qD.                Reflux esophagitis K21.00       GERD - Acid reflux disease. Rx. PPI (Prilosec/Prevacid/Protonix/Nexium) and educate diet and life style changes. Referred patient to an endoscopy (EGD) and check H. Pylori titers.            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 Educate low salt diet and exercise with weight loss. Educate home self monitoring and diary keeping. Educate risks of elevate blood pressure and benefits of prompt treatment.  Refill Lisinopril             COPD (chronic obstructive pulmonary disease) (Multi) J44.9       COPD - no wheezing, no SOB, no Crackles heard/ Exacerbation. Get CXR. Continues mild exercises and inhalers. [place]  preventive care and vaccinations. Add advair inhalers and spiriva inhaler.                   Hypercholesterolemia E78.00       Hypercholesterolemia - Monitor lipid profile and educate patient upon risks of high cholesterol and targets. Educate diet and change in lifestyle and increase in exercises - Refill: Atorvastatin  20 mg daily and educate compliance with medication and diet.            Fatigue R53.83       Fatigue - check CMP(metabolic panel and elctrolytes) , CBC(complete blood cell count), TSH(thyroid function). Insomnia may play a role and sleep studies(rule out sleep apnea) are recommended . Educate sleep hygiene. Consider anxiety disorder vs. depression. Consider Stress test, and 2DECHO.             High triglycerides E78.1     Relevant Medications     atorvastatin (Lipitor) 20 mg tablet     Diabetes mellitus (Multi) E11.9       DM - NIDDM  . Reviewed with patient / Will check  HbA1c and fasting blood sugars. Educate home self monitoring and diary keeping(reviewed with patient home blood sugar levels /diary). Educate extensively low calorie diet and weight loss with exercise. Reviewed BS- diary and Rx. Regimen. [place] renal protection with ARB/ACEI.               Educate compliance with diet and Rx. And educate risks and autcomes.    Continuesandrefill the Metformin 500 mg BID with meals  Januvia 100 mg daily and Farxiga 5 mg daily                      Hypothyroidism, unspecified E03.9       Hypothyroidism - Symptoms well/not controlled (weight gain, fatigue, constipation, cold intolerance). Check TSH continue/change dose of Synthroid/Levothyroxine of 100 mcg/qD.            Recurrent canker sores K12.0       Possible due to HSV  - start Valtrex 1 gm TID for 5 days and consider Sweet syndrome and start Colchicine 0.6 mg daily  and start Vitamin B complex daily  -            Relevant Medications     valACYclovir (Valtrex) 1 gram tablet     colchicine 0.6 mg tablet      Other Visit Diagnoses            Codes     Flu vaccine need     Z23     Relevant Orders     Flu vaccine, trivalent, preservative free, HIGH-DOSE, age 65y+ (Fluzone) (Completed)     Gastroesophageal reflux disease without esophagitis     K21.9     Relevant Medications     pantoprazole (ProtoNix) 40 mg EC tablet                            Benign essential hypertension I10         HTN - hypertension well/controlled .Target BP < 130/80  achieved. Educate low salt diet and exercise with weight loss. Educate home self monitoring and diary keeping. Educate risks of elevate blood pressure and benefits of prompt treatment.  Refill Lisinopril             COPD (chronic obstructive pulmonary disease) (Multi) J44.9       COPD - no wheezing, no SOB, no Crackles heard/ Exacerbation. Get CXR. Continues mild exercises and inhalers. [place] preventive care and vaccinations. Add advair inhalers and spiriva inhaler.                Hypercholesterolemia E78.00       Hypercholesterolemia - Monitor lipid profile and educate patient upon risks of high cholesterol and targets. Educate diet and change in lifestyle and increase in exercises - Refill: Atorvastatin  20 mg daily and educate compliance with medication and diet.            Diabetes mellitus (Multi) E11.9       DM - NIDDM  . Reviewed with patient / Will check  HbA1c and fasting blood sugars. Educate home self monitoring and diary keeping(reviewed with patient home blood sugar levels /diary). Educate extensively low calorie diet and weight loss with exercise. Reviewed BS- diary and Rx. Regimen. [place] renal protection with ARB/ACEI.               Educate compliance with diet and Rx. And educate risks and autcomes.    Continuesandrefill the Metformin 500 mg BID with meals  Januvia 100 mg daily and Farxiga 5 mg daily                      Primary hypothyroidism E03.9       Hypothyroidism - Symptoms well/controlled (weight gain, fatigue, constipation, cold intolerance). Check TSH continues dose of  Synthroid/Levothyroxine  of  88 bmcg/qD.            Reflux esophagitis K21.00       GERD - Acid reflux disease. Rx. PPI (Prilosec/Prevacid/Protonix/Nexium) and educate diet and life style changes. Referred patient to an endoscopy (EGD) and check H. Pylori titers.                          Pressure injury of sacral region, stage 2 (Multi) L89.152       Start Desitin (Zinc oxide paste) - and get cushioning and doughnut pillow             Other Visit Diagnoses           Codes     Routine general medical examination at health care facility     Z00.00                          COPD (chronic obstructive pulmonary disease) (Multi) J44.9         COPD - no wheezing, no SOB, no Crackles heard/ Exacerbation. Get CXR. Continues mild exercises and inhalers. [place] preventive care and vaccinations. Add advair inhalers and spiriva inhaler.            Diabetic peripheral neuropathy (Multi) E11.42       Neuropathy/ - positive numbness, tingling, discomfort (needles pricking, cold feeling) in distal lower extremities(toes, feet, legs), secondary to DM/Radiculopathy/idiopathic. Recommend: Gabapentin(Neurontin) 300 daily(at bed time) upward taper up to 2 gm/day or Lyrica 75 mg daily if failure of treatment. Also recommend: treatment of Diabetes(control levels of blood sugars), lumbar/ cervical disc disease (Physical Therapy), and check electrolytes and Thyroid function. Also address regenrative measures: B12 intrmusculary (Monthly) and Folic acid supplementation. Recommend EMG. Start Amitriptyline 25 mg daily / . Start - Tonic water - and Tramadol 50 mg TID.            Enlarged prostate without lower urinary tract symptoms (luts) N40.0       BPH - Benign PROSTATIC Hypertrophy, + Dysuria/ Increased in Nocturia and frequency of urination check PSA, His father had prostate cancer in his late 70's close to 80 years old- Educate exercises and Change in life style, prescribe vitamin E 400 IU qD. Consider referral to urology.             Fatigue R53.83       Last Assessment & Plan Note   Written:Ki Ahuja MD7/22/2024 12:17 PM     Fatigue - check CMP(metabolic panel and elctrolytes) , CBC(complete blood cell count), TSH(thyroid function). Insomnia may play a role and sleep studies(rule out sleep apnea) are recommended . Educate sleep hygiene. Consider anxiety disorder vs. depression. Consider Stress test, and 2DECHO.                   Diabetes mellitus (Multi) E11.9       DM - NIDDM  . Reviewed with patient / Will check  HbA1c and fasting blood sugars. Educate home self monitoring and diary keeping(reviewed with patient home blood sugar levels /diary). Educate extensively low calorie diet and weight loss with exercise. Reviewed BS- diary and Rx. Regimen. [place] renal protection with ARB/ACEI.               Educate compliance with diet and Rx. And educate risks and autcomes.    Continuesandrefill the Metformin 500 mg BID with meals  Januvia 100 mg daily and Farxiga 5 mg daily                      Hypothyroidism, unspecified E03.9       Hypothyroidism - Symptoms well/not controlled (weight gain, fatigue, constipation, cold intolerance). Check TSH continue/change dose of Synthroid/Levothyroxine  of 100 mcg/qD.            Reflux esophagitis K21.00       GERD - Acid reflux disease. Rx. PPI (Prilosec/Prevacid/Protonix/Nexium) and educate diet and life style changes. Referred patient to an endoscopy (EGD) and check H. Pylori titers.            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 otitis vs. Eustachian tube dysfunction vs. advanced cervical disc disease causing encroachment of the Vertebral arteries vs. viral infection of the Vestibular nerve sensor - recommend : Antivert /Meclizine 12.5 mg TID , treat infection of the sinuses or ears , increase intake of fluids, educate exercises  , avoid operating machinery, monitor closely, rest.             Relevant Medications     meclizine (Antivert) 12.5 mg tablet                                 Acute pain of left knee M25.562         DJD - Degenerative Joint Disease, Chronic Arthritis, of the Left more than the right  Knee. . Pain control, and anti-inflammatory meds : consider Kenalog injection and start Voltaren gel 1% topically BID,  and  Tylenol 325 mg TID PRN. Educate exercises and referred the patient to PT (Physical Therapy). Get X-Ray's.               Benign essential hypertension I10       HTN - hypertension well/controlled .Target BP < 130/80  achieved. Educate low salt diet and exercise with weight loss. Educate home self monitoring and diary keeping. Educate risks of elevate blood pressure and benefits of prompt treatment.  Refill Lisinopril             Relevant Orders     Comprehensive Metabolic Panel     Hypercholesterolemia E78.00       Hypercholesterolemia - Monitor lipid profile and educate patient upon risks of high cholesterol and targets. Educate diet and change in lifestyle and increase in exercises - Refill: Atorvastatin  20 mg daily and educate compliance with medication and diet.            Relevant Orders     Lipid Panel     Fatigue R53.83       Fatigue - check CMP(metabolic panel and elctrolytes) , CBC(complete blood cell count), TSH(thyroid function). Insomnia may play a role and sleep studies(rule out sleep apnea) are recommended . Educate sleep hygiene. Consider anxiety disorder vs. depression. Consider Stress test, and 2DECHO.             Diabetes mellitus (Multi) - Primary E11.9       DM - NIDDM  . Reviewed with patient / Will check  HbA1c and fasting blood sugars. Educate home self monitoring and diary keeping(reviewed with patient home blood sugar levels /diary). Educate extensively low calorie diet and weight loss with exercise. Reviewed BS- diary and Rx. Regimen. [place] renal protection with ARB/ACEI.                Educate compliance with diet and Rx. And educate risks and autcomes.    Continuesandrefill the Metformin 500 mg BID with meals  Januvia 100 mg daily and Farxiga 5 mg daily                         Relevant Orders     Hemoglobin A1C     Primary hypothyroidism E03.9       Hypothyroidism - Symptoms well/controlled (weight gain, fatigue, constipation, cold intolerance). Check TSH continues dose of Synthroid/Levothyroxine  of  88 bmcg/qD.            Relevant Orders     CBC and Auto Differential     TSH with reflex to Free T4 if abnormal     Reflux esophagitis K21.00       GERD - Acid reflux disease. Rx. PPI (Prilosec/Prevacid/Protonix/Nexium) and educate diet and life style changes. Referred patient to an endoscopy (EGD) and check H. Pylori titers.            Hypogonadism male E29.1       Check levels and refill supplement of Testosterone            Relevant Orders     Prostate Specific Antigen     Essential (primary) hypertension I10       HTN - hypertension well/controlled .Target BP < 130/80  achieved. Educate low salt diet and exercise with weight loss. Educate home self monitoring and diary keeping. Educate risks of elevate blood pressure and benefits of prompt treatment.  Refill lisinopril             Other Visit Diagnoses           Codes     Goiter     E04.9     Relevant Orders     US thyroid     Acute pain of both knees     M25.561, M25.562     Relevant Orders     XR knee 1-2 views bilateral     Benign prostatic hyperplasia with urinary frequency     N40.1, R35.0     Relevant Orders     Prostate Specific Antigen                             Immunizations/Injections       very important  Immunizations from outside sources need reconciliation.       Flu vaccine, quadrivalent, high-dose, preservative free, age 65y+ (FLUZONE)10/18/2023, 10/17/2022, 10/8/2020  Flu vaccine, trivalent, preservative free, HIGH-DOSE, age 65y+ (Fluzone)10/14/2019, 10/17/2018, 10/4/2017  Flu vaccine, trivalent, preservative free, age 6 months  and greater (Fluarix/Fluzone/Flulaval)10/5/2015, 10/1/2015  Influenza, Seasonal, Quadrivalent, Ydmsajssyu57/27/2021  Influenza, seasonal, ujlomgaxiz59/1/2021, 9/23/2011, 10/8/2009,  ... (1 more)  Moderna SARS-CoV-2 Vaccination3/27/2021, 2/28/2021  Pfizer COVID-19 vaccine, Fall 2023, 12 years and older, (30mcg/0.3mL)1/23/2024  Pfizer COVID-19 vaccine, bivalent, age 12 years and older (30 mcg/0.3 mL)9/15/2022  Pfizer Purple Cap SARS-CoV-211/3/2021  Pneumococcal conjugate vaccine, 13-valent (PREVNAR 13)9/15/2014  Pneumococcal conjugate vaccine, 20-valent (PREVNAR 20)5/5/2023  Pneumococcal polysaccharide vaccine, 23-valent, age 2 years and older (PNEUMOVAX 23)1/1/2014  Tdap vaccine, age 7 year and older (BOOSTRIX, ADACEL)6/5/2023, 11/26/2017  Zoster vaccine, recombinant, adult (SHINGRIX)8/8/2023, 6/5/2023, 12/13/2020,  ... (1 more)  Zoster, live10/25/2017, 5/22/2014                   Immunizations/Injections       very important  Immunizations from outside sources need reconciliation.       COVID-19, mRNA, LNP-S, PF, 30 mcg/0.3 mL dose11/3/2021  Flu vaccine, quadrivalent, high-dose, preservative free, age 65y+ (FLUZONE)10/18/2023, 10/17/2022, 10/8/2020  Flu vaccine, trivalent, preservative free, HIGH-DOSE, age 65y+ (Fluzone)10/21/2024, 10/14/2019, 10/17/2018,  ... (1 more)  Flu vaccine, trivalent, preservative free, age 6 months and greater (Fluarix/Fluzone/Flulaval)10/5/2015, 10/1/2015  Influenza, Seasonal, Quadrivalent, Hqgybszcue65/27/2021  Influenza, seasonal, tbqrrliqwd17/1/2021, 9/23/2011, 10/8/2009,  ... (1 more)  Moderna COVID-19 vaccine, 12 years and older (50mcg/0.5mL)(Spikevax)11/13/2024  Moderna SARS-CoV-2 Vaccination3/27/2021, 2/28/2021  Pfizer COVID-19 vaccine, 12 years and older, (30mcg/0.3mL) (Comirnaty)1/23/2024  Pfizer COVID-19 vaccine, bivalent, age 12 years and older (30 mcg/0.3 mL)9/15/2022  Pneumococcal conjugate vaccine, 13-valent (PREVNAR 13)9/15/2014  Pneumococcal conjugate vaccine, 20-valent  (PREVNAR 20)5/5/2023  Pneumococcal polysaccharide vaccine, 23-valent, age 2 years and older (PNEUMOVAX 23)1/1/2014  Tdap vaccine, age 7 year and older (BOOSTRIX, ADACEL)6/5/2023, 11/26/2017  Zoster vaccine, recombinant, adult (SHINGRIX)8/8/2023, 6/5/2023, 12/13/2020,  ... (1 more)  Zoster, live10/25/2017, 5/22/2014          Immunizations/Injections      very important  Immunizations from outside sources need reconciliation.      COVID-19, mRNA, LNP-S, PF, 30 mcg/0.3 mL dose11/3/2021  Flu vaccine, quadrivalent, high-dose, preservative free, age 65y+ (FLUZONE)10/18/2023, 10/17/2022, 10/8/2020  Flu vaccine, trivalent, preservative free, HIGH-DOSE, age 65y+ (Fluzone)10/21/2024, 10/14/2019, 10/17/2018,  ... (1 more)  Flu vaccine, trivalent, preservative free, age 6 months and greater (Fluarix/Fluzone/Flulaval)10/5/2015, 10/1/2015  Influenza, Seasonal, Quadrivalent, Ywgvkgorfv48/27/2021  Influenza, seasonal, gopnexvvec31/1/2021, 9/23/2011, 10/8/2009,  ... (1 more)  Moderna COVID-19 vaccine, 12 years and older (50mcg/0.5mL)(Spikevax)11/13/2024  Moderna SARS-CoV-2 Vaccination3/27/2021, 2/28/2021  Pfizer COVID-19 vaccine, 12 years and older, (30mcg/0.3mL) (Comirnaty)1/23/2024  Pfizer COVID-19 vaccine, bivalent, age 12 years and older (30 mcg/0.3 mL)9/15/2022  Pneumococcal conjugate vaccine, 13-valent (PREVNAR 13)9/15/2014  Pneumococcal conjugate vaccine, 20-valent (PREVNAR 20)5/5/2023  Pneumococcal polysaccharide vaccine, 23-valent, age 2 years and older (PNEUMOVAX 23)1/1/2014  Tdap vaccine, age 7 year and older (BOOSTRIX, ADACEL)6/5/2023, 11/26/2017  Zoster vaccine, recombinant, adult (SHINGRIX)8/8/2023, 6/5/2023, 12/13/2020,  ... (1 more)  Zoster, live10/25/2017, 5/22/2014

## 2025-03-10 NOTE — ASSESSMENT & PLAN NOTE
DM - NIDDM  . Reviewed with patient / Will check  HbA1c and fasting blood sugars. Educate home self monitoring and diary keeping(reviewed with patient home blood sugar levels /diary). Educate extensively low calorie diet and weight loss with exercise. Reviewed BS- diary and Rx. Regimen. [place] renal protection with ARB/ACEI.               Educate compliance with diet and Rx. And educate risks and autcomes.    Continuesandrefill the Metformin 500 mg BID with meals  Januvia 100 mg daily and Farxiga 5 mg daily

## 2025-03-10 NOTE — ASSESSMENT & PLAN NOTE
COPD - no wheezing, no SOB, no Crackles heard/ Exacerbation. Get CXR. Continues mild exercises and inhalers. [place] preventive care and vaccinations. Add advair inhalers and spiriva inhaler.

## 2025-03-11 ENCOUNTER — TELEPHONE (OUTPATIENT)
Dept: BEHAVIORAL HEALTH | Facility: CLINIC | Age: 75
End: 2025-03-11

## 2025-03-11 ENCOUNTER — OFFICE VISIT (OUTPATIENT)
Dept: BEHAVIORAL HEALTH | Facility: CLINIC | Age: 75
End: 2025-03-11
Payer: COMMERCIAL

## 2025-03-11 VITALS
SYSTOLIC BLOOD PRESSURE: 106 MMHG | DIASTOLIC BLOOD PRESSURE: 68 MMHG | HEART RATE: 88 BPM | TEMPERATURE: 93.7 F | BODY MASS INDEX: 25.95 KG/M2 | WEIGHT: 202.1 LBS

## 2025-03-11 DIAGNOSIS — F25.1 SCHIZOAFFECTIVE DISORDER, DEPRESSIVE TYPE (MULTI): ICD-10-CM

## 2025-03-11 PROCEDURE — 3078F DIAST BP <80 MM HG: CPT | Performed by: PSYCHIATRY & NEUROLOGY

## 2025-03-11 PROCEDURE — 3074F SYST BP LT 130 MM HG: CPT | Performed by: PSYCHIATRY & NEUROLOGY

## 2025-03-11 PROCEDURE — 99214 OFFICE O/P EST MOD 30 MIN: CPT | Performed by: PSYCHIATRY & NEUROLOGY

## 2025-03-11 PROCEDURE — 99214 OFFICE O/P EST MOD 30 MIN: CPT | Mod: AM | Performed by: PSYCHIATRY & NEUROLOGY

## 2025-03-11 PROCEDURE — 4010F ACE/ARB THERAPY RXD/TAKEN: CPT | Performed by: PSYCHIATRY & NEUROLOGY

## 2025-03-11 ASSESSMENT — PATIENT HEALTH QUESTIONNAIRE - PHQ9
4. FEELING TIRED OR HAVING LITTLE ENERGY: MORE THAN HALF THE DAYS
SUM OF ALL RESPONSES TO PHQ9 QUESTIONS 1 AND 2: 4
2. FEELING DOWN, DEPRESSED OR HOPELESS: MORE THAN HALF THE DAYS
8. MOVING OR SPEAKING SO SLOWLY THAT OTHER PEOPLE COULD HAVE NOTICED. OR THE OPPOSITE, BEING SO FIGETY OR RESTLESS THAT YOU HAVE BEEN MOVING AROUND A LOT MORE THAN USUAL: MORE THAN HALF THE DAYS
7. TROUBLE CONCENTRATING ON THINGS, SUCH AS READING THE NEWSPAPER OR WATCHING TELEVISION: MORE THAN HALF THE DAYS
1. LITTLE INTEREST OR PLEASURE IN DOING THINGS: MORE THAN HALF THE DAYS
3. TROUBLE FALLING OR STAYING ASLEEP OR SLEEPING TOO MUCH: MORE THAN HALF THE DAYS

## 2025-03-11 ASSESSMENT — ENCOUNTER SYMPTOMS
DEPRESSION: 1
OCCASIONAL FEELINGS OF UNSTEADINESS: 0

## 2025-03-11 NOTE — PROGRESS NOTES
Pt called stating that he ran out of Diazepam, but his medication cannot be refilled until tomorrow. He is asking if he can get some medication today. Message sent to Dr. Hernandez making him aware.

## 2025-03-11 NOTE — PROGRESS NOTES
Subjective   Patient ID: Truong Armijo is a 74 y.o. male who presents for No chief complaint on file..    HPI: The patient report many frustrations with multiple endeavors. The patient is managing at this time. He is working maintaining his weight. He is currently trying to resume psychotherapy with a previous therapist.      Active and Past Problems  Problems    · Acute pain of both knees (338.19,719.46) (M25.561,M25.562)   · Adult hypothyroidism (244.9) (E03.9)   · Adverse drug reaction (E947.9) (T50.905A)   · Adverse effect of drug, initial encounter (E947.9) (T50.905A)   · Anorexia (783.0) (R63.0)   · Anxiety and depression (300.00,311) (F41.9,F32.A)   · Anxiety disorder (300.00) (F41.9)   · Arthralgia of knee, unspecified laterality (719.46) (M25.569)   · Arthralgia of left shoulder region (719.41) (M25.512)   · Arthralgia of right shoulder region (719.41) (M25.511)   · Asthmatic bronchitis (493.90) (J45.909)   · Atelectasis (518.0) (J98.11)   · Atelectasis, bilateral (518.0) (J98.11)   · Atrophic retina (362.60) (H35.89)   · Back pain (724.5) (M54.9)   · Balance problem (781.99) (R26.89)   · Benign essential hypertension (401.1) (I10)   · Bilateral artificial lens implant (V43.1) (Z96.1)   · Bilateral myopia (367.1) (H52.13)   · Bilateral sensorineural hearing loss (389.18) (H90.3)   · Bilateral shoulder pain, unspecified chronicity (719.41) (M25.511,M25.512)   · Cervical back pain with evidence of disc disease (722.91) (M50.90)   · Cervical disc disease (722.91) (M50.90)   · Cervical myofascial pain syndrome (729.1) (M79.18)   · 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)   · Chest pain of uncertain etiology (786.59) (R07.9)   · Chronic depression (311) (F32.A)   · Chronic low self esteem (799.29) (R45.81)   · Chronic otitis externa of left ear (380.23) (H60.62)   · Chronic otitis media (382.9) (H66.90)   · Chronic primary angle-closure glaucoma of both eyes, mild stage (365.23) (H40.2231)   · Chronic primary  angle-closure glaucoma of left eye, indeterminate stage (365.23)  (H40.2224)   · Chronic primary angle-closure glaucoma of left eye, unspecified glaucoma stage  (365.23) (H40.2220)   · Constipation (564.00) (K59.00)   · Contusion of lower back (922.31) (S30.0XXA)   · COPD (chronic obstructive pulmonary disease) (496) (J44.9)   · Cough variant asthma (493.82) (J45.991)   · Degeneration of intervertebral disc of cervical region (722.4) (M50.30)   · Depression with anxiety (300.4) (F41.8)   · Diabetes mellitus (250.00) (E11.9)   · Diabetic Charcot's foot (250.60,713.5) (E11.610)   · Diabetic gastroenteropathy (250.60,579.8) (E11.69,K52.9)   · Diabetic peripheral neuropathy (250.60,357.2) (E11.42)   · Dizziness (780.4) (R42)   · Elbow pain (719.42) (M25.529)   · Elevated IOP (365.00) (H40.059)   · Elevated LDL cholesterol level (272.0) (E78.00)   · Encounter for immunization (V03.89) (Z23)   · Enlarged prostate without lower urinary tract symptoms (luts) (600.00) (N40.0)   · Epididymitis (604.90) (N45.1)   · Fatigue (780.79) (R53.83)   · Fluctuating mental status (780.97) (R41.82)   · Generalized weakness (780.79) (R53.1)   · Glaucoma (365.9) (H40.9)   · High triglycerides (272.1) (E78.1)   · HTN (hypertension) (401.9) (I10)   · Hyperglycemia (790.29) (R73.9)   · Hypogonadism male (257.2) (E29.1)   · Hypotension due to drugs (458.8,E947.9) (I95.2)   · Impaired vision (369.9) (H54.7)   · Increased urinary frequency (788.41) (R35.0)   · Insomnia (780.52) (G47.00)   · Intoxication   · Lateral epicondylitis of right elbow (726.32) (M77.11)   · Leg weakness (729.89) (R29.898)   · Lumbar disc herniation (722.10) (M51.26)   · Lumbar pain (724.2) (M54.50)   · Medication management (V58.69) (Z79.899)   · Migraine equivalent (346.20) (G43.109)   · Mixed hearing loss (389.20) (H90.8)   · Moderate episode of recurrent major depressive disorder (296.32) (F33.1)   · Neck pain (723.1) (M54.2)   · Neck pain, acute (723.1) (M54.2)   ·  Neurogenic bladder (596.54) (N31.9)   · 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,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)   · Optic nerve hemorrhage (377.42) (H47.029)   · 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)   · Other specified hypotension (458.8) (I95.89)   · Pain in both feet (729.5) (M79.671,M79.672)   · Paranoia (psychosis) (297.1) (F22)   · Paranoid ideation (297.8) (F22)   · Penile pain, chronic (607.9) (N48.89,G89.29)   · Peripheral neuropathy (356.9) (G62.9)   · Persistent testicular pain (608.9) (N50.819)   · Poorly controlled diabetes mellitus (250.00) (E11.65)   · Post traumatic stress disorder (PTSD) (309.81) (F43.10)   · Primary hypothyroidism (244.9) (E03.9)   · Primary open angle glaucoma of both eyes, mild stage (365.11,365.71) (H40.1131)   · Primary osteoarthritis of left knee (715.16) (M17.12)   · Primary osteoarthritis of right knee (715.16) (M17.11)   · PUD (peptic ulcer disease) (533.90) (K27.9)   · Reactive confusion (298.2) (F44.89)   · Reflux esophagitis (530.11) (K21.00)   · Right inguinal hernia (550.90) (K40.90)   · Schizoaffective disorder (295.70) (F25.9)   · Screening PSA (prostate specific antigen) (V76.44) (Z12.5)   · SOB (shortness of breath) (786.05) (R06.02)   · SOB (shortness of breath) (786.05) (R06.02)   · Sprain of right elbow, initial encounter (841.9) (S53.401A)   · Stress reaction (308.9) (F43.0)   · Subjective tinnitus of both ears (388.31) (H93.13)   · 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)   · Thyroid disorder (246.9) (E07.9)   · TIA (transient ischemic attack) (435.9) (G45.9)   · Trigeminal neuralgia (350.1) (G50.0)   · Trigger point of extremity (729.5) (M79.609)   · Urinary frequency (788.41) (R35.0)   · Urinary tract infection (599.0) (N39.0)   · Urinary urgency (788.63) (R39.15)   · Vasovagal reaction (780.2) (R55)   · Vertigo (780.4) (R42)   · Vitamin D deficiency (268.9) (E55.9)     Past Medical History  Problems    · History of Acute bilateral otitis media (382.9) (H66.93)   · Resolved  Date: 15 Oct 2015   · History of Change in bowel habits (787.99) (R19.4)   · Resolved Date: 09 Dec 2021   · History of Change in mental status (780.97) (R41.82)   · Resolved Date: 17 Sep 2019   · History of Chronic diarrhea of unknown origin (787.91) (K52.9)   · Resolved Date: 09 Dec 2021   · History of Depression with anxiety (300.4) (F41.8)   · Resolved Date: 03 Nov 2017   · History of Depression with anxiety (300.4) (F41.8)   · Resolved Date: 13 May 2020   · History of Depression with anxiety (300.4) (F41.8)   · Resolved Date: 02 Jul 2021   · History of Dysphagia, oropharyngeal phase (787.22) (R13.12)   · Resolved Date: 09 Dec 2021   · History of acute otitis media (V12.49) (Z86.69)   · Resolved Date: 15 Oct 2015   · History of acute pharyngitis (V12.69) (Z87.09)   · Resolved Date: 15 Oct 2015   · History of acute sinusitis (V12.69) (Z87.09)   · Resolved Date: 15 Oct 2015   · History of chronic diarrhea   · Resolved Date: 09 Dec 2021   · History of dysphagia (V13.89) (Z87.898)   · Resolved Date: 09 Dec 2021   · History of dysphagia (V13.89) (Z87.898)   · Resolved Date: 09 Dec 2021   · History of fatigue (V13.89) (Z87.898)   · Resolved Date: 28 Apr 2014   · History of hypercholesterolemia (V12.29) (Z86.39)   · Resolved Date: 12 Oct 2016   · History of nausea (V12.79) (Z87.898)   · Resolved Date: 09 Dec 2021   · History of nausea (V12.79) (Z87.898)   · Resolved Date: 09 Dec 2021   · History of nausea and vomiting (V12.79) (Z87.898)   · Resolved Date: 09 Dec 2021   · History of psychiatric hospitalization (V11.9) (Z86.59)   · Several psychiatric hospitalizations including when in his early 20's he was diagnosed      with disorganized schizophrenia.   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   · Completed elementary school   · Completed high school   · Former smoker (V15.82) (Z87.891)   · Heterosexual   · No drug use   · Retired   · Retired from taxi driving for about 20 years. He did work for [employer] prior to driving a taxi.   · Single     Mental Status Exam     General: In no acute distress with depressed mood.   Appearance: Exacerbation of chronic depressive features with poor self esteem.   Attitude: Cooperative.   Behavior: Exacerbation of chronic depressive features with poor self esteem.   Motor Activity: No agitation or retardation. No EPS/TD. Normal gait and station.   Speech: Regular rate, rhythm, volume and tone, spontaneous, fluent.   Mood: Exacerbation of chronic depressive features with poor self esteem.   Affect: Exacerbation of chronic depressive features with poor self esteem.   Thought Process: Exacerbation of chronic depressive features with poor self esteem.   Thought Content: Exacerbation of chronic depressive features with poor self esteem.   Thought Perception: Thought disturbance with chronic depressive features with poor self  esteem.   Cognition: Thought disturbance with chronic depressive features with poor self esteem.   Insight: Insight limitations.   Judgment: Judgment limitations.       Appearance: well-groomed.   Build: average.   Demeanor: average.   Eye Contact: average.   Motor Activity: average.   Speech: clear.   Language: Neurologic language is intact.   Fund of Knowledge: intact fund of knowledge.   Delusions: None Reported.   Self Harm: None Reported.   Aggressive: None Reported.   Mood: depressed and anxious . Exacerbation of chronic depressive features with poor self esteem.   Affect: labile . Exacerbation of chronic depressive features with poor self esteem.   Orientation: alert, oriented x3.   Manner: cooperative.   Thought process: goal-directed.   Thought association: displays rational thought process.   Content of thought: Mr. HANNY HILL denies any suicidal or homicidal ideation or plans.   Abstract/ Rational Thought: minimal impairment   Memory: grossly intact.   Behavior: calm.   Attention/Concentration: normal.   Cognition: intact.   Intelligence Estimate: average.   Executive Function: intact.   Insight: minimal impairment.   Judgement: minimal impairment.         Review of Systems   Neurological:         Mental Status Exam     General: In no acute distress with depressed mood.   Appearance: Exacerbation of chronic depressive features with poor self esteem.   Attitude: Cooperative.   Behavior: Exacerbation of chronic depressive features with poor self esteem.   Motor Activity: No agitation or retardation. No EPS/TD. Normal gait and station.   Speech: Regular rate, rhythm, volume and tone, spontaneous, fluent.   Mood: Exacerbation of chronic depressive features with poor self esteem.   Affect: Exacerbation of chronic depressive features with poor self esteem.   Thought Process: Exacerbation of chronic depressive features with poor self esteem.   Thought Content: Exacerbation of chronic depressive features with  poor self esteem.   Thought Perception: Thought disturbance with chronic depressive features with poor self esteem.   Cognition: Thought disturbance with chronic depressive features with poor self esteem.   Insight: Insight limitations.   Judgment: Judgment limitations.       Appearance: well-groomed.   Build: average.   Demeanor: average.   Eye Contact: average.   Motor Activity: average.   Speech: clear.   Language: Neurologic language is intact.   Fund of Knowledge: intact fund of knowledge.   Delusions: None Reported.   Self Harm: None Reported.   Aggressive: None Reported.   Mood: depressed and anxious . Exacerbation of chronic depressive features with poor self esteem.   Affect: labile . Exacerbation of chronic depressive features with poor self esteem.   Orientation: alert, oriented x3.   Manner: cooperative.   Thought process: goal-directed.   Thought association: displays rational thought process.   Content of thought: Mr. HANNY HILL denies any suicidal or homicidal ideation or plans.   Abstract/ Rational Thought: minimal impairment   Memory: grossly intact.   Behavior: calm.   Attention/Concentration: normal.   Cognition: intact.   Intelligence Estimate: average.   Executive Function: intact.   Insight: minimal impairment.   Judgement: minimal impairment.      Psychiatric/Behavioral:          Mental Status Exam     General: In no acute distress with depressed mood.   Appearance: Exacerbation of chronic depressive features with poor self esteem.   Attitude: Cooperative.   Behavior: Exacerbation of chronic depressive features with poor self esteem.   Motor Activity: No agitation or retardation. No EPS/TD. Normal gait and station.   Speech: Regular rate, rhythm, volume and tone, spontaneous, fluent.   Mood: Exacerbation of chronic depressive features with poor self esteem.   Affect: Exacerbation of chronic depressive features with poor self esteem.   Thought Process: Exacerbation of chronic depressive  features with poor self esteem.   Thought Content: Exacerbation of chronic depressive features with poor self esteem.   Thought Perception: Thought disturbance with chronic depressive features with poor self esteem.   Cognition: Thought disturbance with chronic depressive features with poor self esteem.   Insight: Insight limitations.   Judgment: Judgment limitations.       Appearance: well-groomed.   Build: average.   Demeanor: average.   Eye Contact: average.   Motor Activity: average.   Speech: clear.   Language: Neurologic language is intact.   Fund of Knowledge: intact fund of knowledge.   Delusions: None Reported.   Self Harm: None Reported.   Aggressive: None Reported.   Mood: depressed and anxious . Exacerbation of chronic depressive features with poor self esteem.   Affect: labile . Exacerbation of chronic depressive features with poor self esteem.   Orientation: alert, oriented x3.   Manner: cooperative.   Thought process: goal-directed.   Thought association: displays rational thought process.   Content of thought: Mr. HANNY HILL denies any suicidal or homicidal ideation or plans.   Abstract/ Rational Thought: minimal impairment   Memory: grossly intact.   Behavior: calm.   Attention/Concentration: normal.   Cognition: intact.   Intelligence Estimate: average.   Executive Function: intact.   Insight: minimal impairment.   Judgement: minimal impairment.        Psych Review of Symptoms:    ADHD: Patient denied any symptoms.         Anxiety: Patient denied any symptoms.         Developmental and Sensory Concerns: Patient denied any symptoms.         Depressive Symptoms: Patient denied any symptoms.         Disruptive and Conduct Symptoms: Patient denied any symptoms.         Eating / Feeding Concerns: Patient denied any symptoms.         Elimination Symptoms: Patient denied any symptoms.         Manic Symptoms: Patient denied any symptoms.         Obsessive-Compulsive Symptoms: Patient denied any  symptoms.         Trauma Related Symptoms: Patient denied any symptoms.           Sleep Concerns: Patient denied any symptoms.             Objective   Physical Exam  Neurological:      Mental Status: He is oriented to person, place, and time.      Comments: Mental Status Exam     General: In no acute distress with depressed mood.   Appearance: Exacerbation of chronic depressive features with poor self esteem.   Attitude: Cooperative.   Behavior: Exacerbation of chronic depressive features with poor self esteem.   Motor Activity: No agitation or retardation. No EPS/TD. Normal gait and station.   Speech: Regular rate, rhythm, volume and tone, spontaneous, fluent.   Mood: Exacerbation of chronic depressive features with poor self esteem.   Affect: Exacerbation of chronic depressive features with poor self esteem.   Thought Process: Exacerbation of chronic depressive features with poor self esteem.   Thought Content: Exacerbation of chronic depressive features with poor self esteem.   Thought Perception: Thought disturbance with chronic depressive features with poor self esteem.   Cognition: Thought disturbance with chronic depressive features with poor self esteem.   Insight: Insight limitations.   Judgment: Judgment limitations.       Appearance: well-groomed.   Build: average.   Demeanor: average.   Eye Contact: average.   Motor Activity: average.   Speech: clear.   Language: Neurologic language is intact.   Fund of Knowledge: intact fund of knowledge.   Delusions: None Reported.   Self Harm: None Reported.   Aggressive: None Reported.   Mood: depressed and anxious . Exacerbation of chronic depressive features with poor self esteem.   Affect: labile . Exacerbation of chronic depressive features with poor self esteem.   Orientation: alert, oriented x3.   Manner: cooperative.   Thought process: goal-directed.   Thought association: displays rational thought process.   Content of thought: Mr. HANNY HILL denies any  suicidal or homicidal ideation or plans.   Abstract/ Rational Thought: minimal impairment   Memory: grossly intact.   Behavior: calm.   Attention/Concentration: normal.   Cognition: intact.   Intelligence Estimate: average.   Executive Function: intact.   Insight: minimal impairment.   Judgement: minimal impairment.      Psychiatric:      Comments: Mental Status Exam     General: In no acute distress with depressed mood.   Appearance: Exacerbation of chronic depressive features with poor self esteem.   Attitude: Cooperative.   Behavior: Exacerbation of chronic depressive features with poor self esteem.   Motor Activity: No agitation or retardation. No EPS/TD. Normal gait and station.   Speech: Regular rate, rhythm, volume and tone, spontaneous, fluent.   Mood: Exacerbation of chronic depressive features with poor self esteem.   Affect: Exacerbation of chronic depressive features with poor self esteem.   Thought Process: Exacerbation of chronic depressive features with poor self esteem.   Thought Content: Exacerbation of chronic depressive features with poor self esteem.   Thought Perception: Thought disturbance with chronic depressive features with poor self esteem.   Cognition: Thought disturbance with chronic depressive features with poor self esteem.   Insight: Insight limitations.   Judgment: Judgment limitations.       Appearance: well-groomed.   Build: average.   Demeanor: average.   Eye Contact: average.   Motor Activity: average.   Speech: clear.   Language: Neurologic language is intact.   Fund of Knowledge: intact fund of knowledge.   Delusions: None Reported.   Self Harm: None Reported.   Aggressive: None Reported.   Mood: depressed and anxious . Exacerbation of chronic depressive features with poor self esteem.   Affect: labile . Exacerbation of chronic depressive features with poor self esteem.   Orientation: alert, oriented x3.   Manner: cooperative.   Thought process: goal-directed.   Thought  association: displays rational thought process.   Content of thought: Mr. HANNY HILL denies any suicidal or homicidal ideation or plans.   Abstract/ Rational Thought: minimal impairment   Memory: grossly intact.   Behavior: calm.   Attention/Concentration: normal.   Cognition: intact.   Intelligence Estimate: average.   Executive Function: intact.   Insight: minimal impairment.   Judgement: minimal impairment.            Lab Review:   Lab on 12/18/2024   Component Date Value    WBC 12/18/2024 8.4     nRBC 12/18/2024 0.0     RBC 12/18/2024 5.02     Hemoglobin 12/18/2024 15.3     Hematocrit 12/18/2024 46.5     MCV 12/18/2024 93     MCH 12/18/2024 30.5     MCHC 12/18/2024 32.9     RDW 12/18/2024 13.5     Platelets 12/18/2024 264     Neutrophils % 12/18/2024 62.8     Immature Granulocytes %,* 12/18/2024 0.4     Lymphocytes % 12/18/2024 24.2     Monocytes % 12/18/2024 10.3     Eosinophils % 12/18/2024 1.7     Basophils % 12/18/2024 0.6     Neutrophils Absolute 12/18/2024 5.29     Immature Granulocytes Ab* 12/18/2024 0.03     Lymphocytes Absolute 12/18/2024 2.04     Monocytes Absolute 12/18/2024 0.87 (H)     Eosinophils Absolute 12/18/2024 0.14     Basophils Absolute 12/18/2024 0.05     Glucose 12/18/2024 136 (H)     Sodium 12/18/2024 140     Potassium 12/18/2024 4.3     Chloride 12/18/2024 102     Bicarbonate 12/18/2024 30     Anion Gap 12/18/2024 12     Urea Nitrogen 12/18/2024 24 (H)     Creatinine 12/18/2024 1.05     eGFR 12/18/2024 75     Calcium 12/18/2024 10.0     Albumin 12/18/2024 4.6     Alkaline Phosphatase 12/18/2024 32 (L)     Total Protein 12/18/2024 7.0     AST 12/18/2024 10     Bilirubin, Total 12/18/2024 1.0     ALT 12/18/2024 15     Cholesterol 12/18/2024 120     HDL-Cholesterol 12/18/2024 40.8     Cholesterol/HDL Ratio 12/18/2024 2.9     LDL Calculated 12/18/2024 57     VLDL 12/18/2024 22     Triglycerides 12/18/2024 110     Non HDL Cholesterol 12/18/2024 79     Hemoglobin A1C 12/18/2024 5.3      Estimated Average Glucose 12/18/2024 105    Lab on 12/16/2024   Component Date Value    Clonazepam 12/16/2024 <25     7-Aminoclonazepam 12/16/2024 <25     Alprazolam 12/16/2024 <25     Alpha-Hydroxyalprazolam 12/16/2024 <25     Midazolam 12/16/2024 <25     Alpha-Hydroxymidazolam 12/16/2024 <25     Chlordiazepoxide 12/16/2024 <25     Diazepam 12/16/2024 <25     Nordiazepam 12/16/2024 237 (H)     Temazepam 12/16/2024 611 (H)     Oxazepam 12/16/2024 632 (H)     Lorazepam 12/16/2024 <25    Lab on 12/02/2024   Component Date Value    HSV 1, IgG 12/02/2024 0.2     HSV 2, 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        Assessment/Plan   Psychiatric Risk Assessment  Violence Risk Assessment: none  Acute Risk of Harm to Others is Considered: low   Suicide Risk Assessment: age > 65 yrs old and   Protective Factors against Suicide: adherence to  treatment, fear of suicide, moral objections to suicide, positive family relationships, and sense of responsibility toward family  Acute Risk of Harm to Self is Considered: low    Imminent Risk of Suicide or Serious Self-Injury: Low   Chronic Risk of Suicide of Serious Self-Injury: Low  Risk factors: Age, depression history and   Protective factors: Denies current suicidal ideation, denies history of suicide attempts , willingness to seek help and support , gender, access to a variety of clinical interventions , and receiving and engaged in care for mental, physical, and substance use disorders      Imminent Risk of Violence or Homicide: Low   Risk Factors: No significant risk factors identified on screening  Protective Factors: Lack of known history of harm to others , Lack of known history of violent ideation , and lack of known access to firearms.     Assessment & Plan    Your diagnosis is schizoaffective disorder with of chronic depressed features. You deny any suicidal or homicidal ideation or plans.      [place]AR[place] was reviewed today with no concerning findings evidenced.     The plan is to  continue bupropion  mg morning and middle afternoon daily, you have elected to stop risperidone 1 mg twice daily in the morning and late afternoon which you announced you have done unilaterally prior to this appointment because of concerns of parkinsonian features/risks; continue fluoxetine to 40 mg daily as well as bupropion  mg daily for depression and you can continue diazepam 5 mg as needed daily and at bedtime as needed only. You clearly are able to recite back the risks, benefits and alternatives of these medications as discussed with you today.     The FDA risks of antipsychotics including increased risk of sudden death, heart attack, brain stroke, irreversible neurological movement disorders, parkinsonism, cardiac toxicity, weight gain and diabetes of this medication were discussed. You were able to recite back the risks, benefits, alternatives with respect to antipsychotics and in particular risperidone.     The FDA risks of antidepressants including increased risk of suicide, cardiotoxicity, lowered seizure threshold, the serotonin syndrome with serotonin agents and anticholinergic effects have been discussed. We also discussed that at least in the case of SSRIs there is interference with warfarin and nonsteroidal inflammatories and can cause increased bleeding and hemorrhage. You were able to recite back the risks, benefits, alternatives with respect to your antidepressants in particular bupropion and fluoxetine.     The FDA risks of benzodiazepines were discussed which includes impairment of memory and cognition, increased falls, addiction and dangerous withdrawals if stopped suddenly which can cause significant morbidity and or mortality. There is also risk of respiratory suppression alone or with other sedative or other medications which can lead to morbidity and mortality. You clearly were able to recite back the risks, benefits and alternatives to the benzodiazepines discussed.       Follow up monthly.     Time:   Prep time on date of the patient encounter:  5 minutes.   Time spent directly with patient/family/caregiver: 20 minutes.   Additional time spent on patient care activities:  minutes.   Documentation time: 5 minutes.   Total time on date of patient encounter:  minutes.

## 2025-04-04 DIAGNOSIS — E11.42 DIABETIC PERIPHERAL NEUROPATHY (MULTI): ICD-10-CM

## 2025-04-04 DIAGNOSIS — E11.9 TYPE 2 DIABETES MELLITUS WITHOUT COMPLICATION, WITHOUT LONG-TERM CURRENT USE OF INSULIN: ICD-10-CM

## 2025-04-04 RX ORDER — DAPAGLIFLOZIN 10 MG/1
10 TABLET, FILM COATED ORAL DAILY
Qty: 90 TABLET | Refills: 0 | Status: SHIPPED | OUTPATIENT
Start: 2025-04-04

## 2025-04-08 ENCOUNTER — APPOINTMENT (OUTPATIENT)
Dept: BEHAVIORAL HEALTH | Facility: CLINIC | Age: 75
End: 2025-04-08

## 2025-04-14 ENCOUNTER — APPOINTMENT (OUTPATIENT)
Dept: PRIMARY CARE | Facility: CLINIC | Age: 75
End: 2025-04-14
Payer: COMMERCIAL

## 2025-04-14 VITALS
OXYGEN SATURATION: 98 % | WEIGHT: 197 LBS | HEIGHT: 74 IN | SYSTOLIC BLOOD PRESSURE: 100 MMHG | HEART RATE: 88 BPM | DIASTOLIC BLOOD PRESSURE: 65 MMHG | RESPIRATION RATE: 21 BRPM | BODY MASS INDEX: 25.28 KG/M2

## 2025-04-14 DIAGNOSIS — J43.1 PANLOBULAR EMPHYSEMA (MULTI): ICD-10-CM

## 2025-04-14 DIAGNOSIS — E86.1 HYPOTENSION DUE TO HYPOVOLEMIA: ICD-10-CM

## 2025-04-14 DIAGNOSIS — I10 BENIGN ESSENTIAL HYPERTENSION: Primary | ICD-10-CM

## 2025-04-14 DIAGNOSIS — R35.0 BENIGN PROSTATIC HYPERPLASIA WITH URINARY FREQUENCY: ICD-10-CM

## 2025-04-14 DIAGNOSIS — I95.89 HYPOTENSION DUE TO HYPOVOLEMIA: ICD-10-CM

## 2025-04-14 DIAGNOSIS — E11.610 CHARCOT FOOT DUE TO DIABETES MELLITUS (MULTI): ICD-10-CM

## 2025-04-14 DIAGNOSIS — N40.1 BENIGN PROSTATIC HYPERPLASIA WITH URINARY FREQUENCY: ICD-10-CM

## 2025-04-14 DIAGNOSIS — E78.00 HYPERCHOLESTEROLEMIA: ICD-10-CM

## 2025-04-14 DIAGNOSIS — E29.1 HYPOGONADISM MALE: ICD-10-CM

## 2025-04-14 DIAGNOSIS — N40.0 ENLARGED PROSTATE WITHOUT LOWER URINARY TRACT SYMPTOMS (LUTS): ICD-10-CM

## 2025-04-14 DIAGNOSIS — E03.9 PRIMARY HYPOTHYROIDISM: ICD-10-CM

## 2025-04-14 DIAGNOSIS — E11.9 TYPE 2 DIABETES MELLITUS WITHOUT COMPLICATION, WITHOUT LONG-TERM CURRENT USE OF INSULIN: ICD-10-CM

## 2025-04-14 DIAGNOSIS — K21.00 GASTROESOPHAGEAL REFLUX DISEASE WITH ESOPHAGITIS WITHOUT HEMORRHAGE: ICD-10-CM

## 2025-04-14 DIAGNOSIS — R53.1 GENERALIZED WEAKNESS: ICD-10-CM

## 2025-04-14 DIAGNOSIS — I10 ESSENTIAL (PRIMARY) HYPERTENSION: ICD-10-CM

## 2025-04-14 DIAGNOSIS — R53.83 OTHER FATIGUE: ICD-10-CM

## 2025-04-14 DIAGNOSIS — E11.42 DIABETIC PERIPHERAL NEUROPATHY (MULTI): ICD-10-CM

## 2025-04-14 PROCEDURE — G2211 COMPLEX E/M VISIT ADD ON: HCPCS | Performed by: INTERNAL MEDICINE

## 2025-04-14 PROCEDURE — 99215 OFFICE O/P EST HI 40 MIN: CPT | Performed by: INTERNAL MEDICINE

## 2025-04-14 PROCEDURE — 3008F BODY MASS INDEX DOCD: CPT | Performed by: INTERNAL MEDICINE

## 2025-04-14 PROCEDURE — 4010F ACE/ARB THERAPY RXD/TAKEN: CPT | Performed by: INTERNAL MEDICINE

## 2025-04-14 PROCEDURE — 1036F TOBACCO NON-USER: CPT | Performed by: INTERNAL MEDICINE

## 2025-04-14 PROCEDURE — 1159F MED LIST DOCD IN RCRD: CPT | Performed by: INTERNAL MEDICINE

## 2025-04-14 PROCEDURE — 3074F SYST BP LT 130 MM HG: CPT | Performed by: INTERNAL MEDICINE

## 2025-04-14 PROCEDURE — 3078F DIAST BP <80 MM HG: CPT | Performed by: INTERNAL MEDICINE

## 2025-04-14 RX ORDER — TESTOSTERONE 20.25 MG/1.25G
2 GEL TOPICAL DAILY
Qty: 75 G | Refills: 0 | Status: SHIPPED | OUTPATIENT
Start: 2025-04-14

## 2025-04-14 RX ORDER — LISINOPRIL 5 MG/1
5 TABLET ORAL DAILY
Qty: 30 TABLET | Refills: 2 | Status: SHIPPED | OUTPATIENT
Start: 2025-04-14

## 2025-04-14 ASSESSMENT — ENCOUNTER SYMPTOMS
RESPIRATORY NEGATIVE: 1
ALLERGIC/IMMUNOLOGIC NEGATIVE: 1
HEMATOLOGIC/LYMPHATIC NEGATIVE: 1
ENDOCRINE NEGATIVE: 1
EYES NEGATIVE: 1
CARDIOVASCULAR NEGATIVE: 1
NEUROLOGICAL NEGATIVE: 1
MUSCULOSKELETAL NEGATIVE: 1
PSYCHIATRIC NEGATIVE: 1
GASTROINTESTINAL NEGATIVE: 1
CONSTITUTIONAL NEGATIVE: 1

## 2025-04-14 NOTE — ASSESSMENT & PLAN NOTE
COPD - no wheezing, no SOB, no Crackles heard/ Exacerbation. Get CXR. Continues mild exercises and inhalers. [place] preventive care and vaccinations. Add advair inhalers and spiriva inhaler.

## 2025-04-14 NOTE — ASSESSMENT & PLAN NOTE
DM - NIDDM . Reviewed with patient / Will check HbA1c and fasting blood sugars. Educate home self monitoring and diary keeping(reviewed with patient home blood sugar levels /diary). Educate extensively low calorie diet and weight loss with exercise. Reviewed BS- diary and Rx. Regimen. [place] renal protection with ARB/ACEI. Educate compliance with diet and Rx. And educate risks and autcomes. Continuesandrefill the Metformin 500 mg BID with meals Januvia 100 mg daily and Farxiga 5 mg daily

## 2025-04-14 NOTE — PROGRESS NOTES
"Subjective   Patient ID: Truong Armijo is a 74 y.o. male who presents for Follow-up (Follow up). Instability due to high arches and not happy with orthotics and following with podiatry which he can not get any help     HPI     Review of Systems   Constitutional: Negative.    HENT: Negative.     Eyes: Negative.    Respiratory: Negative.     Cardiovascular: Negative.    Gastrointestinal: Negative.    Endocrine: Negative.    Musculoskeletal: Negative.    Skin: Negative.    Allergic/Immunologic: Negative.    Neurological: Negative.    Hematological: Negative.    Psychiatric/Behavioral: Negative.     All other systems reviewed and are negative.      Objective   /65   Pulse 88   Resp 21   Ht 1.88 m (6' 2\")   Wt 89.4 kg (197 lb)   SpO2 98%   BMI 25.29 kg/m²     Physical Exam  Vitals and nursing note reviewed.   Constitutional:       Appearance: Normal appearance.   HENT:      Head: Normocephalic and atraumatic.      Right Ear: Tympanic membrane, ear canal and external ear normal.      Left Ear: Tympanic membrane, ear canal and external ear normal. There is no impacted cerumen.      Nose: Nose normal.      Mouth/Throat:      Mouth: Mucous membranes are moist.      Pharynx: Oropharynx is clear.   Eyes:      Extraocular Movements: Extraocular movements intact.      Conjunctiva/sclera: Conjunctivae normal.      Pupils: Pupils are equal, round, and reactive to light.   Cardiovascular:      Rate and Rhythm: Normal rate and regular rhythm.      Pulses: Normal pulses.      Heart sounds: Normal heart sounds. No murmur heard.  Pulmonary:      Effort: Pulmonary effort is normal. No respiratory distress.      Breath sounds: Normal breath sounds. No stridor. No wheezing, rhonchi or rales.   Chest:      Chest wall: No tenderness.   Abdominal:      General: Abdomen is flat. Bowel sounds are normal. There is no distension.      Palpations: Abdomen is soft. There is no mass.      Tenderness: There is no abdominal tenderness. " There is no right CVA tenderness, left CVA tenderness, guarding or rebound.      Hernia: No hernia is present.   Musculoskeletal:         General: Normal range of motion.      Cervical back: Normal range of motion and neck supple.   Skin:     General: Skin is warm.      Capillary Refill: Capillary refill takes less than 2 seconds.   Neurological:      General: No focal deficit present.      Mental Status: He is alert.      Cranial Nerves: No cranial nerve deficit.      Sensory: No sensory deficit.      Motor: No weakness.      Coordination: Coordination normal.      Gait: Gait normal.      Deep Tendon Reflexes: Reflexes normal.   Psychiatric:         Mood and Affect: Mood normal.         Behavior: Behavior normal. Behavior is cooperative.         Thought Content: Thought content normal.         Judgment: Judgment normal.         Assessment/Plan   Problem List Items Addressed This Visit             ICD-10-CM    Benign essential hypertension - Primary I10     HTN - hypertension well/controlled .Target BP < 130/80  achieved. Educate low salt diet and exercise with weight loss. Educate home self monitoring and diary keeping. Educate risks of elevate blood pressure and benefits of prompt treatment.  Refill Lisinopril              COPD (chronic obstructive pulmonary disease) (Multi) J44.9     COPD - no wheezing, no SOB, no Crackles heard/ Exacerbation. Get CXR. Continues mild exercises and inhalers. [place] preventive care and vaccinations. Add advair inhalers and spiriva inhaler.                      Charcot foot due to diabetes mellitus (Multi) E11.610     Peripheral neuropathy and foot deformity requiring deep diabetic shoes      Orders:    Diabetic Shoes               Relevant Orders    Referral to Orthopedics and Sports Medicine    Diabetic peripheral neuropathy (Multi) E11.42     Neuropathy/ - positive numbness, tingling, discomfort (needles pricking, cold feeling) in distal lower extremities(toes, feet, legs),  secondary to DM/Radiculopathy/idiopathic. Recommend: Gabapentin(Neurontin) 300 daily(at bed time) upward taper up to 2 gm/day or Lyrica 75 mg daily if failure of treatment. Also recommend: treatment of Diabetes(control levels of blood sugars), lumbar/ cervical disc disease (Physical Therapy), and check electrolytes and Thyroid function. Also address regenrative measures: B12 intrmusculary (Monthly) and Folic acid supplementation. Recommend EMG. Start Amitriptyline 25 mg daily / . Start - Tonic water - and Tramadol 50 mg TID.             Relevant Orders    Hemoglobin A1C    Hypercholesterolemia E78.00     Hypercholesterolemia - Monitor lipid profile and educate patient upon risks of high cholesterol and targets. Educate diet and change in lifestyle and increase in exercises - Refill: Atorvastatin  20 mg daily and educate compliance with medication and diet.             Relevant Orders    Lipid Panel    Enlarged prostate without lower urinary tract symptoms (luts) N40.0     BPH - Benign PROSTATIC Hypertrophy, + Dysuria/Nocturia, check PSA, prescribe Flomax 0.4mg qD and Avodart 0.5 mg qD vs. Finasteride 5 mg qD.  Educate exercises and Change in life style, prescribe vitamin E 400 IU qD. Consider referral to urology.          Fatigue R53.83     Fatigue - check CMP(metabolic panel and elctrolytes) , CBC(complete blood cell count), TSH(thyroid function). Insomnia may play a role and sleep studies(rule out sleep apnea) are recommended . Educate sleep hygiene. Consider anxiety disorder vs. depression. Consider Stress test, and 2DECHO.           Relevant Orders    Comprehensive Metabolic Panel    CBC and Auto Differential    Generalized weakness R53.1     Weakness  - refer to PT ans strengthening exERCIESES         Diabetes mellitus (Multi) E11.9     DM - NIDDM . Reviewed with patient / Will check HbA1c and fasting blood sugars. Educate home self monitoring and diary keeping(reviewed with patient home blood sugar levels  /diary). Educate extensively low calorie diet and weight loss with exercise. Reviewed BS- diary and Rx. Regimen. [place] renal protection with ARB/ACEI. Educate compliance with diet and Rx. And educate risks and autcomes. Continuesandrefill the Metformin 500 mg BID with meals Januvia 100 mg daily and Farxiga 5 mg daily          Relevant Orders    Hemoglobin A1C    Primary hypothyroidism E03.9     Hypothyroidism - Symptoms well/controlled (weight gain, fatigue, constipation, cold intolerance). Check TSH continues dose of Synthroid/Levothyroxine  of  88 bmcg/qD.          Relevant Orders    TSH with reflex to Free T4 if abnormal    Reflux esophagitis K21.00     GERD - Acid reflux disease. Rx. PPI (Prilosec/Prevacid/Protonix/Nexium) and educate diet and life style changes. Referred patient to an endoscopy (EGD) and check H. Pylori titers.          Hypogonadism male E29.1     Check levels and refill supplement of Testosterone                Relevant Medications    testosterone 20.25 mg/1.25 gram (1.62 %) gel in metered-dose pump    Other Relevant Orders    Prostate Specific Antigen     Other Visit Diagnoses         Codes    Benign prostatic hyperplasia with urinary frequency     N40.1, R35.0    Relevant Orders    Prostate Specific Antigen    Essential (primary) hypertension     I10    Relevant Medications    lisinopril 5 mg tablet    Hypotension due to hypovolemia     I95.89, E86.1    Relevant Orders    Transthoracic Echo Complete              Benign essential hypertension I10        HTN - hypertension well/controlled .Target BP < 130/80  achieved. Educate low salt diet and exercise with weight loss. Educate home self monitoring and diary keeping. Educate risks of elevate blood pressure and benefits of prompt treatment.  Refill Lisinopril                COPD (chronic obstructive pulmonary disease) (Multi) J44.9       COPD - no wheezing, no SOB, no Crackles heard/ Exacerbation. Get CXR. Continues mild exercises and  inhalers. [place] preventive care and vaccinations. Add advair inhalers and spiriva inhaler.                      Hypercholesterolemia E78.00       Hypercholesterolemia - Monitor lipid profile and educate patient upon risks of high cholesterol and targets. Educate diet and change in lifestyle and increase in exercises - Refill: Atorvastatin  20 mg daily and educate compliance with medication and diet.                Enlarged prostate without lower urinary tract symptoms (luts) N40.0       BPH - Benign PROSTATIC Hypertrophy, + Dysuria/Nocturia, check PSA, prescribe Flomax 0.4mg qD and Avodart 0.5 mg qD vs. Finasteride 5 mg qD.  Educate exercises and Change in life style, prescribe vitamin E 400 IU qD. Consider referral to urology.            Fatigue R53.83       Fatigue - check CMP(metabolic panel and elctrolytes) , CBC(complete blood cell count), TSH(thyroid function). Insomnia may play a role and sleep studies(rule out sleep apnea) are recommended . Educate sleep hygiene. Consider anxiety disorder vs. depression. Consider Stress test, and 2DECHO.             Generalized weakness R53.1       Weakness  - refer to PT ans strengthening exERCIESES           Diabetes mellitus (Multi) - Primary E11.9       DM - NIDDM  . Reviewed with patient / Will check  HbA1c and fasting blood sugars. Educate home self monitoring and diary keeping(reviewed with patient home blood sugar levels /diary). Educate extensively low calorie diet and weight loss with exercise. Reviewed BS- diary and Rx. Regimen. [place] renal protection with ARB/ACEI.               Educate compliance with diet and Rx. And educate risks and autcomes.    Continuesandrefill the Metformin 500 mg BID with meals  Januvia 100 mg daily and Farxiga 5 mg daily                      Moderate episode of recurrent major depressive disorder F33.1       Refer and follows with psych           Primary hypothyroidism E03.9       Hypothyroidism - Symptoms well/controlled  (weight gain, fatigue, constipation, cold intolerance). Check TSH continues dose of Synthroid/Levothyroxine  of  88 bmcg/qD.            Reflux esophagitis K21.00       GERD - Acid reflux disease. Rx. PPI (Prilosec/Prevacid/Protonix/Nexium) and educate diet and life style changes. Referred patient to an endoscopy (EGD) and check H. Pylori titers.            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 Educate low salt diet and exercise with weight loss. Educate home self monitoring and diary keeping. Educate risks of elevate blood pressure and benefits of prompt treatment.  Refill Lisinopril          Hypercholesterolemia  Hypercholesterolemia - Monitor lipid profile and educate patient upon risks of high cholesterol and targets. Educate diet and change in lifestyle and increase in exercises - Refill: Atorvastatin  20 mg daily and educate compliance with medication and diet.             Type 2 diabetes mellitus without complication, without long-term current use of insulin (Multi)  DM - NIDDM  . Reviewed with patient / Will check  HbA1c and fasting blood sugars. Educate home self monitoring and diary keeping(reviewed with patient home blood sugar levels /diary). Educate extensively low calorie diet and weight loss with exercise. Reviewed BS- diary and Rx. Regimen. [place] renal protection with ARB/ACEI.               Educate compliance with diet and Rx. And educate risks and autcomes.    Continuesandrefill the Metformin 500 mg BID with meals  Januvia 100 mg daily and Farxiga 5 mg daily                   Hypogonadism male  Check levels and refill supplement of Testosterone         Gastroesophageal reflux disease with esophagitis without hemorrhage  GERD - Acid reflux disease. Rx. PPI  (Prilosec/Prevacid/Protonix/Nexium) and educate diet and life style changes. Referred patient to an endoscopy (EGD) and check H. Pylori titers.         Enlarged prostate without lower urinary tract symptoms (luts)  BPH - Benign PROSTATIC Hypertrophy, + Dysuria/ Increased in Nocturia and frequency of urination check PSA, His father had prostate cancer in his late 70's close to 80 years old- Educate exercises and Change in life style, prescribe vitamin E 400 IU qD. Consider referral to urology.          Other polyneuropathy  Neuropathy/ - positive numbness, tingling, discomfort (needles pricking, cold feeling) in distal lower extremities(toes, feet, legs), secondary to DM/Radiculopathy/idiopathic. Recommend: Gabapentin(Neurontin) 300 daily(at bed time) upward taper up to  2 gm/day or Lyrica 75 mg daily if failure of treatment. Also recommend: treatment of Diabetes(control levels of blood sugars), lumbar/ cervical  disc disease (Physical Therapy), and check electrolytes and Thyroid function. Also address regenrative measures: B12 intrmusculary (Monthly) and Folic acid supplementation. Recommend  EMG. Start Amitriptyline 25 mg daily / . Start - Tonic water -         Panlobular emphysema (Multi)  COPD - no wheezing, no SOB, no Crackles heard/ Exacerbation. Get CXR. Continues mild exercises and inhalers. [place] preventive care and vaccinations. Add advair inhalers and spiriva inhaler.                Other post infection and related fatigue syndromes  Fatigue - check CMP(metabolic panel and elctrolytes) , CBC(complete blood cell count), TSH(thyroid function). Insomnia may play a role and sleep studies(rule out sleep apnea) are recommended . Educate sleep hygiene. Consider anxiety disorder vs. depression. Consider Stress test, and 2DECHO.          Charcot foot due to diabetes mellitus (Multi)  Peripheral neuropathy and foot deformity requiring deep diabetic shoes      Orders:    Diabetic Shoes     Other schizoaffective  disorders                 Cardiac Risk Assessment  15 - 20 minutes were spent discussing Cardiovascular risk and, if needed, lifestyle modifications were recommended, including nutritional choices, exercise, and elimination of habits contributing to risk.   Aspirin use/disuse was discussed following the guidelines below:  low dose ASA ( mg) should be considered:     If prior Heart Attack/Stroke/Peripheral vascular disease:  Generally recommend daily low dose aspirin unless extremely high bleeding risk (e.g., gastrointestinal).     If no prior Heart Attack/Stroke/Peripheral vascular disease:                                   Age over 70: Do not use Aspirin for prevention                          Age less than 70 and 10-year cardiovascular disease risk is >20%: use low dose Aspirin for prevention.                                              Benign essential hypertension I10         HTN - hypertension well/controlled .Target BP < 130/80  achieved. Educate low salt diet and exercise with weight loss. Educate home self monitoring and diary keeping. Educate risks of elevate blood pressure and benefits of prompt treatment.  Refill Lisinopril             Charcot foot due to diabetes mellitus (Multi) E11.610     Relevant Orders     Diabetic Shoes     Diabetic peripheral neuropathy (Multi) E11.42       Neuropathy/ - positive numbness, tingling, discomfort (needles pricking, cold feeling) in distal lower extremities(toes, feet, legs), secondary to DM/Radiculopathy/idiopathic. Recommend: Gabapentin(Neurontin) 300 daily(at bed time) upward taper up to 2 gm/day or Lyrica 75 mg daily if failure of treatment. Also recommend: treatment of Diabetes(control levels of blood sugars), lumbar/ cervical disc disease (Physical Therapy), and check electrolytes and Thyroid function. Also address regenrative measures: B12 intrmusculary (Monthly) and Folic acid supplementation. Recommend EMG. Start Amitriptyline 25 mg daily / . Start -  Tonic water - and Tramadol 50 mg TID.                Hypercholesterolemia E78.00       Hypercholesterolemia - Monitor lipid profile and educate patient upon risks of high cholesterol and targets. Educate diet and change in lifestyle and increase in exercises - Refill: Atorvastatin  20 mg daily and educate compliance with medication and diet.                Enlarged prostate without lower urinary tract symptoms (luts) N40.0       BPH - Benign PROSTATIC Hypertrophy, + Dysuria/ Increased in Nocturia and frequency of urination check PSA, His father had prostate cancer in his late 70's close to 80 years old- Educate exercises and Change in life style, prescribe vitamin E 400 IU qD. Consider referral to urology.            Diabetes mellitus (Multi) - Primary E11.9       DM - NIDDM  . Reviewed with patient / Will check  HbA1c and fasting blood sugars. Educate home self monitoring and diary keeping(reviewed with patient home blood sugar levels /diary). Educate extensively low calorie diet and weight loss with exercise. Reviewed BS- diary and Rx. Regimen. [place] renal protection with ARB/ACEI.               Educate compliance with diet and Rx. And educate risks and autcomes.    Continuesandrefill the Metformin 500 mg BID with meals  Januvia 100 mg daily and Farxiga 5 mg daily                      Hypothyroidism, unspecified E03.9       Hypothyroidism - Symptoms well/not controlled (weight gain, fatigue, constipation, cold intolerance). Check TSH continue/change dose of Synthroid/Levothyroxine of 100 mcg/qD.                Reflux esophagitis K21.00       GERD - Acid reflux disease. Rx. PPI (Prilosec/Prevacid/Protonix/Nexium) and educate diet and life style changes. Referred patient to an endoscopy (EGD) and check H. Pylori titers.            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 Educate low salt diet and exercise with weight loss. Educate home self monitoring and diary keeping. Educate risks of elevate blood pressure and benefits of prompt treatment.  Refill Lisinopril             COPD (chronic obstructive pulmonary disease) (Multi) J44.9       COPD - no wheezing, no SOB, no Crackles heard/ Exacerbation. Get CXR. Continues mild exercises and inhalers. [place] preventive care and vaccinations. Add advair inhalers and spiriva inhaler.                   Hypercholesterolemia E78.00       Hypercholesterolemia - Monitor lipid profile and educate patient upon risks of high cholesterol and targets. Educate diet and change in lifestyle and increase in exercises - Refill: Atorvastatin  20 mg daily and educate compliance with medication and diet.            Fatigue R53.83       Fatigue - check CMP(metabolic panel and elctrolytes) , CBC(complete blood cell count), TSH(thyroid function). Insomnia may play a role and sleep studies(rule out sleep apnea) are recommended . Educate sleep hygiene. Consider anxiety disorder vs. depression. Consider Stress test, and 2DECHO.             High triglycerides E78.1     Relevant Medications     atorvastatin (Lipitor) 20 mg tablet     Diabetes mellitus (Multi) E11.9       DM - NIDDM  . Reviewed with patient / Will check  HbA1c and fasting blood sugars. Educate home self monitoring and diary keeping(reviewed with patient home blood sugar levels /diary). Educate extensively low calorie diet and weight loss with exercise. Reviewed BS- diary and Rx. Regimen. [place] renal protection with ARB/ACEI.               Educate compliance with diet and Rx. And educate risks and autcomes.    Continuesandrefill the Metformin 500 mg BID with meals  Januvia 100 mg daily and Farxiga 5 mg daily                       Hypothyroidism, unspecified E03.9       Hypothyroidism - Symptoms well/not controlled (weight gain, fatigue, constipation, cold intolerance). Check TSH continue/change dose of Synthroid/Levothyroxine of 100 mcg/qD.            Recurrent canker sores K12.0       Possible due to HSV  - start Valtrex 1 gm TID for 5 days and consider Sweet syndrome and start Colchicine 0.6 mg daily  and start Vitamin B complex daily  -            Relevant Medications     valACYclovir (Valtrex) 1 gram tablet     colchicine 0.6 mg tablet      Other Visit Diagnoses           Codes     Flu vaccine need     Z23     Relevant Orders     Flu vaccine, trivalent, preservative free, HIGH-DOSE, age 65y+ (Fluzone) (Completed)     Gastroesophageal reflux disease without esophagitis     K21.9     Relevant Medications     pantoprazole (ProtoNix) 40 mg EC tablet                            Benign essential hypertension I10         HTN - hypertension well/controlled .Target BP < 130/80  achieved. Educate low salt diet and exercise with weight loss. Educate home self monitoring and diary keeping. Educate risks of elevate blood pressure and benefits of prompt treatment.  Refill Lisinopril             COPD (chronic obstructive pulmonary disease) (Multi) J44.9       COPD - no wheezing, no SOB, no Crackles heard/ Exacerbation. Get CXR. Continues mild exercises and inhalers. [place] preventive care and vaccinations. Add advair inhalers and spiriva inhaler.                Hypercholesterolemia E78.00       Hypercholesterolemia - Monitor lipid profile and educate patient upon risks of high cholesterol and targets. Educate diet and change in lifestyle and increase in exercises - Refill: Atorvastatin  20 mg daily and educate compliance with medication and diet.            Diabetes mellitus (Multi) E11.9       DM - NIDDM  . Reviewed with patient / Will check  HbA1c and fasting blood sugars. Educate home self monitoring and diary keeping(reviewed with patient  home blood sugar levels /diary). Educate extensively low calorie diet and weight loss with exercise. Reviewed BS- diary and Rx. Regimen. [place] renal protection with ARB/ACEI.               Educate compliance with diet and Rx. And educate risks and autcomes.    Continuesandrefill the Metformin 500 mg BID with meals  Januvia 100 mg daily and Farxiga 5 mg daily                      Primary hypothyroidism E03.9       Hypothyroidism - Symptoms well/controlled (weight gain, fatigue, constipation, cold intolerance). Check TSH continues dose of Synthroid/Levothyroxine  of  88 bmcg/qD.            Reflux esophagitis K21.00       GERD - Acid reflux disease. Rx. PPI (Prilosec/Prevacid/Protonix/Nexium) and educate diet and life style changes. Referred patient to an endoscopy (EGD) and check H. Pylori titers.                          Pressure injury of sacral region, stage 2 (Multi) L89.152       Start Desitin (Zinc oxide paste) - and get cushioning and doughnut pillow             Other Visit Diagnoses           Codes     Routine general medical examination at health care facility     Z00.00                          COPD (chronic obstructive pulmonary disease) (Multi) J44.9         COPD - no wheezing, no SOB, no Crackles heard/ Exacerbation. Get CXR. Continues mild exercises and inhalers. [place] preventive care and vaccinations. Add advair inhalers and spiriva inhaler.            Diabetic peripheral neuropathy (Multi) E11.42       Neuropathy/ - positive numbness, tingling, discomfort (needles pricking, cold feeling) in distal lower extremities(toes, feet, legs), secondary to DM/Radiculopathy/idiopathic. Recommend: Gabapentin(Neurontin) 300 daily(at bed time) upward taper up to 2 gm/day or Lyrica 75 mg daily if failure of treatment. Also recommend: treatment of Diabetes(control levels of blood sugars), lumbar/ cervical disc disease (Physical Therapy), and check electrolytes and Thyroid function. Also address regenrative  measures: B12 intrmusculary (Monthly) and Folic acid supplementation. Recommend EMG. Start Amitriptyline 25 mg daily / . Start - Tonic water - and Tramadol 50 mg TID.            Enlarged prostate without lower urinary tract symptoms (luts) N40.0       BPH - Benign PROSTATIC Hypertrophy, + Dysuria/ Increased in Nocturia and frequency of urination check PSA, His father had prostate cancer in his late 70's close to 80 years old- Educate exercises and Change in life style, prescribe vitamin E 400 IU qD. Consider referral to urology.            Fatigue R53.83       Last Assessment & Plan Note   Written:Ki Ahuja MD7/22/2024 12:17 PM     Fatigue - check CMP(metabolic panel and elctrolytes) , CBC(complete blood cell count), TSH(thyroid function). Insomnia may play a role and sleep studies(rule out sleep apnea) are recommended . Educate sleep hygiene. Consider anxiety disorder vs. depression. Consider Stress test, and 2DECHO.                   Diabetes mellitus (Multi) E11.9       DM - NIDDM  . Reviewed with patient / Will check  HbA1c and fasting blood sugars. Educate home self monitoring and diary keeping(reviewed with patient home blood sugar levels /diary). Educate extensively low calorie diet and weight loss with exercise. Reviewed BS- diary and Rx. Regimen. [place] renal protection with ARB/ACEI.               Educate compliance with diet and Rx. And educate risks and autcomes.    Continuesandrefill the Metformin 500 mg BID with meals  Januvia 100 mg daily and Farxiga 5 mg daily                      Hypothyroidism, unspecified E03.9       Hypothyroidism - Symptoms well/not controlled (weight gain, fatigue, constipation, cold intolerance). Check TSH continue/change dose of Synthroid/Levothyroxine  of 100 mcg/qD.            Reflux esophagitis K21.00       GERD - Acid reflux disease. Rx. PPI (Prilosec/Prevacid/Protonix/Nexium) and educate diet and life style changes. Referred patient to an endoscopy (EGD) and  check H. Pylori titers.            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 otitis vs. Eustachian tube dysfunction vs. advanced cervical disc disease causing encroachment of the Vertebral arteries vs. viral infection of the Vestibular nerve sensor - recommend : Antivert /Meclizine 12.5 mg TID , treat infection of the sinuses or ears , increase intake of fluids, educate exercises , avoid operating machinery, monitor closely, rest.             Relevant Medications     meclizine (Antivert) 12.5 mg tablet                                 Acute pain of left knee M25.562         DJD - Degenerative Joint Disease, Chronic Arthritis, of the Left more than the right  Knee. . Pain control, and anti-inflammatory meds : consider Kenalog injection and start Voltaren gel 1% topically BID,  and  Tylenol 325 mg TID PRN. Educate exercises and referred the patient to PT (Physical Therapy). Get X-Ray's.               Benign essential hypertension I10       HTN - hypertension well/controlled .Target BP < 130/80  achieved. Educate low salt diet and exercise with weight loss. Educate home self monitoring and diary keeping. Educate risks of elevate blood pressure and benefits of prompt treatment.  Refill Lisinopril             Relevant Orders     Comprehensive Metabolic Panel     Hypercholesterolemia E78.00       Hypercholesterolemia - Monitor lipid profile and educate patient upon risks of high cholesterol and targets. Educate diet and change in lifestyle and increase in exercises - Refill: Atorvastatin  20 mg daily and educate compliance with medication and diet.            Relevant Orders     Lipid Panel     Fatigue R53.83       Fatigue - check CMP(metabolic panel and elctrolytes) , CBC(complete blood cell count), TSH(thyroid function). Insomnia may play a role and sleep  studies(rule out sleep apnea) are recommended . Educate sleep hygiene. Consider anxiety disorder vs. depression. Consider Stress test, and 2DECHO.             Diabetes mellitus (Multi) - Primary E11.9       DM - NIDDM  . Reviewed with patient / Will check  HbA1c and fasting blood sugars. Educate home self monitoring and diary keeping(reviewed with patient home blood sugar levels /diary). Educate extensively low calorie diet and weight loss with exercise. Reviewed BS- diary and Rx. Regimen. [place] renal protection with ARB/ACEI.               Educate compliance with diet and Rx. And educate risks and autcomes.    Continuesandrefill the Metformin 500 mg BID with meals  Januvia 100 mg daily and Farxiga 5 mg daily                         Relevant Orders     Hemoglobin A1C     Primary hypothyroidism E03.9       Hypothyroidism - Symptoms well/controlled (weight gain, fatigue, constipation, cold intolerance). Check TSH continues dose of Synthroid/Levothyroxine  of  88 bmcg/qD.            Relevant Orders     CBC and Auto Differential     TSH with reflex to Free T4 if abnormal     Reflux esophagitis K21.00       GERD - Acid reflux disease. Rx. PPI (Prilosec/Prevacid/Protonix/Nexium) and educate diet and life style changes. Referred patient to an endoscopy (EGD) and check H. Pylori titers.            Hypogonadism male E29.1       Check levels and refill supplement of Testosterone            Relevant Orders     Prostate Specific Antigen     Essential (primary) hypertension I10       HTN - hypertension well/controlled .Target BP < 130/80  achieved. Educate low salt diet and exercise with weight loss. Educate home self monitoring and diary keeping. Educate risks of elevate blood pressure and benefits of prompt treatment.  Refill lisinopril             Other Visit Diagnoses           Codes     Goiter     E04.9     Relevant Orders     US thyroid     Acute pain of both knees     M25.561, M25.562     Relevant Orders     XR knee  1-2 views bilateral     Benign prostatic hyperplasia with urinary frequency     N40.1, R35.0     Relevant Orders     Prostate Specific Antigen                             Immunizations/Injections       very important  Immunizations from outside sources need reconciliation.       Flu vaccine, quadrivalent, high-dose, preservative free, age 65y+ (FLUZONE)10/18/2023, 10/17/2022, 10/8/2020  Flu vaccine, trivalent, preservative free, HIGH-DOSE, age 65y+ (Fluzone)10/14/2019, 10/17/2018, 10/4/2017  Flu vaccine, trivalent, preservative free, age 6 months and greater (Fluarix/Fluzone/Flulaval)10/5/2015, 10/1/2015  Influenza, Seasonal, Quadrivalent, Scdlrzdjej28/27/2021  Influenza, seasonal, uhtvpgzrfs44/1/2021, 9/23/2011, 10/8/2009,  ... (1 more)  Moderna SARS-CoV-2 Vaccination3/27/2021, 2/28/2021  Pfizer COVID-19 vaccine, Fall 2023, 12 years and older, (30mcg/0.3mL)1/23/2024  Pfizer COVID-19 vaccine, bivalent, age 12 years and older (30 mcg/0.3 mL)9/15/2022  Pfizer Purple Cap SARS-CoV-211/3/2021  Pneumococcal conjugate vaccine, 13-valent (PREVNAR 13)9/15/2014  Pneumococcal conjugate vaccine, 20-valent (PREVNAR 20)5/5/2023  Pneumococcal polysaccharide vaccine, 23-valent, age 2 years and older (PNEUMOVAX 23)1/1/2014  Tdap vaccine, age 7 year and older (BOOSTRIX, ADACEL)6/5/2023, 11/26/2017  Zoster vaccine, recombinant, adult (SHINGRIX)8/8/2023, 6/5/2023, 12/13/2020,  ... (1 more)  Zoster, live10/25/2017, 5/22/2014                   Immunizations/Injections       very important  Immunizations from outside sources need reconciliation.       COVID-19, mRNA, LNP-S, PF, 30 mcg/0.3 mL dose11/3/2021  Flu vaccine, quadrivalent, high-dose, preservative free, age 65y+ (FLUZONE)10/18/2023, 10/17/2022, 10/8/2020  Flu vaccine, trivalent, preservative free, HIGH-DOSE, age 65y+ (Fluzone)10/21/2024, 10/14/2019, 10/17/2018,  ... (1 more)  Flu vaccine, trivalent, preservative free, age 6 months and greater (Fluarix/Fluzone/Flulaval)10/5/2015,  10/1/2015  Influenza, Seasonal, Quadrivalent, Uusghxuoyd91/27/2021  Influenza, seasonal, kqsvbprsqr83/1/2021, 9/23/2011, 10/8/2009,  ... (1 more)  Moderna COVID-19 vaccine, 12 years and older (50mcg/0.5mL)(Spikevax)11/13/2024  Moderna SARS-CoV-2 Vaccination3/27/2021, 2/28/2021  Pfizer COVID-19 vaccine, 12 years and older, (30mcg/0.3mL) (Comirnaty)1/23/2024  Pfizer COVID-19 vaccine, bivalent, age 12 years and older (30 mcg/0.3 mL)9/15/2022  Pneumococcal conjugate vaccine, 13-valent (PREVNAR 13)9/15/2014  Pneumococcal conjugate vaccine, 20-valent (PREVNAR 20)5/5/2023  Pneumococcal polysaccharide vaccine, 23-valent, age 2 years and older (PNEUMOVAX 23)1/1/2014  Tdap vaccine, age 7 year and older (BOOSTRIX, ADACEL)6/5/2023, 11/26/2017  Zoster vaccine, recombinant, adult (SHINGRIX)8/8/2023, 6/5/2023, 12/13/2020,  ... (1 more)  Zoster, live10/25/2017, 5/22/2014            Immunizations/Injections       very important  Immunizations from outside sources need reconciliation.       COVID-19, mRNA, LNP-S, PF, 30 mcg/0.3 mL dose11/3/2021  Flu vaccine, quadrivalent, high-dose, preservative free, age 65y+ (FLUZONE)10/18/2023, 10/17/2022, 10/8/2020  Flu vaccine, trivalent, preservative free, HIGH-DOSE, age 65y+ (Fluzone)10/21/2024, 10/14/2019, 10/17/2018,  ... (1 more)  Flu vaccine, trivalent, preservative free, age 6 months and greater (Fluarix/Fluzone/Flulaval)10/5/2015, 10/1/2015  Influenza, Seasonal, Quadrivalent, Mkgfzumcbh95/27/2021  Influenza, seasonal, bxsfxwtutx86/1/2021, 9/23/2011, 10/8/2009,  ... (1 more)  Moderna COVID-19 vaccine, 12 years and older (50mcg/0.5mL)(Spikevax)11/13/2024  Moderna SARS-CoV-2 Vaccination3/27/2021, 2/28/2021  Pfizer COVID-19 vaccine, 12 years and older, (30mcg/0.3mL) (Comirnaty)1/23/2024  Pfizer COVID-19 vaccine, bivalent, age 12 years and older (30 mcg/0.3 mL)9/15/2022  Pneumococcal conjugate vaccine, 13-valent (PREVNAR 13)9/15/2014  Pneumococcal conjugate vaccine, 20-valent (PREVNAR  20)5/5/2023  Pneumococcal polysaccharide vaccine, 23-valent, age 2 years and older (PNEUMOVAX 23)1/1/2014  Tdap vaccine, age 7 year and older (BOOSTRIX, ADACEL)6/5/2023, 11/26/2017  Zoster vaccine, recombinant, adult (SHINGRIX)8/8/2023, 6/5/2023, 12/13/2020,  ... (1 more)  Zoster, live10/25/2017, 5/22/2014                 Immunizations/Injections      very important  Immunizations from outside sources need reconciliation.      COVID-19, mRNA, LNP-S, PF, 30 mcg/0.3 mL dose11/3/2021  Flu vaccine, quadrivalent, high-dose, preservative free, age 65y+ (FLUZONE)10/18/2023, 10/17/2022, 10/8/2020  Flu vaccine, trivalent, preservative free, HIGH-DOSE, age 65y+ (Fluzone)10/21/2024, 10/14/2019, 10/17/2018,  ... (1 more)  Flu vaccine, trivalent, preservative free, age 6 months and greater (Fluarix/Fluzone/Flulaval)10/5/2015, 10/1/2015  Influenza, Seasonal, Quadrivalent, Jjjjpuvflt73/27/2021  Influenza, seasonal, lfoegpotlp25/1/2021, 9/23/2011, 10/8/2009,  ... (1 more)  Moderna COVID-19 vaccine, 12 years and older (50mcg/0.5mL)(Spikevax)11/13/2024  Moderna SARS-CoV-2 Vaccination3/27/2021, 2/28/2021  Pfizer COVID-19 vaccine, 12 years and older, (30mcg/0.3mL) (Comirnaty)1/23/2024  Pfizer COVID-19 vaccine, bivalent, age 12 years and older (30 mcg/0.3 mL)9/15/2022  Pneumococcal conjugate vaccine, 13-valent (PREVNAR 13)9/15/2014  Pneumococcal conjugate vaccine, 20-valent (PREVNAR 20)5/5/2023  Pneumococcal polysaccharide vaccine, 23-valent, age 2 years and older (PNEUMOVAX 23)1/1/2014  Tdap vaccine, age 7 year and older (BOOSTRIX, ADACEL)6/5/2023, 11/26/2017  Zoster vaccine, recombinant, adult (SHINGRIX)8/8/2023, 6/5/2023, 12/13/2020,  ... (1 more)  Zoster, live10/25/2017, 5/22/2014

## 2025-04-15 ENCOUNTER — TELEMEDICINE (OUTPATIENT)
Dept: BEHAVIORAL HEALTH | Facility: CLINIC | Age: 75
End: 2025-04-15
Payer: COMMERCIAL

## 2025-04-15 DIAGNOSIS — R06.81 APNEA: ICD-10-CM

## 2025-04-15 DIAGNOSIS — F41.8 MIXED ANXIETY AND DEPRESSIVE DISORDER: ICD-10-CM

## 2025-04-15 DIAGNOSIS — G47.30 SLEEP APNEA, UNSPECIFIED TYPE: ICD-10-CM

## 2025-04-15 DIAGNOSIS — F25.1 SCHIZOAFFECTIVE DISORDER, DEPRESSIVE TYPE (MULTI): ICD-10-CM

## 2025-04-15 PROCEDURE — 1159F MED LIST DOCD IN RCRD: CPT | Performed by: PSYCHIATRY & NEUROLOGY

## 2025-04-15 PROCEDURE — 4010F ACE/ARB THERAPY RXD/TAKEN: CPT | Performed by: PSYCHIATRY & NEUROLOGY

## 2025-04-15 PROCEDURE — 99215 OFFICE O/P EST HI 40 MIN: CPT | Performed by: PSYCHIATRY & NEUROLOGY

## 2025-04-15 PROCEDURE — 1160F RVW MEDS BY RX/DR IN RCRD: CPT | Performed by: PSYCHIATRY & NEUROLOGY

## 2025-04-15 ASSESSMENT — ENCOUNTER SYMPTOMS
NERVOUS/ANXIOUS: 1
DYSPHORIC MOOD: 1
DEPRESSED MOOD: 1

## 2025-04-15 NOTE — PROGRESS NOTES
Subjective   Patient ID: Truong Armijo is a 74 y.o. male who presents for No chief complaint on file. I have trouble with sleep.     Virtual Consent: The patient engaged in a telehealth session via Epic audio visual or phone with this provider practicing within the [facility]. The identity of the patient was verified by their date of birth and last four digits of their social security number. The provider demonstrated that confidentially was preserved at their location. The patient was informed that they were responsible for ensuring confidentially was secured at their location. The patient's location was documented for emergency purposes. The patient was informed of the necessary steps that would occur if an emergency was to occur or technology failed during session.   An interactive audio and video telecommunication system which permits real time communications between the patient (at home) and provider (at the home office) was utilized to provide this telehealth service.   Verbal consent was requested and obtained from Truong Armijo on this date, 04/15/25 for a telehealth visit and the patient's location was confirmed at the time of the visit.    HPI: The patient report many frustrations with multiple endeavors. The patient is managing at this time. He is working maintaining his weight. He is currently trying to resume psychotherapy with a previous therapist.      Active and Past Problems  Problems    · Acute pain of both knees (338.19,719.46) (M25.561,M25.562)   · Adult hypothyroidism (244.9) (E03.9)   · Adverse drug reaction (E947.9) (T50.905A)   · Adverse effect of drug, initial encounter (E947.9) (T50.905A)   · Anorexia (783.0) (R63.0)   · Anxiety and depression (300.00,311) (F41.9,F32.A)   · Anxiety disorder (300.00) (F41.9)   · Arthralgia of knee, unspecified laterality (719.46) (M25.569)   · Arthralgia of left shoulder region (719.41) (M25.512)   · Arthralgia of right shoulder region (719.41)  (M25.511)   · Asthmatic bronchitis (493.90) (J45.909)   · Atelectasis (518.0) (J98.11)   · Atelectasis, bilateral (518.0) (J98.11)   · Atrophic retina (362.60) (H35.89)   · Back pain (724.5) (M54.9)   · Balance problem (781.99) (R26.89)   · Benign essential hypertension (401.1) (I10)   · Bilateral artificial lens implant (V43.1) (Z96.1)   · Bilateral myopia (367.1) (H52.13)   · Bilateral sensorineural hearing loss (389.18) (H90.3)   · Bilateral shoulder pain, unspecified chronicity (719.41) (M25.511,M25.512)   · Cervical back pain with evidence of disc disease (722.91) (M50.90)   · Cervical disc disease (722.91) (M50.90)   · Cervical myofascial pain syndrome (729.1) (M79.18)   · 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)   · Chest pain of uncertain etiology (786.59) (R07.9)   · Chronic depression (311) (F32.A)   · Chronic low self esteem (799.29) (R45.81)   · Chronic otitis externa of left ear (380.23) (H60.62)   · Chronic otitis media (382.9) (H66.90)   · Chronic primary angle-closure glaucoma of both eyes, mild stage (365.23) (H40.2231)   · Chronic primary angle-closure glaucoma of left eye, indeterminate stage (365.23)  (H40.2224)   · Chronic primary angle-closure glaucoma of left eye, unspecified glaucoma stage  (365.23) (H40.2220)   · Constipation (564.00) (K59.00)   · Contusion of lower back (922.31) (S30.0XXA)   · COPD (chronic obstructive pulmonary disease) (496) (J44.9)   · Cough variant asthma (493.82) (J45.991)   · Degeneration of intervertebral disc of cervical region (722.4) (M50.30)   · Depression with anxiety (300.4) (F41.8)   · Diabetes mellitus (250.00) (E11.9)   · Diabetic Charcot's foot (250.60,713.5) (E11.610)   · Diabetic gastroenteropathy (250.60,579.8) (E11.69,K52.9)   · Diabetic peripheral neuropathy (250.60,357.2) (E11.42)   · Dizziness (780.4) (R42)   · Elbow pain (719.42) (M25.529)   · Elevated IOP (365.00) (H40.059)   · Elevated LDL cholesterol level (272.0) (E78.00)   · Encounter for  immunization (V03.89) (Z23)   · Enlarged prostate without lower urinary tract symptoms (luts) (600.00) (N40.0)   · Epididymitis (604.90) (N45.1)   · Fatigue (780.79) (R53.83)   · Fluctuating mental status (780.97) (R41.82)   · Generalized weakness (780.79) (R53.1)   · Glaucoma (365.9) (H40.9)   · High triglycerides (272.1) (E78.1)   · HTN (hypertension) (401.9) (I10)   · Hyperglycemia (790.29) (R73.9)   · Hypogonadism male (257.2) (E29.1)   · Hypotension due to drugs (458.8,E947.9) (I95.2)   · Impaired vision (369.9) (H54.7)   · Increased urinary frequency (788.41) (R35.0)   · Insomnia (780.52) (G47.00)   · Intoxication   · Lateral epicondylitis of right elbow (726.32) (M77.11)   · Leg weakness (729.89) (R29.898)   · Lumbar disc herniation (722.10) (M51.26)   · Lumbar pain (724.2) (M54.50)   · Medication management (V58.69) (Z79.899)   · Migraine equivalent (346.20) (G43.109)   · Mixed hearing loss (389.20) (H90.8)   · Moderate episode of recurrent major depressive disorder (296.32) (F33.1)   · Neck pain (723.1) (M54.2)   · Neck pain, acute (723.1) (M54.2)   · Neurogenic bladder (596.54) (N31.9)   · 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,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)   · Optic nerve hemorrhage (377.42) (H47.029)   · 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)   · Other specified hypotension (458.8) (I95.89)   · Pain in both feet (729.5) (M79.671,M79.672)   · Paranoia (psychosis) (297.1) (F22)   · Paranoid ideation (297.8) (F22)   · Penile pain, chronic (607.9) (N48.89,G89.29)   · Peripheral neuropathy (356.9) (G62.9)   · Persistent testicular pain (608.9) (N50.819)   · Poorly controlled diabetes mellitus (250.00) (E11.65)   · Post traumatic stress disorder (PTSD) (309.81) (F43.10)   · Primary hypothyroidism (244.9) (E03.9)   · Primary open angle glaucoma of both eyes, mild stage (365.11,365.71) (H40.1131)   · Primary osteoarthritis of left knee (715.16) (M17.12)   · Primary osteoarthritis of right knee (715.16) (M17.11)   · PUD (peptic  ulcer disease) (533.90) (K27.9)   · Reactive confusion (298.2) (F44.89)   · Reflux esophagitis (530.11) (K21.00)   · Right inguinal hernia (550.90) (K40.90)   · Schizoaffective disorder (295.70) (F25.9)   · Screening PSA (prostate specific antigen) (V76.44) (Z12.5)   · SOB (shortness of breath) (786.05) (R06.02)   · SOB (shortness of breath) (786.05) (R06.02)   · Sprain of right elbow, initial encounter (841.9) (S53.401A)   · Stress reaction (308.9) (F43.0)   · Subjective tinnitus of both ears (388.31) (H93.13)   · 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)   · Thyroid disorder (246.9) (E07.9)   · TIA (transient ischemic attack) (435.9) (G45.9)   · Trigeminal neuralgia (350.1) (G50.0)   · Trigger point of extremity (729.5) (M79.609)   · Urinary frequency (788.41) (R35.0)   · Urinary tract infection (599.0) (N39.0)   · Urinary urgency (788.63) (R39.15)   · Vasovagal reaction (780.2) (R55)   · Vertigo (780.4) (R42)   · Vitamin D deficiency (268.9) (E55.9)     Past Medical History  Problems    · History of Acute bilateral otitis media (382.9) (H66.93)   · Resolved Date: 15 Oct 2015   · History of Change in bowel habits (787.99) (R19.4)   · Resolved Date: 09 Dec 2021   · History of Change in mental status (780.97) (R41.82)   · Resolved Date: 17 Sep 2019   · History of Chronic diarrhea of unknown origin (787.91) (K52.9)   · Resolved Date: 09 Dec 2021   · History of Depression with anxiety (300.4) (F41.8)   · Resolved Date: 03 Nov 2017   · History of Depression with anxiety (300.4) (F41.8)   · Resolved Date: 13 May 2020   · History of Depression with anxiety (300.4) (F41.8)   · Resolved Date: 02 Jul 2021   · History of Dysphagia, oropharyngeal phase (787.22) (R13.12)   · Resolved Date: 09 Dec 2021   · History of acute otitis media (V12.49) (Z86.69)   · Resolved Date: 15 Oct 2015   · History of acute pharyngitis (V12.69) (Z87.09)   · Resolved Date: 15 Oct 2015   · History of acute sinusitis (V12.69) (Z87.09)   · Resolved  Date: 15 Oct 2015   · History of chronic diarrhea   · Resolved Date: 09 Dec 2021   · History of dysphagia (V13.89) (Z87.898)   · Resolved Date: 09 Dec 2021   · History of dysphagia (V13.89) (Z87.898)   · Resolved Date: 09 Dec 2021   · History of fatigue (V13.89) (Z87.898)   · Resolved Date: 28 Apr 2014   · History of hypercholesterolemia (V12.29) (Z86.39)   · Resolved Date: 12 Oct 2016   · History of nausea (V12.79) (Z87.898)   · Resolved Date: 09 Dec 2021   · History of nausea (V12.79) (Z87.898)   · Resolved Date: 09 Dec 2021   · History of nausea and vomiting (V12.79) (Z87.898)   · Resolved Date: 09 Dec 2021   · History of psychiatric hospitalization (V11.9) (Z86.59)   · Several psychiatric hospitalizations including when in his early 20's he was diagnosed      with disorganized schizophrenia.   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   · Completed elementary school   ·  Completed high school   · Former smoker (V15.82) (Z87.891)   · Heterosexual   · No drug use   · Retired   · Retired from taxi driving for about 20 years. He did work for [employer] prior to driving a taxi.   · Single     Mental Status Exam     General: In no acute distress with depressed mood.   Appearance: Exacerbation of chronic depressive features with poor self esteem.   Attitude: Cooperative.   Behavior: Exacerbation of chronic depressive features with poor self esteem.   Motor Activity: No agitation or retardation. No EPS/TD. Normal gait and station.   Speech: Regular rate, rhythm, volume and tone, spontaneous, fluent.   Mood: Exacerbation of chronic depressive features with poor self esteem.   Affect: Exacerbation of chronic depressive features with poor self esteem.   Thought Process: Exacerbation of chronic depressive features with poor self esteem.   Thought Content: Exacerbation of chronic depressive features with poor self esteem.   Thought Perception: Thought disturbance with chronic depressive features with poor self esteem.   Cognition: Thought disturbance with chronic depressive features with poor self esteem.   Insight: Insight limitations.   Judgment: Judgment limitations.       Appearance: well-groomed.   Build: average.   Demeanor: average.   Eye Contact: average.   Motor Activity: average.   Speech: clear.   Language: Neurologic language is intact.   Fund of Knowledge: intact fund of knowledge.   Delusions: None Reported.   Self Harm: None Reported.   Aggressive: None Reported.   Mood: depressed and anxious . Exacerbation of chronic depressive features with poor self esteem.   Affect: labile . Exacerbation of chronic depressive features with poor self esteem.   Orientation: alert, oriented x3.   Manner: cooperative.   Thought process: goal-directed.   Thought association: displays rational thought process.   Content of thought: Mr. HANNY HILL denies any suicidal  or homicidal ideation or plans.   Abstract/ Rational Thought: minimal impairment   Memory: grossly intact.   Behavior: calm.   Attention/Concentration: normal.   Cognition: intact.   Intelligence Estimate: average.   Executive Function: intact.   Insight: minimal impairment.   Judgement: minimal impairment.         Review of Systems   Neurological:         Mental Status Exam     General: In no acute distress with depressed mood.   Appearance: Exacerbation of chronic depressive features with poor self esteem.   Attitude: Cooperative.   Behavior: Exacerbation of chronic depressive features with poor self esteem.   Motor Activity: No agitation or retardation. No EPS/TD. Normal gait and station.   Speech: Regular rate, rhythm, volume and tone, spontaneous, fluent.   Mood: Exacerbation of chronic depressive features with poor self esteem.   Affect: Exacerbation of chronic depressive features with poor self esteem.   Thought Process: Exacerbation of chronic depressive features with poor self esteem.   Thought Content: Exacerbation of chronic depressive features with poor self esteem.   Thought Perception: Thought disturbance with chronic depressive features with poor self esteem.   Cognition: Thought disturbance with chronic depressive features with poor self esteem.   Insight: Insight limitations.   Judgment: Judgment limitations.       Appearance: well-groomed.   Build: average.   Demeanor: average.   Eye Contact: average.   Motor Activity: average.   Speech: clear.   Language: Neurologic language is intact.   Fund of Knowledge: intact fund of knowledge.   Delusions: None Reported.   Self Harm: None Reported.   Aggressive: None Reported.   Mood: depressed and anxious . Exacerbation of chronic depressive features with poor self esteem.   Affect: labile . Exacerbation of chronic depressive features with poor self esteem.   Orientation: alert, oriented x3.   Manner: cooperative.   Thought process: goal-directed.   Thought  association: displays rational thought process.   Content of thought: Mr. HANNY HILL denies any suicidal or homicidal ideation or plans.   Abstract/ Rational Thought: minimal impairment   Memory: grossly intact.   Behavior: calm.   Attention/Concentration: normal.   Cognition: intact.   Intelligence Estimate: average.   Executive Function: intact.   Insight: minimal impairment.   Judgement: minimal impairment.      Psychiatric/Behavioral:  Positive for dysphoric mood. The patient is nervous/anxious.         Mental Status Exam     General: In no acute distress with depressed mood.   Appearance: Exacerbation of chronic depressive features with poor self esteem.   Attitude: Cooperative.   Behavior: Exacerbation of chronic depressive features with poor self esteem.   Motor Activity: No agitation or retardation. No EPS/TD. Normal gait and station.   Speech: Regular rate, rhythm, volume and tone, spontaneous, fluent.   Mood: Exacerbation of chronic depressive features with poor self esteem.   Affect: Exacerbation of chronic depressive features with poor self esteem.   Thought Process: Exacerbation of chronic depressive features with poor self esteem.   Thought Content: Exacerbation of chronic depressive features with poor self esteem.   Thought Perception: Thought disturbance with chronic depressive features with poor self esteem.   Cognition: Thought disturbance with chronic depressive features with poor self esteem.   Insight: Insight limitations.   Judgment: Judgment limitations.       Appearance: well-groomed.   Build: average.   Demeanor: average.   Eye Contact: average.   Motor Activity: average.   Speech: clear.   Language: Neurologic language is intact.   Fund of Knowledge: intact fund of knowledge.   Delusions: None Reported.   Self Harm: None Reported.   Aggressive: None Reported.   Mood: depressed and anxious . Exacerbation of chronic depressive features with poor self esteem.   Affect: labile . Exacerbation  of chronic depressive features with poor self esteem.   Orientation: alert, oriented x3.   Manner: cooperative.   Thought process: goal-directed.   Thought association: displays rational thought process.   Content of thought: Mr. HANNY HILL denies any suicidal or homicidal ideation or plans.   Abstract/ Rational Thought: minimal impairment   Memory: grossly intact.   Behavior: calm.   Attention/Concentration: normal.   Cognition: intact.   Intelligence Estimate: average.   Executive Function: intact.   Insight: minimal impairment.   Judgement: minimal impairment.      All other systems reviewed and are negative.    Psych Review of Symptoms:    ADHD: Patient denied any symptoms.         Anxiety:   Generalized Anxiety Symptoms: Difficulty controlling worry and excessive worry.       Developmental and Sensory Concerns: Patient denied any symptoms.         Depressive Symptoms:   Depressed mood and irritable.       Disruptive and Conduct Symptoms: Patient denied any symptoms.         Eating / Feeding Concerns: Patient denied any symptoms.         Elimination Symptoms: Patient denied any symptoms.         Manic Symptoms: Patient denied any symptoms.         Obsessive-Compulsive Symptoms: Patient denied any symptoms.         Psychotic Symptoms: Patient denied any symptoms.           Trauma Related Symptoms: Patient denied any symptoms.           Sleep Concerns: Patient denied any symptoms.             Objective   Physical Exam  Neurological:      Mental Status: He is oriented to person, place, and time.      Comments: Mental Status Exam     General: In no acute distress with depressed mood.   Appearance: Exacerbation of chronic depressive features with poor self esteem.   Attitude: Cooperative.   Behavior: Exacerbation of chronic depressive features with poor self esteem.   Motor Activity: No agitation or retardation. No EPS/TD. Normal gait and station.   Speech: Regular rate, rhythm, volume and tone, spontaneous,  fluent.   Mood: Exacerbation of chronic depressive features with poor self esteem.   Affect: Exacerbation of chronic depressive features with poor self esteem.   Thought Process: Exacerbation of chronic depressive features with poor self esteem.   Thought Content: Exacerbation of chronic depressive features with poor self esteem.   Thought Perception: Thought disturbance with chronic depressive features with poor self esteem.   Cognition: Thought disturbance with chronic depressive features with poor self esteem.   Insight: Insight limitations.   Judgment: Judgment limitations.       Appearance: well-groomed.   Build: average.   Demeanor: average.   Eye Contact: average.   Motor Activity: average.   Speech: clear.   Language: Neurologic language is intact.   Fund of Knowledge: intact fund of knowledge.   Delusions: None Reported.   Self Harm: None Reported.   Aggressive: None Reported.   Mood: depressed and anxious . Exacerbation of chronic depressive features with poor self esteem.   Affect: labile . Exacerbation of chronic depressive features with poor self esteem.   Orientation: alert, oriented x3.   Manner: cooperative.   Thought process: goal-directed.   Thought association: displays rational thought process.   Content of thought: Mr. HANNY HILL denies any suicidal or homicidal ideation or plans.   Abstract/ Rational Thought: minimal impairment   Memory: grossly intact.   Behavior: calm.   Attention/Concentration: normal.   Cognition: intact.   Intelligence Estimate: average.   Executive Function: intact.   Insight: minimal impairment.   Judgement: minimal impairment.      Psychiatric:      Comments: Mental Status Exam     General: In no acute distress with depressed mood.   Appearance: Exacerbation of chronic depressive features with poor self esteem.   Attitude: Cooperative.   Behavior: Exacerbation of chronic depressive features with poor self esteem.   Motor Activity: No agitation or retardation. No  EPS/TD. Normal gait and station.   Speech: Regular rate, rhythm, volume and tone, spontaneous, fluent.   Mood: Exacerbation of chronic depressive features with poor self esteem.   Affect: Exacerbation of chronic depressive features with poor self esteem.   Thought Process: Exacerbation of chronic depressive features with poor self esteem.   Thought Content: Exacerbation of chronic depressive features with poor self esteem.   Thought Perception: Thought disturbance with chronic depressive features with poor self esteem.   Cognition: Thought disturbance with chronic depressive features with poor self esteem.   Insight: Insight limitations.   Judgment: Judgment limitations.       Appearance: well-groomed.   Build: average.   Demeanor: average.   Eye Contact: average.   Motor Activity: average.   Speech: clear.   Language: Neurologic language is intact.   Fund of Knowledge: intact fund of knowledge.   Delusions: None Reported.   Self Harm: None Reported.   Aggressive: None Reported.   Mood: depressed and anxious . Exacerbation of chronic depressive features with poor self esteem.   Affect: labile . Exacerbation of chronic depressive features with poor self esteem.   Orientation: alert, oriented x3.   Manner: cooperative.   Thought process: goal-directed.   Thought association: displays rational thought process.   Content of thought: Mr. HANNY HILL denies any suicidal or homicidal ideation or plans.   Abstract/ Rational Thought: minimal impairment   Memory: grossly intact.   Behavior: calm.   Attention/Concentration: normal.   Cognition: intact.   Intelligence Estimate: average.   Executive Function: intact.   Insight: minimal impairment.   Judgement: minimal impairment.            Lab Review:   Lab on 12/18/2024   Component Date Value    WBC 12/18/2024 8.4     nRBC 12/18/2024 0.0     RBC 12/18/2024 5.02     Hemoglobin 12/18/2024 15.3     Hematocrit 12/18/2024 46.5     MCV 12/18/2024 93     MCH 12/18/2024 30.5      MCHC 12/18/2024 32.9     RDW 12/18/2024 13.5     Platelets 12/18/2024 264     Neutrophils % 12/18/2024 62.8     Immature Granulocytes %,* 12/18/2024 0.4     Lymphocytes % 12/18/2024 24.2     Monocytes % 12/18/2024 10.3     Eosinophils % 12/18/2024 1.7     Basophils % 12/18/2024 0.6     Neutrophils Absolute 12/18/2024 5.29     Immature Granulocytes Ab* 12/18/2024 0.03     Lymphocytes Absolute 12/18/2024 2.04     Monocytes Absolute 12/18/2024 0.87 (H)     Eosinophils Absolute 12/18/2024 0.14     Basophils Absolute 12/18/2024 0.05     Glucose 12/18/2024 136 (H)     Sodium 12/18/2024 140     Potassium 12/18/2024 4.3     Chloride 12/18/2024 102     Bicarbonate 12/18/2024 30     Anion Gap 12/18/2024 12     Urea Nitrogen 12/18/2024 24 (H)     Creatinine 12/18/2024 1.05     eGFR 12/18/2024 75     Calcium 12/18/2024 10.0     Albumin 12/18/2024 4.6     Alkaline Phosphatase 12/18/2024 32 (L)     Total Protein 12/18/2024 7.0     AST 12/18/2024 10     Bilirubin, Total 12/18/2024 1.0     ALT 12/18/2024 15     Cholesterol 12/18/2024 120     HDL-Cholesterol 12/18/2024 40.8     Cholesterol/HDL Ratio 12/18/2024 2.9     LDL Calculated 12/18/2024 57     VLDL 12/18/2024 22     Triglycerides 12/18/2024 110     Non HDL Cholesterol 12/18/2024 79     Hemoglobin A1C 12/18/2024 5.3     Estimated Average Glucose 12/18/2024 105    Lab on 12/16/2024   Component Date Value    Clonazepam 12/16/2024 <25     7-Aminoclonazepam 12/16/2024 <25     Alprazolam 12/16/2024 <25     Alpha-Hydroxyalprazolam 12/16/2024 <25     Midazolam 12/16/2024 <25     Alpha-Hydroxymidazolam 12/16/2024 <25     Chlordiazepoxide 12/16/2024 <25     Diazepam 12/16/2024 <25     Nordiazepam 12/16/2024 237 (H)     Temazepam 12/16/2024 611 (H)     Oxazepam 12/16/2024 632 (H)     Lorazepam 12/16/2024 <25    Lab on 12/02/2024   Component Date Value    HSV 1, IgG 12/02/2024 0.2     HSV 2, 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        Assessment/Plan Psychiatric Risk Assessment  Violence Risk Assessment:  none  Acute Risk of Harm to Others is Considered: low   Suicide Risk Assessment: age > 65 yrs old and   Protective Factors against Suicide: adherence to  treatment, fear of suicide, moral objections to suicide, positive family relationships, and sense of responsibility toward family  Acute Risk of Harm to Self is Considered: low    Imminent Risk of Suicide or Serious Self-Injury: Low   Chronic Risk of Suicide of Serious Self-Injury: Low  Risk factors: Age, depression history and   Protective factors: Denies current suicidal ideation, denies history of suicide attempts , willingness to seek help and support , gender, access to a variety of clinical interventions , and receiving and engaged in care for mental, physical, and substance use disorders      Imminent Risk of Violence or Homicide: Low   Risk Factors: No significant risk factors identified on screening  Protective Factors: Lack of known history of harm to others , Lack of known history of violent ideation , and lack of known access to firearms.     Assessment & Plan  Sleep apnea, unspecified type    Orders:    Referral to Adult Sleep Medicine; Future    Mixed anxiety and depressive disorder  Your diagnosis is of chronic depressive and anxious features. You deny any suicidal or homicidal ideation or plans.      [place]AR[place] was reviewed today, 4/15/25 with no concerning findings evidenced.     The plan is to continue bupropion  mg morning and middle afternoon daily, you have elected to stop risperidone 1 mg twice daily in the morning and late afternoon which you announced you have done unilaterally prior to this appointment because of concerns of parkinsonian features/risks; continue fluoxetine to 40 mg daily as well as bupropion  mg daily for depression and you can continue diazepam 5 mg as needed daily and at bedtime as needed only. You clearly are able to recite back the risks, benefits and alternatives of these medications as discussed with  you today.     The FDA risks of antipsychotics including increased risk of sudden death, heart attack, brain stroke, irreversible neurological movement disorders, parkinsonism, cardiac toxicity, weight gain and diabetes of this medication were discussed. You were able to recite back the risks, benefits, alternatives with respect to antipsychotics and in particular risperidone.     The FDA risks of antidepressants including increased risk of suicide, cardiotoxicity, lowered seizure threshold, the serotonin syndrome with serotonin agents and anticholinergic effects have been discussed. We also discussed that at least in the case of SSRIs there is interference with warfarin and nonsteroidal inflammatories and can cause increased bleeding and hemorrhage. You were able to recite back the risks, benefits, alternatives with respect to your antidepressants in particular bupropion and fluoxetine.     The FDA risks of benzodiazepines were discussed which includes impairment of memory and cognition, increased falls, addiction and dangerous withdrawals if stopped suddenly which can cause significant morbidity and or mortality. There is also risk of respiratory suppression alone or with other sedative or other medications which can lead to morbidity and mortality. You clearly were able to recite back the risks, benefits and alternatives to the benzodiazepines discussed.      Follow up monthly.     We made a referral to sleep medicine as discussed.  Time:   Prep time on date of the patient encounter: 5 minutes.   Time spent directly with patient/family/caregiver: 30 minutes.   Additional time spent on patient care activities:  minutes.   Documentation time: 5 minutes.   Total time on date of patient encounter: 40 minutes.

## 2025-04-24 DIAGNOSIS — F41.8 MIXED ANXIETY DEPRESSIVE DISORDER: ICD-10-CM

## 2025-04-24 DIAGNOSIS — F43.10 POST-TRAUMATIC STRESS DISORDER, UNSPECIFIED: ICD-10-CM

## 2025-04-24 DIAGNOSIS — F25.1 SCHIZOAFFECTIVE DISORDER, DEPRESSIVE TYPE (MULTI): ICD-10-CM

## 2025-04-24 RX ORDER — BUPROPION HYDROCHLORIDE 150 MG/1
150 TABLET ORAL EVERY MORNING
Qty: 180 TABLET | Refills: 1 | Status: SHIPPED | OUTPATIENT
Start: 2025-04-24

## 2025-04-24 RX ORDER — FLUOXETINE HYDROCHLORIDE 40 MG/1
40 CAPSULE ORAL DAILY
Qty: 90 CAPSULE | Refills: 1 | Status: SHIPPED | OUTPATIENT
Start: 2025-04-24

## 2025-04-25 ENCOUNTER — APPOINTMENT (OUTPATIENT)
Facility: CLINIC | Age: 75
End: 2025-04-25
Payer: COMMERCIAL

## 2025-04-25 VITALS
OXYGEN SATURATION: 98 % | HEART RATE: 70 BPM | HEIGHT: 74 IN | WEIGHT: 198 LBS | RESPIRATION RATE: 18 BRPM | SYSTOLIC BLOOD PRESSURE: 116 MMHG | DIASTOLIC BLOOD PRESSURE: 77 MMHG | BODY MASS INDEX: 25.41 KG/M2

## 2025-04-25 DIAGNOSIS — G47.30 SLEEP DISORDER BREATHING: Primary | ICD-10-CM

## 2025-04-25 DIAGNOSIS — G47.30 SLEEP APNEA, UNSPECIFIED TYPE: ICD-10-CM

## 2025-04-25 PROCEDURE — 3008F BODY MASS INDEX DOCD: CPT | Performed by: GENERAL PRACTICE

## 2025-04-25 PROCEDURE — 1036F TOBACCO NON-USER: CPT | Performed by: GENERAL PRACTICE

## 2025-04-25 PROCEDURE — 3078F DIAST BP <80 MM HG: CPT | Performed by: GENERAL PRACTICE

## 2025-04-25 PROCEDURE — 4010F ACE/ARB THERAPY RXD/TAKEN: CPT | Performed by: GENERAL PRACTICE

## 2025-04-25 PROCEDURE — 1160F RVW MEDS BY RX/DR IN RCRD: CPT | Performed by: GENERAL PRACTICE

## 2025-04-25 PROCEDURE — 1159F MED LIST DOCD IN RCRD: CPT | Performed by: GENERAL PRACTICE

## 2025-04-25 PROCEDURE — 3074F SYST BP LT 130 MM HG: CPT | Performed by: GENERAL PRACTICE

## 2025-04-25 PROCEDURE — 99214 OFFICE O/P EST MOD 30 MIN: CPT | Performed by: GENERAL PRACTICE

## 2025-04-25 ASSESSMENT — SLEEP AND FATIGUE QUESTIONNAIRES
HOW LIKELY ARE YOU TO NOD OFF OR FALL ASLEEP IN A CAR, WHILE STOPPED FOR A FEW MINUTES IN TRAFFIC: SLIGHT CHANCE OF DOZING
HOW LIKELY ARE YOU TO NOD OFF OR FALL ASLEEP WHILE SITTING QUIETLY AFTER LUNCH WITHOUT ALCOHOL: WOULD NEVER DOZE
DIFFICULTY_STAYING_ASLEEP: SEVERE
SLEEP_PROBLEM_NOTICEABLE_TO_OTHERS: MUCH
DIFFICULTY_FALLING_ASLEEP: SEVERE
HOW LIKELY ARE YOU TO NOD OFF OR FALL ASLEEP WHILE SITTING AND TALKING TO SOMEONE: WOULD NEVER DOZE
WAKING_TOO_EARLY: VERY SEVERE
ESS-CHAD TOTAL SCORE: 11
SLEEP_PROBLEM_INTERFERES_DAILY_ACTIVITIES: MUCH
HOW LIKELY ARE YOU TO NOD OFF OR FALL ASLEEP WHILE LYING DOWN TO REST IN THE AFTERNOON WHEN CIRCUMSTANCES PERMIT: MODERATE CHANCE OF DOZING
SATISFACTION_WITH_CURRENT_SLEEP_PATTERN: VERY DISSATISFIED
HOW LIKELY ARE YOU TO NOD OFF OR FALL ASLEEP WHILE WATCHING TV: HIGH CHANCE OF DOZING
SITING INACTIVE IN A PUBLIC PLACE LIKE A CLASS ROOM OR A MOVIE THEATER: MODERATE CHANCE OF DOZING
HOW LIKELY ARE YOU TO NOD OFF OR FALL ASLEEP WHEN YOU ARE A PASSENGER IN A CAR FOR AN HOUR WITHOUT A BREAK: WOULD NEVER DOZE
WORRIED_DISTRESSED_DUE_TO_SLEEP: VERY MUCH NOTICEABLE
HOW LIKELY ARE YOU TO NOD OFF OR FALL ASLEEP WHILE SITTING AND READING: HIGH CHANCE OF DOZING

## 2025-04-25 ASSESSMENT — PATIENT HEALTH QUESTIONNAIRE - PHQ9
SUM OF ALL RESPONSES TO PHQ9 QUESTIONS 1 AND 2: 0
1. LITTLE INTEREST OR PLEASURE IN DOING THINGS: NOT AT ALL
2. FEELING DOWN, DEPRESSED OR HOPELESS: NOT AT ALL

## 2025-04-25 NOTE — PROGRESS NOTES
Patient: Truong Armijo    [id number]  : 1950 -- AGE 74 y.o.    Provider: Justin Lagos DO     Location [facility]   Service Date: 2025              [facility]  New Visit Note        HISTORY OF PRESENT ILLNESS     The patient's referring provider is: Josue Hernandez MD PhD    HISTORY OF PRESENT ILLNESS   Truong Armijo is a 74 y.o. male who presents to a [facility] for a sleep medicine evaluation with concerns of Excessive Daytime Sleepiness.     The patient  has a past medical history of Abnormality of gait (2012), Acute constipation (2024), Altered mental status, unspecified (2019), Asthma (2019), Benign neoplasm of eyelid including canthus (2017), Benign neoplasm of left choroid (2015), Cervicalgia (2012), Change in bowel habit (2021), Condition not found (2023), Disorganized schizophrenia (Multi), Dysphagia (2024), Dysphagia, oropharyngeal phase (2021), Elbow pain (2024), Elbow sprain (2024), Hemorrhage in optic nerve sheath (2024), Impairment of balance (2024), Nausea (2024), Neurogenic bladder (2024), Noninfective gastroenteritis and colitis, unspecified (2021), Nuclear sclerotic cataract of both eyes (2019), Other conditions influencing health status (2021), 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), Other specified anxiety disorders (2020), Other specified anxiety disorders (2021), Other specified anxiety disorders (2017), Otitis media, unspecified, bilateral (2014), Peripheral vertigo (10/01/2012), Personal history of other diseases of the nervous system and sense organs (10/17/2022), Personal history of other diseases of the nervous system and sense organs (2014), Personal history of other diseases of the respiratory system (2015),  Personal history of other diseases of the respiratory system (12/12/2014), Personal history of other endocrine, nutritional and metabolic disease (10/12/2016), Personal history of other mental and behavioral disorders, Personal history of other specified conditions (12/09/2021), Personal history of other specified conditions (09/03/2021), Personal history of other specified conditions (12/09/2021), Personal history of other specified conditions (12/09/2021), Personal history of other specified conditions (12/09/2021), Personal history of other specified conditions (07/25/2018), Personal history of other specified conditions (12/09/2021), Personal history of other specified conditions (03/07/2014), Postconcussional syndrome, Primary open angle glaucoma (POAG) (09/14/2023), Problem related to primary support group, unspecified (07/30/2019), Subjective tinnitus (01/26/2024), and Urinary tract infection (01/26/2024)..    PAST SLEEP HISTORY    Pmhx includes HTN, insomnia, seasonal allergies, DM, GERD, hypothyroidism, anxiety, depression, PTSD, schizoaffective disorder.,  Charcot foot, trigeminal neuralgia, COPD?    CURRENT HISTORY    On today's visit, 4/25/2025, the patient reports that he is establishing care for his insomnia.     Patient states that he has had difficulty with falling asleep at night since he was a child. No specific triggering factors. He does doze off during the day.   He tried Valium and tylenol pm and benadryl--> they help him but he hopes to stop taking these meds.     Breakfast: 7:30am  Lunch: varies but around noon  Dinner: 7-8pm    Snacks: sometimes       He is taking a class online through [facility], he plays music and is leaking Citizen of the [place].     After dinner he thinks, and listens to music, reads, watches TV in his living room then dozes off around 10:30pm. Then gets up after an hour an goes to bed around 12am.     Sleep schedule  on weekdays / work days:  Usual Bedtime: 12am  Sleep latency:  varies   Wake time : 5:30am  Total sleep time average/day: 5 hours/day  Awakenings: multiple times per night, nocturia, varies in length.   Naps: 1-2hrs while watching TV.       Sleep schedule  on weekends/non work days :  Same as above schedule.     Sleep aids:   Valium and tylenol pm and benadryl--> they help him but he hopes to stop taking these meds.     Stimulants: no    Occupation: retired. He was on  rotating shifts; a job driving people.     Preferred sleeping position: SLEEP POSITION: sidelying    Sleep-related ROS:    Snoring:  y  Witnessed apneas:      n  Gasping/ choking: n   Am Dry mouth:  y           Nasal congestion:  y       am headaches: y    Sleep is described as unrefreshing.     Daytime sleepiness: y  Fatigue or decreased energy: y  Difficulty remembering things in daytime: sometimes  Difficulty staying focused in daytime: : sometimes  Irritable during the day: sometimes    Hx of car accident: n      RLS screen:  RLSSCREEN: - Sensations: Patient does not have unusual sensations in their extremities that cause an urge to move them     Sleep-related behaviors: DENIES      ESS: 11       REVIEW OF SYSTEMS     REVIEW OF SYSTEMS  Review of Systems   All other systems reviewed and are negative.      ALLERGIES AND MEDICATIONS     ALLERGIES  Allergies[1]    MEDICATIONS  Current Medications[2]      PAST HISTORY     PAST MEDICAL HISTORY  He  has a past medical history of Abnormality of gait (05/16/2012), Acute constipation (01/26/2024), Altered mental status, unspecified (08/28/2019), Asthma (09/26/2019), Benign neoplasm of eyelid including canthus (12/28/2017), Benign neoplasm of left choroid (09/18/2015), Cervicalgia (05/16/2012), Change in bowel habit (12/09/2021), Condition not found (09/14/2023), Disorganized schizophrenia (Multi), Dysphagia (01/26/2024), Dysphagia, oropharyngeal phase (12/09/2021), Elbow pain (01/26/2024), Elbow sprain (01/26/2024), Hemorrhage in optic nerve sheath (01/26/2024),  Impairment of balance (01/26/2024), Nausea (01/26/2024), Neurogenic bladder (01/26/2024), Noninfective gastroenteritis and colitis, unspecified (12/09/2021), Nuclear sclerotic cataract of both eyes (09/26/2019), Other conditions influencing health status (12/09/2021), 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), Other specified anxiety disorders (05/13/2020), Other specified anxiety disorders (07/02/2021), Other specified anxiety disorders (11/03/2017), Otitis media, unspecified, bilateral (12/12/2014), Peripheral vertigo (10/01/2012), Personal history of other diseases of the nervous system and sense organs (10/17/2022), Personal history of other diseases of the nervous system and sense organs (12/12/2014), Personal history of other diseases of the respiratory system (08/17/2015), Personal history of other diseases of the respiratory system (12/12/2014), Personal history of other endocrine, nutritional and metabolic disease (10/12/2016), Personal history of other mental and behavioral disorders, Personal history of other specified conditions (12/09/2021), Personal history of other specified conditions (09/03/2021), Personal history of other specified conditions (12/09/2021), Personal history of other specified conditions (12/09/2021), Personal history of other specified conditions (12/09/2021), Personal history of other specified conditions (07/25/2018), Personal history of other specified conditions (12/09/2021), Personal history of other specified conditions (03/07/2014), Postconcussional syndrome, Primary open angle glaucoma (POAG) (09/14/2023), Problem related to primary support group, unspecified (07/30/2019), Subjective tinnitus (01/26/2024), and Urinary tract infection (01/26/2024).    PAST SURGICAL HISTORY:  Surgical History[3]    FAMILY HISTORY  Family History[4]  DOES/DOES NOT EC: does not have a family history of sleep disorder.      SOCIAL HISTORY  He  reports that he quit smoking about 43  "years ago. His smoking use included cigars. He has never been exposed to tobacco smoke. He has never used smokeless tobacco. He reports that he does not drink alcohol and does not use drugs.     Caffeine consumption: 1 cup coffee in am sometimes gets another one around 4pm.   Alcohol consumption: 5x per week, 1/2 cup wine.   Smoking: n  Marijuana: n  Other drugs: n      PHYSICAL EXAM     VITAL SIGNS: /77   Pulse 70   Resp 18   Ht 1.88 m (6' 2\")   Wt 89.8 kg (198 lb)   SpO2 98%   BMI 25.42 kg/m²      PREVIOUS WEIGHTS:  Wt Readings from Last 3 Encounters:   04/25/25 89.8 kg (198 lb)   04/14/25 89.4 kg (197 lb)   03/11/25 91.7 kg (202 lb 1.6 oz)       Physical Exam  Constitutional: Alert and oriented, cooperative, no obvious distress.   HENT: normocephalic.   Eyes: PERRLA, nonicteric   Neck: Supple, trachea midline   respiratory: CTA bilaterally, no wheezing/ crackles/ cough  Cardiac: no rub/ gallops  GI:BS in all 4 quadrants, Soft, nontender, no masses  musculoskeletal/ Extremities: No clubbing  integumentary: no significant rashes observed.   Neurologic: AOx3.   psychiatric: appropriate mood and affect.  Modified Mallampati: 4    RESULTS/DATA       ASSESSMENT/PLAN     Mr. Armijo is a 74 y.o. male and  has a past medical history of Abnormality of gait (05/16/2012), Acute constipation (01/26/2024), Altered mental status, unspecified (08/28/2019), Asthma (09/26/2019), Benign neoplasm of eyelid including canthus (12/28/2017), Benign neoplasm of left choroid (09/18/2015), Cervicalgia (05/16/2012), Change in bowel habit (12/09/2021), Condition not found (09/14/2023), Disorganized schizophrenia (Multi), Dysphagia (01/26/2024), Dysphagia, oropharyngeal phase (12/09/2021), Elbow pain (01/26/2024), Elbow sprain (01/26/2024), Hemorrhage in optic nerve sheath (01/26/2024), Impairment of balance (01/26/2024), Nausea (01/26/2024), Neurogenic bladder (01/26/2024), Noninfective gastroenteritis and colitis, unspecified " (12/09/2021), Nuclear sclerotic cataract of both eyes (09/26/2019), Other conditions influencing health status (12/09/2021), 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), Other specified anxiety disorders (05/13/2020), Other specified anxiety disorders (07/02/2021), Other specified anxiety disorders (11/03/2017), Otitis media, unspecified, bilateral (12/12/2014), Peripheral vertigo (10/01/2012), Personal history of other diseases of the nervous system and sense organs (10/17/2022), Personal history of other diseases of the nervous system and sense organs (12/12/2014), Personal history of other diseases of the respiratory system (08/17/2015), Personal history of other diseases of the respiratory system (12/12/2014), Personal history of other endocrine, nutritional and metabolic disease (10/12/2016), Personal history of other mental and behavioral disorders, Personal history of other specified conditions (12/09/2021), Personal history of other specified conditions (09/03/2021), Personal history of other specified conditions (12/09/2021), Personal history of other specified conditions (12/09/2021), Personal history of other specified conditions (12/09/2021), Personal history of other specified conditions (07/25/2018), Personal history of other specified conditions (12/09/2021), Personal history of other specified conditions (03/07/2014), Postconcussional syndrome, Primary open angle glaucoma (POAG) (09/14/2023), Problem related to primary support group, unspecified (07/30/2019), Subjective tinnitus (01/26/2024), and Urinary tract infection (01/26/2024). He was referred to the [facility] for evaluation of insomnia.     Problem List Items Addressed This Visit    None  Visit Diagnoses         Sleep apnea, unspecified type                Problem List and Orders  Pmhx includes HTN, insomnia, seasonal allergies, DM, GERD, hypothyroidism, anxiety, depression, PTSD, schizoaffective  disorder.,  Charcot foot, trigeminal neuralgia, COPD?    1- insomnia   Sleep onset and sleep maintenance    likely multifactorial. Etiology could include psych problems, untreated sleep apnea, medication/ caffeine, circadian rhythm problems, poor sleepy hygiene.    Currently on Valium and tylenol pm and benadryl--> they help him but he hopes to stop taking these meds.     keep a steady schedule, try to regulate your wake up time. Keep a sleep log.   please minimize/eliminate caffeine, nicotine, and alcohol  Avoid daytime naps.  decrease stimulating activity for at least two hours prior to the desired sleep onset time.   get morning light exposure as much as possible especially when you first wake up  decrease light exposure as much as possible in the evening and around bedtime.   Keep a regular eating schedule.   Eat a healthy diet and exercise as tolerated.    2- sleep disorder breathing  Symptoms include snoring, unrefreshing sleep, nighttime awakenings, nocturia.    Ordered sleep study    -do not drive or operate heavy machinery if drowsy.  -avoid sedating substances/ medication, alcohol, illicit drugs and tobacco.    Follow up after sleep study or sooner as needed.            [1]   Allergies  Allergen Reactions    Cigarette Smoke Other     Mental unclear     Caffeine Unknown     Pt denies    Cat Dander Unknown    Latanoprost Unknown     Pt denies    Timolol Other     Pt denies    Wheat Flour Unknown     Pt denies this as sensitivity    Yeast, Dried Unknown     Pt denies as a sensitivity   [2]   Current Outpatient Medications   Medication Sig Dispense Refill    atorvastatin (Lipitor) 20 mg tablet TAKE 1 TABLET (20 MG) BY MOUTH ONCE DAILY AT BEDTIME. 90 tablet 1    BetimoL 0.5 % ophthalmic solution Administer 1 drop into both eyes 2 times a day.      buPROPion XL (Wellbutrin XL) 150 mg 24 hr tablet Take 1 tablet (150 mg) by mouth once daily in the morning. Do not crush, chew, or split. 180 tablet 1    cetirizine  (ZyrTEC) 5 mg chewable tablet Chew 1 tablet (5 mg) once daily.      cholecalciferol (Vitamin D-3) 50 MCG (2000 UT) tablet Take 2 tablets (100 mcg) by mouth once daily in the evening. Take with meals.      coenzyme Q10-vitamin E 100-5 mg-unit capsule TAKE 200 MG BY MOUTH ONCE DAILY. 180 capsule 2    colchicine 0.6 mg tablet TAKE 1 TABLET (0.6 MG) BY MOUTH ONCE DAILY. 90 tablet 1    cycloSPORINE (Restasis) 0.05 % ophthalmic emulsion Administer 1 drop into both eyes 2 times a day. 60 mL 11    dapagliflozin propanediol (Farxiga) 10 mg tablet Take 1 tablet (10 mg) by mouth once daily. 90 tablet 0    diazePAM (Valium) 5 mg tablet Take 1 tablet (5 mg) by mouth as needed at bedtime for anxiety. Do not fill before March 12, 2025. 90 tablet 0    diclofenac sodium 1 % kit Place 4 g on the skin once daily.      Farxiga 5 mg TAKE 1 TABLET BY MOUTH EVERY DAY 90 tablet 0    FLUoxetine (PROzac) 40 mg capsule Take 1 capsule (40 mg) by mouth once daily. 90 capsule 1    FreeStyle Lite Strips strip 1 strip 2 times a day. 200 strip 2    glipiZIDE (Glucotrol) 5 mg tablet TAKE 2 TABLETS (10 MG) BY MOUTH 2 TIMES A DAY BEFORE MEALS. 360 tablet 0    ibuprofen (Motrin) capsule Take 1 capsule (200 mg) by mouth.      latanoprost, PF, (Iyuzeh, PF,) 0.005 % dropperette Administer 1 drop into both eyes once daily at bedtime. 30 each 11    levothyroxine (Synthroid, Levoxyl) 100 mcg tablet TAKE 1 TABLET (100 MCG) BY MOUTH ONCE DAILY. AS DIRECTED 90 tablet 1    lisinopril 5 mg tablet Take 1 tablet (5 mg) by mouth once daily. 30 tablet 2    meclizine (Antivert) 12.5 mg tablet Take 1 tablet (12.5 mg) by mouth 3 times a day as needed for dizziness. 30 tablet 11    metFORMIN (Glucophage) 500 mg tablet TAKE 2 TABLETS BY MOUTH TWICE A  tablet 0    nystatin (Mycostatin) cream 2-3 GM ON THE SKIN TWICE A DAY      omega-3 acid ethyl esters (Lovaza) 1 gram capsule TAKE 4 CAPSULES (4 G) BY MOUTH ONCE DAILY IN THE EVENING. TAKE WITH MEALS. 360 capsule 0     pantoprazole (ProtoNix) 40 mg EC tablet TAKE 1 TABLET BY MOUTH EVERY DAY 90 tablet 2    polyethylene glycol-electrolytes 420 gram solution Take by mouth.  drink 1/2 beginning at 6pm night before the procedure and start drinking the 2nd 1/2 5 hours before procedure time      risperiDONE (RisperDAL) 1 mg tablet Take 1 tablet (1 mg) by mouth 2 times a day. 180 tablet 0    SITagliptin phosphate (Januvia) 100 mg tablet Take 1 tablet (100 mg) by mouth once daily. 90 tablet 0    testosterone 20.25 mg/1.25 gram (1.62 %) gel in metered-dose pump Place 2 Pump on the skin once daily. 75 g 0    timoloL maleate, PF, 0.5 % dropperette Administer 1 drop into both eyes 2 times a day. 10 each 11    triamcinolone (Kenalog) 0.1 % cream 1 Gram      valACYclovir (Valtrex) 1 gram tablet Take 1 tablet (1,000 mg) by mouth 3 times a day. 15 tablet 2    VITAMIN B COMPLEX ORAL Take 1 capsule by mouth once daily.      pantoprazole (ProtoNix) 40 mg EC tablet Take 1 tablet (40 mg) by mouth once daily. Do not crush, chew, or split. 30 tablet 1     No current facility-administered medications for this visit.   [3]   Past Surgical History:  Procedure Laterality Date    TONSILLECTOMY  09/20/2013    Tonsillectomy   [4]   Family History  Problem Relation Name Age of Onset    Glaucoma Mother      No Known Problems Father      Other (Abdominal Aortic Aneurysm) Brother      Schizophrenia Brother

## 2025-04-25 NOTE — PATIENT INSTRUCTIONS
keep a steady schedule, try to regulate your wake up time. Keep a sleep log.   please minimize/eliminate caffeine, nicotine, and alcohol  Avoid daytime naps.  decrease stimulating activity for at least two hours prior to the desired sleep onset time.   get morning light exposure as much as possible especially when you first wake up  decrease light exposure as much as possible in the evening and around bedtime.   Keep a regular eating schedule.   Eat a healthy diet and exercise as tolerated.

## 2025-04-29 ENCOUNTER — APPOINTMENT (OUTPATIENT)
Dept: OTOLARYNGOLOGY | Facility: CLINIC | Age: 75
End: 2025-04-29
Payer: COMMERCIAL

## 2025-05-01 ENCOUNTER — HOSPITAL ENCOUNTER (EMERGENCY)
Facility: HOSPITAL | Age: 75
Discharge: HOME | End: 2025-05-01
Payer: COMMERCIAL

## 2025-05-01 ENCOUNTER — HOSPITAL ENCOUNTER (OUTPATIENT)
Dept: RADIOLOGY | Facility: CLINIC | Age: 75
Discharge: HOME | End: 2025-05-01
Payer: COMMERCIAL

## 2025-05-01 ENCOUNTER — APPOINTMENT (OUTPATIENT)
Dept: RADIOLOGY | Facility: HOSPITAL | Age: 75
End: 2025-05-01
Payer: COMMERCIAL

## 2025-05-01 ENCOUNTER — OFFICE VISIT (OUTPATIENT)
Dept: ORTHOPEDIC SURGERY | Facility: CLINIC | Age: 75
End: 2025-05-01
Payer: COMMERCIAL

## 2025-05-01 VITALS — BODY MASS INDEX: 26.85 KG/M2 | WEIGHT: 198 LBS

## 2025-05-01 VITALS
RESPIRATION RATE: 18 BRPM | TEMPERATURE: 98.6 F | OXYGEN SATURATION: 98 % | BODY MASS INDEX: 26.82 KG/M2 | WEIGHT: 198 LBS | HEIGHT: 72 IN | SYSTOLIC BLOOD PRESSURE: 146 MMHG | DIASTOLIC BLOOD PRESSURE: 82 MMHG | HEART RATE: 86 BPM

## 2025-05-01 DIAGNOSIS — M79.672 BILATERAL FOOT PAIN: ICD-10-CM

## 2025-05-01 DIAGNOSIS — M25.551 RIGHT HIP PAIN: ICD-10-CM

## 2025-05-01 DIAGNOSIS — M79.2 NEUROPATHIC PAIN: Primary | ICD-10-CM

## 2025-05-01 DIAGNOSIS — M79.671 BILATERAL FOOT PAIN: ICD-10-CM
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
## 2025-05-01 DIAGNOSIS — T14.8XXA HEMATOMA: ICD-10-CM

## 2025-05-01 DIAGNOSIS — M62.469 GASTROCNEMIUS EQUINUS, UNSPECIFIED LATERALITY: ICD-10-CM

## 2025-05-01 DIAGNOSIS — Q66.70 PES CAVUS: ICD-10-CM

## 2025-05-01 DIAGNOSIS — E11.610 CHARCOT FOOT DUE TO DIABETES MELLITUS (MULTI): ICD-10-CM

## 2025-05-01 PROCEDURE — 73630 X-RAY EXAM OF FOOT: CPT | Mod: 50

## 2025-05-01 PROCEDURE — 1159F MED LIST DOCD IN RCRD: CPT | Performed by: ORTHOPAEDIC SURGERY

## 2025-05-01 PROCEDURE — 73552 X-RAY EXAM OF FEMUR 2/>: CPT | Mod: RIGHT SIDE | Performed by: RADIOLOGY

## 2025-05-01 PROCEDURE — 99284 EMERGENCY DEPT VISIT MOD MDM: CPT

## 2025-05-01 PROCEDURE — 99214 OFFICE O/P EST MOD 30 MIN: CPT | Performed by: ORTHOPAEDIC SURGERY

## 2025-05-01 PROCEDURE — 73502 X-RAY EXAM HIP UNI 2-3 VIEWS: CPT | Mod: RT

## 2025-05-01 PROCEDURE — 73552 X-RAY EXAM OF FEMUR 2/>: CPT | Mod: RT

## 2025-05-01 PROCEDURE — 4010F ACE/ARB THERAPY RXD/TAKEN: CPT | Performed by: ORTHOPAEDIC SURGERY

## 2025-05-01 PROCEDURE — 73502 X-RAY EXAM HIP UNI 2-3 VIEWS: CPT | Mod: RIGHT SIDE | Performed by: RADIOLOGY

## 2025-05-01 PROCEDURE — 99204 OFFICE O/P NEW MOD 45 MIN: CPT | Performed by: ORTHOPAEDIC SURGERY

## 2025-05-01 PROCEDURE — 1036F TOBACCO NON-USER: CPT | Performed by: ORTHOPAEDIC SURGERY

## 2025-05-01 PROCEDURE — 1125F AMNT PAIN NOTED PAIN PRSNT: CPT | Performed by: ORTHOPAEDIC SURGERY

## 2025-05-01 ASSESSMENT — PAIN SCALES - GENERAL
PAINLEVEL_OUTOF10: 3
PAINLEVEL_OUTOF10: 9
PAINLEVEL_OUTOF10: 0 - NO PAIN

## 2025-05-01 ASSESSMENT — PAIN - FUNCTIONAL ASSESSMENT
PAIN_FUNCTIONAL_ASSESSMENT: 0-10
PAIN_FUNCTIONAL_ASSESSMENT: 0-10

## 2025-05-01 ASSESSMENT — COLUMBIA-SUICIDE SEVERITY RATING SCALE - C-SSRS
1. IN THE PAST MONTH, HAVE YOU WISHED YOU WERE DEAD OR WISHED YOU COULD GO TO SLEEP AND NOT WAKE UP?: NO
2. HAVE YOU ACTUALLY HAD ANY THOUGHTS OF KILLING YOURSELF?: NO
6. HAVE YOU EVER DONE ANYTHING, STARTED TO DO ANYTHING, OR PREPARED TO DO ANYTHING TO END YOUR LIFE?: NO

## 2025-05-01 ASSESSMENT — PAIN DESCRIPTION - PAIN TYPE: TYPE: ACUTE PAIN

## 2025-05-01 ASSESSMENT — PAIN DESCRIPTION - LOCATION: LOCATION: HIP

## 2025-05-01 NOTE — ED TRIAGE NOTES
Patient fell last Thursday has a bruise on his right leg, that is in stages of healing, patient has denies any other complaint

## 2025-05-01 NOTE — PROGRESS NOTES
"74-year-old male here for bilateral foot pain.  Sent by PCP.  Has had high arches for many years.  Has had orthotics by his podiatrist since his teenage years.  They \"stopped working\" a few years ago.  He had diabetes before about 25 years.  He has had modified his diet but is on multiple oral medications.  He was diagnosed with neuropathy about 12 years ago.  Some therapist discussed possible Charcot of his feet.  Complains of difficulty walking and balance.  Has not had physical therapy.    On exam:  WD/WN thin male  A+O X3  NAD  No lymphedema  Inspection of both feet and ankles show symmetric cavus arches.   5/5 strength in all 4 planes.   Sensation intact to LT.   Good pulses.   Stable anterior drawer.  No peroneal subluxation.    (+) Sherri.     I personally reviewed the following radiographic exams: Weightbearing x-rays of both feet shows mild cavus deformity.  No evidence of Charcot.  No significant arthrosis.  No acute changes.    Assessment: Tight gastroc with bilateral pes cavus.  Probable neuropathic pain.    Plan: Discussed nonoperative and operative options in detail.   Risk and benefits discussed in detail. All questions answered today.  Recovery timeline and expectations discussed in detail.  Pain does not appear to be orthopedic to me.  His pain is somewhat out of proportion to his clinical findings.  Though he does have higher arches, they are not pathologic.  There is no evidence of Charcot on his x-rays.  I think a lot of his balance issues and pain are really neurologic in origin.  I think he should get some relief from good custom orthotics and we gave him a prescription for that.  Recommend he work with physical therapy for his Achilles flexibility and his balance and strength.  Do not feel there is anything surgical here.  Consider neurology evaluation.  "

## 2025-05-01 NOTE — DISCHARGE INSTRUCTIONS
Can take motrin and tylenol as needed for pain. Apply ice to area of pain  Return to ED for any new or worsening symptoms

## 2025-05-01 NOTE — ED PROVIDER NOTES
"HPI   CC: Fall (Fell last thursday)     Patient is a 74-year-old male with PMH schizoaffective, hypertension, sleep apnea, Charcot disease of foot, COPD, diabetes, high cholesterol, BPH, hypothyroidism, GERD, hypogonadism presenting to the ED with concern for right hip pain after fall.  Patient states last Thursday 4/24/25 he was walking and tripped on the corner of a rug.  Patient landed on his right hip.  He denies hitting his head or injuring any other body part.  Patient states immediately after he felt fine and was able to ambulate okay.  In the days since he noticed a \" bump\" developed pain at the site where he fell on the ground with large bruising.  Pain is rated 1/10 at rest but 5/10 with palpation.  He does not take blood thinners.  He takes 1 Tylenol PM every night and states that helps with pain.        ROS: 10-point review of systems was performed and is otherwise negative except as noted in HPI.    Limitations to history: N/A  Independent Historians: Self  External Records Reviewed: Outpatient notes in EMR    Past Medical History: Noncontributory except per HPI     Past Surgical History: Noncontributory except per HPI     Family History: Reviewed and noncontributory     Social History:  Denies tobacco. Denies ETOH. Denies illicit drugs.    RX Allergies[1]    Home Meds:   Current Outpatient Medications   Medication Instructions    atorvastatin (LIPITOR) 20 mg, oral, Nightly    BetimoL 0.5 % ophthalmic solution 1 drop, 2 times daily    buPROPion XL (WELLBUTRIN XL) 150 mg, oral, Every morning, Do not crush, chew, or split.    cetirizine (ZYRTEC) 5 mg, Daily    cholecalciferol (Vitamin D-3) 50 MCG (2000 UT) tablet 2 tablets, Daily with evening meal    coenzyme Q10-vitamin E 100-5 mg-unit capsule TAKE 200 MG BY MOUTH ONCE DAILY.    colchicine 0.6 mg, oral, Daily    cycloSPORINE (Restasis) 0.05 % ophthalmic emulsion 1 drop, Both Eyes, 2 times daily    dapagliflozin propanediol (FARXIGA) 10 mg, oral, Daily    " diazePAM (VALIUM) 5 mg, oral, Nightly PRN    diclofenac sodium 4 g, Daily    Farxiga 5 mg, oral, Daily    FLUoxetine (PROZAC) 40 mg, oral, Daily    FreeStyle Lite Strips strip 1 strip, miscellaneous, 2 times daily    glipiZIDE (GLUCOTROL) 10 mg, oral, 2 times daily before meals    ibuprofen (MOTRIN) 200 mg    latanoprost, PF, (Iyuzeh, PF,) 0.005 % dropperette 1 drop, Both Eyes, Nightly    levothyroxine (SYNTHROID, LEVOXYL) 100 mcg, oral, Daily, as directed    lisinopril 5 mg, oral, Daily    meclizine (ANTIVERT) 12.5 mg, oral, 3 times daily PRN    metFORMIN (GLUCOPHAGE) 1,000 mg, oral, 2 times daily    nystatin (Mycostatin) cream 2-3 GM ON THE SKIN TWICE A DAY    omega-3 acid ethyl esters (LOVAZA) 4 g, oral, Daily with evening meal    pantoprazole (PROTONIX) 40 mg, oral, Daily, Do not crush, chew, or split.    pantoprazole (PROTONIX) 40 mg, oral, Daily    polyethylene glycol-electrolytes 420 gram solution Take by mouth.  drink 1/2 beginning at 6pm night before the procedure and start drinking the 2nd 1/2 5 hours before procedure time    risperiDONE (RISPERDAL) 1 mg, oral, 2 times daily    SITagliptin phosphate (JANUVIA) 100 mg, oral, Daily    testosterone 20.25 mg/1.25 gram (1.62 %) gel in metered-dose pump 2 Pump, transdermal, Daily    timoloL maleate, PF, 0.5 % dropperette 1 drop, Both Eyes, 2 times daily    triamcinolone (Kenalog) 0.1 % cream 1 Gram    valACYclovir (VALTREX) 1,000 mg, oral, 3 times daily    VITAMIN B COMPLEX ORAL 1 capsule, Daily RT        Physical Exam     ED Triage Vitals [05/01/25 1038]   Temperature Heart Rate Respirations BP   37 °C (98.6 °F) 79 18 146/82      Pulse Ox Temp src Heart Rate Source Patient Position   98 % -- -- --      BP Location FiO2 (%)     -- --         Vitals and nursing note reviewed.   Physical Exam  Constitutional:       General: He is not in acute distress.     Appearance: Normal appearance. He is not ill-appearing, toxic-appearing or diaphoretic.   HENT:      Head:  Normocephalic and atraumatic.   Cardiovascular:      Rate and Rhythm: Normal rate and regular rhythm.      Pulses: Normal pulses.      Heart sounds: Normal heart sounds. No murmur heard.     No friction rub. No gallop.      Comments: DP and PT 2+ b/l  Pulmonary:      Effort: Pulmonary effort is normal. No respiratory distress.      Breath sounds: Normal breath sounds. No stridor. No wheezing, rhonchi or rales.   Abdominal:      General: Abdomen is flat. Bowel sounds are normal. There is no distension.      Palpations: Abdomen is soft.      Tenderness: There is no abdominal tenderness. There is no right CVA tenderness, left CVA tenderness or guarding.   Musculoskeletal:         General: Normal range of motion.   Skin:     General: Skin is warm and dry.      Capillary Refill: Capillary refill takes less than 2 seconds.      Comments: Approximately 7 cm hematoma on the lateral right hip with overlying bruising   Neurological:      General: No focal deficit present.      Mental Status: He is alert and oriented to person, place, and time.   Psychiatric:         Mood and Affect: Mood normal.         Behavior: Behavior normal.       Diagnostic Results      Labs Reviewed - No data to display      XR hip right with pelvis when performed 2 or 3 views   Final Result   No fracture or dislocation of the right hip or right femur with no   pelvic fracture observed.        Large 5 cm osteochondroma arising from the distal femoral   metadiaphysis.        Signed by: Emmy Ray 5/1/2025 11:41 AM   Dictation workstation:   [id number]      XR femur right 2+ views   Final Result   No fracture or dislocation of the right hip or right femur with no   pelvic fracture observed.        Large 5 cm osteochondroma arising from the distal femoral   metadiaphysis.        Signed by: Emmy Ray 5/1/2025 11:41 AM   Dictation workstation:   [id number]          ED Course & MDM   Assessment/Plan:   Medications - No data to display   ED Course as of  05/01/25 1337   Thu May 01, 2025   1122 Patient is a 74-year-old male presenting to the ED with concern of right hip pain after a fall.  Vital signs are stable, patient is nontoxic-appearing.  He is neurovascularly intact in the right lower extremity.  He does have a large hematoma at the site of his right hip with overlying bruising.  Will obtain x-rays of right hip and femur to assess for any underlying fractures.  Offered pain control in ED, however patient states he feels fine at the moment. [VS]   1336 X-rays obtained are negative for any acute fracture or dislocation.  It did reveal an osteochondroma of the distal femur.  I discussed with patient he states that he knows he has this and has been present his whole life.  He is appropriate for outpatient management.  Recommend icing the area and taking Tylenol and Motrin as needed for pain control.  Offered to give Ace wrap in ED, however patient states he does not think that is necessary at this time.  Recommend follow-up with PCP in 1 to 2 days for repeat evaluation.  Patient told to return to ED for any new or worsening symptoms. [VS]      ED Course User Index  [VS] Hailey Carmona PA-C         Diagnoses as of 05/01/25 1337   Fall, initial encounter   Right hip pain   Hematoma       ED Prescriptions    None         Chronic Medical Conditions Significantly Affecting Care:      Escalation of Care: Appropriate for outpatient management    Counseling: Spoke with the patient and discussed today´s findings, in addition to providing specific details for the plan of care and expected course.  Patient was given the opportunity to ask questions.    Discussed return precautions and importance of follow-up.  Advised to follow-up with PCP.  Advised to return to the ED for changing or worsening symptoms, new symptoms, complaint specific precautions, and precautions listed on the discharge paperwork.    I advised the patient that the emergency evaluation and  treatment provided today doesn't end their need for medical care. It is very important that they follow-up with their primary care provider or other specialist as instructed.    The plan of care was mutually agreed upon with the patient. The patient and/or family were given the opportunity to ask questions. All questions asked today in the ED were answered to the best of my ability with today's information.    I specifically advised the patient to return to the ED for changing or worsening symptoms, worrisome new symptoms, or for any complaint specific precautions listed on the discharge paperwork.    Procedure  Procedures         [1]   Allergies  Allergen Reactions    Cigarette Smoke Other     Mental unclear     Caffeine Unknown     Pt denies    Cat Dander Unknown    Latanoprost Unknown     Pt denies    Timolol Other     Pt denies    Wheat Flour Unknown     Pt denies this as sensitivity    Yeast, Dried Unknown     Pt denies as a sensitivity        Hailey Carmona PA-C  05/01/25 [id number]

## 2025-05-06 ENCOUNTER — APPOINTMENT (OUTPATIENT)
Dept: OTOLARYNGOLOGY | Facility: CLINIC | Age: 75
End: 2025-05-06
Payer: COMMERCIAL

## 2025-05-06 DIAGNOSIS — E11.9 TYPE 2 DIABETES MELLITUS WITHOUT COMPLICATION, WITHOUT LONG-TERM CURRENT USE OF INSULIN: ICD-10-CM

## 2025-05-08 DIAGNOSIS — I10 ESSENTIAL (PRIMARY) HYPERTENSION: ICD-10-CM

## 2025-05-08 RX ORDER — LISINOPRIL 5 MG/1
5 TABLET ORAL DAILY
Qty: 90 TABLET | Refills: 1 | Status: SHIPPED | OUTPATIENT
Start: 2025-05-08

## 2025-05-13 ENCOUNTER — APPOINTMENT (OUTPATIENT)
Dept: CARDIOLOGY | Facility: CLINIC | Age: 75
End: 2025-05-13
Payer: COMMERCIAL

## 2025-05-13 ENCOUNTER — OFFICE VISIT (OUTPATIENT)
Dept: BEHAVIORAL HEALTH | Facility: CLINIC | Age: 75
End: 2025-05-13
Payer: COMMERCIAL

## 2025-05-13 VITALS
BODY MASS INDEX: 27.06 KG/M2 | WEIGHT: 199.5 LBS | SYSTOLIC BLOOD PRESSURE: 110 MMHG | HEART RATE: 71 BPM | DIASTOLIC BLOOD PRESSURE: 73 MMHG | TEMPERATURE: 96.6 F | RESPIRATION RATE: 18 BRPM

## 2025-05-13 DIAGNOSIS — F41.8 MIXED ANXIETY DEPRESSIVE DISORDER: ICD-10-CM

## 2025-05-13 DIAGNOSIS — F25.1 SCHIZOAFFECTIVE DISORDER, DEPRESSIVE TYPE (MULTI): ICD-10-CM

## 2025-05-13 LAB
ALBUMIN SERPL-MCNC: 4.6 G/DL (ref 3.6–5.1)
ALP SERPL-CCNC: 33 U/L (ref 35–144)
ALT SERPL-CCNC: 13 U/L (ref 9–46)
ANION GAP SERPL CALCULATED.4IONS-SCNC: 13 MMOL/L (CALC) (ref 7–17)
AST SERPL-CCNC: 14 U/L (ref 10–35)
BASOPHILS # BLD AUTO: 69 CELLS/UL (ref 0–200)
BASOPHILS NFR BLD AUTO: 0.8 %
BILIRUB SERPL-MCNC: 1 MG/DL (ref 0.2–1.2)
BUN SERPL-MCNC: 19 MG/DL (ref 7–25)
CALCIUM SERPL-MCNC: 9.8 MG/DL (ref 8.6–10.3)
CHLORIDE SERPL-SCNC: 103 MMOL/L (ref 98–110)
CHOLEST SERPL-MCNC: 131 MG/DL
CHOLEST/HDLC SERPL: 2.8 (CALC)
CO2 SERPL-SCNC: 24 MMOL/L (ref 20–32)
CREAT SERPL-MCNC: 0.88 MG/DL (ref 0.7–1.28)
EGFRCR SERPLBLD CKD-EPI 2021: 91 ML/MIN/1.73M2
EOSINOPHIL # BLD AUTO: 112 CELLS/UL (ref 15–500)
EOSINOPHIL NFR BLD AUTO: 1.3 %
ERYTHROCYTE [DISTWIDTH] IN BLOOD BY AUTOMATED COUNT: 12.3 % (ref 11–15)
EST. AVERAGE GLUCOSE BLD GHB EST-MCNC: 123 MG/DL
EST. AVERAGE GLUCOSE BLD GHB EST-SCNC: 6.8 MMOL/L
GLUCOSE SERPL-MCNC: 129 MG/DL (ref 65–99)
HBA1C MFR BLD: 5.9 %
HCT VFR BLD AUTO: 45.7 % (ref 38.5–50)
HDLC SERPL-MCNC: 46 MG/DL
HGB BLD-MCNC: 14.7 G/DL (ref 13.2–17.1)
LDLC SERPL CALC-MCNC: 67 MG/DL (CALC)
LYMPHOCYTES # BLD AUTO: 1359 CELLS/UL (ref 850–3900)
LYMPHOCYTES NFR BLD AUTO: 15.8 %
MCH RBC QN AUTO: 30.8 PG (ref 27–33)
MCHC RBC AUTO-ENTMCNC: 32.2 G/DL (ref 32–36)
MCV RBC AUTO: 95.8 FL (ref 80–100)
MONOCYTES # BLD AUTO: 929 CELLS/UL (ref 200–950)
MONOCYTES NFR BLD AUTO: 10.8 %
NEUTROPHILS # BLD AUTO: 6132 CELLS/UL (ref 1500–7800)
NEUTROPHILS NFR BLD AUTO: 71.3 %
NONHDLC SERPL-MCNC: 85 MG/DL (CALC)
PLATELET # BLD AUTO: 240 THOUSAND/UL (ref 140–400)
PMV BLD REES-ECKER: 10.3 FL (ref 7.5–12.5)
POTASSIUM SERPL-SCNC: 4.2 MMOL/L (ref 3.5–5.3)
PROT SERPL-MCNC: 7.1 G/DL (ref 6.1–8.1)
PSA SERPL-MCNC: 3.52 NG/ML
RBC # BLD AUTO: 4.77 MILLION/UL (ref 4.2–5.8)
SODIUM SERPL-SCNC: 140 MMOL/L (ref 135–146)
TRIGL SERPL-MCNC: 92 MG/DL
TSH SERPL-ACNC: 1.07 MIU/L (ref 0.4–4.5)
WBC # BLD AUTO: 8.6 THOUSAND/UL (ref 3.8–10.8)

## 2025-05-13 PROCEDURE — 99214 OFFICE O/P EST MOD 30 MIN: CPT | Performed by: PSYCHIATRY & NEUROLOGY

## 2025-05-13 PROCEDURE — 3074F SYST BP LT 130 MM HG: CPT | Performed by: PSYCHIATRY & NEUROLOGY

## 2025-05-13 PROCEDURE — 4010F ACE/ARB THERAPY RXD/TAKEN: CPT | Performed by: PSYCHIATRY & NEUROLOGY

## 2025-05-13 PROCEDURE — 1126F AMNT PAIN NOTED NONE PRSNT: CPT | Performed by: PSYCHIATRY & NEUROLOGY

## 2025-05-13 PROCEDURE — 99214 OFFICE O/P EST MOD 30 MIN: CPT | Mod: AM | Performed by: PSYCHIATRY & NEUROLOGY

## 2025-05-13 PROCEDURE — 1160F RVW MEDS BY RX/DR IN RCRD: CPT | Performed by: PSYCHIATRY & NEUROLOGY

## 2025-05-13 PROCEDURE — 1036F TOBACCO NON-USER: CPT | Performed by: PSYCHIATRY & NEUROLOGY

## 2025-05-13 PROCEDURE — 3078F DIAST BP <80 MM HG: CPT | Performed by: PSYCHIATRY & NEUROLOGY

## 2025-05-13 PROCEDURE — 1159F MED LIST DOCD IN RCRD: CPT | Performed by: PSYCHIATRY & NEUROLOGY

## 2025-05-13 RX ORDER — BUPROPION HYDROCHLORIDE 150 MG/1
150 TABLET ORAL 2 TIMES DAILY
Qty: 180 TABLET | Refills: 1 | Status: SHIPPED | OUTPATIENT
Start: 2025-05-13 | End: 2025-11-09

## 2025-05-13 RX ORDER — GLIPIZIDE 5 MG/1
10 TABLET ORAL
Qty: 360 TABLET | Refills: 0 | Status: SHIPPED | OUTPATIENT
Start: 2025-05-13

## 2025-05-13 ASSESSMENT — ENCOUNTER SYMPTOMS
OCCASIONAL FEELINGS OF UNSTEADINESS: 1
LOSS OF SENSATION IN FEET: 0
DEPRESSED MOOD: 1
NERVOUS/ANXIOUS: 1
DYSPHORIC MOOD: 1

## 2025-05-13 ASSESSMENT — PATIENT HEALTH QUESTIONNAIRE - PHQ9
2. FEELING DOWN, DEPRESSED OR HOPELESS: NOT AT ALL
1. LITTLE INTEREST OR PLEASURE IN DOING THINGS: NOT AT ALL
SUM OF ALL RESPONSES TO PHQ9 QUESTIONS 1 AND 2: 0

## 2025-05-13 ASSESSMENT — PAIN SCALES - GENERAL: PAINLEVEL_OUTOF10: 0-NO PAIN

## 2025-05-13 NOTE — ASSESSMENT & PLAN NOTE
Your diagnosis is of chronic depressive and anxious features. You deny any suicidal or homicidal ideation or plans.      [place] was reviewed today, 4/15/25 with no concerning findings evidenced.     The plan is to continue bupropion  mg morning and middle afternoon daily, you have elected to stop risperidone 1 mg twice daily in the morning and late afternoon which you announced you have done unilaterally prior to this appointment because of concerns of parkinsonian features/risks; continue fluoxetine to 40 mg daily as well as bupropion  mg  twice daily for depression and you can continue diazepam 5 mg as needed daily and at bedtime as needed only. You clearly are able to recite back the risks, benefits and alternatives of these medications as discussed with you today.     The FDA risks of antipsychotics including increased risk of sudden death, heart attack, brain stroke, irreversible neurological movement disorders, parkinsonism, cardiac toxicity, weight gain and diabetes of this medication were discussed. You were able to recite back the risks, benefits, alternatives with respect to antipsychotics and in particular risperidone.     The FDA risks of antidepressants including increased risk of suicide, cardiotoxicity, lowered seizure threshold, the serotonin syndrome with serotonin agents and anticholinergic effects have been discussed. We also discussed that at least in the case of SSRIs there is interference with warfarin and nonsteroidal inflammatories and can cause increased bleeding and hemorrhage. You were able to recite back the risks, benefits, alternatives with respect to your antidepressants in particular bupropion and fluoxetine.     The FDA risks of benzodiazepines were discussed which includes impairment of memory and cognition, increased falls, addiction and dangerous withdrawals if stopped suddenly which can cause significant morbidity and or mortality. There is also risk of respiratory  suppression alone or with other sedative or other medications which can lead to morbidity and mortality. You clearly were able to recite back the risks, benefits and alternatives to the benzodiazepines discussed.   Orders:    buPROPion XL (Wellbutrin XL) 150 mg 24 hr tablet; Take 1 tablet (150 mg) by mouth 2 times a day. Do not crush, chew, or split.

## 2025-05-13 NOTE — PROGRESS NOTES
Subjective   Patient ID: Truong Armijo is a 74 y.o. male who presents for Follow-up. I I had a bad experience coming here today.     HPI: The patient report many frustrations with multiple endeavors. The patient is managing at this time. He is working maintaining his weight. He is currently trying to resume psychotherapy with a previous therapist. The patient was admonished by a  which upset him today. The patient was able to work through his anger in therapy today. We discussed his medical conditions and managing his conditions and the emotional sequelae.      Active and Past Problems  Problems    · Acute pain of both knees (338.19,719.46) (M25.561,M25.562)   · Adult hypothyroidism (244.9) (E03.9)   · Adverse drug reaction (E947.9) (T50.905A)   · Adverse effect of drug, initial encounter (E947.9) (T50.905A)   · Anorexia (783.0) (R63.0)   · Anxiety and depression (300.00,311) (F41.9,F32.A)   · Anxiety disorder (300.00) (F41.9)   · Arthralgia of knee, unspecified laterality (719.46) (M25.569)   · Arthralgia of left shoulder region (719.41) (M25.512)   · Arthralgia of right shoulder region (719.41) (M25.511)   · Asthmatic bronchitis (493.90) (J45.909)   · Atelectasis (518.0) (J98.11)   · Atelectasis, bilateral (518.0) (J98.11)   · Atrophic retina (362.60) (H35.89)   · Back pain (724.5) (M54.9)   · Balance problem (781.99) (R26.89)   · Benign essential hypertension (401.1) (I10)   · Bilateral artificial lens implant (V43.1) (Z96.1)   · Bilateral myopia (367.1) (H52.13)   · Bilateral sensorineural hearing loss (389.18) (H90.3)   · Bilateral shoulder pain, unspecified chronicity (719.41) (M25.511,M25.512)   · Cervical back pain with evidence of disc disease (722.91) (M50.90)   · Cervical disc disease (722.91) (M50.90)   · Cervical myofascial pain syndrome (729.1) (M79.18)   · 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)   · Chest pain of uncertain etiology (786.59) (R07.9)   · Chronic depression (311) (F32.A)   ·  Chronic low self esteem (799.29) (R45.81)   · Chronic otitis externa of left ear (380.23) (H60.62)   · Chronic otitis media (382.9) (H66.90)   · Chronic primary angle-closure glaucoma of both eyes, mild stage (365.23) (H40.2231)   · Chronic primary angle-closure glaucoma of left eye, indeterminate stage (365.23)  (H40.2224)   · Chronic primary angle-closure glaucoma of left eye, unspecified glaucoma stage  (365.23) (H40.2220)   · Constipation (564.00) (K59.00)   · Contusion of lower back (922.31) (S30.0XXA)   · COPD (chronic obstructive pulmonary disease) (496) (J44.9)   · Cough variant asthma (493.82) (J45.991)   · Degeneration of intervertebral disc of cervical region (722.4) (M50.30)   · Depression with anxiety (300.4) (F41.8)   · Diabetes mellitus (250.00) (E11.9)   · Diabetic Charcot's foot (250.60,713.5) (E11.610)   · Diabetic gastroenteropathy (250.60,579.8) (E11.69,K52.9)   · Diabetic peripheral neuropathy (250.60,357.2) (E11.42)   · Dizziness (780.4) (R42)   · Elbow pain (719.42) (M25.529)   · Elevated IOP (365.00) (H40.059)   · Elevated LDL cholesterol level (272.0) (E78.00)   · Encounter for immunization (V03.89) (Z23)   · Enlarged prostate without lower urinary tract symptoms (luts) (600.00) (N40.0)   · Epididymitis (604.90) (N45.1)   · Fatigue (780.79) (R53.83)   · Fluctuating mental status (780.97) (R41.82)   · Generalized weakness (780.79) (R53.1)   · Glaucoma (365.9) (H40.9)   · High triglycerides (272.1) (E78.1)   · HTN (hypertension) (401.9) (I10)   · Hyperglycemia (790.29) (R73.9)   · Hypogonadism male (257.2) (E29.1)   · Hypotension due to drugs (458.8,E947.9) (I95.2)   · Impaired vision (369.9) (H54.7)   · Increased urinary frequency (788.41) (R35.0)   · Insomnia (780.52) (G47.00)   · Intoxication   · Lateral epicondylitis of right elbow (726.32) (M77.11)   · Leg weakness (729.89) (R29.898)   · Lumbar disc herniation (722.10) (M51.26)   · Lumbar pain (724.2) (M54.50)   · Medication management  (V58.69) (Z79.899)   · Migraine equivalent (346.20) (G43.109)   · Mixed hearing loss (389.20) (H90.8)   · Moderate episode of recurrent major depressive disorder (296.32) (F33.1)   · Neck pain (723.1) (M54.2)   · Neck pain, acute (723.1) (M54.2)   · Neurogenic bladder (596.54) (N31.9)   · 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,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)   · Optic nerve hemorrhage (377.42) (H47.029)   · 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)   · Other specified hypotension (458.8) (I95.89)   · Pain in both feet (729.5) (M79.671,M79.672)   · Paranoia (psychosis) (297.1) (F22)   · Paranoid ideation (297.8) (F22)   · Penile pain, chronic (607.9) (N48.89,G89.29)   · Peripheral neuropathy (356.9) (G62.9)   · Persistent testicular pain (608.9) (N50.819)   · Poorly controlled diabetes mellitus (250.00) (E11.65)   · Post traumatic stress disorder (PTSD) (309.81) (F43.10)   · Primary hypothyroidism (244.9) (E03.9)   · Primary open angle glaucoma of both eyes, mild stage (365.11,365.71) (H40.1131)   · Primary osteoarthritis of left knee (715.16) (M17.12)   · Primary osteoarthritis of right knee (715.16) (M17.11)   · PUD (peptic ulcer disease) (533.90) (K27.9)   · Reactive confusion (298.2) (F44.89)   · Reflux esophagitis (530.11) (K21.00)   · Right inguinal hernia (550.90) (K40.90)   · Schizoaffective disorder (295.70) (F25.9)   · Screening PSA (prostate specific antigen) (V76.44) (Z12.5)   · SOB (shortness of breath) (786.05) (R06.02)   · SOB (shortness of breath) (786.05) (R06.02)   · Sprain of right elbow, initial encounter (841.9) (S53.401A)   · Stress reaction (308.9) (F43.0)   · Subjective tinnitus of both ears (388.31) (H93.13)   · 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)   · Thyroid disorder (246.9) (E07.9)   · TIA (transient ischemic attack) (435.9) (G45.9)   · Trigeminal neuralgia (350.1) (G50.0)   · Trigger point of extremity (729.5) (M79.609)   · Urinary frequency (788.41) (R35.0)   · Urinary tract infection (599.0) (N39.0)   ·  Urinary urgency (788.63) (R39.15)   · Vasovagal reaction (780.2) (R55)   · Vertigo (780.4) (R42)   · Vitamin D deficiency (268.9) (E55.9)     Past Medical History  Problems    · History of Acute bilateral otitis media (382.9) (H66.93)   · Resolved Date: 15 Oct 2015   · History of Change in bowel habits (787.99) (R19.4)   · Resolved Date: 09 Dec 2021   · History of Change in mental status (780.97) (R41.82)   · Resolved Date: 17 Sep 2019   · History of Chronic diarrhea of unknown origin (787.91) (K52.9)   · Resolved Date: 09 Dec 2021   · History of Depression with anxiety (300.4) (F41.8)   · Resolved Date: 03 Nov 2017   · History of Depression with anxiety (300.4) (F41.8)   · Resolved Date: 13 May 2020   · History of Depression with anxiety (300.4) (F41.8)   · Resolved Date: 02 Jul 2021   · History of Dysphagia, oropharyngeal phase (787.22) (R13.12)   · Resolved Date: 09 Dec 2021   · History of acute otitis media (V12.49) (Z86.69)   · Resolved Date: 15 Oct 2015   · History of acute pharyngitis (V12.69) (Z87.09)   · Resolved Date: 15 Oct 2015   · History of acute sinusitis (V12.69) (Z87.09)   · Resolved Date: 15 Oct 2015   · History of chronic diarrhea   · Resolved Date: 09 Dec 2021   · History of dysphagia (V13.89) (Z87.898)   · Resolved Date: 09 Dec 2021   · History of dysphagia (V13.89) (Z87.898)   · Resolved Date: 09 Dec 2021   · History of fatigue (V13.89) (Z87.898)   · Resolved Date: 28 Apr 2014   · History of hypercholesterolemia (V12.29) (Z86.39)   · Resolved Date: 12 Oct 2016   · History of nausea (V12.79) (Z87.898)   · Resolved Date: 09 Dec 2021   · History of nausea (V12.79) (Z87.898)   · Resolved Date: 09 Dec 2021   · History of nausea and vomiting (V12.79) (Z87.898)   · Resolved Date: 09 Dec 2021   · History of psychiatric hospitalization (V11.9) (Z86.59)   · Several psychiatric hospitalizations including when in his early 20's he was diagnosed      with disorganized schizophrenia.   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   · Single     Mental Status Exam     General: In no acute distress with depressed mood.   Appearance: Exacerbation of chronic depressive features with poor self esteem.   Attitude: Cooperative.   Behavior: Exacerbation of chronic depressive features with poor self esteem.   Motor Activity: No agitation or retardation. No EPS/TD. Normal gait and station.   Speech: Regular rate, rhythm, volume and tone, spontaneous, fluent.   Mood: Exacerbation of chronic depressive features with poor self esteem.   Affect: Exacerbation of chronic depressive features with poor self esteem.   Thought Process:  Exacerbation of chronic depressive features with poor self esteem.   Thought Content: Exacerbation of chronic depressive features with poor self esteem.   Thought Perception: Thought disturbance with chronic depressive features with poor self esteem.   Cognition: Thought disturbance with chronic depressive features with poor self esteem.   Insight: Insight limitations.   Judgment: Judgment limitations.       Appearance: well-groomed.   Build: average.   Demeanor: average.   Eye Contact: average.   Motor Activity: average.   Speech: clear.   Language: Neurologic language is intact.   Fund of Knowledge: intact fund of knowledge.   Delusions: None Reported.   Self Harm: None Reported.   Aggressive: None Reported.   Mood: depressed and anxious . Exacerbation of chronic depressive features with poor self esteem.   Affect: labile . Exacerbation of chronic depressive features with poor self esteem.   Orientation: alert, oriented x3.   Manner: cooperative.   Thought process: goal-directed.   Thought association: displays rational thought process.   Content of thought: Mr. HANNY HILL denies any suicidal or homicidal ideation or plans.   Abstract/ Rational Thought: minimal impairment   Memory: grossly intact.   Behavior: calm.   Attention/Concentration: normal.   Cognition: intact.   Intelligence Estimate: average.   Executive Function: intact.   Insight: minimal impairment.   Judgement: minimal impairment.       Review of Systems   Neurological:         Mental Status Exam     General: In no acute distress with depressed mood.   Appearance: Exacerbation of chronic depressive features with poor self esteem.   Attitude: Cooperative.   Behavior: Exacerbation of chronic depressive features with poor self esteem.   Motor Activity: No agitation or retardation. No EPS/TD. Normal gait and station.   Speech: Regular rate, rhythm, volume and tone, spontaneous, fluent.   Mood: Exacerbation of chronic depressive features with poor  self esteem.   Affect: Exacerbation of chronic depressive features with poor self esteem.   Thought Process: Exacerbation of chronic depressive features with poor self esteem.   Thought Content: Exacerbation of chronic depressive features with poor self esteem.   Thought Perception: Thought disturbance with chronic depressive features with poor self esteem.   Cognition: Thought disturbance with chronic depressive features with poor self esteem.   Insight: Insight limitations.   Judgment: Judgment limitations.       Appearance: well-groomed.   Build: average.   Demeanor: average.   Eye Contact: average.   Motor Activity: average.   Speech: clear.   Language: Neurologic language is intact.   Fund of Knowledge: intact fund of knowledge.   Delusions: None Reported.   Self Harm: None Reported.   Aggressive: None Reported.   Mood: depressed and anxious . Exacerbation of chronic depressive features with poor self esteem.   Affect: labile . Exacerbation of chronic depressive features with poor self esteem.   Orientation: alert, oriented x3.   Manner: cooperative.   Thought process: goal-directed.   Thought association: displays rational thought process.   Content of thought: Mr. HANNY HILL denies any suicidal or homicidal ideation or plans.   Abstract/ Rational Thought: minimal impairment   Memory: grossly intact.   Behavior: calm.   Attention/Concentration: normal.   Cognition: intact.   Intelligence Estimate: average.   Executive Function: intact.   Insight: minimal impairment.   Judgement: minimal impairment.        Psychiatric/Behavioral:  Positive for dysphoric mood. The patient is nervous/anxious.         Mental Status Exam     General: In no acute distress with depressed mood.   Appearance: Exacerbation of chronic depressive features with poor self esteem.   Attitude: Cooperative.   Behavior: Exacerbation of chronic depressive features with poor self esteem.   Motor Activity: No agitation or retardation. No  EPS/TD. Normal gait and station.   Speech: Regular rate, rhythm, volume and tone, spontaneous, fluent.   Mood: Exacerbation of chronic depressive features with poor self esteem.   Affect: Exacerbation of chronic depressive features with poor self esteem.   Thought Process: Exacerbation of chronic depressive features with poor self esteem.   Thought Content: Exacerbation of chronic depressive features with poor self esteem.   Thought Perception: Thought disturbance with chronic depressive features with poor self esteem.   Cognition: Thought disturbance with chronic depressive features with poor self esteem.   Insight: Insight limitations.   Judgment: Judgment limitations.       Appearance: well-groomed.   Build: average.   Demeanor: average.   Eye Contact: average.   Motor Activity: average.   Speech: clear.   Language: Neurologic language is intact.   Fund of Knowledge: intact fund of knowledge.   Delusions: None Reported.   Self Harm: None Reported.   Aggressive: None Reported.   Mood: depressed and anxious . Exacerbation of chronic depressive features with poor self esteem.   Affect: labile . Exacerbation of chronic depressive features with poor self esteem.   Orientation: alert, oriented x3.   Manner: cooperative.   Thought process: goal-directed.   Thought association: displays rational thought process.   Content of thought: Mr. HANNY HILL denies any suicidal or homicidal ideation or plans.   Abstract/ Rational Thought: minimal impairment   Memory: grossly intact.   Behavior: calm.   Attention/Concentration: normal.   Cognition: intact.   Intelligence Estimate: average.   Executive Function: intact.   Insight: minimal impairment.   Judgement: minimal impairment.    All other systems reviewed and are negative.    Psych Review of Symptoms:    ADHD: Patient denied any symptoms.         Anxiety:   Generalized Anxiety Symptoms: Difficulty controlling worry.       Developmental and Sensory Concerns: Patient denied  any symptoms.         Depressive Symptoms:   Depressed mood and irritable.       Disruptive and Conduct Symptoms: Patient denied any symptoms.         Eating / Feeding Concerns: Patient denied any symptoms.         Elimination Symptoms: Patient denied any symptoms.         Manic Symptoms: Patient denied any symptoms.         Obsessive-Compulsive Symptoms: Patient denied any symptoms.         Psychotic Symptoms: Patient denied any symptoms.           Trauma Related Symptoms: Patient denied any symptoms.           Sleep Concerns:   Difficulty staying asleep.           Objective   Physical Exam  Neurological:      Mental Status: He is alert and oriented to person, place, and time.      Comments: Mental Status Exam     General: In no acute distress with depressed mood.   Appearance: Exacerbation of chronic depressive features with poor self esteem.   Attitude: Cooperative.   Behavior: Exacerbation of chronic depressive features with poor self esteem.   Motor Activity: No agitation or retardation. No EPS/TD. Normal gait and station.   Speech: Regular rate, rhythm, volume and tone, spontaneous, fluent.   Mood: Exacerbation of chronic depressive features with poor self esteem.   Affect: Exacerbation of chronic depressive features with poor self esteem.   Thought Process: Exacerbation of chronic depressive features with poor self esteem.   Thought Content: Exacerbation of chronic depressive features with poor self esteem.   Thought Perception: Thought disturbance with chronic depressive features with poor self esteem.   Cognition: Thought disturbance with chronic depressive features with poor self esteem.   Insight: Insight limitations.   Judgment: Judgment limitations.       Appearance: well-groomed.   Build: average.   Demeanor: average.   Eye Contact: average.   Motor Activity: average.   Speech: clear.   Language: Neurologic language is intact.   Fund of Knowledge: intact fund of knowledge.   Delusions: None Reported.    Self Harm: None Reported.   Aggressive: None Reported.   Mood: depressed and anxious . Exacerbation of chronic depressive features with poor self esteem.   Affect: labile . Exacerbation of chronic depressive features with poor self esteem.   Orientation: alert, oriented x3.   Manner: cooperative.   Thought process: goal-directed.   Thought association: displays rational thought process.   Content of thought: Mr. HANNY HILL denies any suicidal or homicidal ideation or plans.   Abstract/ Rational Thought: minimal impairment   Memory: grossly intact.   Behavior: calm.   Attention/Concentration: normal.   Cognition: intact.   Intelligence Estimate: average.   Executive Function: intact.   Insight: minimal impairment.   Judgement: minimal impairment.    Psychiatric:      Comments: Mental Status Exam     General: In no acute distress with depressed mood.   Appearance: Exacerbation of chronic depressive features with poor self esteem.   Attitude: Cooperative.   Behavior: Exacerbation of chronic depressive features with poor self esteem.   Motor Activity: No agitation or retardation. No EPS/TD. Normal gait and station.   Speech: Regular rate, rhythm, volume and tone, spontaneous, fluent.   Mood: Exacerbation of chronic depressive features with poor self esteem.   Affect: Exacerbation of chronic depressive features with poor self esteem.   Thought Process: Exacerbation of chronic depressive features with poor self esteem.   Thought Content: Exacerbation of chronic depressive features with poor self esteem.   Thought Perception: Thought disturbance with chronic depressive features with poor self esteem.   Cognition: Thought disturbance with chronic depressive features with poor self esteem.   Insight: Insight limitations.   Judgment: Judgment limitations.       Appearance: well-groomed.   Build: average.   Demeanor: average.   Eye Contact: average.   Motor Activity: average.   Speech: clear.   Language: Neurologic  language is intact.   Fund of Knowledge: intact fund of knowledge.   Delusions: None Reported.   Self Harm: None Reported.   Aggressive: None Reported.   Mood: depressed and anxious . Exacerbation of chronic depressive features with poor self esteem.   Affect: labile . Exacerbation of chronic depressive features with poor self esteem.   Orientation: alert, oriented x3.   Manner: cooperative.   Thought process: goal-directed.   Thought association: displays rational thought process.   Content of thought: Mr. HANNY HILL denies any suicidal or homicidal ideation or plans.   Abstract/ Rational Thought: minimal impairment   Memory: grossly intact.   Behavior: calm.   Attention/Concentration: normal.   Cognition: intact.   Intelligence Estimate: average.   Executive Function: intact.   Insight: minimal impairment.   Judgement: minimal impairment.          Lab Review:   Office Visit on 04/14/2025   Component Date Value    HEMOGLOBIN A1c 05/12/2025 5.9 (H)     eAG (mg/dL) 05/12/2025 123     eAG (mmol/L) 05/12/2025 6.8     CHOLESTEROL, TOTAL 05/12/2025 131     HDL CHOLESTEROL 05/12/2025 46     TRIGLYCERIDES 05/12/2025 92     LDL-CHOLESTEROL 05/12/2025 67     CHOL/HDLC RATIO 05/12/2025 2.8     NON HDL CHOLESTEROL 05/12/2025 85     GLUCOSE 05/12/2025 129 (H)     UREA NITROGEN (BUN) 05/12/2025 19     CREATININE 05/12/2025 0.88     EGFR 05/12/2025 91     SODIUM 05/12/2025 140     POTASSIUM 05/12/2025 4.2     CHLORIDE 05/12/2025 103     CARBON DIOXIDE 05/12/2025 24     ELECTROLYTE BALANCE 05/12/2025 13     CALCIUM 05/12/2025 9.8     PROTEIN, TOTAL 05/12/2025 7.1     ALBUMIN 05/12/2025 4.6     BILIRUBIN, TOTAL 05/12/2025 1.0     ALKALINE PHOSPHATASE 05/12/2025 33 (L)     AST 05/12/2025 14     ALT 05/12/2025 13     WHITE BLOOD CELL COUNT 05/12/2025 8.6     RED BLOOD CELL COUNT 05/12/2025 4.77     HEMOGLOBIN 05/12/2025 14.7     HEMATOCRIT 05/12/2025 45.7     MCV 05/12/2025 95.8     MCH 05/12/2025 30.8     MCHC 05/12/2025 32.2      RDW 05/12/2025 12.3     PLATELET COUNT 05/12/2025 240     MPV 05/12/2025 10.3     ABSOLUTE NEUTROPHILS 05/12/2025 6,132     ABSOLUTE LYMPHOCYTES 05/12/2025 1,359     ABSOLUTE MONOCYTES 05/12/2025 929     ABSOLUTE EOSINOPHILS 05/12/2025 112     ABSOLUTE BASOPHILS 05/12/2025 69     NEUTROPHILS 05/12/2025 71.3     LYMPHOCYTES 05/12/2025 15.8     MONOCYTES 05/12/2025 10.8     EOSINOPHILS 05/12/2025 1.3     BASOPHILS 05/12/2025 0.8     PSA, TOTAL 05/12/2025 3.52     TSH W/REFLEX TO FT4 05/12/2025 1.07    Lab on 12/18/2024   Component Date Value    WBC 12/18/2024 8.4     nRBC 12/18/2024 0.0     RBC 12/18/2024 5.02     Hemoglobin 12/18/2024 15.3     Hematocrit 12/18/2024 46.5     MCV 12/18/2024 93     MCH 12/18/2024 30.5     MCHC 12/18/2024 32.9     RDW 12/18/2024 13.5     Platelets 12/18/2024 264     Neutrophils % 12/18/2024 62.8     Immature Granulocytes %,* 12/18/2024 0.4     Lymphocytes % 12/18/2024 24.2     Monocytes % 12/18/2024 10.3     Eosinophils % 12/18/2024 1.7     Basophils % 12/18/2024 0.6     Neutrophils Absolute 12/18/2024 5.29     Immature Granulocytes Ab* 12/18/2024 0.03     Lymphocytes Absolute 12/18/2024 2.04     Monocytes Absolute 12/18/2024 0.87 (H)     Eosinophils Absolute 12/18/2024 0.14     Basophils Absolute 12/18/2024 0.05     Glucose 12/18/2024 136 (H)     Sodium 12/18/2024 140     Potassium 12/18/2024 4.3     Chloride 12/18/2024 102     Bicarbonate 12/18/2024 30     Anion Gap 12/18/2024 12     Urea Nitrogen 12/18/2024 24 (H)     Creatinine 12/18/2024 1.05     eGFR 12/18/2024 75     Calcium 12/18/2024 10.0     Albumin 12/18/2024 4.6     Alkaline Phosphatase 12/18/2024 32 (L)     Total Protein 12/18/2024 7.0     AST 12/18/2024 10     Bilirubin, Total 12/18/2024 1.0     ALT 12/18/2024 15     Cholesterol 12/18/2024 120     HDL-Cholesterol 12/18/2024 40.8     Cholesterol/HDL Ratio 12/18/2024 2.9     LDL Calculated 12/18/2024 57     VLDL 12/18/2024 22     Triglycerides 12/18/2024 110     Non HDL  Cholesterol 12/18/2024 79     Hemoglobin A1C 12/18/2024 5.3     Estimated Average Glucose 12/18/2024 105    Lab on 12/16/2024   Component Date Value    Clonazepam 12/16/2024 <25     7-Aminoclonazepam 12/16/2024 <25     Alprazolam 12/16/2024 <25     Alpha-Hydroxyalprazolam 12/16/2024 <25     Midazolam 12/16/2024 <25     Alpha-Hydroxymidazolam 12/16/2024 <25     Chlordiazepoxide 12/16/2024 <25     Diazepam 12/16/2024 <25     Nordiazepam 12/16/2024 237 (H)     Temazepam 12/16/2024 611 (H)     Oxazepam 12/16/2024 632 (H)     Lorazepam 12/16/2024 <25    Lab on 12/02/2024   Component Date Value    HSV 1, IgG 12/02/2024 0.2     HSV 2, 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        Assessment/Plan   Psychiatric Risk Assessment  Violence Risk Assessment: none  Acute Risk of Harm to Others is Considered: low   Suicide Risk Assessment: age > 65 yrs old and   Protective Factors against Suicide: adherence to  treatment, fear of suicide, moral objections to suicide, positive family relationships, and sense of responsibility toward family  Acute Risk of Harm to Self is Considered: low    Imminent Risk of Suicide or Serious Self-Injury: Low   Chronic Risk of Suicide of Serious Self-Injury: Low  Risk factors: Age, depression history and   Protective factors: Denies current suicidal ideation, denies history of suicide attempts , willingness to seek help and support , gender, access to a variety of clinical interventions , and receiving and engaged in care for mental, physical, and substance use disorders      Imminent Risk of Violence or Homicide: Low   Risk Factors: No significant risk factors identified on screening  Protective Factors: Lack of known history of harm to others , Lack of known history of violent ideation , and lack of known access to firearms.     Assessment & Plan  Schizoaffective disorder, depressive type (Multi)  Your diagnosis is of chronic depressive and anxious features. You deny any suicidal or homicidal ideation or  plans.      [place] was reviewed today, 4/15/25 with no concerning findings evidenced.     The plan is to continue bupropion  mg morning and middle afternoon daily, you have elected to stop risperidone 1 mg twice daily in the morning and late afternoon which you announced you have done unilaterally prior to this appointment because of concerns of parkinsonian features/risks; continue fluoxetine to 40 mg daily as well as bupropion  mg  twice daily for depression and you can continue diazepam 5 mg as needed daily and at bedtime as needed only. You clearly are able to recite back the risks, benefits and alternatives of these medications as discussed with you today.     The FDA risks of antipsychotics including increased risk of sudden death, heart attack, brain stroke, irreversible neurological movement disorders, parkinsonism, cardiac toxicity, weight gain and diabetes of this medication were discussed. You were able to recite back the risks, benefits, alternatives with respect to antipsychotics and in particular risperidone.     The FDA risks of antidepressants including increased risk of suicide, cardiotoxicity, lowered seizure threshold, the serotonin syndrome with serotonin agents and anticholinergic effects have been discussed. We also discussed that at least in the case of SSRIs there is interference with warfarin and nonsteroidal inflammatories and can cause increased bleeding and hemorrhage. You were able to recite back the risks, benefits, alternatives with respect to your antidepressants in particular bupropion and fluoxetine.     The FDA risks of benzodiazepines were discussed which includes impairment of memory and cognition, increased falls, addiction and dangerous withdrawals if stopped suddenly which can cause significant morbidity and or mortality. There is also risk of respiratory suppression alone or with other sedative or other medications which can lead to morbidity and mortality. You  clearly were able to recite back the risks, benefits and alternatives to the benzodiazepines discussed.   Orders:    buPROPion XL (Wellbutrin XL) 150 mg 24 hr tablet; Take 1 tablet (150 mg) by mouth 2 times a day. Do not crush, chew, or split.    Mixed anxiety depressive disorder  Your diagnosis is of chronic depressive and anxious features. You deny any suicidal or homicidal ideation or plans.      OARRS was reviewed today, 4/15/25 with no concerning findings evidenced.     The plan is to continue bupropion  mg morning and middle afternoon daily, you have elected to stop risperidone 1 mg twice daily in the morning and late afternoon which you announced you have done unilaterally prior to this appointment because of concerns of parkinsonian features/risks; continue fluoxetine to 40 mg daily as well as bupropion  mg  twice daily for depression and you can continue diazepam 5 mg as needed daily and at bedtime as needed only. You clearly are able to recite back the risks, benefits and alternatives of these medications as discussed with you today.     The FDA risks of antipsychotics including increased risk of sudden death, heart attack, brain stroke, irreversible neurological movement disorders, parkinsonism, cardiac toxicity, weight gain and diabetes of this medication were discussed. You were able to recite back the risks, benefits, alternatives with respect to antipsychotics and in particular risperidone.     The FDA risks of antidepressants including increased risk of suicide, cardiotoxicity, lowered seizure threshold, the serotonin syndrome with serotonin agents and anticholinergic effects have been discussed. We also discussed that at least in the case of SSRIs there is interference with warfarin and nonsteroidal inflammatories and can cause increased bleeding and hemorrhage. You were able to recite back the risks, benefits, alternatives with respect to your antidepressants in particular bupropion and  fluoxetine.     The FDA risks of benzodiazepines were discussed which includes impairment of memory and cognition, increased falls, addiction and dangerous withdrawals if stopped suddenly which can cause significant morbidity and or mortality. There is also risk of respiratory suppression alone or with other sedative or other medications which can lead to morbidity and mortality. You clearly were able to recite back the risks, benefits and alternatives to the benzodiazepines discussed.   Orders:    buPROPion XL (Wellbutrin XL) 150 mg 24 hr tablet; Take 1 tablet (150 mg) by mouth 2 times a day. Do not crush, chew, or split.    Your diagnosis is of chronic depressive and anxious features. You deny any suicidal or homicidal ideation or plans.      OARRS was reviewed today, 5/13/25 with no concerning findings evidenced.     The plan is to continue bupropion  mg morning and late afternoon daily, you have elected to stop risperidone 1 mg twice daily in the morning and late afternoon which you announced you have done unilaterally prior to this appointment because of concerns of parkinsonian features/risks; continue fluoxetine to 40 mg daily as well as bupropion  mg  twice daily for depression and you can continue diazepam 5 mg as needed daily and at bedtime as needed only. You clearly are able to recite back the risks, benefits and alternatives of these medications as discussed with you today.     The FDA risks of antipsychotics including increased risk of sudden death, heart attack, brain stroke, irreversible neurological movement disorders, parkinsonism, cardiac toxicity, weight gain and diabetes of this medication were discussed. You were able to recite back the risks, benefits, alternatives with respect to antipsychotics and in particular risperidone.     The FDA risks of antidepressants including increased risk of suicide, cardiotoxicity, lowered seizure threshold, the serotonin syndrome with serotonin  agents and anticholinergic effects have been discussed. We also discussed that at least in the case of SSRIs there is interference with warfarin and nonsteroidal inflammatories and can cause increased bleeding and hemorrhage. You were able to recite back the risks, benefits, alternatives with respect to your antidepressants in particular bupropion and fluoxetine.     The FDA risks of benzodiazepines were discussed which includes impairment of memory and cognition, increased falls, addiction and dangerous withdrawals if stopped suddenly which can cause significant morbidity and or mortality. There is also risk of respiratory suppression alone or with other sedative or other medications which can lead to morbidity and mortality. You clearly were able to recite back the risks, benefits and alternatives to the benzodiazepines discussed.         Time:   Prep time on date of the patient encounter: 5 minutes.   Time spent directly with patient/family/caregiver: 20 minutes.   Additional time spent on patient care activities:  minutes.   Documentation time: 5 minutes.   Total time on date of patient encounter: 30 minutes.

## 2025-05-14 ENCOUNTER — ANCILLARY PROCEDURE (OUTPATIENT)
Dept: CARDIOLOGY | Facility: CLINIC | Age: 75
End: 2025-05-14
Payer: COMMERCIAL

## 2025-05-14 DIAGNOSIS — I95.89 HYPOTENSION DUE TO HYPOVOLEMIA: ICD-10-CM

## 2025-05-14 DIAGNOSIS — E86.1 HYPOTENSION DUE TO HYPOVOLEMIA: ICD-10-CM

## 2025-05-14 LAB
AORTIC VALVE MEAN GRADIENT: 4 MMHG
AORTIC VALVE PEAK VELOCITY: 1.35 M/S
AV PEAK GRADIENT: 7 MMHG
AVA (PEAK VEL): 2.89 CM2
AVA (VTI): 2.91 CM2
EJECTION FRACTION APICAL 4 CHAMBER: 61.1
EJECTION FRACTION: 58 %
LEFT ATRIUM VOLUME AREA LENGTH INDEX BSA: 15.6 ML/M2
LEFT VENTRICLE INTERNAL DIMENSION DIASTOLE: 3.7 CM (ref 3.5–6)
LEFT VENTRICULAR OUTFLOW TRACT DIAMETER: 2.13 CM
MITRAL VALVE E/A RATIO: 0.9
RIGHT VENTRICLE FREE WALL PEAK S': 12 CM/S
TRICUSPID ANNULAR PLANE SYSTOLIC EXCURSION: 2.6 CM

## 2025-05-14 PROCEDURE — 93306 TTE W/DOPPLER COMPLETE: CPT

## 2025-05-14 PROCEDURE — 93306 TTE W/DOPPLER COMPLETE: CPT | Performed by: INTERNAL MEDICINE

## 2025-05-27 DIAGNOSIS — E11.9 TYPE 2 DIABETES MELLITUS WITHOUT COMPLICATIONS: ICD-10-CM

## 2025-05-27 RX ORDER — SITAGLIPTIN 100 MG/1
100 TABLET, FILM COATED ORAL DAILY
Qty: 90 TABLET | Refills: 0 | Status: SHIPPED | OUTPATIENT
Start: 2025-05-27

## 2025-05-28 ENCOUNTER — APPOINTMENT (OUTPATIENT)
Dept: PHYSICAL THERAPY | Facility: CLINIC | Age: 75
End: 2025-05-28
Payer: COMMERCIAL

## 2025-05-28 DIAGNOSIS — E11.9 TYPE 2 DIABETES MELLITUS WITHOUT COMPLICATIONS: ICD-10-CM

## 2025-05-28 DIAGNOSIS — E11.9 TYPE 2 DIABETES MELLITUS WITHOUT COMPLICATION, WITHOUT LONG-TERM CURRENT USE OF INSULIN: Primary | ICD-10-CM

## 2025-05-28 RX ORDER — METFORMIN HYDROCHLORIDE 500 MG/1
1000 TABLET ORAL 2 TIMES DAILY
Qty: 360 TABLET | Refills: 0 | Status: SHIPPED | OUTPATIENT
Start: 2025-05-28

## 2025-05-30 ENCOUNTER — APPOINTMENT (OUTPATIENT)
Dept: PRIMARY CARE | Facility: CLINIC | Age: 75
End: 2025-05-30
Payer: COMMERCIAL

## 2025-06-02 DIAGNOSIS — I10 BENIGN ESSENTIAL HYPERTENSION: ICD-10-CM

## 2025-06-02 RX ORDER — OMEGA-3-ACID ETHYL ESTERS 1 G/1
4 CAPSULE, LIQUID FILLED ORAL
Qty: 360 CAPSULE | Refills: 0 | Status: SHIPPED | OUTPATIENT
Start: 2025-06-02

## 2025-06-05 DIAGNOSIS — E11.9 TYPE 2 DIABETES MELLITUS WITHOUT COMPLICATION, WITHOUT LONG-TERM CURRENT USE OF INSULIN: ICD-10-CM

## 2025-06-05 DIAGNOSIS — E29.1 HYPOGONADISM MALE: ICD-10-CM

## 2025-06-05 DIAGNOSIS — E03.9 HYPOTHYROIDISM, UNSPECIFIED: ICD-10-CM

## 2025-06-06 RX ORDER — BLOOD-GLUCOSE METER
1 KIT MISCELLANEOUS 2 TIMES DAILY
Qty: 200 STRIP | Refills: 2 | Status: SHIPPED | OUTPATIENT
Start: 2025-06-06

## 2025-06-06 RX ORDER — LEVOTHYROXINE SODIUM 100 UG/1
100 TABLET ORAL DAILY
Qty: 90 TABLET | Refills: 0 | Status: SHIPPED | OUTPATIENT
Start: 2025-06-06

## 2025-06-06 RX ORDER — TESTOSTERONE 20.25 MG/1.25G
2 GEL TOPICAL DAILY
Qty: 75 G | Refills: 0 | Status: SHIPPED | OUTPATIENT
Start: 2025-06-06

## 2025-06-17 ENCOUNTER — TELEMEDICINE (OUTPATIENT)
Dept: BEHAVIORAL HEALTH | Facility: CLINIC | Age: 75
End: 2025-06-17
Payer: COMMERCIAL

## 2025-06-17 DIAGNOSIS — F41.8 MIXED ANXIETY DEPRESSIVE DISORDER: ICD-10-CM

## 2025-06-17 PROCEDURE — 4010F ACE/ARB THERAPY RXD/TAKEN: CPT | Performed by: PSYCHIATRY & NEUROLOGY

## 2025-06-17 PROCEDURE — 99214 OFFICE O/P EST MOD 30 MIN: CPT | Performed by: PSYCHIATRY & NEUROLOGY

## 2025-06-17 RX ORDER — DIAZEPAM 5 MG/1
5 TABLET ORAL NIGHTLY PRN
Qty: 90 TABLET | Refills: 0 | Status: SHIPPED | OUTPATIENT
Start: 2025-06-17 | End: 2025-09-15

## 2025-06-17 ASSESSMENT — ENCOUNTER SYMPTOMS
FATIGUE: 1
NERVOUS/ANXIOUS: 1
DEPRESSED MOOD: 1
DYSPHORIC MOOD: 1

## 2025-06-17 NOTE — ASSESSMENT & PLAN NOTE
Your diagnosis is of chronic depressive and anxious features. You deny any suicidal or homicidal ideation or plans.      OARRS was reviewed today, 6/17/25 with no concerning findings evidenced.     The plan is to continue bupropion  mg morning and middle afternoon daily, you have elected to stop risperidone 1 mg twice daily in the morning and late afternoon which you announced you have done unilaterally prior to this appointment because of concerns of parkinsonian features/risks; continue fluoxetine to 40 mg daily as well as bupropion  mg  twice daily for depression and you can continue diazepam 5 mg as needed daily and at bedtime as needed only. You clearly are able to recite back the risks, benefits and alternatives of these medications as discussed with you today.     The FDA risks of antipsychotics including increased risk of sudden death, heart attack, brain stroke, irreversible neurological movement disorders, parkinsonism, cardiac toxicity, weight gain and diabetes of this medication were discussed. You were able to recite back the risks, benefits, alternatives with respect to antipsychotics and in particular risperidone.     The FDA risks of antidepressants including increased risk of suicide, cardiotoxicity, lowered seizure threshold, the serotonin syndrome with serotonin agents and anticholinergic effects have been discussed. We also discussed that at least in the case of SSRIs there is interference with warfarin and nonsteroidal inflammatories and can cause increased bleeding and hemorrhage. You were able to recite back the risks, benefits, alternatives with respect to your antidepressants in particular bupropion and fluoxetine.     The FDA risks of benzodiazepines were discussed which includes impairment of memory and cognition, increased falls, addiction and dangerous withdrawals if stopped suddenly which can cause significant morbidity and or mortality. There is also risk of respiratory  suppression alone or with other sedative or other medications which can lead to morbidity and mortality. You clearly were able to recite back the risks, benefits and alternatives to the benzodiazepines discussed.   Orders:    diazePAM (Valium) 5 mg tablet; Take 1 tablet (5 mg) by mouth as needed at bedtime for anxiety.    Continue to follow up monthly.

## 2025-06-18 ENCOUNTER — APPOINTMENT (OUTPATIENT)
Dept: PHYSICAL THERAPY | Facility: CLINIC | Age: 75
End: 2025-06-18
Payer: COMMERCIAL

## 2025-06-23 ENCOUNTER — TELEPHONE (OUTPATIENT)
Dept: PRIMARY CARE | Facility: CLINIC | Age: 75
End: 2025-06-23
Payer: COMMERCIAL

## 2025-06-26 ENCOUNTER — EVALUATION (OUTPATIENT)
Dept: PHYSICAL THERAPY | Facility: CLINIC | Age: 75
End: 2025-06-26
Payer: COMMERCIAL

## 2025-06-26 VITALS — SYSTOLIC BLOOD PRESSURE: 99 MMHG | DIASTOLIC BLOOD PRESSURE: 62 MMHG | HEART RATE: 92 BPM

## 2025-06-26 DIAGNOSIS — M79.2 NEUROPATHIC PAIN: ICD-10-CM

## 2025-06-26 DIAGNOSIS — R26.9 ABNORMAL GAIT: Primary | ICD-10-CM

## 2025-06-26 DIAGNOSIS — K21.00 GASTROESOPHAGEAL REFLUX DISEASE WITH ESOPHAGITIS WITHOUT HEMORRHAGE: ICD-10-CM

## 2025-06-26 DIAGNOSIS — M62.469 GASTROCNEMIUS EQUINUS, UNSPECIFIED LATERALITY: ICD-10-CM

## 2025-06-26 DIAGNOSIS — E11.42 DIABETIC PERIPHERAL NEUROPATHY (MULTI): ICD-10-CM

## 2025-06-26 DIAGNOSIS — E11.610 CHARCOT FOOT DUE TO DIABETES MELLITUS (MULTI): ICD-10-CM

## 2025-06-26 DIAGNOSIS — Q66.70 PES CAVUS: ICD-10-CM

## 2025-06-26 PROCEDURE — 97161 PT EVAL LOW COMPLEX 20 MIN: CPT | Mod: GP | Performed by: PHYSICAL THERAPIST

## 2025-06-26 PROCEDURE — 97110 THERAPEUTIC EXERCISES: CPT | Mod: GP | Performed by: PHYSICAL THERAPIST

## 2025-06-26 RX ORDER — PANTOPRAZOLE SODIUM 40 MG/1
40 TABLET, DELAYED RELEASE ORAL DAILY
Qty: 90 TABLET | Refills: 0 | Status: SHIPPED | OUTPATIENT
Start: 2025-06-26

## 2025-06-26 ASSESSMENT — PAIN - FUNCTIONAL ASSESSMENT: PAIN_FUNCTIONAL_ASSESSMENT: 0-10

## 2025-06-26 ASSESSMENT — ENCOUNTER SYMPTOMS
DEPRESSION: 1
OCCASIONAL FEELINGS OF UNSTEADINESS: 1
LOSS OF SENSATION IN FEET: 1

## 2025-06-26 ASSESSMENT — PAIN SCALES - GENERAL: PAINLEVEL_OUTOF10: 0 - NO PAIN

## 2025-06-26 NOTE — PROGRESS NOTES
"Physical Therapy    Physical Therapy Evaluation and Treatment      Patient Name: Truong Armijo  MRN: [id number]  Today's Date: 6/26/25  [facility]  Primary dx=abnormal gait  \"[place]\"  Time Entry:   Time Calculation  Start Time: 1710  Stop Time: 1810  Time Calculation (min): 60 min  PT Evaluation Time Entry  PT Evaluation (Low) Time Entry: 32  PT Therapeutic Procedures Time Entry  Therapeutic Exercise Time Entry: 28      Assessment:  Patient is a 74 year old male referred to [facility]'s outpatient physical therapy services with a chief complaint of bilateral foot pain. The patient presents with bilateral pes cavus and bilateral foot pain, with pain located in the midfoot and is more severe on the right side.  The patient reports that his pain is worse when he has shoes on and the pain limits his walking tolerance.  The patient also reports a lack of confidence in his balance and had a fall in the past year which places him a risk for future falls.  The patient is presenting with decreased single leg balance bilaterally and difficulty with eyes closed on foam condition of balance indicating reduced utilization of vestibular cues for balance.  Mr. Armijo will benefit from physical therapy services to address the above deficits, improve balance and decrease fall risk in his home and the community.          Plan: FGA, continue to establish HEP       Current Problem:   1. Abnormal gait  Follow Up In Physical Therapy      2. Neuropathic pain  Referral to Physical Therapy    Follow Up In Physical Therapy      3. Pes cavus  Referral to Physical Therapy    Follow Up In Physical Therapy      4. Gastrocnemius equinus, unspecified laterality  Referral to Physical Therapy    Follow Up In Physical Therapy      5. Charcot foot due to diabetes mellitus (Multi)  Follow Up In Physical Therapy      6. Diabetic peripheral neuropathy (Multi)  Follow Up In Physical Therapy          Subjective    Patient Report:  Pt. Reports " "that he usually has very extreme pain especially when he wears shoes and it is so bad that he has to take his shoes off.  He has sketchers slip ins and they are elastic and they are more comfortable.  Up to this week he is in desperate pain in his right foot even with his current shoes.  When he wears orthotics he is in excruciating pain.  He also has a lack of propulsion and feels that he hobbles.  He feels that his balance is really bad.  He had a bad fall about a month ago and his right hip swelled up.  He was holding his glasses and he was trying not to break them.  He cannot walk with orthotics because it makes the pain worse.  He saw a foot surgeon and he said that he doesn't need surgery.  He describes the pain as sharp pain that is on the bottom of his feet but especially on the midfoot.  The pain is worse when he wears shoes.  He reports that he is a moderate drinker 2-3 per night but that is less this year. He is diabetic and his A1c went down but his cholesterol went up.  He sees a counselor 2 times a week.  He feels off balance if he turns too quickly.  He is worried about falling in his bathroom      Home Setup: pt. Lives alone, has a few steps to get inside and has to walk from the garage to his condo which is a far drive.  He has a tub but is afraid to use it sometimes.  Equipment: doesn't have any devices  Hobbies: he has decreased his social activity, he was involved in [employer] family services but it isnt involved anymore  Physical Activity: he was feeling very down in November and wastn exercises but [facility] and [facility] 3 [facility] last month.    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 Goal: \" be able to walk longer " "distances, minimize pain\"       Precautions: HTN, hypothyroidism, COPD, DMII     Vital Signs: /58 sitting HR=77  Standing vitals BP=99/62, HR=92 BPM     Pain: 0/10          Objective     Vision=glasses for distance  Hearing=he doesn't notice it but they have said he could use hearing aides  Sensation=diabetic peripheral neuropathy  Observation=pes planus bilaterally  Edema=neg.  Gait=reduced stance time on RLE, reciprocal gait, normal speed    Lower Extremity Strength MMT (tested in seated)    Left Right   Hip flexion 5/5 5/5   Hip ADD  5/5 5/5   Hip ABD 5/5 5/5   Knee flexion 5/5 5/5   Knee Extension 5/5 5/5   Ankle Dorsiflexion 4/5 4/5   Ankle Plantarflexion 4-/5 4-/5   Ankle Inversion 4/5 4/5   Ankle Eversion 4-/5 4-/5         SLS:  RLE=2 sec.  LLE=2 sec.    Modified Clinical Sensory Integration Test  Condition One: Eyes Open, Firm Surface  Total Time: __30_____ / 30 sec  Condition Two: Eyes Closed, Firm Surface  Total Time: __30_____ / 30 sec  Condition Three: Eyes Open, Foam Surface  Total Time: __30_____ / 30 sec  Condition Four: Eyes Closed, Foam Surface  Total Time: __3,6, 19 (average=9.33 sec.)_____ / 30 sec     ABC functional outcome [id number]        Treatments:    There ex:  -PT PROM distraction of each digit from base of proximal phalanx  -PT PROM midfoot pronation/supination  -printed handout for the above exercises  -educated pt. On soaking feet in warm water with epsom salt for pain relief (not hot water)  -educated pt. On natural creams for pain relief    Self Care (5'x1 billed with there ex)  -educated pt. On taking BP log sitting and standing after 2 minutes daily to provide to PCP  -educated pt. On proper hydration due to low BP and hx. syncope    EDUCATION:  -see education       Goals:  Active       PT Problem       PT Goal 1: Pt. Will improve EC on foam condition of MCTSIB to indicate improved utilization of vestibular cues for balance by end of POC.        Start:  06/30/25    Expected " "End:  09/24/25            PT Goal 2: Pt. Will demonstrate improved single leg balance to increase safety/decrease fall risk during ADLs such as donning/doffing shoes and pants.        Start:  06/30/25    Expected End:  09/24/25            PT Goal 3: Pt. Will be independent in HEP in next 1-2 visits to promote self efficacy, manage symptoms and meet other LTG.        Start:  06/30/25    Expected End:  09/24/25            PT Goal 4: Pt. will be able to complete the full 6MWT by end of POC       Start:  06/30/25    Expected End:  09/24/25            Patient Stated Goal 1: Pt. Goal: \" be able to walk longer distances, minimize pain\"       Start:  06/30/25    Expected End:  09/24/25                      "

## 2025-07-03 DIAGNOSIS — E11.9 TYPE 2 DIABETES MELLITUS WITHOUT COMPLICATION, WITHOUT LONG-TERM CURRENT USE OF INSULIN: ICD-10-CM

## 2025-07-03 DIAGNOSIS — E11.42 DIABETIC PERIPHERAL NEUROPATHY (MULTI): ICD-10-CM

## 2025-07-04 RX ORDER — DAPAGLIFLOZIN 10 MG/1
10 TABLET, FILM COATED ORAL DAILY
Qty: 90 TABLET | Refills: 0 | Status: SHIPPED | OUTPATIENT
Start: 2025-07-04

## 2025-07-08 ENCOUNTER — OFFICE VISIT (OUTPATIENT)
Dept: BEHAVIORAL HEALTH | Facility: CLINIC | Age: 75
End: 2025-07-08
Payer: COMMERCIAL

## 2025-07-08 VITALS
TEMPERATURE: 97 F | SYSTOLIC BLOOD PRESSURE: 121 MMHG | WEIGHT: 198.4 LBS | RESPIRATION RATE: 18 BRPM | HEART RATE: 60 BPM | BODY MASS INDEX: 26.91 KG/M2 | DIASTOLIC BLOOD PRESSURE: 78 MMHG

## 2025-07-08 DIAGNOSIS — F25.1 SCHIZOAFFECTIVE DISORDER, DEPRESSIVE TYPE (MULTI): ICD-10-CM

## 2025-07-08 PROCEDURE — 3074F SYST BP LT 130 MM HG: CPT | Performed by: PSYCHIATRY & NEUROLOGY

## 2025-07-08 PROCEDURE — 99214 OFFICE O/P EST MOD 30 MIN: CPT | Mod: AM | Performed by: PSYCHIATRY & NEUROLOGY

## 2025-07-08 PROCEDURE — 4010F ACE/ARB THERAPY RXD/TAKEN: CPT | Performed by: PSYCHIATRY & NEUROLOGY

## 2025-07-08 PROCEDURE — 1159F MED LIST DOCD IN RCRD: CPT | Performed by: PSYCHIATRY & NEUROLOGY

## 2025-07-08 PROCEDURE — 1126F AMNT PAIN NOTED NONE PRSNT: CPT | Performed by: PSYCHIATRY & NEUROLOGY

## 2025-07-08 PROCEDURE — 1160F RVW MEDS BY RX/DR IN RCRD: CPT | Performed by: PSYCHIATRY & NEUROLOGY

## 2025-07-08 PROCEDURE — 1036F TOBACCO NON-USER: CPT | Performed by: PSYCHIATRY & NEUROLOGY

## 2025-07-08 PROCEDURE — 99214 OFFICE O/P EST MOD 30 MIN: CPT | Performed by: PSYCHIATRY & NEUROLOGY

## 2025-07-08 PROCEDURE — 3078F DIAST BP <80 MM HG: CPT | Performed by: PSYCHIATRY & NEUROLOGY

## 2025-07-08 ASSESSMENT — PAIN SCALES - GENERAL: PAINLEVEL_OUTOF10: 0-NO PAIN

## 2025-07-08 ASSESSMENT — PATIENT HEALTH QUESTIONNAIRE - PHQ9
1. LITTLE INTEREST OR PLEASURE IN DOING THINGS: NOT AT ALL
SUM OF ALL RESPONSES TO PHQ9 QUESTIONS 1 AND 2: 0
2. FEELING DOWN, DEPRESSED OR HOPELESS: NOT AT ALL

## 2025-07-08 ASSESSMENT — ENCOUNTER SYMPTOMS
OCCASIONAL FEELINGS OF UNSTEADINESS: 1
LOSS OF SENSATION IN FEET: 0
DEPRESSED MOOD: 1

## 2025-07-08 NOTE — PROGRESS NOTES
Subjective   Patient ID: Truong Armijo is a 74 y.o. male who presents for No chief complaint on file. I feel overwhelmed.    HPI: The patient report many frustrations with multiple endeavors. The patient is managing at this time. He is working maintaining his weight. He is currently trying to resume psychotherapy with a previous therapist. The patient has been overwhelmed with the many tasks he has to manage with ordinary responsibilities.      Active and Past Problems  Problems    · Acute pain of both knees (338.19,719.46) (M25.561,M25.562)   · Adult hypothyroidism (244.9) (E03.9)   · Adverse drug reaction (E947.9) (T50.905A)   · Adverse effect of drug, initial encounter (E947.9) (T50.905A)   · Anorexia (783.0) (R63.0)   · Anxiety and depression (300.00,311) (F41.9,F32.A)   · Anxiety disorder (300.00) (F41.9)   · Arthralgia of knee, unspecified laterality (719.46) (M25.569)   · Arthralgia of left shoulder region (719.41) (M25.512)   · Arthralgia of right shoulder region (719.41) (M25.511)   · Asthmatic bronchitis (493.90) (J45.909)   · Atelectasis (518.0) (J98.11)   · Atelectasis, bilateral (518.0) (J98.11)   · Atrophic retina (362.60) (H35.89)   · Back pain (724.5) (M54.9)   · Balance problem (781.99) (R26.89)   · Benign essential hypertension (401.1) (I10)   · Bilateral artificial lens implant (V43.1) (Z96.1)   · Bilateral myopia (367.1) (H52.13)   · Bilateral sensorineural hearing loss (389.18) (H90.3)   · Bilateral shoulder pain, unspecified chronicity (719.41) (M25.511,M25.512)   · Cervical back pain with evidence of disc disease (722.91) (M50.90)   · Cervical disc disease (722.91) (M50.90)   · Cervical myofascial pain syndrome (729.1) (M79.18)   · 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)   · Chest pain of uncertain etiology (786.59) (R07.9)   · Chronic depression (311) (F32.A)   · Chronic low self esteem (799.29) (R45.81)   · Chronic otitis externa of left ear (380.23) (H60.62)   · Chronic otitis media (382.9)  (H66.90)   · Chronic primary angle-closure glaucoma of both eyes, mild stage (365.23) (H40.2231)   · Chronic primary angle-closure glaucoma of left eye, indeterminate stage (365.23)  (H40.2224)   · Chronic primary angle-closure glaucoma of left eye, unspecified glaucoma stage  (365.23) (H40.2220)   · Constipation (564.00) (K59.00)   · Contusion of lower back (922.31) (S30.0XXA)   · COPD (chronic obstructive pulmonary disease) (496) (J44.9)   · Cough variant asthma (493.82) (J45.991)   · Degeneration of intervertebral disc of cervical region (722.4) (M50.30)   · Depression with anxiety (300.4) (F41.8)   · Diabetes mellitus (250.00) (E11.9)   · Diabetic Charcot's foot (250.60,713.5) (E11.610)   · Diabetic gastroenteropathy (250.60,579.8) (E11.69,K52.9)   · Diabetic peripheral neuropathy (250.60,357.2) (E11.42)   · Dizziness (780.4) (R42)   · Elbow pain (719.42) (M25.529)   · Elevated IOP (365.00) (H40.059)   · Elevated LDL cholesterol level (272.0) (E78.00)   · Encounter for immunization (V03.89) (Z23)   · Enlarged prostate without lower urinary tract symptoms (luts) (600.00) (N40.0)   · Epididymitis (604.90) (N45.1)   · Fatigue (780.79) (R53.83)   · Fluctuating mental status (780.97) (R41.82)   · Generalized weakness (780.79) (R53.1)   · Glaucoma (365.9) (H40.9)   · High triglycerides (272.1) (E78.1)   · HTN (hypertension) (401.9) (I10)   · Hyperglycemia (790.29) (R73.9)   · Hypogonadism male (257.2) (E29.1)   · Hypotension due to drugs (458.8,E947.9) (I95.2)   · Impaired vision (369.9) (H54.7)   · Increased urinary frequency (788.41) (R35.0)   · Insomnia (780.52) (G47.00)   · Intoxication   · Lateral epicondylitis of right elbow (726.32) (M77.11)   · Leg weakness (729.89) (R29.898)   · Lumbar disc herniation (722.10) (M51.26)   · Lumbar pain (724.2) (M54.50)   · Medication management (V58.69) (Z79.899)   · Migraine equivalent (346.20) (G43.109)   · Mixed hearing loss (389.20) (H90.8)   · Moderate episode of  recurrent major depressive disorder (296.32) (F33.1)   · Neck pain (723.1) (M54.2)   · Neck pain, acute (723.1) (M54.2)   · Neurogenic bladder (596.54) (N31.9)   · 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,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)   · Optic nerve hemorrhage (377.42) (H47.029)   · 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)   · Other specified hypotension (458.8) (I95.89)   · Pain in both feet (729.5) (M79.671,M79.672)   · Paranoia (psychosis) (297.1) (F22)   · Paranoid ideation (297.8) (F22)   · Penile pain, chronic (607.9) (N48.89,G89.29)   · Peripheral neuropathy (356.9) (G62.9)   · Persistent testicular pain (608.9) (N50.819)   · Poorly controlled diabetes mellitus (250.00) (E11.65)   · Post traumatic stress disorder (PTSD) (309.81) (F43.10)   · Primary hypothyroidism (244.9) (E03.9)   · Primary open angle glaucoma of both eyes, mild stage (365.11,365.71) (H40.1131)   · Primary osteoarthritis of left knee (715.16) (M17.12)   · Primary osteoarthritis of right knee (715.16) (M17.11)   · PUD (peptic ulcer disease) (533.90) (K27.9)   · Reactive confusion (298.2) (F44.89)   · Reflux esophagitis (530.11) (K21.00)   · Right inguinal hernia (550.90) (K40.90)   · Schizoaffective disorder (295.70) (F25.9)   · Screening PSA (prostate specific antigen) (V76.44) (Z12.5)   · SOB (shortness of breath) (786.05) (R06.02)   · SOB (shortness of breath) (786.05) (R06.02)   · Sprain of right elbow, initial encounter (841.9) (S53.401A)   · Stress reaction (308.9) (F43.0)   · Subjective tinnitus of both ears (388.31) (H93.13)   · 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)   · Thyroid disorder (246.9) (E07.9)   · TIA (transient ischemic attack) (435.9) (G45.9)   · Trigeminal neuralgia (350.1) (G50.0)   · Trigger point of extremity (729.5) (M79.609)   · Urinary frequency (788.41) (R35.0)   · Urinary tract infection (599.0) (N39.0)   · Urinary urgency (788.63) (R39.15)   · Vasovagal reaction (780.2) (R55)   · Vertigo (780.4) (R42)   · Vitamin D deficiency  (268.9) (E55.9)     Past Medical History  Problems    · History of Acute bilateral otitis media (382.9) (H66.93)   · Resolved Date: 15 Oct 2015   · History of Change in bowel habits (787.99) (R19.4)   · Resolved Date: 09 Dec 2021   · History of Change in mental status (780.97) (R41.82)   · Resolved Date: 17 Sep 2019   · History of Chronic diarrhea of unknown origin (787.91) (K52.9)   · Resolved Date: 09 Dec 2021   · History of Depression with anxiety (300.4) (F41.8)   · Resolved Date: 03 Nov 2017   · History of Depression with anxiety (300.4) (F41.8)   · Resolved Date: 13 May 2020   · History of Depression with anxiety (300.4) (F41.8)   · Resolved Date: 02 Jul 2021   · History of Dysphagia, oropharyngeal phase (787.22) (R13.12)   · Resolved Date: 09 Dec 2021   · History of acute otitis media (V12.49) (Z86.69)   · Resolved Date: 15 Oct 2015   · History of acute pharyngitis (V12.69) (Z87.09)   · Resolved Date: 15 Oct 2015   · History of acute sinusitis (V12.69) (Z87.09)   · Resolved Date: 15 Oct 2015   · History of chronic diarrhea   · Resolved Date: 09 Dec 2021   · History of dysphagia (V13.89) (Z87.898)   · Resolved Date: 09 Dec 2021   · History of dysphagia (V13.89) (Z87.898)   · Resolved Date: 09 Dec 2021   · History of fatigue (V13.89) (Z87.898)   · Resolved Date: 28 Apr 2014   · History of hypercholesterolemia (V12.29) (Z86.39)   · Resolved Date: 12 Oct 2016   · History of nausea (V12.79) (Z87.898)   · Resolved Date: 09 Dec 2021   · History of nausea (V12.79) (Z87.898)   · Resolved Date: 09 Dec 2021   · History of nausea and vomiting (V12.79) (Z87.898)   · Resolved Date: 09 Dec 2021   · History of psychiatric hospitalization (V11.9) (Z86.59)   · Several psychiatric hospitalizations including when in his early 20's he was diagnosed      with disorganized schizophrenia.   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   · Completed elementary school   · Completed high school   · Former smoker (V15.82) (Z87.891)   · Heterosexual   · No drug use   · Retired   · Retired from taxi driving for about 20 years. He did work for [employer] prior to driving a taxi.   · Single     Mental Status Exam     General: In no acute distress with depressed mood.   Appearance: Exacerbation of chronic depressive features with poor self esteem.   Attitude: Cooperative.   Behavior: Exacerbation of chronic depressive features with poor self esteem.   Motor Activity: No agitation or retardation. No EPS/TD. Normal gait and station.   Speech: Regular rate, rhythm, volume and tone, spontaneous, fluent.   Mood: Exacerbation of chronic depressive features with poor self esteem.   Affect: Exacerbation of chronic depressive features with poor self esteem.   Thought Process: Exacerbation of chronic depressive features with poor self esteem.   Thought Content: Exacerbation of chronic  depressive features with poor self esteem.   Thought Perception: Thought disturbance with chronic depressive features with poor self esteem.   Cognition: Thought disturbance with chronic depressive features with poor self esteem.   Insight: Insight limitations.   Judgment: Judgment limitations.       Appearance: well-groomed.   Build: average.   Demeanor: average.   Eye Contact: average.   Motor Activity: average.   Speech: clear.   Language: Neurologic language is intact.   Fund of Knowledge: intact fund of knowledge.   Delusions: None Reported.   Self Harm: None Reported.   Aggressive: None Reported.   Mood: depressed and anxious . Exacerbation of chronic depressive features with poor self esteem.   Affect: labile . Exacerbation of chronic depressive features with poor self esteem.   Orientation: alert, oriented x3.   Manner: cooperative.   Thought process: goal-directed.   Thought association: displays rational thought process.   Content of thought: Mr. HANNY HILL denies any suicidal or homicidal ideation or plans.   Abstract/ Rational Thought: minimal impairment   Memory: grossly intact.   Behavior: calm.   Attention/Concentration: normal.   Cognition: intact.   Intelligence Estimate: average.   Executive Function: intact.   Insight: minimal impairment.   Judgement: minimal impairment.         Review of Systems   Neurological:         Mental Status Exam     General: In no acute distress with depressed mood.   Appearance: Exacerbation of chronic depressive features with poor self esteem.   Attitude: Cooperative.   Behavior: Exacerbation of chronic depressive features with poor self esteem.   Motor Activity: No agitation or retardation. No EPS/TD. Normal gait and station.   Speech: Regular rate, rhythm, volume and tone, spontaneous, fluent.   Mood: Exacerbation of chronic depressive features with poor self esteem.   Affect: Exacerbation of chronic depressive features with poor self esteem.   Thought Process:  Exacerbation of chronic depressive features with poor self esteem.   Thought Content: Exacerbation of chronic depressive features with poor self esteem.   Thought Perception: Thought disturbance with chronic depressive features with poor self esteem.   Cognition: Thought disturbance with chronic depressive features with poor self esteem.   Insight: Insight limitations.   Judgment: Judgment limitations.       Appearance: well-groomed.   Build: average.   Demeanor: average.   Eye Contact: average.   Motor Activity: average.   Speech: clear.   Language: Neurologic language is intact.   Fund of Knowledge: intact fund of knowledge.   Delusions: None Reported.   Self Harm: None Reported.   Aggressive: None Reported.   Mood: depressed and anxious . Exacerbation of chronic depressive features with poor self esteem.   Affect: labile . Exacerbation of chronic depressive features with poor self esteem.   Orientation: alert, oriented x3.   Manner: cooperative.   Thought process: goal-directed.   Thought association: displays rational thought process.   Content of thought: Mr. HANNY HILL denies any suicidal or homicidal ideation or plans.   Abstract/ Rational Thought: minimal impairment   Memory: grossly intact.   Behavior: calm.   Attention/Concentration: normal.   Cognition: intact.   Intelligence Estimate: average.   Executive Function: intact.   Insight: minimal impairment.   Judgement: minimal impairment.      Psychiatric/Behavioral:          Mental Status Exam     General: In no acute distress with depressed mood.   Appearance: Exacerbation of chronic depressive features with poor self esteem.   Attitude: Cooperative.   Behavior: Exacerbation of chronic depressive features with poor self esteem.   Motor Activity: No agitation or retardation. No EPS/TD. Normal gait and station.   Speech: Regular rate, rhythm, volume and tone, spontaneous, fluent.   Mood: Exacerbation of chronic depressive features with poor self esteem.    Affect: Exacerbation of chronic depressive features with poor self esteem.   Thought Process: Exacerbation of chronic depressive features with poor self esteem.   Thought Content: Exacerbation of chronic depressive features with poor self esteem.   Thought Perception: Thought disturbance with chronic depressive features with poor self esteem.   Cognition: Thought disturbance with chronic depressive features with poor self esteem.   Insight: Insight limitations.   Judgment: Judgment limitations.       Appearance: well-groomed.   Build: average.   Demeanor: average.   Eye Contact: average.   Motor Activity: average.   Speech: clear.   Language: Neurologic language is intact.   Fund of Knowledge: intact fund of knowledge.   Delusions: None Reported.   Self Harm: None Reported.   Aggressive: None Reported.   Mood: depressed and anxious . Exacerbation of chronic depressive features with poor self esteem.   Affect: labile . Exacerbation of chronic depressive features with poor self esteem.   Orientation: alert, oriented x3.   Manner: cooperative.   Thought process: goal-directed.   Thought association: displays rational thought process.   Content of thought: Mr. HANNY HILL denies any suicidal or homicidal ideation or plans.   Abstract/ Rational Thought: minimal impairment   Memory: grossly intact.   Behavior: calm.   Attention/Concentration: normal.   Cognition: intact.   Intelligence Estimate: average.   Executive Function: intact.   Insight: minimal impairment.   Judgement: minimal impairment.      All other systems reviewed and are negative.    Psych Review of Symptoms:    ADHD: Patient denied any symptoms.         Anxiety:   Generalized Anxiety Symptoms: Difficulty controlling worry.       Developmental and Sensory Concerns: Patient denied any symptoms.         Depressive Symptoms:   Depressed mood.       Disruptive and Conduct Symptoms: Patient denied any symptoms.         Eating / Feeding Concerns: Patient  denied any symptoms.         Elimination Symptoms: Patient denied any symptoms.         Manic Symptoms: Patient denied any symptoms.         Obsessive-Compulsive Symptoms: Patient denied any symptoms.         Psychotic Symptoms: Patient denied any symptoms.           Trauma Related Symptoms: Patient denied any symptoms.           Sleep Concerns: Patient denied any symptoms.             Objective   Physical Exam  Neurological:      Mental Status: He is alert and oriented to person, place, and time.      Comments: Mental Status Exam     General: In no acute distress with depressed mood.   Appearance: Exacerbation of chronic depressive features with poor self esteem.   Attitude: Cooperative.   Behavior: Exacerbation of chronic depressive features with poor self esteem.   Motor Activity: No agitation or retardation. No EPS/TD. Normal gait and station.   Speech: Regular rate, rhythm, volume and tone, spontaneous, fluent.   Mood: Exacerbation of chronic depressive features with poor self esteem.   Affect: Exacerbation of chronic depressive features with poor self esteem.   Thought Process: Exacerbation of chronic depressive features with poor self esteem.   Thought Content: Exacerbation of chronic depressive features with poor self esteem.   Thought Perception: Thought disturbance with chronic depressive features with poor self esteem.   Cognition: Thought disturbance with chronic depressive features with poor self esteem.   Insight: Insight limitations.   Judgment: Judgment limitations.       Appearance: well-groomed.   Build: average.   Demeanor: average.   Eye Contact: average.   Motor Activity: average.   Speech: clear.   Language: Neurologic language is intact.   Fund of Knowledge: intact fund of knowledge.   Delusions: None Reported.   Self Harm: None Reported.   Aggressive: None Reported.   Mood: depressed and anxious . Exacerbation of chronic depressive features with poor self esteem.   Affect: labile .  Exacerbation of chronic depressive features with poor self esteem.   Orientation: alert, oriented x3.   Manner: cooperative.   Thought process: goal-directed.   Thought association: displays rational thought process.   Content of thought: Mr. HANNY HILL denies any suicidal or homicidal ideation or plans.   Abstract/ Rational Thought: minimal impairment   Memory: grossly intact.   Behavior: calm.   Attention/Concentration: normal.   Cognition: intact.   Intelligence Estimate: average.   Executive Function: intact.   Insight: minimal impairment.   Judgement: minimal impairment.      Psychiatric:      Comments: Mental Status Exam     General: In no acute distress with depressed mood.   Appearance: Exacerbation of chronic depressive features with poor self esteem.   Attitude: Cooperative.   Behavior: Exacerbation of chronic depressive features with poor self esteem.   Motor Activity: No agitation or retardation. No EPS/TD. Normal gait and station.   Speech: Regular rate, rhythm, volume and tone, spontaneous, fluent.   Mood: Exacerbation of chronic depressive features with poor self esteem.   Affect: Exacerbation of chronic depressive features with poor self esteem.   Thought Process: Exacerbation of chronic depressive features with poor self esteem.   Thought Content: Exacerbation of chronic depressive features with poor self esteem.   Thought Perception: Thought disturbance with chronic depressive features with poor self esteem.   Cognition: Thought disturbance with chronic depressive features with poor self esteem.   Insight: Insight limitations.   Judgment: Judgment limitations.       Appearance: well-groomed.   Build: average.   Demeanor: average.   Eye Contact: average.   Motor Activity: average.   Speech: clear.   Language: Neurologic language is intact.   Fund of Knowledge: intact fund of knowledge.   Delusions: None Reported.   Self Harm: None Reported.   Aggressive: None Reported.   Mood: depressed and  anxious . Exacerbation of chronic depressive features with poor self esteem.   Affect: labile . Exacerbation of chronic depressive features with poor self esteem.   Orientation: alert, oriented x3.   Manner: cooperative.   Thought process: goal-directed.   Thought association: displays rational thought process.   Content of thought: Mr. HANNY HILL denies any suicidal or homicidal ideation or plans.   Abstract/ Rational Thought: minimal impairment   Memory: grossly intact.   Behavior: calm.   Attention/Concentration: normal.   Cognition: intact.   Intelligence Estimate: average.   Executive Function: intact.   Insight: minimal impairment.   Judgement: minimal impairment.            Lab Review:   Ancillary Procedure on 05/14/2025   Component Date Value    AV pk madyson 05/14/2025 1.35     AV mn grad 05/14/2025 4     LVOT diam 05/14/2025 2.13     MV E/A ratio 05/14/2025 0.90     LA vol index A/L 05/14/2025 15.6     Tricuspid annular plane * 05/14/2025 2.6     LV EF 05/14/2025 58     RV free wall pk S' 05/14/2025 12.00     LVIDd 05/14/2025 3.70     Aortic Valve Area by Con* 05/14/2025 2.91     Aortic Valve Area by Con* 05/14/2025 2.89     AV pk grad 05/14/2025 7     LV A4C EF 05/14/2025 61.1    Office Visit on 04/14/2025   Component Date Value    HEMOGLOBIN A1c 05/12/2025 5.9 (H)     eAG (mg/dL) 05/12/2025 123     eAG (mmol/L) 05/12/2025 6.8     CHOLESTEROL, TOTAL 05/12/2025 131     HDL CHOLESTEROL 05/12/2025 46     TRIGLYCERIDES 05/12/2025 92     LDL-CHOLESTEROL 05/12/2025 67     CHOL/HDLC RATIO 05/12/2025 2.8     NON HDL CHOLESTEROL 05/12/2025 85     GLUCOSE 05/12/2025 129 (H)     UREA NITROGEN (BUN) 05/12/2025 19     CREATININE 05/12/2025 0.88     EGFR 05/12/2025 91     SODIUM 05/12/2025 140     POTASSIUM 05/12/2025 4.2     CHLORIDE 05/12/2025 103     CARBON DIOXIDE 05/12/2025 24     ELECTROLYTE BALANCE 05/12/2025 13     CALCIUM 05/12/2025 9.8     PROTEIN, TOTAL 05/12/2025 7.1     ALBUMIN 05/12/2025 4.6     BILIRUBIN,  TOTAL 05/12/2025 1.0     ALKALINE PHOSPHATASE 05/12/2025 33 (L)     AST 05/12/2025 14     ALT 05/12/2025 13     WHITE BLOOD CELL COUNT 05/12/2025 8.6     RED BLOOD CELL COUNT 05/12/2025 4.77     HEMOGLOBIN 05/12/2025 14.7     HEMATOCRIT 05/12/2025 45.7     MCV 05/12/2025 95.8     MCH 05/12/2025 30.8     MCHC 05/12/2025 32.2     RDW 05/12/2025 12.3     PLATELET COUNT 05/12/2025 240     MPV 05/12/2025 10.3     ABSOLUTE NEUTROPHILS 05/12/2025 6,132     ABSOLUTE LYMPHOCYTES 05/12/2025 1,359     ABSOLUTE MONOCYTES 05/12/2025 929     ABSOLUTE EOSINOPHILS 05/12/2025 112     ABSOLUTE BASOPHILS 05/12/2025 69     NEUTROPHILS 05/12/2025 71.3     LYMPHOCYTES 05/12/2025 15.8     MONOCYTES 05/12/2025 10.8     EOSINOPHILS 05/12/2025 1.3     BASOPHILS 05/12/2025 0.8     PSA, TOTAL 05/12/2025 3.52     TSH W/REFLEX TO FT4 05/12/2025 1.07        Assessment/Plan   Psychiatric Risk Assessment  Violence Risk Assessment: none  Acute Risk of Harm to Others is Considered: low   Suicide Risk Assessment: age > 65 yrs old and   Protective Factors against Suicide: adherence to  treatment, fear of suicide, moral objections to suicide, positive family relationships, and sense of responsibility toward family  Acute Risk of Harm to Self is Considered: low    Imminent Risk of Suicide or Serious Self-Injury: Low   Chronic Risk of Suicide of Serious Self-Injury: Low  Risk factors: Age, depression history and   Protective factors: Denies current suicidal ideation, denies history of suicide attempts , willingness to seek help and support , gender, access to a variety of clinical interventions , and receiving and engaged in care for mental, physical, and substance use disorders      Imminent Risk of Violence or Homicide: Low   Risk Factors: No significant risk factors identified on screening  Protective Factors: Lack of known history of harm to others , Lack of known history of violent ideation , and lack of known access to firearms.      Assessment & Plan    Your diagnosis is of chronic depressive and anxious features. You deny any suicidal or homicidal ideation or plans.      [place] was reviewed today, 7/8/25 with no concerning findings evidenced.     The plan is to continue bupropion  mg morning and middle afternoon daily, you have elected to stop risperidone 1 mg twice daily in the morning and late afternoon which you announced you have done unilaterally prior to this appointment because of concerns of parkinsonian features/risks; continue fluoxetine to 40 mg daily as well as bupropion  mg  twice daily for depression and you can continue diazepam 5 mg as needed daily and at bedtime as needed only. You clearly are able to recite back the risks, benefits and alternatives of these medications as discussed with you today.     The FDA risks of antipsychotics including increased risk of sudden death, heart attack, brain stroke, irreversible neurological movement disorders, parkinsonism, cardiac toxicity, weight gain and diabetes of this medication were discussed. You were able to recite back the risks, benefits, alternatives with respect to antipsychotics and in particular risperidone.     The FDA risks of antidepressants including increased risk of suicide, cardiotoxicity, lowered seizure threshold, the serotonin syndrome with serotonin agents and anticholinergic effects have been discussed. We also discussed that at least in the case of SSRIs there is interference with warfarin and nonsteroidal inflammatories and can cause increased bleeding and hemorrhage. You were able to recite back the risks, benefits, alternatives with respect to your antidepressants in particular bupropion and fluoxetine.     The FDA risks of benzodiazepines were discussed which includes impairment of memory and cognition, increased falls, addiction and dangerous withdrawals if stopped suddenly which can cause significant morbidity and or mortality. There is also  risk of respiratory suppression alone or with other sedative or other medications which can lead to morbidity and mortality. You clearly were able to recite back the risks, benefits and alternatives to the benzodiazepines discussed.      Continue to follow up monthly.  Time:   Prep time on date of the patient encounter: 5 minutes.   Time spent directly with patient/family/caregiver: 20 minutes.   Additional time spent on patient care activities:  minutes.   Documentation time: 5 minutes.   Total time on date of patient encounter: 30 minutes.

## 2025-07-14 DIAGNOSIS — F41.8 MIXED ANXIETY DEPRESSIVE DISORDER: ICD-10-CM

## 2025-07-14 DIAGNOSIS — F43.10 POST-TRAUMATIC STRESS DISORDER, UNSPECIFIED: ICD-10-CM

## 2025-07-14 DIAGNOSIS — I10 BENIGN ESSENTIAL HYPERTENSION: ICD-10-CM

## 2025-07-14 DIAGNOSIS — F25.1 SCHIZOAFFECTIVE DISORDER, DEPRESSIVE TYPE (MULTI): ICD-10-CM

## 2025-07-14 RX ORDER — FLUOXETINE HYDROCHLORIDE 40 MG/1
40 CAPSULE ORAL DAILY
Qty: 90 CAPSULE | Refills: 1 | Status: SHIPPED | OUTPATIENT
Start: 2025-07-14

## 2025-07-14 RX ORDER — OMEGA-3-ACID ETHYL ESTERS 1 G/1
4 CAPSULE, LIQUID FILLED ORAL
Qty: 360 CAPSULE | Refills: 0 | Status: SHIPPED | OUTPATIENT
Start: 2025-07-14

## 2025-07-14 RX ORDER — BUPROPION HYDROCHLORIDE 150 MG/1
150 TABLET ORAL 2 TIMES DAILY
Qty: 180 TABLET | Refills: 1 | Status: SHIPPED | OUTPATIENT
Start: 2025-07-14 | End: 2026-01-10

## 2025-07-17 ENCOUNTER — TREATMENT (OUTPATIENT)
Dept: PHYSICAL THERAPY | Facility: CLINIC | Age: 75
End: 2025-07-17
Payer: COMMERCIAL

## 2025-07-17 DIAGNOSIS — E11.610 CHARCOT FOOT DUE TO DIABETES MELLITUS (MULTI): ICD-10-CM

## 2025-07-17 DIAGNOSIS — M62.469 GASTROCNEMIUS EQUINUS, UNSPECIFIED LATERALITY: ICD-10-CM

## 2025-07-17 DIAGNOSIS — E11.42 DIABETIC PERIPHERAL NEUROPATHY (MULTI): ICD-10-CM

## 2025-07-17 DIAGNOSIS — M79.2 NEUROPATHIC PAIN: ICD-10-CM

## 2025-07-17 DIAGNOSIS — Q66.70 PES CAVUS: ICD-10-CM

## 2025-07-17 DIAGNOSIS — R26.9 ABNORMAL GAIT: Primary | ICD-10-CM

## 2025-07-17 PROCEDURE — 97112 NEUROMUSCULAR REEDUCATION: CPT | Mod: GP | Performed by: PHYSICAL THERAPIST

## 2025-07-17 PROCEDURE — 97116 GAIT TRAINING THERAPY: CPT | Mod: GP | Performed by: PHYSICAL THERAPIST

## 2025-07-17 NOTE — PROGRESS NOTES
"Physical Therapy    Physical Therapy Treatment    Patient Name: Truong Armijo  \"Junior"  MRN: [id number]  Today's Date: 7/18/2025  [facility]  Visit number=2  Primary dx.=abnormal gait  Time Entry:   Time Calculation  Start Time: 1751  Stop Time: 1830  Time Calculation (min): 39 min     PT Therapeutic Procedures Time Entry  Neuromuscular Re-Education Time Entry: 30  Gait Training Time Entry: 9       Assessment:    The FGA was performed today, pt. Scored a19/30, scoring below age matched normative value of 24/30 and scored within fall risk category comapred to cut off score of 23/30.  Pt. Benefited from verbal cues to utilize visual cues and focus on somatosensory cues in order to avoid veering his path during head turns.  Pt. Reports feeling better push-off during gait with utilization of trekking pole.      Plan: scapular stabilization exercises for HEP, corner balance HEP       Current Problem  1. Abnormal gait  Follow Up In Physical Therapy      2. Neuropathic pain  Follow Up In Physical Therapy      3. Pes cavus  Follow Up In Physical Therapy      4. Gastrocnemius equinus, unspecified laterality  Follow Up In Physical Therapy      5. Charcot foot due to diabetes mellitus (Multi)  Follow Up In Physical Therapy      6. Diabetic peripheral neuropathy (Multi)  Follow Up In Physical Therapy                  Subjective    Pt. Reports that his pain is slightly better. He doesn't feel like he can walk very far.  He feels stiffness in his right foot. Pt. Reprts that he was sitting in his recliner chair and starting getting a diaper rash 2 years ago and then he got a cushion with a cut out in the back and now he is worried he is going to stretch his thighs with the chair he is in.  He hasn't had back pain until just the last week.     Precautions: mild fall risk     Vital Signs: not taken     Pain: not in pain at home, he just noticed he has thick socks and home and just walking to the mailbox within a little way that feels " stiff       Objective        Outcome Measures:  FGA - Functional Gait Assessment  Gait level surface: 2  Change in gait speed: 2  Gait with horizontal head turns: 2  Gait with vertical head turns: 3  Gait and pivot turn: 2  Step over obstacle: 3  Gait with narrow base of support: 0  Gait with eyes closed: 0  Ambulating backwards: 2 (14 sec.)  Steps: 3  FGA Total Score: 19    Treatments:    Gait training:  -functional mobility with bilateral trekking poles 110'x1  -functional mobility with unilateral trekking pole 110'x1    Neuro re-Ed:  The following exercises were completed at supervision level over 20ft. distance  Gait level surface:  Change in gait speed:  Gait with horizontal head turns:  Gait with vertical head turns:  Gait with pivot turn:  Step over obstacle:  Gait with narrow SEGUN:  Gait with eyes closed:  Ambulating backwards:  Stairs 3-6 inch stairs   Educated pt. On fall risk cut off score.   -horizontal HT with functional mobility 20'x2 with VC for wide SEGUN and looking for visual points    There Ex: (5'x1 billed with neuro re-ed)  -educated pt. On benefits of cardiovascular exercise for heart health and stress mgmt.  -provided pt. With handout for trekking poles to increase daily steps  -educated pt. On having eri cindi remind him to get up to walk around every hour.  -sit<>Stands x10 with no UE support (HEP)    OP EDUCATION:  -see treatment  -sit<>Stands, horizontal HT with gait       Goals:  Active       PT Problem       PT Goal 1: Pt. Will improve EC on foam condition of MCTSIB to indicate improved utilization of vestibular cues for balance by end of POC.        Start:  06/30/25    Expected End:  09/24/25            PT Goal 2: Pt. Will demonstrate improved single leg balance to increase safety/decrease fall risk during ADLs such as donning/doffing shoes and pants.        Start:  06/30/25    Expected End:  09/24/25            PT Goal 3: Pt. Will be independent in HEP in next 1-2 visits to promote self  "efficacy, manage symptoms and meet other LTG.        Start:  06/30/25    Expected End:  09/24/25            PT Goal 4: Pt. will be able to complete the full 6MWT by end of POC       Start:  06/30/25    Expected End:  09/24/25            Patient Stated Goal 1: Pt. Goal: \" be able to walk longer distances, minimize pain\"       Start:  06/30/25    Expected End:  09/24/25              "

## 2025-07-21 ENCOUNTER — APPOINTMENT (OUTPATIENT)
Dept: OPHTHALMOLOGY | Facility: CLINIC | Age: 75
End: 2025-07-21
Payer: MEDICARE

## 2025-07-21 DIAGNOSIS — H35.373 EPIRETINAL MEMBRANE, BOTH EYES: Primary | ICD-10-CM

## 2025-07-21 DIAGNOSIS — D31.32 NEVUS OF CHOROID OF LEFT EYE: ICD-10-CM

## 2025-07-21 PROCEDURE — 99213 OFFICE O/P EST LOW 20 MIN: CPT

## 2025-07-21 PROCEDURE — 92250 FUNDUS PHOTOGRAPHY W/I&R: CPT

## 2025-07-21 RX ORDER — AMOXICILLIN 500 MG/1
CAPSULE ORAL
COMMUNITY
Start: 2025-07-15

## 2025-07-21 RX ORDER — DEXTROMETHORPHAN HYDROBROMIDE, GUAIFENESIN 5; 100 MG/5ML; MG/5ML
1 LIQUID ORAL EVERY 8 HOURS PRN
COMMUNITY
Start: 2025-07-15 | End: 2025-07-22 | Stop reason: WASHOUT

## 2025-07-21 ASSESSMENT — PACHYMETRY
OD_CT(UM): 623
OS_CT(UM): 590

## 2025-07-21 ASSESSMENT — TONOMETRY
OD_IOP_MMHG: 14
OS_IOP_MMHG: 18
IOP_METHOD: TONOPEN

## 2025-07-21 ASSESSMENT — ENCOUNTER SYMPTOMS
CONSTITUTIONAL NEGATIVE: 0
CARDIOVASCULAR NEGATIVE: 0
ALLERGIC/IMMUNOLOGIC NEGATIVE: 0
GASTROINTESTINAL NEGATIVE: 0
ENDOCRINE NEGATIVE: 0
HEMATOLOGIC/LYMPHATIC NEGATIVE: 0
RESPIRATORY NEGATIVE: 0
NEUROLOGICAL NEGATIVE: 0
EYES NEGATIVE: 1
PSYCHIATRIC NEGATIVE: 0
MUSCULOSKELETAL NEGATIVE: 0

## 2025-07-21 ASSESSMENT — SLIT LAMP EXAM - LIDS
COMMENTS: DERMATOCHALASIS
COMMENTS: DERMATOCHALASIS

## 2025-07-21 ASSESSMENT — VISUAL ACUITY
OS_CC: 20/40
METHOD: SNELLEN - LINEAR
OD_CC+: +1
CORRECTION_TYPE: GLASSES
OS_CC+: -2
OD_CC: 20/25

## 2025-07-21 ASSESSMENT — EXTERNAL EXAM - RIGHT EYE: OD_EXAM: NORMAL

## 2025-07-21 ASSESSMENT — EXTERNAL EXAM - LEFT EYE: OS_EXAM: NORMAL

## 2025-07-21 ASSESSMENT — CUP TO DISC RATIO
OS_RATIO: 0.7
OD_RATIO: 0.5

## 2025-07-21 NOTE — PROGRESS NOTES
Epiretinal membrane (ERM) of both eyesH35.373  - OS>OD  - blurring of vision OS  - VA 20/25 to 20/30 OU     OCT 01/20/25     - Right eye (OD): ERM stage 1, normal RPE, outer and inner retinal layers. No IRF or SRF    - Left eye (OS): ERM stage 3. Tractional pseudocysts, slightly worsened from prior       #Impression/Plan#   - ERM OS > OD. 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 Adamson

## 2025-07-22 ENCOUNTER — APPOINTMENT (OUTPATIENT)
Dept: PRIMARY CARE | Facility: CLINIC | Age: 75
End: 2025-07-22
Payer: COMMERCIAL

## 2025-07-22 VITALS
OXYGEN SATURATION: 98 % | HEIGHT: 73 IN | SYSTOLIC BLOOD PRESSURE: 124 MMHG | DIASTOLIC BLOOD PRESSURE: 80 MMHG | HEART RATE: 93 BPM | WEIGHT: 196 LBS | BODY MASS INDEX: 25.98 KG/M2

## 2025-07-22 DIAGNOSIS — N40.0 ENLARGED PROSTATE WITHOUT LOWER URINARY TRACT SYMPTOMS (LUTS): ICD-10-CM

## 2025-07-22 DIAGNOSIS — E03.9 ACQUIRED HYPOTHYROIDISM: ICD-10-CM

## 2025-07-22 DIAGNOSIS — I65.23 BILATERAL CAROTID ARTERY OCCLUSION: ICD-10-CM

## 2025-07-22 DIAGNOSIS — R42 DIZZINESS: Primary | ICD-10-CM

## 2025-07-22 DIAGNOSIS — I10 BENIGN ESSENTIAL HYPERTENSION: ICD-10-CM

## 2025-07-22 DIAGNOSIS — F41.8 MIXED ANXIETY DEPRESSIVE DISORDER: ICD-10-CM

## 2025-07-22 DIAGNOSIS — E11.9 TYPE 2 DIABETES MELLITUS WITHOUT COMPLICATION, WITHOUT LONG-TERM CURRENT USE OF INSULIN: ICD-10-CM

## 2025-07-22 DIAGNOSIS — E29.1 HYPOGONADISM MALE: ICD-10-CM

## 2025-07-22 DIAGNOSIS — R94.31 ABNORMAL EKG: ICD-10-CM

## 2025-07-22 PROCEDURE — 4010F ACE/ARB THERAPY RXD/TAKEN: CPT | Performed by: INTERNAL MEDICINE

## 2025-07-22 PROCEDURE — 3074F SYST BP LT 130 MM HG: CPT | Performed by: INTERNAL MEDICINE

## 2025-07-22 PROCEDURE — 1160F RVW MEDS BY RX/DR IN RCRD: CPT | Performed by: INTERNAL MEDICINE

## 2025-07-22 PROCEDURE — 3008F BODY MASS INDEX DOCD: CPT | Performed by: INTERNAL MEDICINE

## 2025-07-22 PROCEDURE — 93000 ELECTROCARDIOGRAM COMPLETE: CPT | Performed by: INTERNAL MEDICINE

## 2025-07-22 PROCEDURE — 1159F MED LIST DOCD IN RCRD: CPT | Performed by: INTERNAL MEDICINE

## 2025-07-22 PROCEDURE — 99213 OFFICE O/P EST LOW 20 MIN: CPT | Performed by: INTERNAL MEDICINE

## 2025-07-22 PROCEDURE — 3079F DIAST BP 80-89 MM HG: CPT | Performed by: INTERNAL MEDICINE

## 2025-07-22 ASSESSMENT — PATIENT HEALTH QUESTIONNAIRE - PHQ9
1. LITTLE INTEREST OR PLEASURE IN DOING THINGS: NEARLY EVERY DAY
SUM OF ALL RESPONSES TO PHQ9 QUESTIONS 1 AND 2: 0
5. POOR APPETITE OR OVEREATING: SEVERAL DAYS
SUM OF ALL RESPONSES TO PHQ9 QUESTIONS 1 AND 2: 6
4. FEELING TIRED OR HAVING LITTLE ENERGY: SEVERAL DAYS
6. FEELING BAD ABOUT YOURSELF - OR THAT YOU ARE A FAILURE OR HAVE LET YOURSELF OR YOUR FAMILY DOWN: NOT AT ALL
SUM OF ALL RESPONSES TO PHQ QUESTIONS 1-9: 13
3. TROUBLE FALLING OR STAYING ASLEEP OR SLEEPING TOO MUCH: MORE THAN HALF THE DAYS
8. MOVING OR SPEAKING SO SLOWLY THAT OTHER PEOPLE COULD HAVE NOTICED. OR THE OPPOSITE, BEING SO FIGETY OR RESTLESS THAT YOU HAVE BEEN MOVING AROUND A LOT MORE THAN USUAL: SEVERAL DAYS
1. LITTLE INTEREST OR PLEASURE IN DOING THINGS: NOT AT ALL
9. THOUGHTS THAT YOU WOULD BE BETTER OFF DEAD, OR OF HURTING YOURSELF: SEVERAL DAYS
2. FEELING DOWN, DEPRESSED OR HOPELESS: NEARLY EVERY DAY
7. TROUBLE CONCENTRATING ON THINGS, SUCH AS READING THE NEWSPAPER OR WATCHING TELEVISION: SEVERAL DAYS
2. FEELING DOWN, DEPRESSED OR HOPELESS: NOT AT ALL

## 2025-07-22 ASSESSMENT — ENCOUNTER SYMPTOMS
LOSS OF SENSATION IN FEET: 0
OCCASIONAL FEELINGS OF UNSTEADINESS: 1
DEPRESSION: 0

## 2025-07-22 NOTE — PROGRESS NOTES
"Subjective   Patient ID: Truong Armijo is a 75 y.o. male who presents for Follow-up and Dizziness (Patient feels dizzy/lightheaded while driving. Thinks he has hypotension).    HPI patient presents to clinic because he has been complaining of dizzines while driving for the past 2 months.  He denies any syncope, diaphoresis, nausea vomiting, slurring of the speech, weakness of arms and legs and paresthesia.  EKG done shows sinus rhythm at 90 bpm with left anterior fascicular block and right bundle branch block with rate related ST-T changes, there is a new right bundle branch block compared to EKG done on 1/5/2021.  He has a history of   Copd,htn,charcot feet,diabetic peripheral neuropathy,bph,dm2,gerd, hypothyrodism ,bph,hypogonadism,shingles,glaucoma,anxiety ,schizophrenia,,depression,insomania,dyslipidemia,dry eyes,athlete's  foot.carotid artery stenosis,pud,asthma vs copd,goiter  Colonoscopy in 2022 x2 showed polyp in cecum,tonsilectomy, cataract suregry, and stye suirgery,    Review of Systems   Constitutional: Negative.    HENT: Negative.     Eyes: Negative.    Respiratory: Negative.     Cardiovascular: Negative.    Gastrointestinal: Negative.    Endocrine: Negative.    Genitourinary: Negative.    Musculoskeletal: Negative.    Skin: Negative.    Allergic/Immunologic: Negative.    Neurological:  Positive for dizziness.   Hematological: Negative.    Psychiatric/Behavioral: Negative.         Objective   /80 (BP Location: Left arm, Patient Position: Sitting)   Pulse 93   Ht 1.854 m (6' 1\")   Wt 88.9 kg (196 lb)   SpO2 98%   BMI 25.86 kg/m²     Physical Exam  Constitutional:       Appearance: Normal appearance. He is normal weight.   HENT:      Right Ear: Tympanic membrane normal.      Left Ear: Tympanic membrane and ear canal normal.      Nose: Nose normal.   Neck:      Vascular: No carotid bruit.     Cardiovascular:      Rate and Rhythm: Normal rate.   Pulmonary:      Effort: No respiratory distress. "      Breath sounds: No stridor. No wheezing.   Abdominal:      Palpations: Abdomen is soft.      Tenderness: There is no abdominal tenderness. There is no guarding or rebound.     Skin:     Coloration: Skin is not jaundiced.      Findings: No bruising.     Neurological:      General: No focal deficit present.      Mental Status: He is alert and oriented to person, place, and time.     Psychiatric:         Mood and Affect: Mood normal.         Assessment/Plan   Assessment & Plan  Dizziness  Patient will be referred to cardiology and will obtain carotid ultrasound regarding his symptoms of dizziness.  Orders:    ECG 12 lead (Clinic Performed)    Troponin I, High Sensitivity; Future    Vascular US Carotid Artery Duplex Bilateral; Future    Referral to Cardiology; Future    Bilateral carotid artery occlusion  He will continue atorvastatin regarding carotid artery disease.       Benign essential hypertension  He will continue lisinopril regarding hypertension.  Orders:    Basic metabolic panel; Future    Magnesium; Future    Type 2 diabetes mellitus without complication, without long-term current use of insulin  He will continue metformin, Januvia and Farxiga regarding history of type 2 diabetes.  He will be scheduled for routine blood work and will be notified about test results.  Orders:    ECG 12 lead (Clinic Performed)    Basic metabolic panel; Future    Urinalysis with Reflex Microscopic; Future    Acquired hypothyroidism  He will continue levothyroxine and will monitor TSH.  Orders:    TSH; Future    Hypogonadism male  He will continue androgen gel and will check testosterone level regarding hypogonadism.  Orders:    Troponin I, High Sensitivity; Future    Testosterone; Future    Enlarged prostate without lower urinary tract symptoms (luts)  He is advised to consider finasteride or tamsulosin regarding BPH.       Mixed anxiety depressive disorder  He will continue fluoxetine and bupropion regarding history of  anxiety and depression.       Abnormal EKG  He will be referred to cardiology regarding abnormal EKG and will check troponin I level.  Orders:    Troponin I, High Sensitivity; Future    Referral to Cardiology; Future

## 2025-07-23 LAB
ANION GAP SERPL CALCULATED.4IONS-SCNC: 13 MMOL/L (CALC) (ref 7–17)
APPEARANCE UR: CLEAR
BILIRUB UR QL STRIP: NEGATIVE
BUN SERPL-MCNC: 26 MG/DL (ref 7–25)
BUN/CREAT SERPL: 27 (CALC) (ref 6–22)
CALCIUM SERPL-MCNC: 10.2 MG/DL (ref 8.6–10.3)
CHLORIDE SERPL-SCNC: 104 MMOL/L (ref 98–110)
CO2 SERPL-SCNC: 25 MMOL/L (ref 20–32)
COLOR UR: YELLOW
CREAT SERPL-MCNC: 0.96 MG/DL (ref 0.7–1.28)
EGFRCR SERPLBLD CKD-EPI 2021: 83 ML/MIN/1.73M2
GLUCOSE SERPL-MCNC: 72 MG/DL (ref 65–139)
GLUCOSE UR QL STRIP: ABNORMAL
HGB UR QL STRIP: NEGATIVE
KETONES UR QL STRIP: NEGATIVE
LEUKOCYTE ESTERASE UR QL STRIP: NEGATIVE
MAGNESIUM SERPL-MCNC: 1.9 MG/DL (ref 1.5–2.5)
NITRITE UR QL STRIP: NEGATIVE
PH UR STRIP: ABNORMAL [PH] (ref 5–8)
POTASSIUM SERPL-SCNC: 4.3 MMOL/L (ref 3.5–5.3)
PROT UR QL STRIP: NEGATIVE
SODIUM SERPL-SCNC: 142 MMOL/L (ref 135–146)
SP GR UR STRIP: 1.03 (ref 1–1.03)
TESTOST SERPL-MCNC: 487 NG/DL (ref 250–827)
TROPONIN I SERPL-MCNC: <3 NG/L
TSH SERPL-ACNC: 1.32 MIU/L (ref 0.4–4.5)

## 2025-07-26 ASSESSMENT — ENCOUNTER SYMPTOMS
ALLERGIC/IMMUNOLOGIC NEGATIVE: 1
PSYCHIATRIC NEGATIVE: 1
MUSCULOSKELETAL NEGATIVE: 1
CONSTITUTIONAL NEGATIVE: 1
GASTROINTESTINAL NEGATIVE: 1
RESPIRATORY NEGATIVE: 1
EYES NEGATIVE: 1
DIZZINESS: 1
HEMATOLOGIC/LYMPHATIC NEGATIVE: 1
ENDOCRINE NEGATIVE: 1
CARDIOVASCULAR NEGATIVE: 1

## 2025-07-26 NOTE — ASSESSMENT & PLAN NOTE
He will continue metformin, Januvia and Farxiga regarding history of type 2 diabetes.  He will be scheduled for routine blood work and will be notified about test results.  Orders:    ECG 12 lead (Clinic Performed)    Basic metabolic panel; Future    Urinalysis with Reflex Microscopic; Future

## 2025-07-26 NOTE — ASSESSMENT & PLAN NOTE
He will continue androgen gel and will check testosterone level regarding hypogonadism.  Orders:    Troponin I, High Sensitivity; Future    Testosterone; Future

## 2025-07-26 NOTE — ASSESSMENT & PLAN NOTE
He will continue lisinopril regarding hypertension.  Orders:    Basic metabolic panel; Future    Magnesium; Future

## 2025-07-28 ENCOUNTER — APPOINTMENT (OUTPATIENT)
Facility: CLINIC | Age: 75
End: 2025-07-28
Payer: COMMERCIAL

## 2025-07-28 ENCOUNTER — HOSPITAL ENCOUNTER (OUTPATIENT)
Dept: RADIOLOGY | Facility: CLINIC | Age: 75
Discharge: HOME | End: 2025-07-28
Payer: COMMERCIAL

## 2025-07-28 DIAGNOSIS — R42 DIZZINESS: ICD-10-CM

## 2025-07-28 PROCEDURE — 93880 EXTRACRANIAL BILAT STUDY: CPT

## 2025-07-28 PROCEDURE — 93880 EXTRACRANIAL BILAT STUDY: CPT | Performed by: RADIOLOGY

## 2025-07-29 ENCOUNTER — OFFICE VISIT (OUTPATIENT)
Dept: PRIMARY CARE | Facility: CLINIC | Age: 75
End: 2025-07-29
Payer: COMMERCIAL

## 2025-07-29 ENCOUNTER — APPOINTMENT (OUTPATIENT)
Dept: OPHTHALMOLOGY | Facility: CLINIC | Age: 75
End: 2025-07-29
Payer: COMMERCIAL

## 2025-07-29 VITALS
HEART RATE: 79 BPM | BODY MASS INDEX: 25.98 KG/M2 | SYSTOLIC BLOOD PRESSURE: 120 MMHG | OXYGEN SATURATION: 97 % | HEIGHT: 73 IN | WEIGHT: 196 LBS | DIASTOLIC BLOOD PRESSURE: 80 MMHG

## 2025-07-29 DIAGNOSIS — E29.1 HYPOGONADISM MALE: ICD-10-CM

## 2025-07-29 DIAGNOSIS — J45.909 ASTHMATIC BRONCHITIS WITHOUT COMPLICATION, UNSPECIFIED ASTHMA SEVERITY, UNSPECIFIED WHETHER PERSISTENT (HHS-HCC): ICD-10-CM

## 2025-07-29 DIAGNOSIS — R42 DIZZINESS: Primary | ICD-10-CM

## 2025-07-29 DIAGNOSIS — R55 PRE-SYNCOPE: ICD-10-CM

## 2025-07-29 DIAGNOSIS — I65.23 BILATERAL CAROTID ARTERY OCCLUSION: ICD-10-CM

## 2025-07-29 DIAGNOSIS — F41.8 MIXED ANXIETY DEPRESSIVE DISORDER: ICD-10-CM

## 2025-07-29 DIAGNOSIS — E03.9 ACQUIRED HYPOTHYROIDISM: ICD-10-CM

## 2025-07-29 DIAGNOSIS — F25.9 SCHIZOAFFECTIVE DISORDER, UNSPECIFIED TYPE: ICD-10-CM

## 2025-07-29 DIAGNOSIS — E11.9 TYPE 2 DIABETES MELLITUS WITHOUT COMPLICATION, WITHOUT LONG-TERM CURRENT USE OF INSULIN: ICD-10-CM

## 2025-07-29 PROCEDURE — G2211 COMPLEX E/M VISIT ADD ON: HCPCS | Performed by: INTERNAL MEDICINE

## 2025-07-29 PROCEDURE — 1159F MED LIST DOCD IN RCRD: CPT | Performed by: INTERNAL MEDICINE

## 2025-07-29 PROCEDURE — 99213 OFFICE O/P EST LOW 20 MIN: CPT | Performed by: INTERNAL MEDICINE

## 2025-07-29 PROCEDURE — 3074F SYST BP LT 130 MM HG: CPT | Performed by: INTERNAL MEDICINE

## 2025-07-29 PROCEDURE — 4010F ACE/ARB THERAPY RXD/TAKEN: CPT | Performed by: INTERNAL MEDICINE

## 2025-07-29 PROCEDURE — 3079F DIAST BP 80-89 MM HG: CPT | Performed by: INTERNAL MEDICINE

## 2025-07-29 PROCEDURE — 3008F BODY MASS INDEX DOCD: CPT | Performed by: INTERNAL MEDICINE

## 2025-07-29 ASSESSMENT — ENCOUNTER SYMPTOMS
DEPRESSION: 0
OCCASIONAL FEELINGS OF UNSTEADINESS: 0
LOSS OF SENSATION IN FEET: 0

## 2025-07-29 NOTE — PROGRESS NOTES
"Subjective   Patient ID: Truong Armijo is a 75 y.o. male who presents for Follow-up.    HPI Patient returns today to review his laboratory studies.  He continues to have dizziness while driving.  He also had an episode of fainting few days ago.he denies any chest pain, shortness of breath, swelling of legs, nausea vomiting and diaphoresis.  Laboratory studies done shows BUN of 26 BUN/creatinine ratio 0.7, urine showed 3+ glucosuria without any infection and other studies are unremarkable.  Carotid ultrasound done showed less than 50% stenosis of bilateral carotid arteries.  He does not seem to have any orthostatic changes in his blood pressure is 118/80 on standing and 120/80 in sitting position.  He has a history of Copd,htn,charcot feet,diabetic peripheral neuropathy,bph,dm2,gerd, hypothyrodism ,bph,hypogonadism,shingles,glaucoma,anxiety ,schizophrenia,,depression,insomania,dyslipidemia,dry eyes,athlete's foot.carotid artery stenosis,pud,asthma vs copd, and goiter     Review of Systems   Constitutional: Negative.    HENT: Negative.     Eyes: Negative.    Respiratory: Negative.     Cardiovascular: Negative.         Presyncope   Gastrointestinal: Negative.    Endocrine: Negative.    Genitourinary: Negative.    Musculoskeletal: Negative.    Skin: Negative.    Allergic/Immunologic: Negative.    Neurological:  Positive for dizziness.   Hematological: Negative.    Psychiatric/Behavioral: Negative.         Objective   /80 (BP Location: Right arm, Patient Position: Sitting)   Pulse 79   Ht 1.854 m (6' 1\")   Wt 88.9 kg (196 lb)   SpO2 97%   BMI 25.86 kg/m²     Physical Exam  Constitutional:       Appearance: Normal appearance. He is normal weight.   HENT:      Right Ear: Tympanic membrane normal.      Left Ear: Tympanic membrane and ear canal normal.      Nose: Nose normal.   Neck:      Vascular: No carotid bruit.     Cardiovascular:      Rate and Rhythm: Normal rate.   Pulmonary:      Effort: No respiratory " distress.      Breath sounds: No stridor. No wheezing.   Abdominal:      Palpations: Abdomen is soft.      Tenderness: There is no abdominal tenderness. There is no guarding or rebound.     Skin:     Coloration: Skin is not jaundiced.      Findings: No bruising.     Neurological:      General: No focal deficit present.      Mental Status: He is alert and oriented to person, place, and time.     Psychiatric:         Mood and Affect: Mood normal.         Assessment/Plan   Assessment & Plan  Dizziness  Patient is scheduled to follow-up with cardiology clinic regarding abnormal EKG.  He will be also referred to neurology clinic regarding chronic dizziness.  Orders:    Holter or Event Cardiac Monitor; Future    Referral to Neurology; Future    Pre-syncope  He is advised to obtain an Holter monitor regarding his symptoms of presyncope.  Orders:    Holter or Event Cardiac Monitor; Future    Hypogonadism male  He will continue AndroGel       Acquired hypothyroidism  He will continue levothyroxine.       Mixed anxiety depressive disorder  He will continue fluoxetine and Wellbutrin.       Asthmatic bronchitis without complication, unspecified asthma severity, unspecified whether persistent ([facility])  He has not had any recent flareup       Schizoaffective disorder, unspecified type  He will continue fluoxetine, diazepam and bupropion regarding schizoaffective disorder and depression.  He will also continue to follow-up with behavioral health.  First       Type 2 diabetes mellitus without complication, without long-term current use of insulin  He will continue Farxiga, metformin and glipizide regarding history of type 2 diabetes.       Bilateral carotid artery occlusion  He is advised to continue atorvastatin and aspirin regarding carotid artery disease.

## 2025-07-29 NOTE — ASSESSMENT & PLAN NOTE
He will continue fluoxetine, diazepam and bupropion regarding schizoaffective disorder and depression.  He will also continue to follow-up with behavioral health.  First

## 2025-07-30 ASSESSMENT — ENCOUNTER SYMPTOMS
DIZZINESS: 1
MUSCULOSKELETAL NEGATIVE: 1
HEMATOLOGIC/LYMPHATIC NEGATIVE: 1
PSYCHIATRIC NEGATIVE: 1
RESPIRATORY NEGATIVE: 1
CARDIOVASCULAR NEGATIVE: 1
GASTROINTESTINAL NEGATIVE: 1
ENDOCRINE NEGATIVE: 1
CONSTITUTIONAL NEGATIVE: 1
ALLERGIC/IMMUNOLOGIC NEGATIVE: 1
EYES NEGATIVE: 1

## 2025-07-31 ENCOUNTER — TREATMENT (OUTPATIENT)
Dept: PHYSICAL THERAPY | Facility: CLINIC | Age: 75
End: 2025-07-31
Payer: COMMERCIAL

## 2025-07-31 DIAGNOSIS — M62.469 GASTROCNEMIUS EQUINUS, UNSPECIFIED LATERALITY: ICD-10-CM

## 2025-07-31 DIAGNOSIS — E11.42 DIABETIC PERIPHERAL NEUROPATHY (MULTI): ICD-10-CM

## 2025-07-31 DIAGNOSIS — M79.2 NEUROPATHIC PAIN: ICD-10-CM

## 2025-07-31 DIAGNOSIS — R26.9 ABNORMAL GAIT: ICD-10-CM

## 2025-07-31 DIAGNOSIS — E11.610 CHARCOT FOOT DUE TO DIABETES MELLITUS (MULTI): ICD-10-CM

## 2025-07-31 DIAGNOSIS — Q66.70 PES CAVUS: ICD-10-CM

## 2025-07-31 PROCEDURE — 97112 NEUROMUSCULAR REEDUCATION: CPT | Mod: GP | Performed by: PHYSICAL THERAPIST

## 2025-07-31 PROCEDURE — 97110 THERAPEUTIC EXERCISES: CPT | Mod: GP | Performed by: PHYSICAL THERAPIST

## 2025-07-31 NOTE — PROGRESS NOTES
"Physical Therapy    Physical Therapy Treatment    Patient Name: Truong Armijo  \"Kirby\"  MRN: [id number]  Today's Date: 7/31/2025  [facility]  Visit number=3  Primary dx.=abnormal gait  Time Entry:   Time Calculation  Start Time: 1747  Stop Time: 1830  Time Calculation (min): 43 min     PT Therapeutic Procedures Time Entry  Therapeutic Exercise Time Entry: 15  Neuromuscular Re-Education Time Entry: 28       Assessment:  Mr. Armijo was more challenged with right sided single leg balance compared to the left side during today's visit.  PT. Reported feeling lightheaded durign static standing upper extremity exercises however this was alleviated with seated rest break. Educated pt. On importance of proper hydration due to low BP upon initial evaluation.      Plan: add balance to HEP       Current Problem  1. Neuropathic pain  Follow Up In Physical Therapy      2. Pes cavus  Follow Up In Physical Therapy      3. Gastrocnemius equinus, unspecified laterality  Follow Up In Physical Therapy      4. Charcot foot due to diabetes mellitus (Multi)  Follow Up In Physical Therapy      5. Abnormal gait  Follow Up In Physical Therapy      6. Diabetic peripheral neuropathy (Multi)  Follow Up In Physical Therapy                  Subjective    Pt. Reports that he got a hiking stick. He hd a lot of pain in the top of his foot yesterday.  He went to the [facility].     Precautions: mild fall risk     Vital Signs: not taken     Pain: not in pain at home, he just noticed he has thick socks and home and just walking to the mailbox within a little way that feels stiff       Objective     Pt. Arrived to visit with [employer] pole          Treatments:    Neuro re-Ed:  -static corner balance on firm surface   -MTS EO   -MTS EO horizontal HT x10   -MTS EO vertical HT x10   -MTS EC   -TS 5\" on R and 30\" on L   -SLS x3 trials on each side  -static corner balance on foam surface   -MTS EO 30\"    -MTS EO horizontal HT x10   -MTS EO vertical HT x10   -FT EC " "30\"x1     There Ex:   -standing rows with GTB x15 with 5\"hold  -standing pulldowns with GTB x15 with 5\" hold  -HABD with partial squat x10 with GTB   -provide pt. Printout for HEP    OP EDUCATION:  -see treatment  -sit<>Stands, horizontal HT with gait  Access Code: [email or web address]  URL: [email or web address]  Date: 07/31/2025  Prepared by: Sissy Noyola    Exercises  - Seated Row  - 3 x weekly - 2-3 sets - 10-15 reps - 3 hold  - Shoulder extension with resistance - Neutral  - 3 x weekly - 2-3 sets - 10-15 reps - 3 hold  - Partial squat with band pull apart  - 3 x weekly - 2-3 sets - 10 reps       Goals:  Active       PT Problem       PT Goal 1: Pt. Will improve EC on foam condition of MCTSIB to indicate improved utilization of vestibular cues for balance by end of POC.        Start:  06/30/25    Expected End:  09/24/25            PT Goal 2: Pt. Will demonstrate improved single leg balance to increase safety/decrease fall risk during ADLs such as donning/doffing shoes and pants.        Start:  06/30/25    Expected End:  09/24/25            PT Goal 3: Pt. Will be independent in HEP in next 1-2 visits to promote self efficacy, manage symptoms and meet other LTG.        Start:  06/30/25    Expected End:  09/24/25            PT Goal 4: Pt. will be able to complete the full 6MWT by end of POC       Start:  06/30/25    Expected End:  09/24/25            Patient Stated Goal 1: Pt. Goal: \" be able to walk longer distances, minimize pain\"       Start:  06/30/25    Expected End:  09/24/25                "

## 2025-08-01 ENCOUNTER — TELEPHONE (OUTPATIENT)
Facility: CLINIC | Age: 75
End: 2025-08-01
Payer: COMMERCIAL

## 2025-08-05 ENCOUNTER — OFFICE VISIT (OUTPATIENT)
Dept: CARDIOLOGY | Facility: HOSPITAL | Age: 75
End: 2025-08-05
Payer: COMMERCIAL

## 2025-08-05 VITALS
HEART RATE: 58 BPM | SYSTOLIC BLOOD PRESSURE: 112 MMHG | WEIGHT: 197.2 LBS | DIASTOLIC BLOOD PRESSURE: 62 MMHG | BODY MASS INDEX: 26.14 KG/M2 | OXYGEN SATURATION: 96 % | HEIGHT: 73 IN

## 2025-08-05 DIAGNOSIS — R07.89 OTHER CHEST PAIN: Primary | ICD-10-CM

## 2025-08-05 DIAGNOSIS — R07.9 CHEST PAIN OF UNCERTAIN ETIOLOGY: ICD-10-CM

## 2025-08-05 DIAGNOSIS — I10 BENIGN ESSENTIAL HYPERTENSION: ICD-10-CM

## 2025-08-05 DIAGNOSIS — R06.02 SOB (SHORTNESS OF BREATH): ICD-10-CM

## 2025-08-05 DIAGNOSIS — E78.00 HYPERCHOLESTEROLEMIA: ICD-10-CM

## 2025-08-05 DIAGNOSIS — R94.31 ABNORMAL ELECTROCARDIOGRAM (ECG) (EKG): ICD-10-CM

## 2025-08-05 DIAGNOSIS — R42 ORTHOSTATIC DIZZINESS: ICD-10-CM

## 2025-08-05 LAB
ATRIAL RATE: 58 BPM
P AXIS: 56 DEGREES
P OFFSET: 183 MS
P ONSET: 123 MS
PR INTERVAL: 200 MS
Q ONSET: 223 MS
QRS COUNT: 9 BEATS
QRS DURATION: 150 MS
QT INTERVAL: 418 MS
QTC CALCULATION(BAZETT): 410 MS
QTC FREDERICIA: 413 MS
R AXIS: -45 DEGREES
T AXIS: 37 DEGREES
T OFFSET: 432 MS
VENTRICULAR RATE: 58 BPM

## 2025-08-05 PROCEDURE — 3008F BODY MASS INDEX DOCD: CPT | Performed by: NURSE PRACTITIONER

## 2025-08-05 PROCEDURE — 3078F DIAST BP <80 MM HG: CPT | Performed by: NURSE PRACTITIONER

## 2025-08-05 PROCEDURE — 1159F MED LIST DOCD IN RCRD: CPT | Performed by: NURSE PRACTITIONER

## 2025-08-05 PROCEDURE — 93005 ELECTROCARDIOGRAM TRACING: CPT | Performed by: NURSE PRACTITIONER

## 2025-08-05 PROCEDURE — 4010F ACE/ARB THERAPY RXD/TAKEN: CPT | Performed by: NURSE PRACTITIONER

## 2025-08-05 PROCEDURE — 99212 OFFICE O/P EST SF 10 MIN: CPT

## 2025-08-05 PROCEDURE — 1160F RVW MEDS BY RX/DR IN RCRD: CPT | Performed by: NURSE PRACTITIONER

## 2025-08-05 PROCEDURE — 3074F SYST BP LT 130 MM HG: CPT | Performed by: NURSE PRACTITIONER

## 2025-08-05 PROCEDURE — 93010 ELECTROCARDIOGRAM REPORT: CPT | Performed by: INTERNAL MEDICINE

## 2025-08-05 PROCEDURE — 99214 OFFICE O/P EST MOD 30 MIN: CPT | Performed by: NURSE PRACTITIONER

## 2025-08-05 RX ORDER — MAGNESIUM 200 MG
1 TABLET ORAL DAILY
COMMUNITY

## 2025-08-05 RX ORDER — BISMUTH SUBSALICYLATE 262 MG
1 TABLET,CHEWABLE ORAL DAILY
COMMUNITY

## 2025-08-05 NOTE — PATIENT INSTRUCTIONS
SCHEDULE NUCLEAR STRESS TEST    OBTAIN HOLTER MONITOR    STOP LISINOPRIL    CONTINUE OTHER MEDICATION    RETURN AFTER TESTING IS COMPLETED    CALL FOR ANY CONCERNS

## 2025-08-05 NOTE — PROGRESS NOTES
"Referred by Dr. Stanley for NEAR SYNCOPE, ABNORMAL ECG     History Of Present Illness:    Truong Armijo is a 75 y.o. male presenting with complaints of heart weakness and dizziness for the past several  weeks.  He has seen his PCP and been scheduled for Holter monitoring as well as echocardiography. He describes a feeling of a \"weak\" heart and grabs his left chest with activity. He feels as though he may faint. The feeling is brief and not associated with any other symptom. He is limited in activity due to balance problems however does not routinely exercise. He denies any darleen chest pain. He does complain of dizziness with position change which has not improved with reduction in ACE dose.  He denies any palpitations, edema, PND, orthopnea, or bleeding problems.    He has a history of hypertension as a younger adult.  He also has a history of lipidemia and type 2 diabetes.  He has not previously seen a cardiologist but does believe that he had what sounds like a cardiac catheterization many years ago.  He did have a carotid ultrasound in July of this year showing no significant stenosis as well as an echocardiogram in May of this year showing normal RV and LV function as well as normal LVEF and no significant valvular disease.  He has been on a protein diet and lost about 20 pounds in the last several months.         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), Cervicalgia (05/16/2012), Change in bowel habit (12/09/2021), Condition not found (09/14/2023), Disorganized schizophrenia (Multi), Dysphagia (01/26/2024), Dysphagia, oropharyngeal phase (12/09/2021), Elbow pain (01/26/2024), Elbow sprain (01/26/2024), Hemorrhage in optic nerve sheath (01/26/2024), Impairment " of balance (01/26/2024), Nausea (01/26/2024), Neurogenic bladder (01/26/2024), Noninfective gastroenteritis and colitis, unspecified (12/09/2021), Nuclear sclerotic cataract of both eyes (09/26/2019), Other conditions influencing health status (12/09/2021), 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), Other specified anxiety disorders (05/13/2020), Other specified anxiety disorders (07/02/2021), Other specified anxiety disorders (11/03/2017), Otitis media, unspecified, bilateral (12/12/2014), Peripheral vertigo (10/01/2012), Personal history of other diseases of the nervous system and sense organs (10/17/2022), Personal history of other diseases of the nervous system and sense organs (12/12/2014), Personal history of other diseases of the respiratory system (08/17/2015), Personal history of other diseases of the respiratory system (12/12/2014), Personal history of other endocrine, nutritional and metabolic disease (10/12/2016), Personal history of other mental and behavioral disorders, Personal history of other specified conditions (12/09/2021), Personal history of other specified conditions (09/03/2021), Personal history of other specified conditions (12/09/2021), Personal history of other specified conditions (12/09/2021), Personal history of other specified conditions (12/09/2021), Personal history of other specified conditions (07/25/2018), Personal history of other specified conditions (12/09/2021), Personal history of other specified conditions (03/07/2014), Postconcussional syndrome, Primary open angle glaucoma (POAG) (09/14/2023), Problem related to primary support group, unspecified (07/30/2019), Subjective tinnitus (01/26/2024), and Urinary tract infection (01/26/2024).    Past Surgical History:  He has a past surgical history that includes Tonsillectomy (09/20/2013); Intraocular lens insertion; and Cataract extraction.      Social History:  He reports that he quit smoking about 43 years ago.  His smoking use included cigars. He has never been exposed to tobacco smoke. He has never used smokeless tobacco. He reports current alcohol use. He reports that he does not use drugs.    Family History:  Family History[1]     Allergies:  Cigarette smoke; Caffeine; Cat dander; Latanoprost; Timolol; Wheat flour; and Yeast, dried    Outpatient Medications:  Current Outpatient Medications   Medication Instructions    atorvastatin (LIPITOR) 20 mg, oral, Nightly    BetimoL 0.5 % ophthalmic solution 1 drop, 2 times daily    buPROPion XL (WELLBUTRIN XL) 150 mg, oral, 2 times daily, Do not crush, chew, or split.    cholecalciferol (Vitamin D-3) 50 MCG (2000 UT) tablet 2 tablets, Daily with evening meal    choline-lutein-zeaxan-astaxan 10-20-13-4 mg capsule 1 tablet, Daily    cycloSPORINE (Restasis) 0.05 % ophthalmic emulsion 1 drop, Both Eyes, 2 times daily    diazePAM (VALIUM) 5 mg, oral, Nightly PRN    Farxiga 10 mg, oral, Daily    FLUoxetine (PROZAC) 40 mg, oral, Daily    FreeStyle Lite Strips 1 strip, miscellaneous, 2 times daily    glipiZIDE (GLUCOTROL) 10 mg, oral, 2 times daily before meals    Januvia 100 mg, oral, Daily    latanoprost, PF, (Iyuzeh, PF,) 0.005 % dropperette 1 drop, Both Eyes, Nightly    levothyroxine (SYNTHROID, LEVOXYL) 100 mcg, oral, Daily, as directed    lisinopril 5 mg, oral, Daily    magnesium 200 mg tablet 1 tablet, Daily    metFORMIN (GLUCOPHAGE) 1,000 mg, oral, 2 times daily    multivitamin tablet 1 tablet, Daily    nystatin (Mycostatin) cream 2-3 GM ON THE SKIN TWICE A DAY    omega-3 acid ethyl esters (LOVAZA) 4 g, oral, Daily with evening meal    polyethylene glycol-electrolytes 420 gram solution Take by mouth.  drink 1/2 beginning at 6pm night before the procedure and start drinking the 2nd 1/2 5 hours before procedure time    testosterone 20.25 mg/1.25 gram (1.62 %) gel in metered-dose pump 2 Pump, transdermal, Daily    timoloL maleate, PF, 0.5 % dropperette 1 drop, Both Eyes, 2 times  "daily    triamcinolone (Kenalog) 0.1 % cream 1 Gram    VITAMIN B COMPLEX ORAL 1 capsule, Daily RT        Last Recorded Vitals:  Vitals:    08/05/25 1055   BP: 132/84   Pulse: 58   SpO2: 96%   Weight: 89.4 kg (197 lb 3.2 oz)   Height: 1.854 m (6' 1\")       Physical Exam:  GENERAL: alert, cooperative, pleasant, in no acute distress  SKIN: warm, dry, no rash.  NECK: no JVD, no bruits  CARDIAC: Regular rate and rhythm with no rubs, murmurs, or gallops  CHEST: Normal respiratory efforts, lungs clear to auscultation bilaterally.  ABDOMEN: soft, nontender, nondistended  EXTREMITIES: no edema  NEURO: Alert and oriented x 3.  Grossly normal.  Moves all 4 extremities.       Last Labs:  CBC -  Lab Results   Component Value Date    WBC 8.6 05/12/2025    HGB 14.7 05/12/2025    HCT 45.7 05/12/2025    MCV 95.8 05/12/2025     05/12/2025       CMP -  Lab Results   Component Value Date    CALCIUM 10.2 07/22/2025    PROT 7.1 05/12/2025    ALBUMIN 4.6 05/12/2025    AST 14 05/12/2025    ALT 13 05/12/2025    ALKPHOS 33 (L) 05/12/2025    BILITOT 1.0 05/12/2025       LIPID PANEL -   Lab Results   Component Value Date    CHOL 131 05/12/2025    TRIG 92 05/12/2025    HDL 46 05/12/2025    CHHDL 2.8 05/12/2025    LDLF 63 06/26/2023    VLDL 22 12/18/2024    NHDL 85 05/12/2025       RENAL FUNCTION PANEL -   Lab Results   Component Value Date    GLUCOSE 72 07/22/2025     07/22/2025    K 4.3 07/22/2025     07/22/2025    CO2 25 07/22/2025    ANIONGAP 13 07/22/2025    BUN 26 (H) 07/22/2025    CREATININE 0.96 07/22/2025    GFRMALE 71 06/26/2023    CALCIUM 10.2 07/22/2025    ALBUMIN 4.6 05/12/2025        Lab Results   Component Value Date    HGBA1C 5.9 (H) 05/12/2025       Last Cardiology Tests:  ECG:  ECG obtained today and reviewed by me shows a sinus bradycardia with right bundle branch block and left anterior fascicular block with a ventricular rate of 58 bpm.  This is unchanged from prior ECG 7/22/2025      Echo:  Transthoracic " "Echo Complete 05/14/2025      Ejection Fractions:  EF   Date/Time Value Ref Range Status   05/14/2025 10:17 AM 58 %        Cath:  No results found for this or any previous visit from the past 1095 days.      Stress Test:  No results found for this or any previous visit from the past 1095 days.      Cardiac Imaging:  No results found for this or any previous visit from the past 1095 days.            Assessment/Plan   Mr. Armijo is a pleasant 75-year-old white gentleman with a history of questionable hypertension, dyslipidemia and type 2 diabetes..  More recently he is experiencing positional lightheadedness/near syncope and  \"weak\" heart dyspnea with exertion.  Echocardiogram is unremarkable.  Carotid ultrasound is unremarkable.  He is waiting scheduling for Holter monitoring.  Vital signs are stable although he is orthostatic when standing.  ECG shows a bifascicular block.  Fasting lipid panel is at goal on high intensity statin therapy.  Given his risk factors for coronary artery disease as well as poor exercise tolerance we recommend a nuclear stress test.  This will be scheduled in the next several days.  He is agreeable.  In the interim I have asked that he discontinue low-dose blood pressure.  Will obtain the Holter monitor as ordered and follow-up 1 week after his diagnostic testing is completed.  He does know to call for any interim concerns or questions.  He also knows to call 911 for any abrupt change in his symptoms.  All other medications will remain unchanged for now.    Phone communication may be via Jose Enrique [phone] or Sandi [phone] and permission was given to me by the patient        MOISES Silva         [1]   Family History  Problem Relation Name Age of Onset    Glaucoma Mother Grace Armijo     Cataracts Mother Grace Armijo     Cancer Father Jose Cruz Armijo     Other (Abdominal Aortic Aneurysm) Brother      Schizophrenia Brother       "

## 2025-08-07 ENCOUNTER — TREATMENT (OUTPATIENT)
Dept: PHYSICAL THERAPY | Facility: CLINIC | Age: 75
End: 2025-08-07
Payer: COMMERCIAL

## 2025-08-07 VITALS — DIASTOLIC BLOOD PRESSURE: 70 MMHG | SYSTOLIC BLOOD PRESSURE: 108 MMHG | HEART RATE: 97 BPM

## 2025-08-07 DIAGNOSIS — R26.9 ABNORMAL GAIT: Primary | ICD-10-CM

## 2025-08-07 DIAGNOSIS — E11.42 DIABETIC PERIPHERAL NEUROPATHY (MULTI): ICD-10-CM

## 2025-08-07 DIAGNOSIS — M62.469 GASTROCNEMIUS EQUINUS, UNSPECIFIED LATERALITY: ICD-10-CM

## 2025-08-07 DIAGNOSIS — Q66.70 PES CAVUS: ICD-10-CM

## 2025-08-07 DIAGNOSIS — M79.2 NEUROPATHIC PAIN: ICD-10-CM

## 2025-08-07 DIAGNOSIS — G62.89 OTHER POLYNEUROPATHY: ICD-10-CM

## 2025-08-07 DIAGNOSIS — E11.610 CHARCOT FOOT DUE TO DIABETES MELLITUS (MULTI): ICD-10-CM

## 2025-08-07 PROCEDURE — 97112 NEUROMUSCULAR REEDUCATION: CPT | Mod: GP | Performed by: PHYSICAL THERAPIST

## 2025-08-07 PROCEDURE — 97110 THERAPEUTIC EXERCISES: CPT | Mod: GP | Performed by: PHYSICAL THERAPIST

## 2025-08-07 NOTE — PROGRESS NOTES
"Physical Therapy    Physical Therapy Treatment    Patient Name: Truong Armijo  \"Kirby\"  MRN: [id number]  Today's Date: 8/7/2025  [facility]  Visit number=4  Primary dx.=abnormal gait  Time Entry:   Time Calculation  Start Time: 1730  Stop Time: 1825  Time Calculation (min): 55 min     PT Therapeutic Procedures Time Entry  Therapeutic Exercise Time Entry: 30  Neuromuscular Re-Education Time Entry: 25       Assessment:  Pt. Tends to benefit from hip and knee strategies during balance exercises to compensate for impaired ankle strategies as a result of structural deformities in feet particularly on the right side.  The patient was significantly challenged with quadruped core strengthening exercises with reduced core control noted with bird dog, therefore exercise was modified to hip extension only.  The patient reports feeling overwhelmed with life factors, therefore encouraged pt. To complete static balance as a part of his ADL routine.     Plan: add LE strengthening,review HEP as needed.        Current Problem  1. Abnormal gait  Follow Up In Physical Therapy      2. Neuropathic pain  Follow Up In Physical Therapy      3. Pes cavus  Follow Up In Physical Therapy      4. Gastrocnemius equinus, unspecified laterality  Follow Up In Physical Therapy      5. Charcot foot due to diabetes mellitus (Multi)  Follow Up In Physical Therapy      6. Diabetic peripheral neuropathy (Multi)  Follow Up In Physical Therapy      7. Other polyneuropathy                    Subjective    Pt. Reports that he was in so much pain that he got new shoes.  He got them at a smaller size at a 10 and not an 11. He was in a lot of pain and had the idea to get another pair of shoes.  He went on a field trip last Friday and used his walking stick.  He is going to get a heart monitor tomorrow.  He has been distracted so he didn't do his exercises as regularly as he wanted to. He did a workout at the gym on Sunday, he did the resistance machines and felt " "like he got a good workout    Precautions: mild fall risk     Vital Signs: not taken  Vital Signs  Heart Rate: 97  BP: 108/70  BP Location: Left arm  BP Method: Automatic  Patient Position: Standing  Pain: not in pain at home, he just noticed he has thick socks and home and just walking to the mailbox within a little way that feels stiff       Objective     Pt. Arrived to visit with [facility]          Treatments:    Neuro re-Ed:  -static corner balance on firm surface   -TS 5\" on R and 30\" on L   -SLS x3 trials on each side  -static corner balance on foam surface   -MTS EO 30\"    -MTS EO horizontal HT x10   -MTS EO vertical HT x10   -FT EC 30\"x1     There Ex:   -standing rows with BTB x15 with 5\"hold  -standing pulldowns with GTB x15 with 5\" hold  -HABD with partial squat x10 with GTB   -quadruped alt. LE and UE reaches x6  -quadruped LE hip extensions x10 alt.     OP EDUCATION:  -see treatment  -sit<>Stands, horizontal HT with gait  Access Code: [email or web address]  URL: [email or web address]  Date: 07/31/2025  Prepared by: Sissy Noyola    Exercises  - Seated Row  - 3 x weekly - 2-3 sets - 10-15 reps - 3 hold  - Shoulder extension with resistance - Neutral  - 3 x weekly - 2-3 sets - 10-15 reps - 3 hold  - Partial squat with band pull apart  - 3 x weekly - 2-3 sets - 10 reps  -SLS and tandem stance       Goals:  Active       PT Problem       PT Goal 1: Pt. Will improve EC on foam condition of MCTSIB to indicate improved utilization of vestibular cues for balance by end of POC.        Start:  06/30/25    Expected End:  09/24/25            PT Goal 2: Pt. Will demonstrate improved single leg balance to increase safety/decrease fall risk during ADLs such as donning/doffing shoes and pants.        Start:  06/30/25    Expected End:  09/24/25            PT Goal 3: Pt. Will be independent in HEP in next 1-2 visits to promote self efficacy, manage symptoms and meet other LTG.        Start:  06/30/25    " "Expected End:  09/24/25            PT Goal 4: Pt. will be able to complete the full 6MWT by end of POC       Start:  06/30/25    Expected End:  09/24/25            Patient Stated Goal 1: Pt. Goal: \" be able to walk longer distances, minimize pain\"       Start:  06/30/25    Expected End:  09/24/25                  "

## 2025-08-08 ENCOUNTER — ANCILLARY PROCEDURE (OUTPATIENT)
Dept: CARDIOLOGY | Facility: CLINIC | Age: 75
End: 2025-08-08
Payer: COMMERCIAL

## 2025-08-08 DIAGNOSIS — R42 DIZZINESS: ICD-10-CM

## 2025-08-08 DIAGNOSIS — R55 PRE-SYNCOPE: ICD-10-CM

## 2025-08-08 PROCEDURE — 93246 EXT ECG>7D<15D RECORDING: CPT

## 2025-08-11 DIAGNOSIS — E29.1 HYPOGONADISM MALE: ICD-10-CM

## 2025-08-12 ENCOUNTER — TELEPHONE (OUTPATIENT)
Facility: CLINIC | Age: 75
End: 2025-08-12

## 2025-08-12 ENCOUNTER — OFFICE VISIT (OUTPATIENT)
Dept: BEHAVIORAL HEALTH | Facility: CLINIC | Age: 75
End: 2025-08-12
Payer: COMMERCIAL

## 2025-08-12 VITALS
HEART RATE: 74 BPM | WEIGHT: 195 LBS | TEMPERATURE: 97.9 F | BODY MASS INDEX: 25.73 KG/M2 | SYSTOLIC BLOOD PRESSURE: 131 MMHG | RESPIRATION RATE: 20 BRPM | DIASTOLIC BLOOD PRESSURE: 80 MMHG

## 2025-08-12 DIAGNOSIS — F41.8 MIXED ANXIETY DEPRESSIVE DISORDER: ICD-10-CM

## 2025-08-12 PROCEDURE — 99214 OFFICE O/P EST MOD 30 MIN: CPT | Performed by: PSYCHIATRY & NEUROLOGY

## 2025-08-12 PROCEDURE — 3075F SYST BP GE 130 - 139MM HG: CPT | Performed by: PSYCHIATRY & NEUROLOGY

## 2025-08-12 PROCEDURE — 1159F MED LIST DOCD IN RCRD: CPT | Performed by: PSYCHIATRY & NEUROLOGY

## 2025-08-12 PROCEDURE — 1125F AMNT PAIN NOTED PAIN PRSNT: CPT | Performed by: PSYCHIATRY & NEUROLOGY

## 2025-08-12 PROCEDURE — 99214 OFFICE O/P EST MOD 30 MIN: CPT | Mod: AM | Performed by: PSYCHIATRY & NEUROLOGY

## 2025-08-12 PROCEDURE — 3079F DIAST BP 80-89 MM HG: CPT | Performed by: PSYCHIATRY & NEUROLOGY

## 2025-08-12 RX ORDER — ASCORBIC ACID 500 MG
500 TABLET ORAL DAILY
COMMUNITY

## 2025-08-12 ASSESSMENT — PAIN SCALES - GENERAL: PAINLEVEL_OUTOF10: 2

## 2025-08-12 ASSESSMENT — ENCOUNTER SYMPTOMS
DEPRESSED MOOD: 1
DYSPHORIC MOOD: 1
NERVOUS/ANXIOUS: 1

## 2025-08-13 RX ORDER — TESTOSTERONE 20.25 MG/1.25G
2 GEL TOPICAL DAILY
Qty: 75 G | Refills: 0 | Status: SHIPPED | OUTPATIENT
Start: 2025-08-13

## 2025-08-14 ENCOUNTER — TREATMENT (OUTPATIENT)
Dept: PHYSICAL THERAPY | Facility: CLINIC | Age: 75
End: 2025-08-14
Payer: COMMERCIAL

## 2025-08-14 DIAGNOSIS — E11.42 DIABETIC PERIPHERAL NEUROPATHY (MULTI): ICD-10-CM

## 2025-08-14 DIAGNOSIS — R26.9 ABNORMAL GAIT: Primary | ICD-10-CM

## 2025-08-14 DIAGNOSIS — M79.2 NEUROPATHIC PAIN: ICD-10-CM

## 2025-08-14 DIAGNOSIS — M62.469 GASTROCNEMIUS EQUINUS, UNSPECIFIED LATERALITY: ICD-10-CM

## 2025-08-14 DIAGNOSIS — Q66.70 PES CAVUS: ICD-10-CM

## 2025-08-14 DIAGNOSIS — E11.610 CHARCOT FOOT DUE TO DIABETES MELLITUS (MULTI): ICD-10-CM

## 2025-08-14 PROCEDURE — 97110 THERAPEUTIC EXERCISES: CPT | Mod: GP | Performed by: PHYSICAL THERAPIST

## 2025-08-14 PROCEDURE — 97112 NEUROMUSCULAR REEDUCATION: CPT | Mod: GP | Performed by: PHYSICAL THERAPIST

## 2025-08-16 ENCOUNTER — PROCEDURE VISIT (OUTPATIENT)
Dept: SLEEP MEDICINE | Facility: CLINIC | Age: 75
End: 2025-08-16
Payer: COMMERCIAL

## 2025-08-16 VITALS
RESPIRATION RATE: 16 BRPM | HEIGHT: 73 IN | DIASTOLIC BLOOD PRESSURE: 79 MMHG | SYSTOLIC BLOOD PRESSURE: 115 MMHG | OXYGEN SATURATION: 96 % | HEART RATE: 64 BPM | WEIGHT: 197.53 LBS | BODY MASS INDEX: 26.18 KG/M2

## 2025-08-16 DIAGNOSIS — G47.30 SLEEP DISORDER BREATHING: ICD-10-CM

## 2025-08-16 DIAGNOSIS — G47.9 SLEEP DISORDER, UNSPECIFIED: ICD-10-CM

## 2025-08-16 PROCEDURE — 95810 POLYSOM 6/> YRS 4/> PARAM: CPT | Performed by: GENERAL PRACTICE

## 2025-08-18 ENCOUNTER — TELEPHONE (OUTPATIENT)
Dept: CARDIOLOGY | Facility: HOSPITAL | Age: 75
End: 2025-08-18
Payer: COMMERCIAL

## 2025-08-18 DIAGNOSIS — H40.1132 PRIMARY OPEN ANGLE GLAUCOMA (POAG) OF BOTH EYES, MODERATE STAGE: ICD-10-CM

## 2025-08-18 RX ORDER — LATANOPROST OPHTHALMIC SOLUTION 0.005% 50 UG/ML
1 SOLUTION/ DROPS TOPICAL NIGHTLY
Qty: 30 EACH | Refills: 11 | Status: SHIPPED | OUTPATIENT
Start: 2025-08-18 | End: 2026-08-18

## 2025-08-19 ENCOUNTER — HOSPITAL ENCOUNTER (OUTPATIENT)
Dept: RADIOLOGY | Facility: HOSPITAL | Age: 75
Discharge: HOME | End: 2025-08-19
Payer: COMMERCIAL

## 2025-08-19 ENCOUNTER — HOSPITAL ENCOUNTER (OUTPATIENT)
Dept: CARDIOLOGY | Facility: HOSPITAL | Age: 75
Discharge: HOME | End: 2025-08-19
Payer: COMMERCIAL

## 2025-08-19 ENCOUNTER — TREATMENT (OUTPATIENT)
Dept: PHYSICAL THERAPY | Facility: CLINIC | Age: 75
End: 2025-08-19
Payer: COMMERCIAL

## 2025-08-19 DIAGNOSIS — E11.42 DIABETIC PERIPHERAL NEUROPATHY (MULTI): ICD-10-CM

## 2025-08-19 DIAGNOSIS — E11.610 CHARCOT FOOT DUE TO DIABETES MELLITUS (MULTI): ICD-10-CM

## 2025-08-19 DIAGNOSIS — M79.2 NEUROPATHIC PAIN: ICD-10-CM

## 2025-08-19 DIAGNOSIS — Q66.70 PES CAVUS: ICD-10-CM

## 2025-08-19 DIAGNOSIS — R94.31 ABNORMAL ELECTROCARDIOGRAM (ECG) (EKG): ICD-10-CM

## 2025-08-19 DIAGNOSIS — R26.9 ABNORMAL GAIT: ICD-10-CM

## 2025-08-19 DIAGNOSIS — R07.89 OTHER CHEST PAIN: ICD-10-CM

## 2025-08-19 DIAGNOSIS — M62.469 GASTROCNEMIUS EQUINUS, UNSPECIFIED LATERALITY: ICD-10-CM

## 2025-08-19 DIAGNOSIS — R26.89 IMBALANCE: Primary | ICD-10-CM

## 2025-08-19 DIAGNOSIS — R06.02 SOB (SHORTNESS OF BREATH): ICD-10-CM

## 2025-08-19 DIAGNOSIS — R53.1 GENERALIZED WEAKNESS: ICD-10-CM

## 2025-08-19 PROCEDURE — 3430000001 HC RX 343 DIAGNOSTIC RADIOPHARMACEUTICALS: Performed by: NURSE PRACTITIONER

## 2025-08-19 PROCEDURE — 78452 HT MUSCLE IMAGE SPECT MULT: CPT

## 2025-08-19 PROCEDURE — 93018 CV STRESS TEST I&R ONLY: CPT | Performed by: INTERNAL MEDICINE

## 2025-08-19 PROCEDURE — 97110 THERAPEUTIC EXERCISES: CPT | Mod: GP | Performed by: PHYSICAL THERAPIST

## 2025-08-19 PROCEDURE — A9502 TC99M TETROFOSMIN: HCPCS | Performed by: NURSE PRACTITIONER

## 2025-08-19 PROCEDURE — 97116 GAIT TRAINING THERAPY: CPT | Mod: GP | Performed by: PHYSICAL THERAPIST

## 2025-08-19 PROCEDURE — 78452 HT MUSCLE IMAGE SPECT MULT: CPT | Performed by: INTERNAL MEDICINE

## 2025-08-19 PROCEDURE — 93017 CV STRESS TEST TRACING ONLY: CPT

## 2025-08-19 PROCEDURE — 93016 CV STRESS TEST SUPVJ ONLY: CPT | Performed by: INTERNAL MEDICINE

## 2025-08-19 RX ADMIN — TETROFOSMIN 34 MILLICURIE: 0.23 INJECTION, POWDER, LYOPHILIZED, FOR SOLUTION INTRAVENOUS at 10:53

## 2025-08-19 RX ADMIN — TETROFOSMIN 11.1 MILLICURIE: 0.23 INJECTION, POWDER, LYOPHILIZED, FOR SOLUTION INTRAVENOUS at 09:06

## 2025-08-21 ENCOUNTER — APPOINTMENT (OUTPATIENT)
Dept: PHYSICAL THERAPY | Facility: CLINIC | Age: 75
End: 2025-08-21
Payer: COMMERCIAL

## 2025-08-22 DIAGNOSIS — E11.9 TYPE 2 DIABETES MELLITUS WITHOUT COMPLICATION, WITHOUT LONG-TERM CURRENT USE OF INSULIN: ICD-10-CM

## 2025-08-22 RX ORDER — GLIPIZIDE 5 MG/1
10 TABLET ORAL
Qty: 360 TABLET | Refills: 0 | Status: SHIPPED | OUTPATIENT
Start: 2025-08-22

## 2025-08-25 DIAGNOSIS — E11.9 TYPE 2 DIABETES MELLITUS WITHOUT COMPLICATIONS: ICD-10-CM

## 2025-08-26 RX ORDER — METFORMIN HYDROCHLORIDE 500 MG/1
1000 TABLET ORAL 2 TIMES DAILY
Qty: 360 TABLET | Refills: 0 | Status: SHIPPED | OUTPATIENT
Start: 2025-08-26

## 2025-09-02 ENCOUNTER — APPOINTMENT (OUTPATIENT)
Dept: OPHTHALMOLOGY | Facility: CLINIC | Age: 75
End: 2025-09-02
Payer: COMMERCIAL

## 2025-09-02 DIAGNOSIS — E03.9 HYPOTHYROIDISM, UNSPECIFIED: ICD-10-CM

## 2025-09-02 RX ORDER — LEVOTHYROXINE SODIUM 100 UG/1
100 TABLET ORAL DAILY
Qty: 90 TABLET | Refills: 1 | Status: SHIPPED | OUTPATIENT
Start: 2025-09-02

## 2025-09-02 ASSESSMENT — REFRACTION_WEARINGRX
OD_SPHERE: -2.00
OD_ADD: +2.75
OS_AXIS: 005
OS_ADD: +2.75
OS_CYLINDER: -0.75
OS_SPHERE: -2.50
OD_CYLINDER: -1.00
OD_AXIS: 090

## 2025-09-02 ASSESSMENT — EXTERNAL EXAM - RIGHT EYE: OD_EXAM: NORMAL

## 2025-09-02 ASSESSMENT — TONOMETRY
OD_IOP_MMHG: 16
OS_IOP_MMHG: 16
IOP_METHOD: GOLDMANN APPLANATION

## 2025-09-02 ASSESSMENT — PACHYMETRY
OS_CT(UM): 590
OD_CT(UM): 623

## 2025-09-02 ASSESSMENT — VISUAL ACUITY
CORRECTION_TYPE: GLASSES
OS_CC: 20/40+2
METHOD: SNELLEN - LINEAR
OD_CC: 20/20

## 2025-09-02 ASSESSMENT — EXTERNAL EXAM - LEFT EYE: OS_EXAM: NORMAL

## 2025-09-02 ASSESSMENT — SLIT LAMP EXAM - LIDS
COMMENTS: DERMATOCHALASIS
COMMENTS: DERMATOCHALASIS

## 2025-09-02 ASSESSMENT — CUP TO DISC RATIO
OS_RATIO: 0.7
OD_RATIO: 0.5

## 2025-09-04 ENCOUNTER — OFFICE VISIT (OUTPATIENT)
Facility: CLINIC | Age: 75
End: 2025-09-04
Payer: COMMERCIAL

## 2025-09-04 VITALS
SYSTOLIC BLOOD PRESSURE: 123 MMHG | BODY MASS INDEX: 25.74 KG/M2 | WEIGHT: 194.2 LBS | DIASTOLIC BLOOD PRESSURE: 83 MMHG | HEIGHT: 73 IN | HEART RATE: 79 BPM | OXYGEN SATURATION: 96 % | RESPIRATION RATE: 18 BRPM

## 2025-09-04 DIAGNOSIS — G47.30 SLEEP DISORDER BREATHING: Primary | ICD-10-CM

## 2025-09-04 DIAGNOSIS — F51.04 CHRONIC INSOMNIA: ICD-10-CM

## 2025-09-04 DIAGNOSIS — E66.3 OVERWEIGHT (BMI 25.0-29.9): ICD-10-CM

## 2025-09-04 PROCEDURE — 1160F RVW MEDS BY RX/DR IN RCRD: CPT | Performed by: GENERAL PRACTICE

## 2025-09-04 PROCEDURE — 1036F TOBACCO NON-USER: CPT | Performed by: GENERAL PRACTICE

## 2025-09-04 PROCEDURE — 99214 OFFICE O/P EST MOD 30 MIN: CPT | Performed by: GENERAL PRACTICE

## 2025-09-04 PROCEDURE — 1159F MED LIST DOCD IN RCRD: CPT | Performed by: GENERAL PRACTICE

## 2025-09-04 PROCEDURE — 3008F BODY MASS INDEX DOCD: CPT | Performed by: GENERAL PRACTICE

## 2025-09-04 PROCEDURE — G8433 SCR FOR DEP NOT CPT DOC RSN: HCPCS | Performed by: GENERAL PRACTICE

## 2025-09-04 PROCEDURE — 3079F DIAST BP 80-89 MM HG: CPT | Performed by: GENERAL PRACTICE

## 2025-09-04 PROCEDURE — 3074F SYST BP LT 130 MM HG: CPT | Performed by: GENERAL PRACTICE

## 2025-09-04 PROCEDURE — G2211 COMPLEX E/M VISIT ADD ON: HCPCS | Performed by: GENERAL PRACTICE

## 2025-09-04 ASSESSMENT — PATIENT HEALTH QUESTIONNAIRE - PHQ9
SUM OF ALL RESPONSES TO PHQ9 QUESTIONS 1 AND 2: 0
2. FEELING DOWN, DEPRESSED OR HOPELESS: NOT AT ALL
1. LITTLE INTEREST OR PLEASURE IN DOING THINGS: NOT AT ALL

## 2025-09-04 ASSESSMENT — ENCOUNTER SYMPTOMS
OCCASIONAL FEELINGS OF UNSTEADINESS: 1
LOSS OF SENSATION IN FEET: 1
DEPRESSION: 0

## 2025-09-18 ENCOUNTER — APPOINTMENT (OUTPATIENT)
Dept: OPHTHALMOLOGY | Facility: CLINIC | Age: 75
End: 2025-09-18
Payer: COMMERCIAL

## 2025-09-24 ENCOUNTER — APPOINTMENT (OUTPATIENT)
Dept: PRIMARY CARE | Facility: CLINIC | Age: 75
End: 2025-09-24
Payer: COMMERCIAL

## 2025-10-24 ENCOUNTER — APPOINTMENT (OUTPATIENT)
Dept: OPHTHALMOLOGY | Facility: CLINIC | Age: 75
End: 2025-10-24
Payer: COMMERCIAL

## 2025-12-19 ENCOUNTER — APPOINTMENT (OUTPATIENT)
Dept: OPHTHALMOLOGY | Facility: CLINIC | Age: 75
End: 2025-12-19
Payer: COMMERCIAL

## 2026-01-13 ENCOUNTER — APPOINTMENT (OUTPATIENT)
Dept: OPHTHALMOLOGY | Facility: CLINIC | Age: 76
End: 2026-01-13
Payer: COMMERCIAL